# Patient Record
Sex: FEMALE | Race: WHITE | NOT HISPANIC OR LATINO | Employment: OTHER | URBAN - METROPOLITAN AREA
[De-identification: names, ages, dates, MRNs, and addresses within clinical notes are randomized per-mention and may not be internally consistent; named-entity substitution may affect disease eponyms.]

---

## 2017-02-07 ASSESSMENT — ENCOUNTER SYMPTOMS
NECK PAIN: 1
NAIL CHANGES: 0
STIFFNESS: 1
COUGH: 1
TROUBLE SWALLOWING: 0
INSOMNIA: 1
DYSPNEA ON EXERTION: 0
WEIGHT LOSS: 0
RESPIRATORY PAIN: 0
SINUS PAIN: 1
WEIGHT GAIN: 1
TASTE DISTURBANCE: 0
POLYDIPSIA: 0
COUGH DISTURBING SLEEP: 0
HOARSE VOICE: 0
DEPRESSION: 1
HEMOPTYSIS: 0
POSTURAL DYSPNEA: 0
CHILLS: 0
DECREASED APPETITE: 0
MUSCLE WEAKNESS: 1
NIGHT SWEATS: 0
WHEEZING: 0
BACK PAIN: 1
NECK MASS: 0
HALLUCINATIONS: 0
SMELL DISTURBANCE: 0
DECREASED CONCENTRATION: 0
NERVOUS/ANXIOUS: 1
FATIGUE: 1
SNORES LOUDLY: 0
INCREASED ENERGY: 1
SKIN CHANGES: 0
POOR WOUND HEALING: 0
SPUTUM PRODUCTION: 0
SHORTNESS OF BREATH: 0
JOINT SWELLING: 0
PANIC: 0
POLYPHAGIA: 1
MYALGIAS: 1
SINUS CONGESTION: 0
ARTHRALGIAS: 1
FEVER: 0
SORE THROAT: 0
ALTERED TEMPERATURE REGULATION: 0
MUSCLE CRAMPS: 1

## 2017-02-07 ASSESSMENT — ACTIVITIES OF DAILY LIVING (ADL)
DO_MEMBERS_OF_YOUR_HOUSEHOLD_USE_SUNSCREEN?: N
DO_MEMBERS_OF_YOUR_HOUSEHOLD_WEAR_SEAT_BELTS?: Y
ARE_THERE_SMOKE_DETECTORS_IN_YOUR_HOME?: Y
DO_MEMBERS_OF_YOUR_HOUSEHOLD_USE_SAFETY_HELMETS?: Y
ARE_THERE_CARBON_MONOXIDE_DETECTORS_IN_YOUR_HOME?: Y
ARE_THERE_FIREARMS_IN_YOUR_HOME?: N

## 2017-02-08 ENCOUNTER — OFFICE VISIT (OUTPATIENT)
Dept: INTERNAL MEDICINE | Facility: CLINIC | Age: 67
End: 2017-02-08

## 2017-02-08 VITALS
SYSTOLIC BLOOD PRESSURE: 107 MMHG | WEIGHT: 136.8 LBS | BODY MASS INDEX: 22.79 KG/M2 | HEIGHT: 65 IN | HEART RATE: 73 BPM | DIASTOLIC BLOOD PRESSURE: 69 MMHG

## 2017-02-08 DIAGNOSIS — Z12.11 SCREEN FOR COLON CANCER: ICD-10-CM

## 2017-02-08 DIAGNOSIS — M85.80 OSTEOPENIA: Primary | ICD-10-CM

## 2017-02-08 DIAGNOSIS — F33.41 RECURRENT MAJOR DEPRESSIVE DISORDER, IN PARTIAL REMISSION (H): ICD-10-CM

## 2017-02-08 DIAGNOSIS — Z78.0 ASYMPTOMATIC MENOPAUSAL STATE: ICD-10-CM

## 2017-02-08 DIAGNOSIS — Z23 NEED FOR ZOSTAVAX ADMINISTRATION: ICD-10-CM

## 2017-02-08 DIAGNOSIS — Z12.31 VISIT FOR SCREENING MAMMOGRAM: ICD-10-CM

## 2017-02-08 DIAGNOSIS — Z91.89 OTHER SPECIFIED PERSONAL RISK FACTORS, NOT ELSEWHERE CLASSIFIED: ICD-10-CM

## 2017-02-08 DIAGNOSIS — Z11.59 NEED FOR HEPATITIS C SCREENING TEST: ICD-10-CM

## 2017-02-08 DIAGNOSIS — Z13.220 SCREENING FOR HYPERLIPIDEMIA: ICD-10-CM

## 2017-02-08 DIAGNOSIS — M34.9 LIMITED SYSTEMIC SCLEROSIS (H): ICD-10-CM

## 2017-02-08 ASSESSMENT — PAIN SCALES - GENERAL: PAINLEVEL: NO PAIN (0)

## 2017-02-08 NOTE — PROGRESS NOTES
"  SUBJECTIVE:                                                      Stephanie Bhatia is a 66 year old female with a history of systemic sclerosis, osteopenia, anxiety, and lumbar radiculopathy who presents to clinic today for the following health issues:     The patient presents to clinic today for an annual physical. The patient reports that she has generally been well. Her most significant complaint at this time is \"normal aging\", stating that she has diffuse joint pains and aches which she manages with Aleve. She feels that her mood has been low secondary to this, worse than it has been in some time. Her primary symptom is fatigue and low energy. She expresses that she does not wish to resume medication for depression, but does request contact information for talk therapy. She feels that her sleep has been disturbed secondary to this (difficulty with sleep initiation), for which she has been using Advil PM. However, she does not wish to continue this medication as she is sedated in the morning. She also feels loss of motivation, though she is able to function at work and take care of her pet. The patient otherwise denies any recent illnesses, fevers, chills, cough, sore throat, headaches, rashes, nausea, vomiting, constipation, or diarrhea, and states that she has otherwise been healthy. She does not voice any additional concerns at this time.     Problem list and histories reviewed & adjusted, as indicated.    Patient Active Problem List   Diagnosis     Raynaud phenomenon     Anxiety     Osteopenia     Lumbar radiculopathy     Advanced directives, counseling/discussion     CARDIOVASCULAR SCREENING; LDL GOAL LESS THAN 160     Cluster headache syndrome     Dizziness and giddiness     Systemic sclerosis with limited cutaneous involvement (H)     Tinea pedis     AK (actinic keratosis)     Benign neoplasm of other specified sites of skin     Cherry hemangioma     Angular cheilitis     Past Surgical History   Procedure " "Laterality Date     Hysteroscopic placement contraceptive device         Social History   Substance Use Topics     Smoking status: Former Smoker -- 1.00 packs/day for 10 years     Types: Cigarettes     Quit date: 11/26/1986     Smokeless tobacco: Never Used     Alcohol Use: No     Family History   Problem Relation Age of Onset     C.A.D. Father      a efw small heart attacks     Neurologic Disorder Father      dementia     Neurologic Disorder Mother      dementia     Breast Cancer No family hx of      Cancer - colorectal No family hx of      DIABETES No family hx of      CANCER No family hx of      no skin cancer         Current Outpatient Prescriptions   Medication Sig Dispense Refill     Cholecalciferol (VITAMIN D) 1000 UNITS capsule Take 1 capsule by mouth daily       desonide (DESOWEN) 0.05 % ointment Apply topically 2 times daily To rash on the corners of the mouth mixed with nystatin as needed until clear. 60 g 11     nystatin (MYCOSTATIN) ointment Apply topically 2 times daily To rash at the corners of the mouth mixed with desonide until clear. 30 g 11     acyclovir (ZOVIRAX) 400 MG tablet Take 1 tablet (400 mg) by mouth 3 times daily 15 tablet 5     No Known Allergies  BP Readings from Last 3 Encounters:   02/05/16 104/69   12/02/15 92/63   12/18/14 122/80    Wt Readings from Last 3 Encounters:   02/05/16 60.328 kg (133 lb)   12/02/15 62.642 kg (138 lb 1.6 oz)   02/24/15 61.689 kg (136 lb)         Labs reviewed in EPIC.     Problem list, Medication list, Allergies, and Medical/Social/Surgical histories reviewed in [facility] and updated as appropriate.    ROS:  10 point ROS neg other than the symptoms noted above in the HPI.     OBJECTIVE:                                                    /69 mmHg  Pulse 73  Ht 1.657 m (5' 5.25\")  Wt 62.052 kg (136 lb 12.8 oz)  BMI 22.60 kg/m2   Body mass index is 22.6 kg/(m^2).     Physical Examination:   General: Well appearing, sitting upright in chair. No acute " distress. Pleasant and cooperative with examination.   HEENT: PERRL and EOMs intact bilaterally. No scleral icterus or conjunctival injection. Mucous membranes moist without erythema or exudates. Tympanic membranes clear bilaterally.   Neck: No thyromegaly. No lymphadenopathy.   Breast: symmetric breasts, no nipple discharge, no masses palpable, no axillary LN  Pulmonary: CTAB. No wheezes or rales.   Cardiovascular: Normal rate and rhythm. Normal S1 and S2. No murmurs, gallop, or rub. 2+ radial pulses bilaterally. No edema to the bilateral lower extremities.  Abdomen: Soft and non-tender to palpation throughout.   MSK: Fingers are sausage shaped with purple discoloration.   Neurological: Cranial nerves 2-12 intact. 5/5 strength throughout the upper and lower extremities bilaterally. 2+ biceps, patellar, and achilles reflexes. Sensation intact throughout all dermatomal distributions.    Psych: Pleasant affect. Normal mentation.      ASSESSMENT/PLAN:                                                      # Systemic Sclerosis   The patient reports no new or concerning symptoms related to this diagnosis. She has been seen in Rheumatology clinic in the past, and has been encouraged to follow up for her one year appointment. She has expressed that she will do so.   - Follow up in Rheumatology clinic as scheduled    # Anxiety / Depression   The patient reports recent worsening of her depressive symptoms (relatively well controlled throughout the past 20 years). She is not interested in medications at this time, though she would like to see a talk therapist. Melatonin was recommended as a non-sedating sleep aid, and she was encouraged to think about a 10,000 LUX light box to help with her circadian rhythm and seasonal depression.   - Referral for clinical psychology provided   - Melatonin for sleep  - Contact clinic with acutely worsening symptoms     # Healthcare Maintenance   - Contrast enhanced mammogram   - FIT test  provided to patient   - Schedule Dexa scan  - Check insurance coverage of Zostavax  - Referral to Travel Clinic provided for upcoming appointment  - Lipid screening today  - Hepatitis C screening today  - Information provided for advance directives / POLST    Follow Up: Return to primary care clinic in six months.      Priya Delgado, MS4 2/8/17    Scribe Disclosure:   I, Priya Delgado, am serving as a scribe; to document services personally performed by Dr. Cardenas based on data collection and the provider's statements to me.     Provider Disclosure:  I agree with above History, Review of Systems, Physical exam and Plan.  I have reviewed the content of the documentation and have edited it as needed. I have personally performed the services documented here and the documentation accurately represents those services and the decisions I have made.        The medical student acted as scribe and the encounter documented above was completely performed by myself.  Supervising Doctor Lilia Cardenas MD  [facility] PRIMARY [facility]INIC     Answers for HPI/ROS submitted by the patient on 2/7/2017   Annual Exam:  General Symptoms: Yes  Skin Symptoms: Yes  HENT Symptoms: Yes  EYE SYMPTOMS: No  HEART SYMPTOMS: No  LUNG SYMPTOMS: Yes  INTESTINAL SYMPTOMS: No  URINARY SYMPTOMS: No  GYNECOLOGIC SYMPTOMS: No  BREAST SYMPTOMS: No  SKELETAL SYMPTOMS: Yes  BLOOD SYMPTOMS: No  NERVOUS SYSTEM SYMPTOMS: No  MENTAL HEALTH SYMPTOMS: Yes  Fever: No  Loss of appetite: No  Weight loss: No  Weight gain: Yes  Fatigue: Yes  Night sweats: No  Chills: No  Increased stress: Yes  Excessive hunger: Yes  Excessive thirst: No  Feeling hot or cold when others believe the temperature is normal: No  Loss of height: No  Post-operative complications: No  Surgical site pain: No  Hallucinations: No  Change in or Loss of Energy: Yes  Hyperactivity: No  Confusion: No  Changes in hair: No  Changes in moles/birth marks: No  Itching: No  Rashes:  No  Changes in nails: No  Acne: No  Hair in places you don't want it: No  Change in facial hair: No  Warts: No  Non-healing sores: No  Scarring: No  Flaking of skin: No  Color changes of hands/feet in cold : Yes  Sun sensitivity: Yes  Skin thickening: Yes  Ear pain: No  Ear discharge: No  Hearing loss: No  Tinnitus: Yes  Nosebleeds: No  Congestion: No  Sinus pain: Yes  Trouble swallowing: No   Voice hoarseness: No  Mouth sores: No  Sore throat: No  Tooth pain: No  Gum tenderness: No  Bleeding gums: Yes  Change in taste: No  Change in sense of smell: No  Dry mouth: No  Hearing aid used: No  Neck lump: No  Cough: Yes  Sputum or phlegm: No  Coughing up blood: No  Difficulty breating or shortness of breath: No  Snoring: No  Wheezing: No  Difficulty breathing on exertion: No  Respiratory pain: No  Nighttime Cough: No  Difficulty breathing when lying flat: No  Back pain: Yes  Muscle aches: Yes  Neck pain: Yes  Swollen joints: No  Joint pain: Yes  Bone pain: No  Muscle cramps: Yes  Muscle weakness: Yes  Joint stiffness: Yes  Bone fracture: No  Nervous or Anxious: Yes  Depression: Yes  Trouble sleeping: Yes  Trouble thinking or concentrating: No  Mood changes: No  Panic attacks: No  Frequency of exercise:: 6-7 days/week  Duration of exercise:: 30-45 minutes

## 2017-02-08 NOTE — PROGRESS NOTES
The medical student acted as scribe and the encounter documented above was completely performed by myself.  Supervising Doctor Lilia Cardenas    Answers for HPI/ROS submitted by the patient on 2/7/2017   Annual Exam:  General Symptoms: Yes  Skin Symptoms: Yes  HENT Symptoms: Yes  EYE SYMPTOMS: No  HEART SYMPTOMS: No  LUNG SYMPTOMS: Yes  INTESTINAL SYMPTOMS: No  URINARY SYMPTOMS: No  GYNECOLOGIC SYMPTOMS: No  BREAST SYMPTOMS: No  SKELETAL SYMPTOMS: Yes  BLOOD SYMPTOMS: No  NERVOUS SYSTEM SYMPTOMS: No  MENTAL HEALTH SYMPTOMS: Yes  Fever: No  Loss of appetite: No  Weight loss: No  Weight gain: Yes  Fatigue: Yes  Night sweats: No  Chills: No  Increased stress: Yes  Excessive hunger: Yes  Excessive thirst: No  Feeling hot or cold when others believe the temperature is normal: No  Loss of height: No  Post-operative complications: No  Surgical site pain: No  Hallucinations: No  Change in or Loss of Energy: Yes  Hyperactivity: No  Confusion: No  Changes in hair: No  Changes in moles/birth marks: No  Itching: No  Rashes: No  Changes in nails: No  Acne: No  Hair in places you don't want it: No  Change in facial hair: No  Warts: No  Non-healing sores: No  Scarring: No  Flaking of skin: No  Color changes of hands/feet in cold : Yes  Sun sensitivity: Yes  Skin thickening: Yes  Ear pain: No  Ear discharge: No  Hearing loss: No  Tinnitus: Yes  Nosebleeds: No  Congestion: No  Sinus pain: Yes  Trouble swallowing: No   Voice hoarseness: No  Mouth sores: No  Sore throat: No  Tooth pain: No  Gum tenderness: No  Bleeding gums: Yes  Change in taste: No  Change in sense of smell: No  Dry mouth: No  Hearing aid used: No  Neck lump: No  Cough: Yes  Sputum or phlegm: No  Coughing up blood: No  Difficulty breating or shortness of breath: No  Snoring: No  Wheezing: No  Difficulty breathing on exertion: No  Respiratory pain: No  Nighttime Cough: No  Difficulty breathing when lying flat: No  Back pain: Yes  Muscle aches: Yes  Neck pain:  Yes  Swollen joints: No  Joint pain: Yes  Bone pain: No  Muscle cramps: Yes  Muscle weakness: Yes  Joint stiffness: Yes  Bone fracture: No  Nervous or Anxious: Yes  Depression: Yes  Trouble sleeping: Yes  Trouble thinking or concentrating: No  Mood changes: No  Panic attacks: No  Frequency of exercise:: 6-7 days/week  Duration of exercise:: 30-45 minutes

## 2017-02-08 NOTE — NURSING NOTE
Chief Complaint   Patient presents with     Physical     pt here for physical     Yasmin Álvarez CMA at 9:40 AM on 2/8/2017.

## 2017-02-08 NOTE — PATIENT INSTRUCTIONS
Primary Care Center Medication Refill Request Information:  * Please contact your pharmacy regarding ANY request for medication refills.  ** [facility] Prescription Fax = [phone]  * Please allow 3 business days for routine medication refills.  * Please allow 5 business days for controlled substance medication refills.     Primary Care Center Test Result notification information:  *You will be notified with in 7-10 days of your appointment day regarding the results of your test.  If you are on MyChart you will be notified as soon as the provider has reviewed the results and signed off on them.      Primary Care Center [phone] (4th Floor [facility])      Breast Center ([facility]) [phone] (2nd Floor [facility])    Breast Center ([facility]) [phone] ([address])   Rheumatology [phone] (3rd Floor [facility], 3-W)       Health Psychology (Dr. Brandyn Brunson) [phone] (4th Floor [facility])   Health Psychology (Dr. Margarita Oliver) [phone] (4th Floor [facility])  Health Psychology (Dr. Jessica Mari) [phone] (4th Floor [facility])    [facility]  Travel clinic    Mammogram Screening Tool    Mammogram   Does patient have a history of breast cancer? no  Does patient have breast implants? no  Reason for mammogram? Annual    DEXA Screening Tool    Dexa Scan  Is the patient taking any calcium supplements? no, if yes patient must stop calcium supplements 24 hours prior to appointment.

## 2017-02-08 NOTE — MR AVS SNAPSHOT
After Visit Summary   2/8/2017    Stephanie Bhatia    MRN: [id number]           Patient Information     Date Of Birth          1950        Visit Information        Provider Department      2/8/2017 9:55 AM Lilia Cardenas MD [facility]        Today's Diagnoses     Osteopenia    -  1     Screen for colon cancer         Need for hepatitis C screening test         Visit for screening mammogram         Recurrent major depressive disorder, in partial remission (H)         Screening for hyperlipidemia         Limited systemic sclerosis (H)         Need for Zostavax administration         Other specified personal risk factors, not elsewhere classified          Asymptomatic menopausal state            Care Instructions    Primary Care Center Medication Refill Request Information:  * Please contact your pharmacy regarding ANY request for medication refills.  ** [facility] Prescription Fax = [phone]  * Please allow 3 business days for routine medication refills.  * Please allow 5 business days for controlled substance medication refills.     Primary Care Center Test Result notification information:  *You will be notified with in 7-10 days of your appointment day regarding the results of your test.  If you are on MyChart you will be notified as soon as the provider has reviewed the results and signed off on them.      Primary Care Center [phone] (4th Floor [facility])      Breast Center ([facility]) [phone] (2nd Floor [facility])    Breast Center ([facility]) [phone] ([address])   Rheumatology [phone] (3rd Floor [facility], 3-W)       Health Psychology (Dr. Brandyn Brunson) [phone] (4th Floor [facility])   Health Psychology (Dr. Margarita Oliver) [phone] (4th Floor [facility])  Health Psychology (Dr. Jessica Mari) [phone] (4th Floor [facility])    [facility]  [facility] clinic    Mammogram Screening Tool    Mammogram    Does patient have a history of breast cancer? no  Does patient have breast implants? no  Reason for mammogram? Annual    DEXA Screening Tool    Dexa Scan  Is the patient taking any calcium supplements? no, if yes patient must stop calcium supplements 24 hours prior to appointment.          Follow-ups after your visit        Additional Services     [facility] Health Psychology Referral       Your provider has referred you to:  Health Psychology    Please be aware that coverage of these services is subject to the terms and limitations of your health insurance plan.  Call member services at your health plan with any benefit or coverage questions.      Please bring the following to your appointment:    >>   Any x-rays, CTs or MRIs which have been performed.  Contact the facility where they were done to arrange for  prior to your scheduled appointment.   >>   List of current medications   >>   This referral request   >>   Any documents/labs given to you for this referral            RHEUMATOLOGY REFERRAL       Your provider has referred you to: [facility]: Rheumatology Clinic [facility] [phone]   [email or web address]    Please be aware that coverage of these services is subject to the terms and limitations of your health insurance plan.  Call member services at your health plan with any benefit or coverage questions.      Please bring the following with you to your appointment:    (1) Any X-Rays, CTs or MRIs which have been performed.  Contact the facility where they were done to arrange for  prior to your scheduled appointment.    (2) List of current medications   (3) This referral request   (4) Any documents/labs given to you for this referral                  Follow-up notes from your care team     Return in about 6 months (around 8/8/2017).      Future tests that were ordered for you today     Open Future Orders        Priority Expected Expires Ordered    Dexa  "hip/pelvis/spine* Routine  2/8/2018 2/8/2017    Dexa vertebral fracture analysis Routine  2/8/2018 2/8/2017    Hepatitis C Screen Reflex to HCV RNA Quant and Genotype Routine 2/8/2017 2/8/2018 2/8/2017    Lipid panel reflex to direct LDL - -(Today) Routine 2/8/2017 2/8/2018 2/8/2017    Fecal colorectal cancer screen FIT - Future (S+30) Routine 3/1/2017 3/10/2017 2/8/2017    MA Contrast Enhanced Digital Mammogram Routine  2/8/2018 2/8/2017            Who to contact     Please call your clinic at [phone] to:    Ask questions about your health    Make or cancel appointments    Discuss your medicines    Learn about your test results    Speak to your doctor   If you have compliments or concerns about an experience at your clinic, or if you wish to file a complaint, please contact [facility] Patient Relations at [phone] or email us at [email or web address]         Additional Information About Your Visit        [employer] Information     [employer] gives you secure access to your electronic health record. If you see a primary care provider, you can also send messages to your care team and make appointments. If you have questions, please call your primary care clinic.  If you do not have a primary care provider, please call [phone] and they will assist you.      [employer] is an electronic gateway that provides easy, online access to your medical records. With [employer], you can request a clinic appointment, read your test results, renew a prescription or communicate with your care team.     To access your existing account, please contact your [facility] Clinic or call [phone] for assistance.        Care EveryWhere ID     This is your Care EveryWhere ID. This could be used by other organizations to access your [facility] medical records  [id number]        Your Vitals Were     Pulse Height BMI (Body Mass Index)             73 1.657 m (5' 5.25\") 22.60 kg/m2 "          Blood Pressure from Last 3 Encounters:   02/08/17 107/69   02/05/16 104/69   12/02/15 92/63    Weight from Last 3 Encounters:   02/08/17 62.052 kg (136 lb 12.8 oz)   02/05/16 60.328 kg (133 lb)   12/02/15 62.642 kg (138 lb 1.6 oz)              We Performed the Following     [facility] Health Psychology Referral     RHEUMATOLOGY REFERRAL     ZOSTER VACC LIVE SUBQ NJX        Primary Care Provider Office Phone # Fax #    Lilia Cardenas [phone]        [facility]IC[facility] [address]  [facility] [address]        Thank you!     Thank you for choosing [facility] PRIMARY [facility]ARE CLIN[facility]  for your care. Our goal is always to provide you with excellent care. Hearing back from our patients is one way we can continue to improve our services. Please take a few minutes to complete the written survey that you may receive in the mail after your visit with us. Thank you!             Your Updated Medication List - Protect others around you: Learn how to safely use, store and throw away your medicines at www.disposemymeds.org.          This list is accurate as of: 2/8/17 11:00 AM.  Always use your most recent med list.                   Brand Name Dispense Instructions for use    acyclovir 400 MG tablet    ZOVIRAX    15 tablet    Take 1 tablet (400 mg) by mouth 3 times daily       desonide 0.05 % ointment    DESOWEN    60 g    Apply topically 2 times daily To rash on the corners of the mouth mixed with nystatin as needed until clear.       nystatin ointment    MYCOSTATIN    30 g    Apply topically 2 times daily To rash at the corners of the mouth mixed with desonide until clear.       vitamin D 1000 UNITS capsule      Take 1 capsule by mouth daily

## 2017-02-10 DIAGNOSIS — Z12.11 SCREEN FOR COLON CANCER: ICD-10-CM

## 2017-02-10 PROCEDURE — 82274 ASSAY TEST FOR BLOOD FECAL: CPT | Performed by: INTERNAL MEDICINE

## 2017-02-12 LAB — HEMOCCULT STL QL IA: NEGATIVE

## 2017-02-13 DIAGNOSIS — Z11.59 NEED FOR HEPATITIS C SCREENING TEST: ICD-10-CM

## 2017-02-13 DIAGNOSIS — Z13.220 SCREENING FOR HYPERLIPIDEMIA: ICD-10-CM

## 2017-02-13 LAB
CHOLEST SERPL-MCNC: 202 MG/DL
HCV AB SERPL QL IA: NORMAL
HDLC SERPL-MCNC: 51 MG/DL
LDLC SERPL CALC-MCNC: 130 MG/DL
NONHDLC SERPL-MCNC: 151 MG/DL
TRIGL SERPL-MCNC: 105 MG/DL

## 2017-02-13 PROCEDURE — 36415 COLL VENOUS BLD VENIPUNCTURE: CPT | Performed by: FAMILY MEDICINE

## 2017-02-13 PROCEDURE — 80061 LIPID PANEL: CPT | Performed by: FAMILY MEDICINE

## 2017-02-13 PROCEDURE — 86803 HEPATITIS C AB TEST: CPT | Performed by: FAMILY MEDICINE

## 2017-02-23 ENCOUNTER — OFFICE VISIT (OUTPATIENT)
Dept: INTERNAL MEDICINE | Facility: CLINIC | Age: 67
End: 2017-02-23

## 2017-02-23 VITALS
HEART RATE: 68 BPM | OXYGEN SATURATION: 100 % | DIASTOLIC BLOOD PRESSURE: 69 MMHG | RESPIRATION RATE: 16 BRPM | SYSTOLIC BLOOD PRESSURE: 102 MMHG | BODY MASS INDEX: 22.87 KG/M2 | WEIGHT: 138.5 LBS

## 2017-02-23 DIAGNOSIS — Z71.89 OTHER SPECIFIED COUNSELING: Primary | Chronic | ICD-10-CM

## 2017-02-23 ASSESSMENT — PAIN SCALES - GENERAL: PAINLEVEL: MILD PAIN (2)

## 2017-02-23 NOTE — NURSING NOTE
Chief Complaint   Patient presents with     Travel Clinic     Patient is here to discuss what vaccines she needs for when she travels to [facility].     Sary Maria LPN at 3:19 PM on 2/23/2017.

## 2017-02-23 NOTE — PATIENT INSTRUCTIONS
Primary Care Center Medication Refill Request Information:  * Please contact your pharmacy regarding ANY request for medication refills.  ** [facility] Prescription Fax = [phone]  * Please allow 3 business days for routine medication refills.  * Please allow 5 business days for controlled substance medication refills.     Primary Care Center Test Result notification information:  *You will be notified with in 7-10 days of your appointment day regarding the results of your test.  If you are on MyChart you will be notified as soon as the provider has reviewed the results and signed off on them.    Primary Care Center Phone Number [phone]    Infectious Disease [phone] (3rd Floor [facility])        Creating a Medical Travel Kit    Every busy traveler should have a kit containing personal care and medical items that can be easily tossed into a suitcase.  Quantities can be adjusted for the length of travel, for destinations, and availability of replacement at your destination.    Plan to see your Travel medicine Clinic 4-6 weeks prior to departure as some vaccines require time to become effective.     Items to Pack:    -Basic antibiotics  -Mild laxatives: Metamucil or Senokot, available without a prescription.  -Anti-diarrheal medication: Imodium AD or Pepto Bismol, available in tablet form without a prescription.  -Medical thermometer  -Antacid tablets  -Band aides, guaze bandages, ace wrap, adhesive tape, antibiotic ointment  -Topical ointment or creams for rash or bites (over the counter hydrocortisone cream).    -Motion sickness: Dramamine or Antivert (Meclazine)tablets  -Aspirin, acetaminophen(tylenol) ibuprofen  -Cough medicine or drops  -Anti-fungal foot powder  -Nasal spray or decongestants  -Eyeglasses.  Take an extra pair or copy of your prescription.  _Insect repellent with 30% DEET time release, such as Ultrathon or Ramirez products. Available at [employer], [place], sporting goods  stores  -Permethrin clothing insecticide treatment.Available at [employer], [employer]  -Ear Plugs  -Sunscreen  -Sunglasses, Hat  -Safety pins, toilet paper, money belt, padlock, duct tape    Regular Medications should be left in original containers and carried in your carry on bag. Take enough with you for the entire trip. You might need a letter to give to your insurance to obtain a sufficient amount for your trip.      Carry with you the following info: Known allergies, medical conditions, medications, name of your insurance company and policy number, blood type if known, and phone numbers for emergency contact.        Travel Websites:  Wwwnc.cdc.gov/travel    Medical resources  Medical Alert Foundation  1-486.267.1996  Provides Medical Alert Tags    Passport Emergency Services [phone] (9 am TO 5 pm EST)  [phone] (after hours)  Deals with emergencies relating to passport problems.    [facility]  [phone]( Mon-Fri 8:30-10pm EST)  [phone] Emergencies only, 24 hour facilitator)  [phone] ( travel advisories recording) Provides information on current health conditions around the world.

## 2017-02-23 NOTE — MR AVS SNAPSHOT
After Visit Summary   2/23/2017    Stephanie Bhatia    MRN: [id number]           Patient Information     Date Of Birth          1950        Visit Information        Provider Department      2/23/2017 3:40 PM Milla Jiménez, APRN CNP [facility]        Today's Diagnoses     Other specified counseling    -  1      Care Instructions    Primary Care Center Medication Refill Request Information:  * Please contact your pharmacy regarding ANY request for medication refills.  ** [facility] Prescription Fax = [phone]  * Please allow 3 business days for routine medication refills.  * Please allow 5 business days for controlled substance medication refills.     Primary Care Center Test Result notification information:  *You will be notified with in 7-10 days of your appointment day regarding the results of your test.  If you are on MyChart you will be notified as soon as the provider has reviewed the results and signed off on them.    Primary Care Center Phone Number [phone]    Infectious Disease [phone] (3rd Floor [facility])        Creating a Medical Travel Kit    Every busy traveler should have a kit containing personal care and medical items that can be easily tossed into a suitcase.  Quantities can be adjusted for the length of travel, for destinations, and availability of replacement at your destination.    Plan to see your Travel medicine Clinic 4-6 weeks prior to departure as some vaccines require time to become effective.     Items to Pack:    -Basic antibiotics  -Mild laxatives: Metamucil or Senokot, available without a prescription.  -Anti-diarrheal medication: Imodium AD or Pepto Bismol, available in tablet form without a prescription.  -Medical thermometer  -Antacid tablets  -Band aides, guaze bandages, ace wrap, adhesive tape, antibiotic ointment  -Topical ointment or creams for rash or bites (over the counter hydrocortisone cream).    -Motion sickness: Dramamine or  Antivert (Meclazine)tablets  -Aspirin, acetaminophen(tylenol) ibuprofen  -Cough medicine or drops  -Anti-fungal foot powder  -Nasal spray or decongestants  -Eyeglasses.  Take an extra pair or copy of your prescription.  _Insect repellent with 30% DEET time release, such as Ultrathon or Ramirez products. Available at [employer]  -Permethrin clothing insecticide treatment.Available at [employer]  -Ear Plugs  -Sunscreen  -Sunglasses, Hat  -Safety pins, toilet paper, money belt, padlock, duct tape    Regular Medications should be left in original containers and carried in your carry on bag. Take enough with you for the entire trip. You might need a letter to give to your insurance to obtain a sufficient amount for your trip.      Carry with you the following info: Known allergies, medical conditions, medications, name of your insurance company and policy number, blood type if known, and phone numbers for emergency contact.        Travel Websites:  Wwwnc.cdc.gov/travel    Medical resources  Medical Alert Foundation  [phone]  Provides Medical Alert Tags    Passport Emergency Services [phone] (9 am TO 5 pm EST)  [phone] (after hours)  Deals with emergencies relating to passport problems.    [facility]  [phone]( Mon-Fri 8:30-10pm EST)  [phone] Emergencies only, 24 hour facilitator)  [phone] ( travel advisories recording) Provides information on current health conditions around the world.        Follow-ups after your visit        Additional Services     INFECTIOUS DISEASE REFERRAL       Your provider has referred you to: [facility]    Please be aware that coverage of these services is subject to the terms and limitations of your health insurance plan.  Call member services at your health plan with any benefit or coverage questions.      Please  bring the following with you to your appointment:    (1) Any X-Rays, CTs or MRIs which have been performed.  Contact the facility where they were done to arrange for  prior to your scheduled appointment.    (2) List of current medications   (3) This referral request   (4) Any documents/labs given to you for this referral                  Your next 10 appointments already scheduled     Feb 24, 2017 12:00 PM CST   DX HIP/PELVIS/SPINE with [address] [facility] ([facility])    [address] [place] [address] [phone]           Please do not take any of the following 48 hours prior to your exam: vitamins, calcium tablets, antacids.            Feb 24, 2017 12:30 PM CST   DX VERTEBRAL FRACTURE ANALYSIS LAT ONLY with [address] [facility] ([facility])    [address] [place] [address] [phone]           Please do not take any of the following 48 hours prior to your exam: vitamins, calcium tablets, antacids.            Mar 01, 2017 11:30 AM CST   (Arrive by 11:15 AM)   Return Visit with Aristides Case MD   [facility] Rheumatology ([facility])    [address] [place] [address] [phone]              Who to contact     Please call your clinic at [phone] to:    Ask questions about your health    Make or cancel appointments    Discuss your medicines    Learn about your test results    Speak to your doctor   If you have compliments or concerns about an experience at your clinic, or if you wish to file a complaint, please contact [facility] Patient Relations at [phone] or email us at [email or web address]         Additional Information About Your Visit        MyChart Information     [employer] gives you secure access to your electronic health record. If you see a  primary care provider, you can also send messages to your care team and make appointments. If you have questions, please call your primary care clinic.  If you do not have a primary care provider, please call [phone] and they will assist you.      [facility] is an electronic gateway that provides easy, online access to your medical records. With [facility], you can request a clinic appointment, read your test results, renew a prescription or communicate with your care team.     To access your existing account, please contact your [facility] or call [phone] for assistance.        Care EveryWhere ID     This is your Care EveryWhere ID. This could be used by other organizations to access your [facility] medical records  [id number]        Your Vitals Were     Pulse Respirations Pulse Oximetry Breastfeeding? BMI (Body Mass Index)       68 16 100% No 22.87 kg/m2        Blood Pressure from Last 3 Encounters:   02/23/17 102/69   02/08/17 107/69   02/05/16 104/69    Weight from Last 3 Encounters:   02/23/17 62.8 kg (138 lb 8 oz)   02/08/17 62.1 kg (136 lb 12.8 oz)   02/05/16 60.3 kg (133 lb)              We Performed the Following     INFECTIOUS DISEASE REFERRAL        Primary Care Provider Office Phone # Fax #    Lilia Cardenas [phone]        PHYSICIANS [address] [place] [address] 74  [facility] [address]        Thank you!     Thank you for choosing [facility]  for your care. Our goal is always to provide you with excellent care. Hearing back from our patients is one way we can continue to improve our services. Please take a few minutes to complete the written survey that you may receive in the mail after your visit with us. Thank you!             Your Updated Medication List - Protect others around you: Learn how to safely use, store and throw away your medicines at www.disposemymeds.org.          This list is accurate as of: 2/23/17  3:48 PM.  Always  use your most recent med list.                   Brand Name Dispense Instructions for use    acyclovir 400 MG tablet    ZOVIRAX    15 tablet    Take 1 tablet (400 mg) by mouth 3 times daily       desonide 0.05 % ointment    DESOWEN    60 g    Apply topically 2 times daily To rash on the corners of the mouth mixed with nystatin as needed until clear.       nystatin ointment    MYCOSTATIN    30 g    Apply topically 2 times daily To rash at the corners of the mouth mixed with desonide until clear.       vitamin D 1000 UNITS capsule      Take 1 capsule by mouth daily

## 2017-02-23 NOTE — PROGRESS NOTES
HPI: Stephanie Bahtia is a 66 year old female who comes in for consultation regarding travel to [facility] for 2 weeks in June.  She wants to review her current vaccines and be assured she is able to receive the needed vaccines and to ask for a referral to a travel clinic.  She will be visiting a friend, going to the ocean and to a national park.     She is in general good health.     Patient Active Problem List   Diagnosis     Raynaud phenomenon     Anxiety     Osteopenia     Lumbar radiculopathy     Advanced directives, counseling/discussion     CARDIOVASCULAR SCREENING; LDL GOAL LESS THAN 160     Cluster headache syndrome     Dizziness and giddiness     Systemic sclerosis with limited cutaneous involvement (H)     Tinea pedis     AK (actinic keratosis)     Benign neoplasm of other specified sites of skin     Cherry hemangioma     Angular cheilitis       She has a medical hx of  does not have any pertinent problems on file.    Current Outpatient Prescriptions   Medication Sig Dispense Refill     Cholecalciferol (VITAMIN D) 1000 UNITS capsule Take 1 capsule by mouth daily       desonide (DESOWEN) 0.05 % ointment Apply topically 2 times daily To rash on the corners of the mouth mixed with nystatin as needed until clear. 60 g 11     nystatin (MYCOSTATIN) ointment Apply topically 2 times daily To rash at the corners of the mouth mixed with desonide until clear. 30 g 11     acyclovir (ZOVIRAX) 400 MG tablet Take 1 tablet (400 mg) by mouth 3 times daily 15 tablet 5         ALLERGIES: Review of patient's allergies indicates no known allergies.    PAST MEDICAL HX:   Past Medical History   Diagnosis Date     Anxiety      Migraine      Raynaud phenomenon      Sciatica      Systemic sclerosis with limited cutaneous involvement (H) 12/2/2014       PAST SURGICAL HX:   Past Surgical History   Procedure Laterality Date     Hysteroscopic placement contraceptive device         IMMUNIZATION HX:   Immunization History   Administered Date(s)  Administered     Influenza (High Dose) 3 valent vaccine 09/27/2013, 11/26/2014, 10/12/2015, 09/01/2016     Pneumococcal (PCV 13) 11/26/2014     Pneumococcal 23 valent 10/21/2013     TDAP (ADACEL AGES 11-64) 05/01/2012       SOCIAL HX:   Social History     Social History Narrative         OBJECTIVE:  /69  Pulse 68  Resp 16  Wt 62.8 kg (138 lb 8 oz)  SpO2 100%  Breastfeeding? No  BMI 22.87 kg/m2   Wt Readings from Last 1 Encounters:   02/23/17 62.8 kg (138 lb 8 oz)         ASSESSMENT/PLAN:   She will need the following vaccines for [place] travel:  Yellow Fever, typhoid, Hepatitis A, Tdap as well as an rx for Malarone.     Referral given to a travel Medicine  Clinic as we do not have YF vaccine here. Advised to start vaccines within a month to have immunity by the time of her trip.  She is healthy and should be able to have Yellow Fever vaccine w/o much increased risk due to age.     Total time spent 25 minutes.  More than 50% of the time spent with Ms. Bhatia on counseling / reviewing CDC recommendations/ coordinating her care    Milla OWENS, CNP

## 2017-03-01 ENCOUNTER — OFFICE VISIT (OUTPATIENT)
Dept: RHEUMATOLOGY | Facility: CLINIC | Age: 67
End: 2017-03-01
Attending: INTERNAL MEDICINE
Payer: MEDICARE

## 2017-03-01 VITALS
SYSTOLIC BLOOD PRESSURE: 109 MMHG | TEMPERATURE: 98 F | WEIGHT: 136.2 LBS | HEART RATE: 70 BPM | BODY MASS INDEX: 22.69 KG/M2 | HEIGHT: 65 IN | DIASTOLIC BLOOD PRESSURE: 72 MMHG

## 2017-03-01 DIAGNOSIS — M34.9 SYSTEMIC SCLEROSIS WITH LIMITED CUTANEOUS INVOLVEMENT (H): Primary | ICD-10-CM

## 2017-03-01 DIAGNOSIS — R06.02 SOB (SHORTNESS OF BREATH): ICD-10-CM

## 2017-03-01 PROCEDURE — 99212 OFFICE O/P EST SF 10 MIN: CPT | Mod: ZF

## 2017-03-01 ASSESSMENT — PAIN SCALES - GENERAL: PAINLEVEL: NO PAIN (0)

## 2017-03-01 NOTE — MR AVS SNAPSHOT
After Visit Summary   3/1/2017    Stephanie Bhatia    MRN: [id number]           Patient Information     Date Of Birth          1950        Visit Information        Provider Department      3/1/2017 11:30 AM Aristides Case MD [facility] Rheumatology        Today's Diagnoses     Systemic sclerosis with limited cutaneous involvement (H)    -  1    SOB (shortness of breath)          Care Instructions    Check the BP every week and seek medical attention if SBP consistently greater than 15mmHg above baseline or DBP consistently greater than 10mmHG above baseline.         Follow-ups after your visit        Follow-up notes from your care team     Return in about 1 year (around 3/1/2018).      Your next 10 appointments already scheduled     Mar 01, 2017  1:05 PM CST   XR CHEST 2 VIEWS with [id number]   [facility] Orthopaedics XRay ([facility])    [address] [place] [id number] [phone]           Please bring a list of your current medicines to your exam. (Include vitamins, minerals and over-thecounter medicines.) Leave your valuables at home.  Tell your doctor if there is a chance you may be pregnant.  You do not need to do anything special for this exam.            Mar 13, 2017  6:00 AM CDT   FULL PULMONARY FUNCTION with  PFL B   [facility] Pulmonary Function Testing ([facility])    [address] [place] [id number] [phone]            Mar 13, 2017  7:00 AM CDT   Ech Complete with [id number]   [facility] Echo ([facility])    [address] [place] [id number] [phone]           1.  Please bring or wear a comfortable two-piece outfit. 2.  You may eat, drink and take your normal medicines. 3.  For any questions that cannot be answered, please contact the ordering physician            Mar 01, 2018  7:00 AM CST   (Arrive by 6:45 AM)   Return  "Visit with Petros Gunn MD   [facility] Rheumatology ([facility])    [address] [facility]  3rd Floor  [facility] [address] [phone]              Future tests that were ordered for you today     Open Future Orders        Priority Expected Expires Ordered    X-ray Chest 2 views Routine 3/1/2017 9/27/2017 3/1/2017    Echocardiogram Routine  3/1/2018 3/1/2017    General PFT Lab (Please always keep checked) Routine  3/1/2018 3/1/2017    Pulmonary Function Test Routine  3/1/2018 3/1/2017            Who to contact     If you have questions or need follow up information about today's clinic visit or your schedule please contact [facility] R[facility]AT[facility] directly at [phone].  Normal or non-critical lab and imaging results will be communicated to you by MyChart, letter or phone within 4 business days after the clinic has received the results. If you do not hear from us within 7 days, please contact the clinic through [employer] or phone. If you have a critical or abnormal lab result, we will notify you by phone as soon as possible.  Submit refill requests through [employer] or call your pharmacy and they will forward the refill request to us. Please allow 3 business days for your refill to be completed.          Additional Information About Your Visit        [employer] Information     [employer] gives you secure access to your electronic health record. If you see a primary care provider, you can also send messages to your care team and make appointments. If you have questions, please call your primary care clinic.  If you do not have a primary care provider, please call [phone] and they will assist you.        Care EveryWhere ID     This is your Care EveryWhere ID. This could be used by other organizations to access your [facility] medical records  [id number]        Your Vitals Were     Pulse Temperature Height BMI (Body Mass Index)          70 98  F (36.7  C) (Oral) 1.651 m (5' 5\") 22.66 kg/m2 "         Blood Pressure from Last 3 Encounters:   03/01/17 109/72   02/23/17 102/69   02/08/17 107/69    Weight from Last 3 Encounters:   03/01/17 61.8 kg (136 lb 3.2 oz)   02/23/17 62.8 kg (138 lb 8 oz)   02/08/17 62.1 kg (136 lb 12.8 oz)               Primary Care Provider Office Phone # Fax #    Lilia Cardenas [phone]        PHYSICIANS [address] [place] [address] 741  [facility] [address]        Thank you!     Thank you for choosing [facility] RHEUMATOLOG[facility]  for your care. Our goal is always to provide you with excellent care. Hearing back from our patients is one way we can continue to improve our services. Please take a few minutes to complete the written survey that you may receive in the mail after your visit with us. Thank you!             Your Updated Medication List - Protect others around you: Learn how to safely use, store and throw away your medicines at www.disposemymeds.org.          This list is accurate as of: 3/1/17 12:08 PM.  Always use your most recent med list.                   Brand Name Dispense Instructions for use    desonide 0.05 % ointment    DESOWEN    60 g    Apply topically 2 times daily To rash on the corners of the mouth mixed with nystatin as needed until clear.       nystatin ointment    MYCOSTATIN    30 g    Apply topically 2 times daily To rash at the corners of the mouth mixed with desonide until clear.       vitamin D 1000 UNITS capsule      Take 1 capsule by mouth daily

## 2017-03-01 NOTE — NURSING NOTE
"Chief Complaint   Patient presents with     RECHECK     Follow up per pt.       Initial /72  Pulse 70  Temp 98  F (36.7  C) (Oral)  Ht 1.651 m (5' 5\")  Wt 61.8 kg (136 lb 3.2 oz)  BMI 22.66 kg/m2 Estimated body mass index is 22.66 kg/(m^2) as calculated from the following:    Height as of this encounter: 1.651 m (5' 5\").    Weight as of this encounter: 61.8 kg (136 lb 3.2 oz).  Medication Reconciliation: complete  "

## 2017-03-01 NOTE — PROGRESS NOTES
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   No raynaud's or GERD or arthralgias.   Screen for pulmonary hypertension and interstitial lung disease negative. Also there is chronic shortness of breath. Echo, PFT, CXR ordered.    Chronic fatigue. Discussed with the patient regarding plaquenil therapy. But patient wants to wait and watch without plaquenil. I agree with this approach.   Check the BP every week and seek medical attention if SBP consistently greater than 15mmHg above baseline or DBP consistently greater than 10mmHG above baseline.    Since I am moving to [place] practice this spring, patient would follow up with Dr. Gunn in 1 year.      Return in about 1 year (around 3/1/2018).       Orders Placed This Encounter   Procedures     X-ray Chest 2 views     Echocardiogram     General PFT Lab (Please always keep checked)     Pulmonary Function Test       Medications Discontinued During This Encounter   Medication Reason     acyclovir (ZOVIRAX) 400 MG tablet Therapy completed     Current Outpatient Prescriptions   Medication Sig Dispense Refill     Cholecalciferol (VITAMIN D) 1000 UNITS capsule Take 1 capsule by mouth daily       desonide (DESOWEN) 0.05 % ointment Apply topically 2 times daily To rash on the corners of the mouth mixed with nystatin as needed until  clear. 60 g 11     nystatin (MYCOSTATIN) ointment Apply topically 2 times daily To rash at the corners of the mouth mixed with desonide until clear. 30 g 11       Aristides Case M.D.    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  Document not previously scanned.  Validation form completed and sent with document to be scanned.      Confirmed/documented designated decision maker(s). See permanent comments section of demographics in clinical tab. View document(s) and details by clicking on code status.   Added by Elizabeth Hurley on 10/11/2013.             Osteopenia 11/18/2011     Priority: Medium     Lumbar radiculopathy 11/18/2011     Priority: Medium     Raynaud phenomenon      Priority: Medium     Anxiety      Priority: Medium            History of Present Illness:     Chief Complaint   Patient presents with     RECHECK     Follow up per pt.     Original  "presentation:  Ms. Stephanie Bhatia is a very pleasant 63 year old lady who is not on any prescription meds.   She works in [employer] and uses her hands a lot. She says she is here because her PCP asked her to. Ms. Bhatia thinks she can improve her fatigue on diet and exercise.   1995- c/o fatigue, migraine headache, raynauds phenomenon. Saw a rheumatologist in [place]. 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   In 2000 she moved to [facility] and was there until 2009. All those years, her raynauds and migraine went into remission on its own. But had fatigue.  In 2005 saw Dr. Kristen Agee for fatigue in [facility]. They checked her serologies. COLLEEN screen, CCP, C3, C4, neg. dsDNA neg. KRYSTLE panel was negative. PFT was \"borderline\" as per patient. Echo was \"alright\" at that time.   She thinks she has \"sciatica\".  In 2009 moved to [place].   Raynaud's phenomenon:  Onset: 1995  Digits: all fingers except thumb  Digital ulcers: none  Color changes: white and purple in past; past few years purple only  Duration of episode: not >10 mins  Pain during episode: none  Family history of Raynauds: none    GERD: occasionally.  Has had \"thick fingers\" \"always\"  Never been on any DMARDs.    Fatigue - worsening over the past few years  Migraine - 2- 3 episodes in the last one year  No muscle weakness.   Arthralgia: hands, knees, hips, wrists  Rash in forehead this winter on and off  Antiviral on and off for genital herpes (lab tested in past). She does get episodes of perianal lesions which go away in about a week's time. She thinks these lesions are looking different than herpes nowadays.   With exercise, she does feels that \"she cant get enough air\". Chronic cough+  She thinks she might have had couple \"spells\". One episode was 8 months ago and the other was about 4 months ago. Few seconds of blanking out and was having brain fog of about 20 mins. First episode was many years ago " when she was in [place].     CXR neg.   Echo no evidence of Pulmonary HTN  MRA/MRI brain:  1. No evidence of acute infarction or intracranial hemorrhage.  2. Small amount of fluid in the left mastoid air cells .  3. Head MRA demonstrates no definite aneurysm or stenosis of the major  intracranial arteries.  4. Neck MRA demonstrates patent major cervical arteries.    Neurology visit with Olesya Jesus NP: no etiology found for the spell.   6MW test: 1700 feet  PFT within normal limits     No h/o myalgia, weight loss, loss of appetite, fevers, chills, night sweats, swollen glands  Patient denies any, malar rash, photosensitivity, recurrent mouth ulcers, sicca symptoms, recurrent sinusitis/rhinitis,swallowing difficulty, hearing or visual changes recently.   No h/o arterial/venous thrombosis in the past  No h/o persistent cough, chest pain  No h/o persistent nausea, vomiting, constipation, diarrhea, abdominal pain  No h/o hematochezia, hematuria, hemoptysis, hematemesis  No persistent headache, tingling, numbness, weakness. No h/o seizures    PULMONARY STRESS TEST, SIMPLE  MRA and MRI of brain and neck  X-ray Chest 2 views  KRYSTLE antibody panel  DNA double stranded antibodies  CBC with platelets differential  Comprehensive metabolic panel  Antinuclear antibody screen by EIA  Centromere Antibody IgG  Andreia 1 Antibody IgG  Complement C4  Complement C3  UA with Microscopic reflex to Culture  NEUROLOGY ADULT REFERRAL  EKG 12-lead complete w/read - Clinics  Echocardiogram  PFT Lab Testing    COLLEEN 1.4  Anti-centromere and anti-topoisomerase negative.     March 1, 2017  No new changes in the skin.   Raynaud's - about the same.   No acid reflux symptoms.   No muscle weakness on daily basis.  No daily joint pains except chronic on and off neck pain.   Morning stiffness X 30 mins.  Chronic shortness of breath +ve. Not climbing stairs as much nowadays.      Chronic fatigue +ve 6-8/10         Review of Systems:   Complete ROS  "negative except for symptoms mentioned in the HPI          Past Medical History:     Past Medical History   Diagnosis Date     Anxiety      Migraine      Raynaud phenomenon      Sciatica      Systemic sclerosis with limited cutaneous involvement (H) 12/2/2014     Past Surgical History   Procedure Laterality Date     Hysteroscopic placement contraceptive device              Social History:     Social History     Occupational History     Not on file.     Social History Main Topics     Smoking status: Former Smoker     Packs/day: 1.00     Years: 10.00     Types: Cigarettes     Quit date: 11/26/1986     Smokeless tobacco: Never Used     Alcohol use No     Drug use: No     Sexual activity: No            Family History:     Family History   Problem Relation Age of Onset     C.A.D. Father      a efw small heart attacks     Neurologic Disorder Father      dementia     Neurologic Disorder Mother      dementia     Breast Cancer No family hx of      Cancer - colorectal No family hx of      DIABETES No family hx of      CANCER No family hx of      no skin cancer            Allergies:   No Known Allergies         Medications:     Current Outpatient Prescriptions   Medication Sig Dispense Refill     Cholecalciferol (VITAMIN D) 1000 UNITS capsule Take 1 capsule by mouth daily       desonide (DESOWEN) 0.05 % ointment Apply topically 2 times daily To rash on the corners of the mouth mixed with nystatin as needed until clear. 60 g 11     nystatin (MYCOSTATIN) ointment Apply topically 2 times daily To rash at the corners of the mouth mixed with desonide until clear. 30 g 11            Physical Exam:   Blood pressure 109/72, pulse 70, temperature 98  F (36.7  C), temperature source Oral, height 1.651 m (5' 5\"), weight 61.8 kg (136 lb 3.2 oz), not currently breastfeeding.  Wt Readings from Last 4 Encounters:   03/01/17 61.8 kg (136 lb 3.2 oz)   02/23/17 62.8 kg (138 lb 8 oz)   02/08/17 62.1 kg (136 lb 12.8 oz)   02/05/16 60.3 kg (133 " lb)       Constitutional: well-developed, appearing stated age; cooperative  Eyes: nl EOM, PERRLA, vision, conjunctiva, sclera  ENT: nl external ears, nose, hearing, lips, teeth, gums, throat  No mucous membrane lesions, normal saliva pool  Neck: no mass or thyroid enlargement  Resp: lungs clear to auscultation,   CV: RRR, no murmurs, rubs or gallops, no edema  GI: no ABD mass or tenderness, no HSM  : not tested  Lymph: no cervical, supraclavicular or epitrochlear nodes  MS: All shoulder, elbow, wrist, MCP/PIP/DIP, spine, hip, knee, ankle, and foot MTP/IP joints were examined and  found normal. No active synovitis or deformity. Full ROM.  Normal  strength. Fist 100%.  No dactylitis,  tenosynovitis, enthesopathy.  Skin: sclerodactyly +  Psych: nl judgement, orientation, memory, affect.         Data:     Results for orders placed or performed in visit on 02/24/17   Dexa vertebral fracture analysis    Narrative    ATTENTION:  Easy to read DXA/VFA reports (formatted and presented as   tables)   can be found in EPIC under Chart review >  Imaging tab >  DXA    [facility]   [address] [place] [address]  Phone: [phone]   Fax: [phone]    Vertebral Fracture Assessment (VFA) Report:      BEAR PATINO 2DIEM:    Your patient, FUNMI OLSEN ([id number] ), completed a VFA exam ([id number]   ) on a kinkon on   . The following is a summary of   the results.   _________________________________________________________________________    Patient biographical information and history:    Current measured height: 65.0 in.  Current measured weight: 138.0 lbs.    Vertebral Fracture Assessment (VFA) was performed in the lateral decubitus   position on this [age] year [age] White Female, whose history as reported by   the patient is noted for  postmenopausal status      and the following   current treatments:   calcium, vitamin D,    In  "addition, the patient   reported having been previously treated with:   hormone replacement,   corticosteroids,    The patient meets the following indications for a VFA:      (2007 ISCD Position Statements at www.iscd.org)  _________________________________________________________________________    Technical quality:    Satisfactory.  VFA software and contrast adjustment allow for better   visualization of the thoracic vertebral bodies.  VFA scan was   interpretable from T4 - L4.  _________________________________________________________________________    Densitometry results:    Comparison:  None provided.    _________________________________________________________________________    Conclusions:    VFA scan was interpretable from T4 - L4.  Using the semi-quantitative   analysis of Genant (see reference #1), no fractures were identified.  Six   point morphometry confirmed the presence and severity of these   deformities.           _________________________________________________________________________      Feel free to contact DXA services if you have any questions or comments.    Thank you for the opportunity to be of service to you and your patient.      _________________________________________________________________________    References:    1.  NOF Physician's Guideline Website address:  www.nof.org    2.  American College of Rheumatology Recommendations for the Prevention   and Treatment of Glucocorticoid-induced Osteoporosis:  2001 update in   \"Arthritis and Rheumatism\"  July 2001 edition (vol 44, issue 7) pp 1499 -   1504.    3.  ISCD position statements:  www.iscd.org  (includes the report of the   2005 VFA Position Development Conference)    According to the ISCD position statements, lateral spine is not to be used   for diagnosis, but may have a role in monitoring.    According to the ISCD position statements, the diagnosis of osteoporosis   in pre-menopausal women and in men younger than age 50 " should not be made   on the basis of densitometric criteria alone.  In addition Z-scores rather   than T-scores should be used.    4.  Implementation of suggestions is at the discretion of the ordering   provider.  Clinical correlation is recommended.    5.  WHO categories:    normal = T-score of - 1.0 or more positive, low bone density/ osteopenia =   T-score of - 1.0 to - 2.5, osteoporosis = T-score of -2.5 or more negative      Template revised 7.5.2016         Recent Labs   Lab Test  03/21/14   1044  10/21/13   1304  11/18/11   1049   WBC  8.2  6.5  7.1   RBC  4.41  4.15  4.48   HGB  13.5  13.0  13.9   HCT  41.9  39.0  42.0   MCV  95  94  94   RDW  13.1  12.8  12.9   PLT  281  274  283   ALBUMIN  3.9   --   3.9   CRP   --   <5.0   --    BUN  14  18  20      Recent Labs   Lab Test  10/21/13   1304  11/18/11   1049 10/10/08   TSH  1.57  1.10  1.09     Aristides Case M.D.    Division of Rheumatology  [facility]  Phone (Patients line): [phone]  Pager (healthcare professionals only): [phone]  Phone (healthcare professionals only line): [phone]

## 2017-03-01 NOTE — LETTER
3/1/2017       RE: Stephanie Bhatia  [address]  [facility] [address]     Dear Colleague,    Thank you for referring your patient, Stephanie Bhatia, to the [facility] RHEUMATOLOGY at [facility]. Please see a copy of my visit note below.    [facility] - Rheumatology Clinic Visit     Stephanie Bhatia MRN# [id number]   YOB: 1950      Primary care provider: Alysa Gutierrez          Assessment and Plan:   #1 Limited cutaneous systemic sclerosis with secondary raynaud's phenomenon and abnormal capillaries in nailfolds; polyarthralgia;Scl-70 neg; anti-centromere neg  #2 Strongly positive rheumatoid factor  #3 Chronic fatigue; cough; episodes of shortness of breath  #4 Spells of speech arrest X on and off since 2013 - neurology eval negative  #5 Chronic on and off migraines  #6 Episodes of perianal rash     Limited cutaneous systemic sclerosis is stable without any clinical progression since last year.   No raynaud's or GERD or arthralgias.   Screen for pulmonary hypertension and interstitial lung disease negative. Also there is chronic shortness of breath. Echo, PFT, CXR ordered.    Chronic fatigue. Discussed with the patient regarding plaquenil therapy. But patient wants to wait and watch without plaquenil. I agree with this approach.   Check the BP every week and seek medical attention if SBP consistently greater than 15mmHg above baseline or DBP consistently greater than 10mmHG above baseline.    Since I am moving to [place] [facility] this spring, patient would follow up with Dr. Gunn in 1 year.      Return in about 1 year (around 3/1/2018).       Orders Placed This Encounter   Procedures     X-ray Chest 2 views     Echocardiogram     General PFT Lab (Please always keep checked)     Pulmonary Function Test       Medications Discontinued During This Encounter   Medication Reason     acyclovir (ZOVIRAX) 400 MG tablet Therapy completed     Current  Outpatient Prescriptions   Medication Sig Dispense Refill     Cholecalciferol (VITAMIN D) 1000 UNITS capsule Take 1 capsule by mouth daily       desonide (DESOWEN) 0.05 % ointment Apply topically 2 times daily To rash on the corners of the mouth mixed with nystatin as needed until clear. 60 g 11     nystatin (MYCOSTATIN) ointment Apply topically 2 times daily To rash at the corners of the mouth mixed with desonide until clear. 30 g 11       Aristides Case M.D.    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  Document not previously scanned.  Validation form completed and sent with document to be scanned.      Confirmed/documented designated decision maker(s). See permanent comments section of demographics in clinical tab. View document(s) and details by clicking on code status.   Added by Elizabeth Hurley on 10/11/2013.             Osteopenia 11/18/2011      "Priority: Medium     Lumbar radiculopathy 11/18/2011     Priority: Medium     Raynaud phenomenon      Priority: Medium     Anxiety      Priority: Medium            History of Present Illness:     Chief Complaint   Patient presents with     RECHECK     Follow up per pt.     Original presentation:  Ms. Stephanie Bhatia is a very pleasant 63 year old lady who is not on any prescription meds.   She works in [employer] and uses her hands a lot. She says she is here because her PCP asked her to. Ms. Bhatia thinks she can improve her fatigue on diet and exercise.   1995- c/o fatigue, migraine headache, raynauds phenomenon. Saw a rheumatologist in [place]. 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   In 2000 she moved to [facility] and was there until 2009. All those years, her raynauds and migraine went into remission on its own. But had fatigue.  In 2005 saw Dr. Kristen Agee for fatigue in [facility]. They checked her serologies. COLLEEN screen, CCP, C3, C4, neg. dsDNA neg. KRYSTLE panel was negative. PFT was \"borderline\" as per patient. Echo was \"alright\" at that time.   She thinks she has \"sciatica\".  In 2009 moved to [place].   Raynaud's phenomenon:  Onset: 1995  Digits: all fingers except thumb  Digital ulcers: none  Color changes: white and purple in past; past few years purple only  Duration of episode: not >10 mins  Pain during episode: none  Family history of Raynauds: none    GERD: occasionally.  Has had \"thick fingers\" \"always\"  Never been on any DMARDs.    Fatigue - worsening over the past few years  Migraine - 2- 3 episodes in the last one year  No muscle weakness.   Arthralgia: hands, knees, hips, wrists  Rash in forehead this winter on and off  Antiviral on and off for genital herpes (lab tested in past). She does get episodes of perianal lesions which go away in about a week's time. She thinks these lesions are looking different than herpes nowadays.   With exercise, " "she does feels that \"she cant get enough air\". Chronic cough+  She thinks she might have had couple \"spells\". One episode was 8 months ago and the other was about 4 months ago. Few seconds of blanking out and was having brain fog of about 20 mins. First episode was many years ago when she was in [place].     CXR neg.   Echo no evidence of Pulmonary HTN  MRA/MRI brain:  1. No evidence of acute infarction or intracranial hemorrhage.  2. Small amount of fluid in the left mastoid air cells .  3. Head MRA demonstrates no definite aneurysm or stenosis of the major  intracranial arteries.  4. Neck MRA demonstrates patent major cervical arteries.    Neurology visit with Olesya Jesus NP: no etiology found for the spell.   6MW test: 1700 feet  PFT within normal limits     No h/o myalgia, weight loss, loss of appetite, fevers, chills, night sweats, swollen glands  Patient denies any, malar rash, photosensitivity, recurrent mouth ulcers, sicca symptoms, recurrent sinusitis/rhinitis,swallowing difficulty, hearing or visual changes recently.   No h/o arterial/venous thrombosis in the past  No h/o persistent cough, chest pain  No h/o persistent nausea, vomiting, constipation, diarrhea, abdominal pain  No h/o hematochezia, hematuria, hemoptysis, hematemesis  No persistent headache, tingling, numbness, weakness. No h/o seizures    PULMONARY STRESS TEST, SIMPLE  MRA and MRI of brain and neck  X-ray Chest 2 views  KRYSTLE antibody panel  DNA double stranded antibodies  CBC with platelets differential  Comprehensive metabolic panel  Antinuclear antibody screen by EIA  Centromere Antibody IgG  Andreia 1 Antibody IgG  Complement C4  Complement C3  UA with Microscopic reflex to Culture  NEUROLOGY ADULT REFERRAL  EKG 12-lead complete w/read - Clinics  Echocardiogram  PFT Lab Testing    COLLEEN 1.4  Anti-centromere and anti-topoisomerase negative.     March 1, 2017  No new changes in the skin.   Raynaud's - about the same.   No acid reflux " symptoms.   No muscle weakness on daily basis.  No daily joint pains except chronic on and off neck pain.   Morning stiffness X 30 mins.  Chronic shortness of breath +ve. Not climbing stairs as much nowadays.      Chronic fatigue +ve 6-8/10         Review of Systems:   Complete ROS negative except for symptoms mentioned in the HPI          Past Medical History:     Past Medical History   Diagnosis Date     Anxiety      Migraine      Raynaud phenomenon      Sciatica      Systemic sclerosis with limited cutaneous involvement (H) 12/2/2014     Past Surgical History   Procedure Laterality Date     Hysteroscopic placement contraceptive device              Social History:     Social History     Occupational History     Not on file.     Social History Main Topics     Smoking status: Former Smoker     Packs/day: 1.00     Years: 10.00     Types: Cigarettes     Quit date: 11/26/1986     Smokeless tobacco: Never Used     Alcohol use No     Drug use: No     Sexual activity: No            Family History:     Family History   Problem Relation Age of Onset     C.A.D. Father      a efw small heart attacks     Neurologic Disorder Father      dementia     Neurologic Disorder Mother      dementia     Breast Cancer No family hx of      Cancer - colorectal No family hx of      DIABETES No family hx of      CANCER No family hx of      no skin cancer            Allergies:   No Known Allergies         Medications:     Current Outpatient Prescriptions   Medication Sig Dispense Refill     Cholecalciferol (VITAMIN D) 1000 UNITS capsule Take 1 capsule by mouth daily       desonide (DESOWEN) 0.05 % ointment Apply topically 2 times daily To rash on the corners of the mouth mixed with nystatin as needed until clear. 60 g 11     nystatin (MYCOSTATIN) ointment Apply topically 2 times daily To rash at the corners of the mouth mixed with desonide until clear. 30 g 11            Physical Exam:   Blood pressure 109/72, pulse 70, temperature 98  F (36.7  " C), temperature source Oral, height 1.651 m (5' 5\"), weight 61.8 kg (136 lb 3.2 oz), not currently breastfeeding.  Wt Readings from Last 4 Encounters:   03/01/17 61.8 kg (136 lb 3.2 oz)   02/23/17 62.8 kg (138 lb 8 oz)   02/08/17 62.1 kg (136 lb 12.8 oz)   02/05/16 60.3 kg (133 lb)       Constitutional: well-developed, appearing stated age; cooperative  Eyes: nl EOM, PERRLA, vision, conjunctiva, sclera  ENT: nl external ears, nose, hearing, lips, teeth, gums, throat  No mucous membrane lesions, normal saliva pool  Neck: no mass or thyroid enlargement  Resp: lungs clear to auscultation,   CV: RRR, no murmurs, rubs or gallops, no edema  GI: no ABD mass or tenderness, no HSM  : not tested  Lymph: no cervical, supraclavicular or epitrochlear nodes  MS: All shoulder, elbow, wrist, MCP/PIP/DIP, spine, hip, knee, ankle, and foot MTP/IP joints were examined and  found normal. No active synovitis or deformity. Full ROM.  Normal  strength. Fist 100%.  No dactylitis,  tenosynovitis, enthesopathy.  Skin: sclerodactyly +  Psych: nl judgement, orientation, memory, affect.         Data:     Results for orders placed or performed in visit on 02/24/17   Dexa vertebral fracture analysis    Narrative    ATTENTION:  Easy to read DXA/VFA reports (formatted and presented as   tables)   can be found in EPIC under Chart review >  Imaging tab >  DXA    [facility]   [address] [place] [id number]  Phone: [phone]   Fax: [phone]    Vertebral Fracture Assessment (VFA) Report:      BEAR GAY:    Your patient, FUNMI OLSEN ([id number] ), completed a VFA exam ([id number]   ) on a HoverWind on   . The following is a summary of   the results.   _________________________________________________________________________    Patient biographical information and history:    Current measured height: 65.0 in.  Current measured weight: 138.0 " "lbs.    Vertebral Fracture Assessment (VFA) was performed in the lateral decubitus   position on this [age] year [age] White Female, whose history as reported by   the patient is noted for  postmenopausal status      and the following   current treatments:   calcium, vitamin D,    In addition, the patient   reported having been previously treated with:   hormone replacement,   corticosteroids,    The patient meets the following indications for a VFA:      (2007 ISCD Position Statements at www.iscd.org)  _________________________________________________________________________    Technical quality:    Satisfactory.  VFA software and contrast adjustment allow for better   visualization of the thoracic vertebral bodies.  VFA scan was   interpretable from T4 - L4.  _________________________________________________________________________    Densitometry results:    Comparison:  None provided.    _________________________________________________________________________    Conclusions:    VFA scan was interpretable from T4 - L4.  Using the semi-quantitative   analysis of Genant (see reference #1), no fractures were identified.  Six   point morphometry confirmed the presence and severity of these   deformities.           _________________________________________________________________________      Feel free to contact DXA services if you have any questions or comments.    Thank you for the opportunity to be of service to you and your patient.      _________________________________________________________________________    References:    1.  CATHY Physician's Guideline Website address:  www.nof.org    2.  American College of Rheumatology Recommendations for the Prevention   and Treatment of Glucocorticoid-induced Osteoporosis:  2001 update in   \"Arthritis and Rheumatism\"  July 2001 edition (vol 44, issue 7) pp 149   1502.    3.  ISCD position statements:  www.iscd.org  (includes the report of the   2005 VFA Position " Development Conference)    According to the ISCD position statements, lateral spine is not to be used   for diagnosis, but may have a role in monitoring.    According to the ISCD position statements, the diagnosis of osteoporosis   in pre-menopausal women and in men younger than age 50 should not be made   on the basis of densitometric criteria alone.  In addition Z-scores rather   than T-scores should be used.    4.  Implementation of suggestions is at the discretion of the ordering   provider.  Clinical correlation is recommended.    5.  WHO categories:    normal = T-score of - 1.0 or more positive, low bone density/ osteopenia =   T-score of - 1.0 to - 2.5, osteoporosis = T-score of -2.5 or more negative      Template revised 7.5.2016         Recent Labs   Lab Test  03/21/14   1044  10/21/13   1304  11/18/11   1049   WBC  8.2  6.5  7.1   RBC  4.41  4.15  4.48   HGB  13.5  13.0  13.9   HCT  41.9  39.0  42.0   MCV  95  94  94   RDW  13.1  12.8  12.9   PLT  281  274  283   ALBUMIN  3.9   --   3.9   CRP   --   <5.0   --    BUN  14  18  20      Recent Labs   Lab Test  10/21/13   1304  11/18/11   1049 10/10/08   TSH  1.57  1.10  1.09     Aristides Case M.D.    Division of Rheumatology  [facility]  Phone (Patients line): [phone]  Pager (healthcare professionals only): [phone]  Phone (healthcare professionals only line): [phone]

## 2017-03-01 NOTE — PATIENT INSTRUCTIONS
Check the BP every week and seek medical attention if SBP consistently greater than 15mmHg above baseline or DBP consistently greater than 10mmHG above baseline.

## 2017-03-06 DIAGNOSIS — M34.9 SYSTEMIC SCLEROSIS (H): Primary | ICD-10-CM

## 2017-03-06 DIAGNOSIS — J84.10 PULMONARY FIBROSIS (H): ICD-10-CM

## 2017-03-06 DIAGNOSIS — R06.02 SOB (SHORTNESS OF BREATH): ICD-10-CM

## 2017-03-08 ENCOUNTER — OFFICE VISIT (OUTPATIENT)
Dept: INTERNAL MEDICINE | Facility: CLINIC | Age: 67
End: 2017-03-08

## 2017-03-08 VITALS
HEART RATE: 64 BPM | SYSTOLIC BLOOD PRESSURE: 106 MMHG | BODY MASS INDEX: 22.96 KG/M2 | DIASTOLIC BLOOD PRESSURE: 70 MMHG | WEIGHT: 138 LBS

## 2017-03-08 DIAGNOSIS — M81.0 OSTEOPOROSIS: ICD-10-CM

## 2017-03-08 DIAGNOSIS — M81.0 OSTEOPOROSIS: Primary | ICD-10-CM

## 2017-03-08 DIAGNOSIS — Z13.29 SCREENING FOR HYPOTHYROIDISM: ICD-10-CM

## 2017-03-08 LAB
ALBUMIN SERPL-MCNC: 3.5 G/DL (ref 3.4–5)
ALP SERPL-CCNC: 58 U/L (ref 40–150)
ALT SERPL W P-5'-P-CCNC: 17 U/L (ref 0–50)
ANION GAP SERPL CALCULATED.3IONS-SCNC: 7 MMOL/L (ref 3–14)
AST SERPL W P-5'-P-CCNC: 19 U/L (ref 0–45)
BILIRUB SERPL-MCNC: 0.4 MG/DL (ref 0.2–1.3)
BUN SERPL-MCNC: 20 MG/DL (ref 7–30)
CALCIUM SERPL-MCNC: 8.8 MG/DL (ref 8.5–10.1)
CHLORIDE SERPL-SCNC: 106 MMOL/L (ref 94–109)
CO2 SERPL-SCNC: 29 MMOL/L (ref 20–32)
CREAT SERPL-MCNC: 0.61 MG/DL (ref 0.52–1.04)
GFR SERPL CREATININE-BSD FRML MDRD: NORMAL ML/MIN/1.7M2
GLUCOSE SERPL-MCNC: 83 MG/DL (ref 70–99)
POTASSIUM SERPL-SCNC: 3.8 MMOL/L (ref 3.4–5.3)
PROT SERPL-MCNC: 6.9 G/DL (ref 6.8–8.8)
SODIUM SERPL-SCNC: 142 MMOL/L (ref 133–144)
TSH SERPL DL<=0.005 MIU/L-ACNC: 2.43 MU/L (ref 0.4–4)

## 2017-03-08 ASSESSMENT — PAIN SCALES - GENERAL: PAINLEVEL: NO PAIN (0)

## 2017-03-08 NOTE — NURSING NOTE
Chief Complaint   Patient presents with     Results     Pt is here to follow up on a dexa scan.      Fannie Jackson LPN March 8, 2017 2:15 PM

## 2017-03-08 NOTE — PROGRESS NOTES
HPI:   Stephanie Bhatia is a 66 year old year old female presents today for followup.  SUBJECTIVE:  She is here to discuss results of a DEXA scan that we obtained after a recent visit for screening.  She does have osteoporosis based on T-score of -2.5 at the femoral neck.  She has never had a fracture.  She has only been treated with a couple of epidural steroid injections in the remote past for her back pain and has had no other steroid use.  She says her mother broke her femur in her 80s and she is uncertain whether her mother had osteoporosis.  She has been postmenopausal since her early 50s.  She is not sure exactly when she last had a period.      She says that she recently developed hemorrhoids.  She thinks that is what it was.  She was itchy around the anus and then noticed a small lump.  She took a look and saw a small lump that she was able to reduce.  She has noticed increased trouble with constipation prior to this.  She had no bleeding from this and has not had recurrence of the symptoms.  At her last visit, she was describing somewhat depressed mood.  She was not interested in any medications or psychotherapy at that time.  She says it is still somewhat low but she uses self-talk and that is helpful.  Fatigue and low energy, she has related to her cutaneous limited systemic sclerosis does contribute to this somewhat.  She is looking forward to a trip to [facility] in June.  She does describe last week having an episode of a migraine headache.  This began with a visual aura in which vision got some blurry.  Then she developed a headache that has been her typical migraine headache.  This was associated with some trouble speaking that lasted for about 30 minutes and then slowly resolved.  She had similar episodes a couple of years ago and did have an MRI/MRA of the brain and great vessels that was normal.  She previously had similar types of headaches associated with visual aura and so these episodes have been  attributed to migraine.  Other than the one last week, she had had a couple a couple of years ago, and then prior to that, it had been quite a while since previous ones.  Other than that one episode, she has not had other headaches or other neurologic symptoms.         ASSESSMENT/PLAN: ASSESSMENT AND PLAN:   1.  Postmenopausal osteoporosis.  She does not have other obvious risk factors for osteoporosis, but given her age, postmenstrual status and small build as well as a possible family history of osteoporosis, this finding is not unexpected.  Today we will check a vitamin D level, PTH, TSH, comprehensive metabolic panel to rule out secondary causes of osteoporosis.  If these are negative, we would consider treatment with a bisphosphonate.  I discussed with her today the risks and benefits of bisphosphonate treatment as well as other options including denosumab and teriparatide.  At this point, she would not be a candidate for these other options but I would mainly focus on the possibility of treatment with bisphosphonate.  She currently is not having any difficulty with dysphagia or swallowing problems, but with her diagnosis of cutaneous systemic sclerosis, I will check with her rheumatologist whether this would raise any concerns about using an oral bisphosphonate.  If it would, I would then consider IV zoledronic acid.  Ms. Bhatia wishes to read about these medications prior to making a final decision about what to use.  I widely discussed the risk of osteonecrosis of the jaw and atypical femur fractures but explained that these things are very rare events.   2.  Likely recent hemorrhoid, now resolved.   3.  Likely migraine headache, previously evaluated with normal MRI/MRA.  Given how infrequent these are, no prophylactic treatment at this time but we will observe.   4.  She will be getting shots prior to going on her trip to [facility] in June.   5.  I will call her with test results and will make a plan regarding  treatment of her osteoporosis once those are available.         Patient Active Problem List   Diagnosis     Raynaud phenomenon     Anxiety     Osteopenia     Lumbar radiculopathy     Advanced directives, counseling/discussion     CARDIOVASCULAR SCREENING; LDL GOAL LESS THAN 160     Cluster headache syndrome     Dizziness and giddiness     Systemic sclerosis with limited cutaneous involvement (H)     Tinea pedis     AK (actinic keratosis)     Benign neoplasm of other specified sites of skin     Cherry hemangioma     Angular cheilitis       Current Outpatient Prescriptions   Medication Sig Dispense Refill     Cholecalciferol (VITAMIN D) 1000 UNITS capsule Take 1 capsule by mouth daily       desonide (DESOWEN) 0.05 % ointment Apply topically 2 times daily To rash on the corners of the mouth mixed with nystatin as needed until clear. 60 g 11     nystatin (MYCOSTATIN) ointment Apply topically 2 times daily To rash at the corners of the mouth mixed with desonide until clear. 30 g 11         ROS:    Constitutional: no fevers, chill   Cardiovascular: no chest pain, palpitations  Respiratory: no dyspnea, cough, shortness of breath or wheezing   GI: no nausea, vomiting, diarrhea, no abdominal pain   Musculoskeletal: no edema       PHYSICAL EXAM:   /70  Pulse 64  Wt 62.6 kg (138 lb)  BMI 22.96 kg/m2   Wt Readings from Last 1 Encounters:   03/08/17 62.6 kg (138 lb)       Constitutional: no distress, comfortable, pleasant    Cardiovascular: regular rate and rhythm, normal S1 and S2, no murmurs, rubs or gallops  Respiratory: clear to auscultation, no wheezes or crackles, normal breath sounds   Musculoskeletal:  no edema       Lilia Cardenas

## 2017-03-08 NOTE — MR AVS SNAPSHOT
After Visit Summary   3/8/2017    Stephanie Bhatia    MRN: [id number]           Patient Information     Date Of Birth          1950        Visit Information        Provider Department      3/8/2017 2:30 PM Lilia Cardenas MD [facility]        Today's Diagnoses     Osteoporosis    -  1    Screening for hypothyroidism          Care Instructions    Primary Care Center Medication Refill Request Information:  * Please contact your pharmacy regarding ANY request for medication refills.  ** [facility] Prescription Fax = [phone]  * Please allow 3 business days for routine medication refills.  * Please allow 5 business days for controlled substance medication refills.     Primary Care Center Test Result notification information:  *You will be notified with in 7-10 days of your appointment day regarding the results of your test.  If you are on MyChart you will be notified as soon as the provider has reviewed the results and signed off on them.          Follow-ups after your visit        Your next 10 appointments already scheduled     Mar 08, 2017  3:15 PM CST   LAB with  LAB   [facility] ([facility])    [address] [place] [id number] [phone]           Patient must bring picture ID.  Patient should be prepared to give a urine specimen  Please do not eat 10-12 hours before your appointment if you are coming in fasting for labs on lipids, cholesterol, or glucose (sugar).  Pregnant women should follow their Care Team instructions. Water with medications is okay. Do not drink coffee or other fluids.   If you have concerns about taking  your medications, please ask at office or if scheduling via [employer], send a message by clicking on Secure Messaging, Message Your Care Team.            Mar 13, 2017  6:00 AM CDT   FULL PULMONARY FUNCTION with  PFL B   [facility] Pulmonary Function Testing ([facility])    [address] [id number] [phone]            Mar 13, 2017  7:00 AM CDT   Ech Complete with UCECHCR2   [facility] Echo ([facility])    [address] [address] [phone]           1.  Please bring or wear a comfortable two-piece outfit. 2.  You may eat, drink and take your normal medicines. 3.  For any questions that cannot be answered, please contact the ordering physician            Mar 01, 2018  7:00 AM CST   (Arrive by 6:45 AM)   Return Visit with Petrso Gunn MD   [facility] Rheumatology ([facility])    [address] [place] [address] [phone]              Future tests that were ordered for you today     Open Future Orders        Priority Expected Expires Ordered    Vitamin D Deficiency Routine 3/8/2017 3/8/2018 3/8/2017    Parathyroid Hormone Intact Routine 3/8/2017 3/8/2018 3/8/2017    TSH with free T4 reflex Routine 3/8/2017 3/22/2017 3/8/2017            Who to contact     Please call your clinic at [phone] to:    Ask questions about your health    Make or cancel appointments    Discuss your medicines    Learn about your test results    Speak to your doctor   If you have compliments or concerns about an experience at your clinic, or if you wish to file a complaint, please contact [facility] Patient Relations at [phone] or email us at [email or web address]         Additional Information About Your Visit        [employer] Information     [employer] gives you secure access to your electronic health record. If you see a primary care provider, you can also send messages to your care team and make appointments. If you have questions, please call your primary care clinic.  If you do not have a primary care provider, please call [phone] and they will assist you.      [employer] is an electronic gateway that provides  easy, online access to your medical records. With [employer], you can request a clinic appointment, read your test results, renew a prescription or communicate with your care team.     To access your existing account, please contact your [facility] or call [phone] for assistance.        Care EveryWhere ID     This is your Care EveryWhere ID. This could be used by other organizations to access your [facility] medical records  [id number]        Your Vitals Were     Pulse BMI (Body Mass Index)                64 22.96 kg/m2           Blood Pressure from Last 3 Encounters:   03/08/17 106/70   03/01/17 109/72   02/23/17 102/69    Weight from Last 3 Encounters:   03/08/17 62.6 kg (138 lb)   03/01/17 61.8 kg (136 lb 3.2 oz)   02/23/17 62.8 kg (138 lb 8 oz)              We Performed the Following     Comprehensive metabolic panel        Primary Care Provider Office Phone # Fax #    Lilia Cardenas [phone]        [facility] [address] [place] [address] [address]41  [facility] [address]        Thank you!     Thank you for choosing [facility]  for your care. Our goal is always to provide you with excellent care. Hearing back from our patients is one way we can continue to improve our services. Please take a few minutes to complete the written survey that you may receive in the mail after your visit with us. Thank you!             Your Updated Medication List - Protect others around you: Learn how to safely use, store and throw away your medicines at www.disposemymeds.org.          This list is accurate as of: 3/8/17  3:13 PM.  Always use your most recent med list.                   Brand Name Dispense Instructions for use    desonide 0.05 % ointment    DESOWEN    60 g    Apply topically 2 times daily To rash on the corners of the mouth mixed with nystatin as needed until clear.       nystatin ointment    MYCOSTATIN    30 g    Apply topically 2 times daily To rash at  the corners of the mouth mixed with desonide until clear.       vitamin D 1000 UNITS capsule      Take 1 capsule by mouth daily

## 2017-03-09 LAB
DEPRECATED CALCIDIOL+CALCIFEROL SERPL-MC: 26 UG/L (ref 20–75)
PTH-INTACT SERPL-MCNC: 70 PG/ML (ref 12–72)

## 2017-03-13 ENCOUNTER — RADIANT APPOINTMENT (OUTPATIENT)
Dept: CARDIOLOGY | Facility: CLINIC | Age: 67
End: 2017-03-13
Attending: INTERNAL MEDICINE

## 2017-03-13 DIAGNOSIS — R06.02 SOB (SHORTNESS OF BREATH): Primary | ICD-10-CM

## 2017-03-13 DIAGNOSIS — R06.02 SOB (SHORTNESS OF BREATH): ICD-10-CM

## 2017-03-13 DIAGNOSIS — M34.9 SYSTEMIC SCLEROSIS WITH LIMITED CUTANEOUS INVOLVEMENT (H): ICD-10-CM

## 2017-03-20 NOTE — PROGRESS NOTES
Results released to [facility]:  Normal and stable echo when compared to 2014    Sincerely    Aristides Case MD

## 2017-03-29 DIAGNOSIS — M34.9 SYSTEMIC SCLEROSIS (H): ICD-10-CM

## 2017-03-29 DIAGNOSIS — J84.9 ILD (INTERSTITIAL LUNG DISEASE) (H): Primary | ICD-10-CM

## 2017-03-29 LAB
DLCOUNC-%PRED-PRE: 95 %
DLCOUNC-PRE: 20.53 ML/MIN/MMHG
DLCOUNC-PRED: 21.49 ML/MIN/MMHG
ERV-%PRED-PRE: 163 %
ERV-PRE: 1.47 L
ERV-PRED: 0.9 L
EXPTIME-PRE: 7.12 SEC
FEF2575-%PRED-PRE: 126 %
FEF2575-PRE: 2.42 L/SEC
FEF2575-PRED: 1.91 L/SEC
FEFMAX-%PRED-PRE: 120 %
FEFMAX-PRE: 7.31 L/SEC
FEFMAX-PRED: 6.07 L/SEC
FEV1-%PRED-PRE: 120 %
FEV1-PRE: 2.71 L
FEV1FEV6-PRE: 79 %
FEV1FEV6-PRED: 79 %
FEV1FVC-PRE: 80 %
FEV1FVC-PRED: 79 %
FEV1SVC-PRE: 83 %
FEV1SVC-PRED: 67 %
FIFMAX-PRE: 4.56 L/SEC
FRCPLETH-%PRED-PRE: 103 %
FRCPLETH-PRE: 2.91 L
FRCPLETH-PRED: 2.82 L
FVC-%PRED-PRE: 118 %
FVC-PRE: 3.4 L
FVC-PRED: 2.86 L
IC-%PRED-PRE: 73 %
IC-PRE: 1.81 L
IC-PRED: 2.45 L
RVPLETH-%PRED-PRE: 67 %
RVPLETH-PRE: 1.44 L
RVPLETH-PRED: 2.13 L
TLCPLETH-%PRED-PRE: 89 %
TLCPLETH-PRE: 4.72 L
TLCPLETH-PRED: 5.27 L
VA-%PRED-PRE: 75 %
VA-PRE: 4.05 L
VC-%PRED-PRE: 98 %
VC-PRE: 3.28 L
VC-PRED: 3.35 L

## 2017-04-04 DIAGNOSIS — J84.9 ILD (INTERSTITIAL LUNG DISEASE) (H): Primary | ICD-10-CM

## 2017-05-11 ASSESSMENT — ENCOUNTER SYMPTOMS
NECK PAIN: 1
MUSCLE WEAKNESS: 1
ARTHRALGIAS: 1
MUSCLE CRAMPS: 0
BACK PAIN: 0
MYALGIAS: 0
JOINT SWELLING: 0
STIFFNESS: 0

## 2017-05-19 ENCOUNTER — PRE VISIT (OUTPATIENT)
Dept: PULMONOLOGY | Facility: CLINIC | Age: 67
End: 2017-05-19

## 2017-05-19 NOTE — TELEPHONE ENCOUNTER
1.  Date/reason for appt:5/25/17, scleroderma  2.  Referring provider: ANDRES BAUTISTA  3.  Call to patient (Yes / No - short description): No, referred  4.  Previous care at / records requested from:   [facility]- office notes and imaging are in epic.

## 2017-05-25 ENCOUNTER — OFFICE VISIT (OUTPATIENT)
Dept: PULMONOLOGY | Facility: CLINIC | Age: 67
End: 2017-05-25
Attending: INTERNAL MEDICINE
Payer: MEDICARE

## 2017-05-25 VITALS
HEART RATE: 77 BPM | WEIGHT: 128 LBS | BODY MASS INDEX: 21.3 KG/M2 | RESPIRATION RATE: 16 BRPM | OXYGEN SATURATION: 100 % | SYSTOLIC BLOOD PRESSURE: 96 MMHG | DIASTOLIC BLOOD PRESSURE: 62 MMHG

## 2017-05-25 DIAGNOSIS — J84.9 ILD (INTERSTITIAL LUNG DISEASE) (H): ICD-10-CM

## 2017-05-25 DIAGNOSIS — M34.9 SYSTEMIC SCLEROSIS (H): ICD-10-CM

## 2017-05-25 LAB
6 MIN WALK (FT): 1650 FT
6 MIN WALK (M): 503 M

## 2017-05-25 PROCEDURE — 99212 OFFICE O/P EST SF 10 MIN: CPT | Mod: ZF

## 2017-05-25 ASSESSMENT — PAIN SCALES - GENERAL: PAINLEVEL: NO PAIN (0)

## 2017-05-25 NOTE — LETTER
5/25/2017       RE: Stephanie Bhatia  [address]  [facility] [address]     Dear Colleague,    Thank you for referring your patient, Stephanie Bhatia, to the [facility] at [facility]. Please see a copy of my visit note below.    [facility] Interstitial [facility] Disease [facility]    Reason for Visit  Stephanie Bhatia is a 66 year old year old female who is being seen for Interstitial Lung Disease (ILD) (Patient is bieng seen for consultation of ILD/scleroderma)    HPI    Ms. Bhatia is a 66-year old whom I am seeing as a new patient referred by Dr. Case for evaluation of interstitial lung disease.  Ms. Bhatia has been followed by Dr. Case since 2014, at which time she was diagnosed with limited scleroderma.  She had normal PFTs in 2014.  She had followup with Dr. Case in March, at which time she reported some shortness of breath.  He ordered pulmonary function tests and a chest CT, and referred her to ILD Clinic for further evaluation.  Ms. Bhatia reports some dyspnea on exertion; for example, when she walks up hills.  She has noticed this for the past 3-4 years, and it has been increasing slowly.  She does walk her dog every day for about 30-45 minutes, and also takes her dog to the dog park in the evenings.  She also occasionally takes a tap class.  She denies paroxysmal nocturnal dyspnea, fevers, chest pain, syncope, dizziness, peripheral edema, heartburn or new skin rashes.  She does endorse a morning cough with occasional sputum production.  She has never needed specific treatment for her lung disease, and has not been told that she has scleroderma lung disease up to now.  Dr. Case also ordered an echocardiogram in March that showed normal LV and RV function.      Ms. Bhatia denies any exposure to birds or hot tubs.  There is no mold in her house, as far she knows.  She has never had chemotherapy or radiation  therapy.  She does think that she was exposed to asbestos during childhood from basement pipes that were coated in asbestos.  She also lived in [place] from [address], and it was very nathalie from nearby construction.  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           Current Outpatient Prescriptions   Medication     Cholecalciferol (VITAMIN D) 1000 UNITS capsule     desonide (DESOWEN) 0.05 % ointment     nystatin (MYCOSTATIN) ointment     No current facility-administered medications for this visit.      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     Social History Main Topics     Smoking status: Former Smoker     Packs/day: 1.00     Years: 10.00     Types: Cigarettes     Quit date: 11/26/1986     Smokeless tobacco: Never Used     Alcohol use No     Drug use: No     Sexual activity: No     Other Topics Concern     Parent/Sibling W/ Cabg, Mi Or Angioplasty Before 65f 55m? No     Social History Narrative    .  No children.  Part time at [employer].  Possible asbestos exposure from basement pipes in childhood home.  Lived in [place] [address].  Denies exposure to pet birds, hot tubs.  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       Family History   Problem Relation Age of Onset     C.A.D. Father      a efw small heart attacks     Neurologic Disorder Father      dementia     Neurologic  Disorder Mother      dementia     Rheumatoid Arthritis Sister      Breast Cancer No family hx of      Cancer - colorectal No family hx of      DIABETES No family hx of      CANCER No family hx of      no skin cancer     Interstitial Lung Disease No family hx of              ROS Pulmonary  A complete ROS was otherwise negative except as noted in the HPI.    Vitals: BP 96/62 (BP Location: Right arm, Patient Position: Chair, Cuff Size: Adult Regular)  Pulse 77  Resp 16  Wt 58.1 kg (128 lb)  SpO2 100%  BMI 21.3 kg/m2    Exam:   GENERAL APPEARANCE: Well developed, well nourished, alert, and in no apparent distress.  LYMPHATICS: No significant cervical, or supraclavicular nodes.  RESP: good air flow throughout.  Bibasilar inspiratory crackles. No rhonchi. No wheezes.  CV: Normal S1, S2, regular rhythm, normal rate. No murmur.  No LE edema.   ABD: BS+, soft, nontender.  MS: extremities normal. No clubbing. No cyanosis.  Sausage fingers.  SKIN: no rash on limited exam.  No telangiectasias.  NEURO: Mentation intact, speech normal, normal gait and stance.  PSYCH: mentation appears normal. and affect normal/bright.    Results:  Recent Results (from the past 168 hour(s))   6 minute walk test    Collection Time: 05/25/17 12:00 AM   Result Value Ref Range    6 min walk (FT) 1650 ft    6 Min Walk (M) 503 m   General PFT Lab (Please always keep checked)    Collection Time: 05/25/17  6:14 AM   Result Value Ref Range    FVC-Pred 2.75 L    FVC-Pre 3.30 L    FVC-%Pred-Pre 120 %    FEV1-Pre 2.75 L    FEV1-%Pred-Pre 127 %    FEV1FVC-Pred 79 %    FEV1FVC-Pre 83 %    FEFMax-Pred 5.90 L/sec    FEFMax-Pre 6.90 L/sec    FEFMax-%Pred-Pre 116 %    FEF2575-Pred 1.86 L/sec    FEF2575-Pre 2.89 L/sec    WGG7822-%Pred-Pre 155 %    ExpTime-Pre 8.16 sec    FIFMax-Pre 4.59 L/sec    VC-Pred 3.21 L    VC-Pre 3.10 L    VC-%Pred-Pre 96 %    IC-Pred 2.28 L    IC-Pre 1.68 L    IC-%Pred-Pre 73 %    ERV-Pred 0.93 L    ERV-Pre 1.42 L    ERV-%Pred-Pre 152 %     FEV1FEV6-Pred 79 %    FEV1FEV6-Pre 83 %    FRCPleth-Pred 2.77 L    FRCPleth-Pre 3.20 L    FRCPleth-%Pred-Pre 115 %    RVPleth-Pred 2.09 L    RVPleth-Pre 1.78 L    RVPleth-%Pred-Pre 85 %    TLCPleth-Pred 5.11 L    TLCPleth-Pre 4.88 L    TLCPleth-%Pred-Pre 95 %    DLCOunc-Pred 21.07 ml/min/mmHg    DLCOunc-Pre 22.66 ml/min/mmHg    DLCOunc-%Pred-Pre 107 %    VA-Pre 4.53 L    VA-%Pred-Pre 86 %    FEV1SVC-Pred 67 %    FEV1SVC-Pre 89 %       I reviewed the pulmonary function tests and 6-minute walk test that were performed today.  The 6-minute walk test shows greater than normal walk distance, and no significant desaturation or hypoxia on room air.  Pulmonary function test today shows normal spirometry, total lung capacity and diffusion.  However, diffusion has decreased slightly compared to 3 years ago.  I also reviewed a chest CT scan that was performed in March.  This shows peripheral interstitial fibrosis with possible honeycombing that is predominant in the bases.  There are no significant ground-glass opacities.  I do not see any significant air trapping by expiratory images.  By my review, this looks like a possible or definite UIP pattern.  I also reviewed the echocardiogram report from March per the HPI above.     I reviewed results with the patient.      Assessment and plan:   Ms. Bhatia is a 66-year-old with interstitial lung disease who is a new patient.   1.  Scleroderma interstitial lung disease.  Her ILD is related to her scleroderma, and there is no need for lung biopsy to confirm the diagnosis.  Chest CT scan is consistent with possible to definite UIP pattern by my review.  She has nothing else in her history to suggest environmental cause.  She is on no medications that would cause it.  I will review the chest CT scan with Dr. Schmid in our ILD conference to confirm and also to review the 4 mm lung nodules and see if she needs followup for that.  I would not treat her with mycophenolate at this time  given that her pulmonary function test is completely normal.  However, there has been a slight decline over the past 3 years, and she is in the high risk period for progression of her scleroderma ILD.  I will see her back in 3 months with repeat PFT.  If her scleroderma lung disease worsens, then I would recommend mycophenolate for treatment.  I would avoid cyclophosphamide unless absolutely necessary.  She does not qualify for the SENSCIS scleroderma lung disease study because her lung function is too high.  I have encouraged her to continue daily walking as she is doing.  I will see her back in 3 months with full PFT.   2.  Lung nodules.  Chest CT report indicates two 4 mm lung nodules, one in the left lower lobe and one in the right lower lobe.  I will review with Dr. Schmid in ILD confident and determine whether she needs a followup chest CT or not.  She is low risk for lung cancer.     Again, thank you for allowing me to participate in the care of your patient.      Sincerely,    Tiffanie Gomez MD

## 2017-05-25 NOTE — PROGRESS NOTES
[facility] Interstitial Lung Disease Clinic    Reason for Visit  Stephanie Bhatia is a 66 year old year old female who is being seen for Interstitial Lung Disease (ILD) (Patient is bieng seen for consultation of ILD/scleroderma)    HPI    Ms. Bhatia is a 66-year old whom I am seeing as a new patient referred by Dr. Case for evaluation of interstitial lung disease.  Ms. Bahtia has been followed by Dr. Case since 2014, at which time she was diagnosed with limited scleroderma.  She had normal PFTs in 2014.  She had followup with Dr. Case in March, at which time she reported some shortness of breath.  He ordered pulmonary function tests and a chest CT, and referred her to ILD Clinic for further evaluation.  Ms. Bhatia reports some dyspnea on exertion; for example, when she walks up hills.  She has noticed this for the past 3-4 years, and it has been increasing slowly.  She does walk her dog every day for about 30-45 minutes, and also takes her dog to the dog park in the evenings.  She also occasionally takes a tap class.  She denies paroxysmal nocturnal dyspnea, fevers, chest pain, syncope, dizziness, peripheral edema, heartburn or new skin rashes.  She does endorse a morning cough with occasional sputum production.  She has never needed specific treatment for her lung disease, and has not been told that she has scleroderma lung disease up to now.  Dr. Case also ordered an echocardiogram in March that showed normal LV and RV function.      Ms. Bhatia denies any exposure to birds or hot tubs.  There is no mold in her house, as far she knows.  She has never had chemotherapy or radiation therapy.  She does think that she was exposed to asbestos during childhood from basement pipes that were coated in asbestos.  She also lived in [place] from [address], and it was very nathalie from nearby construction.  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           Current Outpatient Prescriptions   Medication     Cholecalciferol (VITAMIN D) 1000 UNITS capsule     desonide (DESOWEN) 0.05 % ointment     nystatin (MYCOSTATIN) ointment     No current facility-administered medications for this visit.      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     Social History Main Topics     Smoking status: Former Smoker     Packs/day: 1.00     Years: 10.00     Types: Cigarettes     Quit date: 11/26/1986     Smokeless tobacco: Never Used     Alcohol use No     Drug use: No     Sexual activity: No     Other Topics Concern     Parent/Sibling W/ Cabg, Mi Or Angioplasty Before 65f 55m? No     Social History Narrative    .  No children.  Part time at [employer].  Possible asbestos exposure from basement pipes in childhood home.  Lived in [place] [address].  Denies exposure to pet birds, hot tubs.  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       Family History   Problem Relation Age of Onset     C.A.D. Father      a efw small heart attacks     Neurologic Disorder Father      dementia     Neurologic Disorder Mother      dementia     Rheumatoid Arthritis Sister      Breast Cancer No family hx of      Cancer - colorectal No family hx of      DIABETES No family hx of      CANCER No family hx of      no skin cancer     Interstitial Lung Disease No family hx of              ROS Pulmonary  A complete  ROS was otherwise negative except as noted in the HPI.    Vitals: BP 96/62 (BP Location: Right arm, Patient Position: Chair, Cuff Size: Adult Regular)  Pulse 77  Resp 16  Wt 58.1 kg (128 lb)  SpO2 100%  BMI 21.3 kg/m2    Exam:   GENERAL APPEARANCE: Well developed, well nourished, alert, and in no apparent distress.  LYMPHATICS: No significant cervical, or supraclavicular nodes.  RESP: good air flow throughout.  Bibasilar inspiratory crackles. No rhonchi. No wheezes.  CV: Normal S1, S2, regular rhythm, normal rate. No murmur.  No LE edema.   ABD: BS+, soft, nontender.  MS: extremities normal. No clubbing. No cyanosis.  Sausage fingers.  SKIN: no rash on limited exam.  No telangiectasias.  NEURO: Mentation intact, speech normal, normal gait and stance.  PSYCH: mentation appears normal. and affect normal/bright.    Results:  Recent Results (from the past 168 hour(s))   6 minute walk test    Collection Time: 05/25/17 12:00 AM   Result Value Ref Range    6 min walk (FT) 1650 ft    6 Min Walk (M) 503 m   General PFT Lab (Please always keep checked)    Collection Time: 05/25/17  6:14 AM   Result Value Ref Range    FVC-Pred 2.75 L    FVC-Pre 3.30 L    FVC-%Pred-Pre 120 %    FEV1-Pre 2.75 L    FEV1-%Pred-Pre 127 %    FEV1FVC-Pred 79 %    FEV1FVC-Pre 83 %    FEFMax-Pred 5.90 L/sec    FEFMax-Pre 6.90 L/sec    FEFMax-%Pred-Pre 116 %    FEF2575-Pred 1.86 L/sec    FEF2575-Pre 2.89 L/sec    SIG3303-%Pred-Pre 155 %    ExpTime-Pre 8.16 sec    FIFMax-Pre 4.59 L/sec    VC-Pred 3.21 L    VC-Pre 3.10 L    VC-%Pred-Pre 96 %    IC-Pred 2.28 L    IC-Pre 1.68 L    IC-%Pred-Pre 73 %    ERV-Pred 0.93 L    ERV-Pre 1.42 L    ERV-%Pred-Pre 152 %    FEV1FEV6-Pred 79 %    FEV1FEV6-Pre 83 %    FRCPleth-Pred 2.77 L    FRCPleth-Pre 3.20 L    FRCPleth-%Pred-Pre 115 %    RVPleth-Pred 2.09 L    RVPleth-Pre 1.78 L    RVPleth-%Pred-Pre 85 %    TLCPleth-Pred 5.11 L    TLCPleth-Pre 4.88 L    TLCPleth-%Pred-Pre 95 %    DLCOunc-Pred 21.07 ml/min/mmHg     DLCOunc-Pre 22.66 ml/min/mmHg    DLCOunc-%Pred-Pre 107 %    VA-Pre 4.53 L    VA-%Pred-Pre 86 %    FEV1SVC-Pred 67 %    FEV1SVC-Pre 89 %       I reviewed the pulmonary function tests and 6-minute walk test that were performed today.  The 6-minute walk test shows greater than normal walk distance, and no significant desaturation or hypoxia on room air.  Pulmonary function test today shows normal spirometry, total lung capacity and diffusion.  However, diffusion has decreased slightly compared to 3 years ago.  I also reviewed a chest CT scan that was performed in March.  This shows peripheral interstitial fibrosis with possible honeycombing that is predominant in the bases.  There are no significant ground-glass opacities.  I do not see any significant air trapping by expiratory images.  By my review, this looks like a possible or definite UIP pattern.  I also reviewed the echocardiogram report from March per the HPI above.     I reviewed results with the patient.      Assessment and plan:   Ms. Bhatia is a 66-year-old with interstitial lung disease who is a new patient.   1.  Scleroderma interstitial lung disease.  Her ILD is related to her scleroderma, and there is no need for lung biopsy to confirm the diagnosis.  Chest CT scan is consistent with possible to definite UIP pattern by my review.  She has nothing else in her history to suggest environmental cause.  She is on no medications that would cause it.  I will review the chest CT scan with Dr. Schmid in our ILD conference to confirm and also to review the 4 mm lung nodules and see if she needs followup for that.  I would not treat her with mycophenolate at this time given that her pulmonary function test is completely normal.  However, there has been a slight decline over the past 3 years, and she is in the high risk period for progression of her scleroderma ILD.  I will see her back in 3 months with repeat PFT.  If her scleroderma lung disease worsens, then I  would recommend mycophenolate for treatment.  I would avoid cyclophosphamide unless absolutely necessary.  She does not qualify for the SENSCIS scleroderma lung disease study because her lung function is too high.  I have encouraged her to continue daily walking as she is doing.  I will see her back in 3 months with full PFT.   2.  Lung nodules.  Chest CT report indicates two 4 mm lung nodules, one in the left lower lobe and one in the right lower lobe.  I will review with Dr. Schmid in ILD confident and determine whether she needs a followup chest CT or not.  She is low risk for lung cancer.

## 2017-05-25 NOTE — MR AVS SNAPSHOT
After Visit Summary   5/25/2017    Stephanie Bhatia    MRN: [id number]           Patient Information     Date Of Birth          1950        Visit Information        Provider Department      5/25/2017 8:00 AM Tiffanie Gomez MD [facility]        Today's Diagnoses     ILD (interstitial lung disease) (H)        Systemic sclerosis (H)           Follow-ups after your visit        Follow-up notes from your care team     Return in about 3 months (around 8/25/2017).      Your next 10 appointments already scheduled     Jun 30, 2017  8:30 AM CDT   (Arrive by 8:15 AM)   Return Visit with Doris Robertson MD   [facility] Dermatology ([facility])    [address] [place] [address]   [phone]            Aug 24, 2017  7:00 AM CDT   FULL PULMONARY FUNCTION with  PFL [facility] Pulmonary Function Testing ([facility])    [address] [place] [address]   [phone]            Aug 24, 2017  8:00 AM CDT   (Arrive by 7:45 AM)   RETURN SCLERODERMA with Tiffanie Gomez MD   [facility] ([facility])    [address] [place] [address]   [phone]            Mar 01, 2018  7:00 AM CST   (Arrive by 6:45 AM)   Return Visit with Petros Gunn MD   [facility] Rheumatology ([facility])    [address] [place] [address]   [phone]              Future tests that were ordered for you today     Open Future Orders        Priority Expected Expires Ordered    General PFT Lab (Please always keep checked) Routine 8/25/2017 5/25/2018 5/25/2017    Pulmonary Function Test Routine 8/25/2017 5/25/2018 5/25/2017            Who to contact     If you have questions or need follow up information about today's clinic visit or your schedule please contact [facility] directly at [phone].  Normal or non-critical lab and imaging results will be communicated to you by MyChart, letter or phone within 4 business days after the clinic has received the results. If you do not hear from us within 7 days, please contact the clinic through [email or web address] or phone. If you have a critical or abnormal lab result, we will notify you by phone as soon as possible.  Submit refill requests through [employer] or call your pharmacy and they will forward the refill request to us. Please allow 3 business days for your refill to be completed.          Additional Information About Your Visit        [email or web address] Information     [employer] gives you secure access to your electronic health record. If you see a primary care provider, you can also send messages to your care team and make appointments. If you have questions, please call your primary care clinic.  If you do not have a primary care provider, please call [phone] and they will assist you.        Care EveryWhere ID     This is your Care EveryWhere ID. This could be used by other organizations to access your [place] medical records  [id number]        Your Vitals Were     Pulse Respirations Pulse Oximetry BMI (Body Mass Index)          77 16 100% 21.3 kg/m2         Blood Pressure from Last 3 Encounters:   05/25/17 96/62   03/08/17 106/70   03/01/17 109/72    Weight from Last 3 Encounters:   05/25/17 58.1 kg (128 lb)   03/08/17 62.6 kg (138 lb)   03/01/17 61.8 kg (136 lb 3.2 oz)               Primary Care Provider Office Phone # Fax #    Lilia Cardenas [phone]        PHYSICIANS [address] [facility] [address]  [facility] [address]        Thank you!     Thank you for choosing [facility]  for your care. Our goal is always to provide you with excellent care. Hearing back from our patients is one way we can continue to improve our services. Please take a few minutes to  complete the written survey that you may receive in the mail after your visit with us. Thank you!             Your Updated Medication List - Protect others around you: Learn how to safely use, store and throw away your medicines at www.disposemymeds.org.          This list is accurate as of: 5/25/17  8:49 AM.  Always use your most recent med list.                   Brand Name Dispense Instructions for use    desonide 0.05 % ointment    DESOWEN    60 g    Apply topically 2 times daily To rash on the corners of the mouth mixed with nystatin as needed until clear.       nystatin ointment    MYCOSTATIN    30 g    Apply topically 2 times daily To rash at the corners of the mouth mixed with desonide until clear.       vitamin D 1000 UNITS capsule      Take 1 capsule by mouth daily

## 2017-05-25 NOTE — NURSING NOTE
Chief Complaint   Patient presents with     Interstitial Lung Disease (ILD)     Patient is bieng seen for consultation of ILD/scleroderma      Carli Duran CMA at 7:50 AM on 5/25/2017

## 2017-06-26 LAB
DLCOUNC-%PRED-PRE: 107 %
DLCOUNC-PRE: 22.66 ML/MIN/MMHG
DLCOUNC-PRED: 21.07 ML/MIN/MMHG
ERV-%PRED-PRE: 152 %
ERV-PRE: 1.42 L
ERV-PRED: 0.93 L
EXPTIME-PRE: 8.16 SEC
FEF2575-%PRED-PRE: 155 %
FEF2575-PRE: 2.89 L/SEC
FEF2575-PRED: 1.86 L/SEC
FEFMAX-%PRED-PRE: 116 %
FEFMAX-PRE: 6.9 L/SEC
FEFMAX-PRED: 5.9 L/SEC
FEV1-%PRED-PRE: 127 %
FEV1-PRE: 2.75 L
FEV1FEV6-PRE: 83 %
FEV1FEV6-PRED: 79 %
FEV1FVC-PRE: 83 %
FEV1FVC-PRED: 79 %
FEV1SVC-PRE: 89 %
FEV1SVC-PRED: 67 %
FIFMAX-PRE: 4.59 L/SEC
FRCPLETH-%PRED-PRE: 115 %
FRCPLETH-PRE: 3.2 L
FRCPLETH-PRED: 2.77 L
FVC-%PRED-PRE: 120 %
FVC-PRE: 3.3 L
FVC-PRED: 2.75 L
IC-%PRED-PRE: 73 %
IC-PRE: 1.68 L
IC-PRED: 2.28 L
RVPLETH-%PRED-PRE: 85 %
RVPLETH-PRE: 1.78 L
RVPLETH-PRED: 2.09 L
TLCPLETH-%PRED-PRE: 95 %
TLCPLETH-PRE: 4.88 L
TLCPLETH-PRED: 5.11 L
VA-%PRED-PRE: 86 %
VA-PRE: 4.53 L
VC-%PRED-PRE: 96 %
VC-PRE: 3.1 L
VC-PRED: 3.21 L

## 2017-06-30 ENCOUNTER — OFFICE VISIT (OUTPATIENT)
Dept: DERMATOLOGY | Facility: CLINIC | Age: 67
End: 2017-06-30

## 2017-06-30 DIAGNOSIS — B35.3 TINEA PEDIS, UNSPECIFIED LATERALITY: Primary | ICD-10-CM

## 2017-06-30 DIAGNOSIS — M34.9 SYSTEMIC SCLEROSIS WITH LIMITED CUTANEOUS INVOLVEMENT (H): ICD-10-CM

## 2017-06-30 ASSESSMENT — PAIN SCALES - GENERAL: PAINLEVEL: NO PAIN (0)

## 2017-06-30 NOTE — LETTER
6/30/2017       RE: Stephanie Bhatia  [address]  [facility] [address]     Dear Colleague,    Thank you for referring your patient, Stephanie Bhatia, to the [facility] DERMATOLOGY at [facility]. Please see a copy of my visit note below.    [facility] Dermatology Note      Dermatology Problem List:  1. Tinea pedis, previously treated with ketoconazole cream  2. AKs   3. Angular cheilitis  4. Benign nevi   5. Cherry hemangiomas  6. CREST, not on any systemic medications     Encounter Date: Jun 30, 2017    CC:   Chief Complaint   Patient presents with     Derm Problem     Stephanie is here today for a skin check.  States she has no areas of concern at this time.          History of Present Illness:  Ms. Stephanie Bhatia is a 66 year old female with a history of limited cutaneous systemic sclerosis with secondary raynaud's phenomenon  who presents as a follow-up for initial concern for lesions on her face which have since resolved. The patient was last seen 5/19/17 for skin check and did not have any concerning lesions. She states that she had something on her face at one point that seems to have self-resolved. She has recently been to [facility] and had a number of bug bites. She currently is doing well with her CREST but has some mild pulmonary fibrosis and occasionally dose have some difficulty swallowing large pills.    She denies any lesions that have been growing, bleeding crusty or otherwise symptomatic. No personal hx of skin cancer. Reports diligence for sunscreen. She does report small amount of weight loss after cutting out sugars out of her diet.     Past Medical History:   Patient Active Problem List   Diagnosis     Raynaud phenomenon     Anxiety     Osteopenia     Lumbar radiculopathy     Advanced directives, counseling/discussion     CARDIOVASCULAR SCREENING; LDL GOAL LESS THAN 160     Cluster headache syndrome     Dizziness and giddiness     Systemic  sclerosis with limited cutaneous involvement (H)     Tinea pedis     AK (actinic keratosis)     Benign neoplasm of other specified sites of skin     Cherry hemangioma     Angular cheilitis     Osteoporosis     ILD (interstitial lung disease) (H)     Past Medical History:   Diagnosis Date     Anxiety      Interstitial lung disease (H)     scleroderma ILD, dx by chest CT 5/2017     Migraine      Raynaud phenomenon      Sciatica      Systemic sclerosis with limited cutaneous involvement (H) 12/02/2014    Scl-70 and anti-centromere antibody neg, +RF     Past Surgical History:   Procedure Laterality Date     HYSTEROSCOPIC PLACEMENT CONTRACEPTIVE DEVICE         Social History:  The patient denies use of tanning beds. She used to live in [place] and had a lot of sun exposure. Had outdoor job in recreation     Family History:  There is no family history of skin cancer.    Medications:  Current Outpatient Prescriptions   Medication Sig Dispense Refill     Cholecalciferol (VITAMIN D) 1000 UNITS capsule Take 1 capsule by mouth daily       desonide (DESOWEN) 0.05 % ointment Apply topically 2 times daily To rash on the corners of the mouth mixed with nystatin as needed until clear. 60 g 11     nystatin (MYCOSTATIN) ointment Apply topically 2 times daily To rash at the corners of the mouth mixed with desonide until clear. 30 g 11        No Known Allergies      Review of Systems:  -Skin Establ Pt: The patient denies any new rash, pruritus, or lesions that are symptomatic, changing or bleeding, except as per HPI.  -Constitutional: The patient denies fatigue, fevers, chills, unintended weight loss, and night sweats.  -HEENT: Patient denies nonhealing oral sores.  -Skin: As above in HPI. No additional skin concerns.    Physical exam:  Vitals: There were no vitals taken for this visit.  GEN: This is a well developed, well-nourished female in no acute distress, in a pleasant mood.    SKIN: Full skin, which includes the head/face, both  arms, chest, back, abdomen,both legs, genitalia and/or groin buttocks, digits and/or nails, was examined.  - Few regular brown pigmented macules and papules are identified on the right arm.   - pink papule on right inguinal fold   - skin thickening and mild sclerodactyly over bilateral hands and feet with purplish discoloration   - no gritty papules on sun exposed areas   - multiple pink papules on upper back and legs   - no significant scaling over the feet or in between the toes     Impression/Plan:  1. Hx of Actinic keratosis and solar elastosis. She had a lesion by her eyebrow which has since resolved since making her appointment     Sun precaution was advised including the use of sun screens of SPF 30 or higher, sun protective clothing, and avoidance of tanning beds.    Yearly skin check should be adequate     2. CREST with associated Raynauds, previously saw Dr. Case and is to see Dr. Gunn. Reports some difficulty swallowing larger pills which is not really necessary new. Raynauds is well controlled       Monitor and to see Dr. Gunn sooner rather than later    3. Angular cheilitis, well controlled     Continue nystatin ointment and deonside ointment    4. Hx of tinea pedis     Continue lamisil cream prn     5.  Multiple bug bites, appear to be resolving     CC Dr. Gunn on close of this encounter.  Follow-up in 1 years, earlier for new or changing lesions.       Dr. Robertson staffed the patient.    Staff Involved:  Resident(Brooklyn Zamora MD)/Staff(as above)    I have personally examined this patient and agree with 's documentation and plan of care. I have reviewed and amended the resident's note above. The documentation accurately reflects my clinical observations, diagnoses, treatment and follow-up plans.     Doris Robertson MD  Dermatology Staff

## 2017-06-30 NOTE — PROGRESS NOTES
[facility] Dermatology Note      Dermatology Problem List:  1. Tinea pedis, previously treated with ketoconazole cream  2. AKs   3. Angular cheilitis  4. Benign nevi   5. Cherry hemangiomas  6. CREST, not on any systemic medications     Encounter Date: Jun 30, 2017    CC:   Chief Complaint   Patient presents with     Derm Problem     Stephanie is here today for a skin check.  States she has no areas of concern at this time.          History of Present Illness:  Ms. Stephanie Bhatia is a 66 year old female with a history of limited cutaneous systemic sclerosis with secondary raynaud's phenomenon  who presents as a follow-up for initial concern for lesions on her face which have since resolved. The patient was last seen 5/19/17 for skin check and did not have any concerning lesions. She states that she had something on her face at one point that seems to have self-resolved. She has recently been to [facility] and had a number of bug bites. She currently is doing well with her CREST but has some mild pulmonary fibrosis and occasionally dose have some difficulty swallowing large pills.    She denies any lesions that have been growing, bleeding crusty or otherwise symptomatic. No personal hx of skin cancer. Reports diligence for sunscreen. She does report small amount of weight loss after cutting out sugars out of her diet.     Past Medical History:   Patient Active Problem List   Diagnosis     Raynaud phenomenon     Anxiety     Osteopenia     Lumbar radiculopathy     Advanced directives, counseling/discussion     CARDIOVASCULAR SCREENING; LDL GOAL LESS THAN 160     Cluster headache syndrome     Dizziness and giddiness     Systemic sclerosis with limited cutaneous involvement (H)     Tinea pedis     AK (actinic keratosis)     Benign neoplasm of other specified sites of skin     Cherry hemangioma     Angular cheilitis     Osteoporosis     ILD (interstitial lung disease) (H)     Past Medical History:   Diagnosis  Date     Anxiety      Interstitial lung disease (H)     scleroderma ILD, dx by chest CT 5/2017     Migraine      Raynaud phenomenon      Sciatica      Systemic sclerosis with limited cutaneous involvement (H) 12/02/2014    Scl-70 and anti-centromere antibody neg, +RF     Past Surgical History:   Procedure Laterality Date     HYSTEROSCOPIC PLACEMENT CONTRACEPTIVE DEVICE         Social History:  The patient denies use of tanning beds. She used to live in [place] and had a lot of sun exposure. Had outdoor job in recreation     Family History:  There is no family history of skin cancer.    Medications:  Current Outpatient Prescriptions   Medication Sig Dispense Refill     Cholecalciferol (VITAMIN D) 1000 UNITS capsule Take 1 capsule by mouth daily       desonide (DESOWEN) 0.05 % ointment Apply topically 2 times daily To rash on the corners of the mouth mixed with nystatin as needed until clear. 60 g 11     nystatin (MYCOSTATIN) ointment Apply topically 2 times daily To rash at the corners of the mouth mixed with desonide until clear. 30 g 11        No Known Allergies      Review of Systems:  -Skin Establ Pt: The patient denies any new rash, pruritus, or lesions that are symptomatic, changing or bleeding, except as per HPI.  -Constitutional: The patient denies fatigue, fevers, chills, unintended weight loss, and night sweats.  -HEENT: Patient denies nonhealing oral sores.  -Skin: As above in HPI. No additional skin concerns.    Physical exam:  Vitals: There were no vitals taken for this visit.  GEN: This is a well developed, well-nourished female in no acute distress, in a pleasant mood.    SKIN: Full skin, which includes the head/face, both arms, chest, back, abdomen,both legs, genitalia and/or groin buttocks, digits and/or nails, was examined.  - Few regular brown pigmented macules and papules are identified on the right arm.   - pink papule on right inguinal fold   - skin thickening and mild sclerodactyly over  bilateral hands and feet with purplish discoloration   - no gritty papules on sun exposed areas   - multiple pink papules on upper back and legs   - no significant scaling over the feet or in between the toes     Impression/Plan:  1. Hx of Actinic keratosis and solar elastosis. She had a lesion by her eyebrow which has since resolved since making her appointment     Sun precaution was advised including the use of sun screens of SPF 30 or higher, sun protective clothing, and avoidance of tanning beds.    Yearly skin check should be adequate     2. CREST with associated Raynauds, previously saw Dr. Case and is to see Dr. Gunn. Reports some difficulty swallowing larger pills which is not really necessary new. Raynauds is well controlled       Monitor and to see Dr. Gunn sooner rather than later    3. Angular cheilitis, well controlled     Continue nystatin ointment and deonside ointment    4. Hx of tinea pedis     Continue lamisil cream prn     5.  Multiple bug bites, appear to be resolving     CC Dr. Gunn on close of this encounter.  Follow-up in 1 years, earlier for new or changing lesions.       Dr. Robertson staffed the patient.    Staff Involved:  Resident(Brooklyn Zamora MD)/Staff(as above)    I have personally examined this patient and agree with 's documentation and plan of care. I have reviewed and amended the resident's note above. The documentation accurately reflects my clinical observations, diagnoses, treatment and follow-up plans.     Doris Robertson MD  Dermatology Staff

## 2017-06-30 NOTE — NURSING NOTE
Dermatology Rooming Note    Stephanie Sriram's goals for this visit include:   Chief Complaint   Patient presents with     Derm Problem     Stephanie is here today for a skin check.  States she has no areas of concern at this time.        Clarita Kelley LPN

## 2017-06-30 NOTE — MR AVS SNAPSHOT
After Visit Summary   6/30/2017    Stephanie Bhatia    MRN: [id number]           Patient Information     Date Of Birth          1950        Visit Information        Provider Department      6/30/2017 8:30 AM Doris Robertson MD [facility] Dermatology        Today's Diagnoses     Tinea pedis, unspecified laterality    -  1    Systemic sclerosis with limited cutaneous involvement (H)           Follow-ups after your visit        Follow-up notes from your care team     Return in about 1 year (around 6/30/2018).      Your next 10 appointments already scheduled     Aug 24, 2017  7:00 AM CDT   FULL PULMONARY FUNCTION with  PFL C   [facility] Pulmonary Function Testing ([facility])    [address] [place] [address] [phone]            Aug 24, 2017  8:00 AM CDT   (Arrive by 7:45 AM)   RETURN SCLERODERMA with Tiffanie Gomez MD   [facility] for [facility] Science and [facility] ([facility])    [address] [place] [address] [phone]            Mar 01, 2018  7:00 AM CST   (Arrive by 6:45 AM)   Return Visit with Petros Gunn MD   [facility] Rheumatology ([facility])    [address] [place] [address] [phone]              Who to contact     Please call your clinic at [phone] to:    Ask questions about your health    Make or cancel appointments    Discuss your medicines    Learn about your test results    Speak to your doctor   If you have compliments or concerns about an experience at your clinic, or if you wish to file a complaint, please contact [facility] Patient Relations at [phone] or email us at [email or web address]         Additional Information About Your Visit        MyChart Information     [employer] gives you secure access to your electronic health record. If you see a primary care  provider, you can also send messages to your care team and make appointments. If you have questions, please call your primary care clinic.  If you do not have a primary care provider, please call [phone] and they will assist you.      [employer] is an electronic gateway that provides easy, online access to your medical records. With [employer], you can request a clinic appointment, read your test results, renew a prescription or communicate with your care team.     To access your existing account, please contact your [facility] or call [phone] for assistance.        Care EveryWhere ID     This is your Care EveryWhere ID. This could be used by other organizations to access your [place] medical records  [id number]         Blood Pressure from Last 3 Encounters:   05/25/17 96/62   03/08/17 106/70   03/01/17 109/72    Weight from Last 3 Encounters:   05/25/17 58.1 kg (128 lb)   03/08/17 62.6 kg (138 lb)   03/01/17 61.8 kg (136 lb 3.2 oz)              Today, you had the following     No orders found for display       Primary Care Provider Office Phone # Fax #    Lilia Cardenas [phone]        PHYSICIANS [address] [facility] [address]  [facility] [address]        Equal Access to Services     TROY SILVA : Hadii karime ku hadasho Soomaali, waaxda luqadaha, qaybta kaalmada adeegyada, rich lopez . So [facility] [phone].    ATENCIÓN: Si habla español, tiene a chacon disposición servicios gratuitos de asistencia lingüística. ame al [phone].    We comply with applicable federal civil rights laws and [place] laws. We do not discriminate on the basis of race, color, national origin, age, disability sex, sexual orientation or gender identity.            Thank you!     Thank you for choosing [facility]  for your care. Our goal is always to provide you with excellent care. Hearing back from our patients is one way we can continue to  improve our services. Please take a few minutes to complete the written survey that you may receive in the mail after your visit with us. Thank you!             Your Updated Medication List - Protect others around you: Learn how to safely use, store and throw away your medicines at www.disposemymeds.org.          This list is accurate as of: 6/30/17 11:59 PM.  Always use your most recent med list.                   Brand Name Dispense Instructions for use Diagnosis    desonide 0.05 % ointment    DESOWEN    60 g    Apply topically 2 times daily To rash on the corners of the mouth mixed with nystatin as needed until clear.    Angular cheilitis       nystatin ointment    MYCOSTATIN    30 g    Apply topically 2 times daily To rash at the corners of the mouth mixed with desonide until clear.    Angular cheilitis       vitamin D 1000 UNITS capsule      Take 1 capsule by mouth daily

## 2017-08-21 ASSESSMENT — ENCOUNTER SYMPTOMS
STIFFNESS: 0
BACK PAIN: 0
RECTAL BLEEDING: 0
HEARTBURN: 0
ABDOMINAL PAIN: 0
VOMITING: 0
BLOOD IN STOOL: 0
PANIC: 0
MUSCLE WEAKNESS: 0
RECTAL PAIN: 0
NAUSEA: 0
MUSCLE CRAMPS: 0
DEPRESSION: 1
MYALGIAS: 0
JOINT SWELLING: 0
ARTHRALGIAS: 1
NERVOUS/ANXIOUS: 0
NECK PAIN: 1
CONSTIPATION: 0
DECREASED CONCENTRATION: 0
BOWEL INCONTINENCE: 0
DIARRHEA: 0
JAUNDICE: 0
INSOMNIA: 1
BLOATING: 0

## 2017-08-24 ENCOUNTER — OFFICE VISIT (OUTPATIENT)
Dept: PULMONOLOGY | Facility: CLINIC | Age: 67
End: 2017-08-24
Attending: INTERNAL MEDICINE
Payer: MEDICARE

## 2017-08-24 VITALS
HEIGHT: 65 IN | OXYGEN SATURATION: 99 % | SYSTOLIC BLOOD PRESSURE: 96 MMHG | WEIGHT: 127.6 LBS | HEART RATE: 77 BPM | BODY MASS INDEX: 21.26 KG/M2 | DIASTOLIC BLOOD PRESSURE: 62 MMHG

## 2017-08-24 DIAGNOSIS — J84.9 ILD (INTERSTITIAL LUNG DISEASE) (H): ICD-10-CM

## 2017-08-24 DIAGNOSIS — J84.9 ILD (INTERSTITIAL LUNG DISEASE) (H): Primary | ICD-10-CM

## 2017-08-24 DIAGNOSIS — M34.9 SYSTEMIC SCLEROSIS (H): ICD-10-CM

## 2017-08-24 ASSESSMENT — PAIN SCALES - GENERAL: PAINLEVEL: NO PAIN (0)

## 2017-08-24 NOTE — PROGRESS NOTES
[facility] Interstitial [facility] Disease [facility]    Reason for Visit  Stephanie Bhatia is a 66 year old year old female who is being seen for Follow Up For    HPI    Ms. Bhatia is a 66-year old whom I am seeing for follow up of scleroderma ILD.  She was diagnosed with scleroderma ILD earlier this year and has not required treatment to date.  She continues to walk 45 min - 1 hour and bike.  She endorses dyspnea when she walks uphill and has noticed no worsening since last visit.  She denies fevers, chest pain, syncope, or cough.  She went to [facility] for 3 weeks and had no problems with her lungs.      Current Outpatient Prescriptions   Medication     Cholecalciferol (VITAMIN D) 1000 UNITS capsule     desonide (DESOWEN) 0.05 % ointment     nystatin (MYCOSTATIN) ointment     No current facility-administered medications for this visit.      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     Social History Main Topics     Smoking status: Former Smoker     Packs/day: 1.00     Years: 10.00     Types: Cigarettes     Quit date: 11/26/1986     Smokeless tobacco: Never Used     Alcohol use No     Drug use: No     Sexual activity: No     Other Topics Concern     Parent/Sibling W/ Cabg, Mi Or Angioplasty Before 65f 55m? No     Social History Narrative    .  No children.  Part time at [employer].  Possible asbestos exposure from basement pipes in  "childhood home.  Lived in [place] [address].  Denies exposure to pet birds, hot tubs.  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       Family History   Problem Relation Age of Onset     C.A.D. Father      a efw small heart attacks     Neurologic Disorder Father      dementia     Neurologic Disorder Mother      dementia     Rheumatoid Arthritis Sister      Breast Cancer No family hx of      Cancer - colorectal No family hx of      DIABETES No family hx of      CANCER No family hx of      no skin cancer     Interstitial Lung Disease No family hx of      Melanoma No family hx of      Skin Cancer No family hx of              ROS Pulmonary  A complete ROS was otherwise negative except as noted in the HPI.    Vitals: BP 96/62 (BP Location: Right arm, Patient Position: Sitting, Cuff Size: Adult Regular)  Pulse 77  Ht 1.651 m (5' 5\")  Wt 57.9 kg (127 lb 9.6 oz)  SpO2 99%  BMI 21.23 kg/m2    Exam:   GENERAL APPEARANCE: Well developed, well nourished, alert, and in no apparent distress.  RESP: good air flow throughout.  Bibasilar inspiratory crackles. No rhonchi. No wheezes.  CV: Normal S1, S2, regular rhythm, normal rate. No murmur.  No LE edema.   MS: extremities normal. No clubbing. No cyanosis.  SKIN: no rash on limited exam.  NEURO: Mentation intact, speech normal, normal gait and stance.  PSYCH: mentation appears normal. and affect normal/bright.    Results:  Recent Results (from the past 168 hour(s))   General PFT Lab (Please always keep checked)    Collection Time: 08/24/17  6:47 AM   Result Value Ref Range    FVC-Pred 2.75 L    FVC-Pre 3.25 L    FVC-%Pred-Pre 118 %    FEV1-Pre 2.67 L    FEV1-%Pred-Pre 123 %    FEV1FVC-Pred 79 %    FEV1FVC-Pre 82 %    FEFMax-Pred 5.88 L/sec    FEFMax-Pre 7.17 L/sec    FEFMax-%Pred-Pre 121 %    FEF2575-Pred 1.85 L/sec    FEF2575-Pre 2.73 L/sec    ONH6526-%Pred-Pre 147 %    ExpTime-Pre 6.83 sec    FIFMax-Pre 4.34 L/sec    VC-Pred 3.21 L    VC-Pre 3.14 L    " VC-%Pred-Pre 97 %    IC-Pred 2.28 L    IC-Pre 1.83 L    IC-%Pred-Pre 80 %    ERV-Pred 0.93 L    ERV-Pre 1.32 L    ERV-%Pred-Pre 141 %    FEV1FEV6-Pred 79 %    FEV1FEV6-Pre 82 %    FRCPleth-Pred 2.77 L    FRCPleth-Pre 3.07 L    FRCPleth-%Pred-Pre 110 %    RVPleth-Pred 2.09 L    RVPleth-Pre 1.75 L    RVPleth-%Pred-Pre 83 %    TLCPleth-Pred 5.11 L    TLCPleth-Pre 4.90 L    TLCPleth-%Pred-Pre 95 %    DLCOunc-Pred 21.04 ml/min/mmHg    DLCOunc-Pre 20.60 ml/min/mmHg    DLCOunc-%Pred-Pre 97 %    VA-Pre 4.53 L    VA-%Pred-Pre 86 %    FEV1SVC-Pred 67 %    FEV1SVC-Pre 85 %       I reviewed the pulmonary function test that was performed today.  It shows normal spirometry, total lung capacity and diffusion; stable from 3 mo ago.     I reviewed results with the patient.      Assessment and plan:   Ms. Bhatia is a 66-year-old who is here for follow up of scleroderma interstitial lung disease.   1.  Scleroderma interstitial lung disease.  She has mild RILEY that is stable and normal PFT.  I would not treat her with mycophenolate at this time given that her pulmonary function test is completely normal.  However, she is in the high risk period for progression of her scleroderma ILD and requires regular monitoring.  I will see her back in 6 months with repeat PFT.  If her scleroderma lung disease worsens, then I would recommend mycophenolate for treatment.  I would avoid cyclophosphamide unless absolutely necessary.  She does not qualify for the SENSCIS scleroderma lung disease study because her lung function is too high.  I have encouraged her to continue regular physical activity as she is doing.  2.  Lung nodules.  Chest CT report indicates two 4 mm lung nodules, one in the left lower lobe and one in the right lower lobe.  No follow up is recommended as she is low risk for lung cancer, [facility]

## 2017-08-24 NOTE — MR AVS SNAPSHOT
After Visit Summary   8/24/2017    Stephanie Bhatia    MRN: [id number]           Patient Information     Date Of Birth          1950        Visit Information        Provider Department      8/24/2017 8:00 AM Tiffanie Gomez MD [facility]        Today's Diagnoses     ILD (interstitial lung disease) (H)    -  1       Follow-ups after your visit        Follow-up notes from your care team     Return in about 6 months (around 2/24/2018).      Your next 10 appointments already scheduled     Mar 01, 2018  7:00 AM CST   (Arrive by 6:45 AM)   Return Visit with Petros Gunn MD   [facility] Rheumatology ([facility])    [address] [place] [address] [id number] [phone]            Mar 01, 2018  8:00 AM CST   FULL PULMONARY FUNCTION with  PFL [facility] Pulmonary Function Testing ([facility])    [address] [id number] [phone]            Mar 01, 2018  9:00 AM CST   (Arrive by 8:45 AM)   RETURN SCLERODERMA with Tiffanie Gomez MD   [facility] ([facility])    [address] [place] [address] [id number] [phone]              Future tests that were ordered for you today     Open Future Orders        Priority Expected Expires Ordered    General PFT Lab (Please always keep checked) Routine 2/24/2018 8/24/2018 8/24/2017    Pulmonary Function Test Routine 2/24/2018 8/24/2018 8/24/2017            Who to contact     If you have questions or need follow up information about today's clinic visit or your schedule please contact [facility] directly at [phone].  Normal or non-critical lab and imaging results will be communicated to you by MyChart, letter or phone within 4 business days after the clinic has received the results. If you do not hear from us within 7  "days, please contact the clinic through Nanalysis or phone. If you have a critical or abnormal lab result, we will notify you by phone as soon as possible.  Submit refill requests through Nanalysis or call your pharmacy and they will forward the refill request to us. Please allow 3 business days for your refill to be completed.          Additional Information About Your Visit        E96hart Information     Nanalysis gives you secure access to your electronic health record. If you see a primary care provider, you can also send messages to your care team and make appointments. If you have questions, please call your primary care clinic.  If you do not have a primary care provider, please call [phone] and they will assist you.        Care EveryWhere ID     This is your Care EveryWhere ID. This could be used by other organizations to access your [place] medical records  [id number]        Your Vitals Were     Pulse Height Pulse Oximetry BMI (Body Mass Index)          77 1.651 m (5' 5\") 99% 21.23 kg/m2         Blood Pressure from Last 3 Encounters:   08/24/17 96/62   05/25/17 96/62   03/08/17 106/70    Weight from Last 3 Encounters:   08/24/17 57.9 kg (127 lb 9.6 oz)   05/25/17 58.1 kg (128 lb)   03/08/17 62.6 kg (138 lb)               Primary Care Provider Office Phone # Fax #    Lilia Cardenas [phone]       [address] [place] [address] [phone]  [facility] [address]        Equal Access to Services     TROY SILVA AH: Hadii aad ku hadasho Soomaali, waaxda luqadaha, qaybta kaalmada adeegyada, waxay herberthin haychetann albino montilla la'aracelis . So [facility] [phone].    ATENCIÓN: Si habla español, tiene a chacon disposición servicios gratuitos de asistencia lingüística. Llame al [phone].    We comply with applicable federal civil rights laws and [place] laws. We do not discriminate on the basis of race, color, national origin, age, disability sex, sexual orientation or gender identity.            Thank you!     Thank " you for choosing [facility]  for your care. Our goal is always to provide you with excellent care. Hearing back from our patients is one way we can continue to improve our services. Please take a few minutes to complete the written survey that you may receive in the mail after your visit with us. Thank you!             Your Updated Medication List - Protect others around you: Learn how to safely use, store and throw away your medicines at www.disposemymeds.org.          This list is accurate as of: 8/24/17  8:17 AM.  Always use your most recent med list.                   Brand Name Dispense Instructions for use Diagnosis    desonide 0.05 % ointment    DESOWEN    60 g    Apply topically 2 times daily To rash on the corners of the mouth mixed with nystatin as needed until clear.    Angular cheilitis       nystatin ointment    MYCOSTATIN    30 g    Apply topically 2 times daily To rash at the corners of the mouth mixed with desonide until clear.    Angular cheilitis       vitamin D 1000 UNITS capsule      Take 1 capsule by mouth daily

## 2017-08-24 NOTE — LETTER
8/24/2017      RE: Stephanie Bhatia  [address]  [place] [id number]       [facility] Lung Disease [facility]    Reason for Visit  Stephanie Bhatia is a 66 year old year old female who is being seen for Follow Up For    HPI    Ms. Bhatia is a 66-year old whom I am seeing for follow up of scleroderma ILD.  She was diagnosed with scleroderma ILD earlier this year and has not required treatment to date.  She continues to walk 45 min - 1 hour and bike.  She endorses dyspnea when she walks uphill and has noticed no worsening since last visit.  She denies fevers, chest pain, syncope, or cough.  She went to [facility] for 3 weeks and had no problems with her lungs.      Current Outpatient Prescriptions   Medication     Cholecalciferol (VITAMIN D) 1000 UNITS capsule     desonide (DESOWEN) 0.05 % ointment     nystatin (MYCOSTATIN) ointment     No current facility-administered medications for this visit.      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     Social History Main Topics     Smoking status: Former Smoker     Packs/day: 1.00     Years: 10.00     Types: Cigarettes     Quit date: 11/26/1986     Smokeless tobacco: Never Used     Alcohol use No     Drug use: No     Sexual activity: No     Other Topics Concern     Parent/Sibling W/ Cabg, Mi Or Angioplasty Before 65f 55m? No     Social History Narrative    .  No children.   "Part time at [employer].  Possible asbestos exposure from basement pipes in childhood home.  Lived in [place] [address].  Denies exposure to pet birds, hot tubs.  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       Family History   Problem Relation Age of Onset     C.A.D. Father      a efw small heart attacks     Neurologic Disorder Father      dementia     Neurologic Disorder Mother      dementia     Rheumatoid Arthritis Sister      Breast Cancer No family hx of      Cancer - colorectal No family hx of      DIABETES No family hx of      CANCER No family hx of      no skin cancer     Interstitial Lung Disease No family hx of      Melanoma No family hx of      Skin Cancer No family hx of              ROS Pulmonary  A complete ROS was otherwise negative except as noted in the HPI.    Vitals: BP 96/62 (BP Location: Right arm, Patient Position: Sitting, Cuff Size: Adult Regular)  Pulse 77  Ht 1.651 m (5' 5\")  Wt 57.9 kg (127 lb 9.6 oz)  SpO2 99%  BMI 21.23 kg/m2    Exam:   GENERAL APPEARANCE: Well developed, well nourished, alert, and in no apparent distress.  RESP: good air flow throughout.  Bibasilar inspiratory crackles. No rhonchi. No wheezes.  CV: Normal S1, S2, regular rhythm, normal rate. No murmur.  No LE edema.   MS: extremities normal. No clubbing. No cyanosis.  SKIN: no rash on limited exam.  NEURO: Mentation intact, speech normal, normal gait and stance.  PSYCH: mentation appears normal. and affect normal/bright.    Results:  Recent Results (from the past 168 hour(s))   General PFT Lab (Please always keep checked)    Collection Time: 08/24/17  6:47 AM   Result Value Ref Range    FVC-Pred 2.75 L    FVC-Pre 3.25 L    FVC-%Pred-Pre 118 %    FEV1-Pre 2.67 L    FEV1-%Pred-Pre 123 %    FEV1FVC-Pred 79 %    FEV1FVC-Pre 82 %    FEFMax-Pred 5.88 L/sec    FEFMax-Pre 7.17 L/sec    FEFMax-%Pred-Pre 121 %    FEF2575-Pred 1.85 L/sec    FEF2575-Pre 2.73 L/sec    PSK1778-%Pred-Pre 147 %    " ExpTime-Pre 6.83 sec    FIFMax-Pre 4.34 L/sec    VC-Pred 3.21 L    VC-Pre 3.14 L    VC-%Pred-Pre 97 %    IC-Pred 2.28 L    IC-Pre 1.83 L    IC-%Pred-Pre 80 %    ERV-Pred 0.93 L    ERV-Pre 1.32 L    ERV-%Pred-Pre 141 %    FEV1FEV6-Pred 79 %    FEV1FEV6-Pre 82 %    FRCPleth-Pred 2.77 L    FRCPleth-Pre 3.07 L    FRCPleth-%Pred-Pre 110 %    RVPleth-Pred 2.09 L    RVPleth-Pre 1.75 L    RVPleth-%Pred-Pre 83 %    TLCPleth-Pred 5.11 L    TLCPleth-Pre 4.90 L    TLCPleth-%Pred-Pre 95 %    DLCOunc-Pred 21.04 ml/min/mmHg    DLCOunc-Pre 20.60 ml/min/mmHg    DLCOunc-%Pred-Pre 97 %    VA-Pre 4.53 L    VA-%Pred-Pre 86 %    FEV1SVC-Pred 67 %    FEV1SVC-Pre 85 %       I reviewed the pulmonary function test that was performed today.   It shows normal spirometry, total lung capacity and diffusion; stable from 3 mo ago.     I reviewed results with the patient.      Assessment and plan:   Ms. Bhatia is a 66-year-old who is here for follow up of scleroderma interstitial lung disease.   1.  Scleroderma interstitial lung disease.  She has mild RILEY that is stable and normal PFT.  I would not treat her with mycophenolate at this time given that her pulmonary function test is completely normal.  However, she is in the high risk period for progression of her scleroderma ILD and requires regular monitoring.  I will see her back in 6 months with repeat PFT.  If her scleroderma lung disease worsens, then I would recommend mycophenolate for treatment.  I would avoid cyclophosphamide unless absolutely necessary.  She does not qualify for the SENSCIS scleroderma lung disease study because her lung function is too high.  I have encouraged her to continue regular physical activity as she is doing.  2.  Lung nodules.  Chest CT report indicates two 4 mm lung nodules, one in the left lower lobe and one in the right lower lobe.   No follow up is recommended as she is low risk for lung cancer, per 2017 [facility] recs.      Tiffanie Gomez MD

## 2017-08-30 LAB
DLCOUNC-%PRED-PRE: 97 %
DLCOUNC-PRE: 20.6 ML/MIN/MMHG
DLCOUNC-PRED: 21.04 ML/MIN/MMHG
ERV-%PRED-PRE: 141 %
ERV-PRE: 1.32 L
ERV-PRED: 0.93 L
EXPTIME-PRE: 6.83 SEC
FEF2575-%PRED-PRE: 147 %
FEF2575-PRE: 2.73 L/SEC
FEF2575-PRED: 1.85 L/SEC
FEFMAX-%PRED-PRE: 121 %
FEFMAX-PRE: 7.17 L/SEC
FEFMAX-PRED: 5.88 L/SEC
FEV1-%PRED-PRE: 123 %
FEV1-PRE: 2.67 L
FEV1FEV6-PRE: 82 %
FEV1FEV6-PRED: 79 %
FEV1FVC-PRE: 82 %
FEV1FVC-PRED: 79 %
FEV1SVC-PRE: 85 %
FEV1SVC-PRED: 67 %
FIFMAX-PRE: 4.34 L/SEC
FRCPLETH-%PRED-PRE: 110 %
FRCPLETH-PRE: 3.07 L
FRCPLETH-PRED: 2.77 L
FVC-%PRED-PRE: 118 %
FVC-PRE: 3.25 L
FVC-PRED: 2.75 L
IC-%PRED-PRE: 80 %
IC-PRE: 1.83 L
IC-PRED: 2.28 L
RVPLETH-%PRED-PRE: 83 %
RVPLETH-PRE: 1.75 L
RVPLETH-PRED: 2.09 L
TLCPLETH-%PRED-PRE: 95 %
TLCPLETH-PRE: 4.9 L
TLCPLETH-PRED: 5.11 L
VA-%PRED-PRE: 86 %
VA-PRE: 4.53 L
VC-%PRED-PRE: 97 %
VC-PRE: 3.14 L
VC-PRED: 3.21 L

## 2017-11-14 ENCOUNTER — MYC MEDICAL ADVICE (OUTPATIENT)
Dept: PULMONOLOGY | Facility: CLINIC | Age: 67
End: 2017-11-14

## 2017-12-05 ENCOUNTER — MYC MEDICAL ADVICE (OUTPATIENT)
Dept: FAMILY MEDICINE | Facility: CLINIC | Age: 67
End: 2017-12-05

## 2017-12-06 ASSESSMENT — ENCOUNTER SYMPTOMS
NECK PAIN: 0
MUSCLE CRAMPS: 0
SINUS CONGESTION: 1
HOARSE VOICE: 0
MUSCLE WEAKNESS: 0
SMELL DISTURBANCE: 0
TROUBLE SWALLOWING: 1
NECK MASS: 0
SORE THROAT: 0
MYALGIAS: 0
TASTE DISTURBANCE: 0
SINUS PAIN: 1
ARTHRALGIAS: 1
JOINT SWELLING: 1
STIFFNESS: 1
BACK PAIN: 1

## 2017-12-06 ASSESSMENT — ACTIVITIES OF DAILY LIVING (ADL)
IN_THE_PAST_7_DAYS,_DID_YOU_NEED_HELP_FROM_OTHERS_TO_PERFORM_EVERYDAY_ACTIVITIES_SUCH_AS_EATING,_GETTING_DRESSED,_GROOMING,_BATHING,_WALKING,_OR_USING_THE_TOILET: N
IN_THE_PAST_7_DAYS,_DID_YOU_NEED_HELP_FROM_OTHERS_TO_TAKE_CARE_OF_THINGS_SUCH_AS_LAUNDRY_AND_HOUSEKEEPING,_BANKING,_SHOPPING,_USING_THE_TELEPHONE,_FOOD_PREPARATION,_TRANSPORTATION,_OR_TAKING_YOUR_OWN_MEDICATIONS?: N

## 2017-12-07 ENCOUNTER — OFFICE VISIT (OUTPATIENT)
Dept: INTERNAL MEDICINE | Facility: CLINIC | Age: 67
End: 2017-12-07

## 2017-12-07 VITALS
BODY MASS INDEX: 21.08 KG/M2 | WEIGHT: 126.7 LBS | HEART RATE: 72 BPM | SYSTOLIC BLOOD PRESSURE: 99 MMHG | DIASTOLIC BLOOD PRESSURE: 65 MMHG

## 2017-12-07 DIAGNOSIS — Z82.49 FAMILY HX OF AORTIC ANEURYSM: ICD-10-CM

## 2017-12-07 DIAGNOSIS — I71.40 ABDOMINAL AORTIC ANEURYSM WITHOUT RUPTURE (H): ICD-10-CM

## 2017-12-07 DIAGNOSIS — R10.9 RADIATING ABDOMINAL PAIN: Primary | ICD-10-CM

## 2017-12-07 LAB
ALBUMIN UR-MCNC: NEGATIVE MG/DL
APPEARANCE UR: CLEAR
BILIRUB UR QL STRIP: NEGATIVE
COLOR UR AUTO: YELLOW
GLUCOSE UR STRIP-MCNC: NEGATIVE MG/DL
HGB UR QL STRIP: NEGATIVE
KETONES UR STRIP-MCNC: NEGATIVE MG/DL
LEUKOCYTE ESTERASE UR QL STRIP: NEGATIVE
MUCOUS THREADS #/AREA URNS LPF: PRESENT /LPF
NITRATE UR QL: NEGATIVE
PH UR STRIP: 5 PH (ref 5–7)
RBC #/AREA URNS AUTO: 2 /HPF (ref 0–2)
SOURCE: ABNORMAL
SP GR UR STRIP: 1.02 (ref 1–1.03)
SQUAMOUS #/AREA URNS AUTO: <1 /HPF (ref 0–1)
UROBILINOGEN UR STRIP-MCNC: 0 MG/DL (ref 0–2)
WBC #/AREA URNS AUTO: 2 /HPF (ref 0–2)

## 2017-12-07 ASSESSMENT — PAIN SCALES - GENERAL: PAINLEVEL: MODERATE PAIN (4)

## 2017-12-07 NOTE — NURSING NOTE
Chief Complaint   Patient presents with     Abdominal Pain     Patient here for possible hiatal hernia.       Ángel Hair CMA at 5:43 PM on 12/7/2017.

## 2017-12-07 NOTE — MR AVS SNAPSHOT
After Visit Summary   12/7/2017    Stephanie Bhatia    MRN: [id number]           Patient Information     Date Of Birth          1950        Visit Information        Provider Department      12/7/2017 5:40 PM Milla Jiménez, GRACIELA [facility]        Today's Diagnoses     Radiating abdominal pain    -  1    Family hx of aortic aneurysm        Abdominal aortic aneurysm without rupture (H)            Follow-ups after your visit        Your next 10 appointments already scheduled     Dec 11, 2017  8:00 AM CST   (Arrive by 7:45 AM)   US ABDOMINAL AORTIC IMAGING with UCUSV2   [facility] US ([facility])    [address] [place] [address] [phone] [phone]           Please bring a list of your medicines (including vitamins, minerals and over-the-counter drugs). Also, tell your doctor about any allergies you may have. Wear comfortable clothes and leave your valuables at home.  Adults: No eating or drinking for 8 hours before the exam. You may take medicine with a small sip of water.  Children: - Children 6+ years: No food or drink for 6 hours before exam. - Children 1-5 years: No food or drink for 4 hours before exam. - Infants, breast-fed: may have breast milk up to 2 hours before exam. - Infants, formula: may have bottle until 4 hours before exam.  Please call the Imaging Department at your exam site with any questions.            Dec 11, 2017  9:00 AM CST   (Arrive by 8:45 AM)   XR CHEST 2 VIEWS with UCXR1   [facility] Xray ([facility])    [address] [place] [address] [id number] [phone]           Please bring a list of your current medicines to your exam. (Include vitamins, minerals and over-thecounter medicines.) Leave your valuables at home.  Tell your doctor if there is a chance you may be pregnant.  You do not need to do anything special for this exam.             Feb 14, 2018  1:00 PM CST   (Arrive by 12:45 PM)   Alex Edwards with Feliciyt Schmid MD PhD   [facility] ([facility])    [address] [place] [address]  4th [facility] [id number] [phone]            Mar 01, 2018  8:00 AM CST   FULL PULMONARY FUNCTION with  PFL C   [facility] Pulmonary Function Testing ([facility])    [address] [place] [address]  3rd [facility] [id number] [phone]            Mar 01, 2018  9:00 AM CST   (Arrive by 8:45 AM)   RETURN SCLERODERMA with Tiffanie Gomez MD   [facility] ([facility])    [address] [place] [id number]   [phone]            Mar 08, 2018  7:30 AM CST   (Arrive by 7:15 AM)   Return Visit with Petros Gunn MD   [facility] Rheumatology ([facility])    [address] [place] [phone] [phone]              Future tests that were ordered for you today     Open Future Orders        Priority Expected Expires Ordered    XR Chest 2 Views Routine 12/7/2017 12/7/2018 12/7/2017            Who to contact     Please call your clinic at [phone] to:    Ask questions about your health    Make or cancel appointments    Discuss your medicines    Learn about your test results    Speak to your doctor   If you have compliments or concerns about an experience at your clinic, or if you wish to file a complaint, please contact [facility] Patient Relations at [phone] or email us at [email or web address]         Additional Information About Your Visit        MyChart Information     MyChart gives you secure access to your electronic health record. If you see a primary care provider, you can also send messages to your care team and make appointments. If you have questions, please call your primary care clinic.  If you do not  have a primary care provider, please call [phone] and they will assist you.      SocialSmack is an electronic gateway that provides easy, online access to your medical records. With SocialSmack, you can request a clinic appointment, read your test results, renew a prescription or communicate with your care team.     To access your existing account, please contact your [facility] or call [phone] for assistance.        Care EveryWhere ID     This is your Care EveryWhere ID. This could be used by other organizations to access your [facility] medical records  [id number]        Your Vitals Were     Pulse Breastfeeding? BMI (Body Mass Index)             72 No 21.08 kg/m2          Blood Pressure from Last 3 Encounters:   12/07/17 99/65   08/24/17 96/62   05/25/17 96/62    Weight from Last 3 Encounters:   12/07/17 57.5 kg (126 lb 11.2 oz)   08/24/17 57.9 kg (127 lb 9.6 oz)   05/25/17 58.1 kg (128 lb)              We Performed the Following     UA with Microscopic reflex to Culture     US Abdominal Aorta Imaging        Primary Care Provider    Lilia Cardenas MD       No address on file        Equal Access to Services     [facility]: Hadii aad ku kristen Norris, waaxda lutressaadaha, qaybta kaalmada tenzin, rich lopez . So [facility] [phone].    ATENCIÓN: Si habla español, tiene a chacon disposición servicios gratuitos de asistencia lingüística. Llame al [phone].    We comply with applicable federal civil rights laws and [place] laws. We do not discriminate on the basis of race, color, national origin, age, disability, sex, sexual orientation, or gender identity.            Thank you!     Thank you for choosing [facility]  for your care. Our goal is always to provide you with excellent care. Hearing back from our patients is one way we can continue to improve our services. Please take a few minutes to complete the written survey that you  may receive in the mail after your visit with us. Thank you!             Your Updated Medication List - Protect others around you: Learn how to safely use, store and throw away your medicines at www.disposemymeds.org.          This list is accurate as of: 12/7/17  6:51 PM.  Always use your most recent med list.                   Brand Name Dispense Instructions for use Diagnosis    desonide 0.05 % ointment    DESOWEN    60 g    Apply topically 2 times daily To rash on the corners of the mouth mixed with nystatin as needed until clear.    Angular cheilitis       nystatin ointment    MYCOSTATIN    30 g    Apply topically 2 times daily To rash at the corners of the mouth mixed with desonide until clear.    Angular cheilitis       vitamin D 1000 UNITS capsule      Take 1 capsule by mouth daily

## 2017-12-08 NOTE — PROGRESS NOTES
HPI: Stephanie Bhatia is a 67 year old female who comes in for abdominal pain for the past several days. She states that she has a boring acute pain which radiates to her back and is aggravated by lying down. She was belching more than usual. She was wondering if this is related to a hiatal hernia. She went to a chiropractor who told her that she had a hiatal hernia and did some deep massage on her which somewhat alleviated her symptoms. She points to her left flank area and states the pain is deep inside, but  is not tender to palpation. She denies any nausea, diarrhea, constipation, blood in her stool. She has no previous history of renal calculi. She thinks her father had a history of an aortic aneurysm but she is not sure of the details about this.      Patient Active Problem List   Diagnosis     Raynaud phenomenon     Anxiety     Osteopenia     Lumbar radiculopathy     Advanced directives, counseling/discussion     CARDIOVASCULAR SCREENING; LDL GOAL LESS THAN 160     Cluster headache syndrome     Dizziness and giddiness     Systemic sclerosis with limited cutaneous involvement (H)     Tinea pedis     AK (actinic keratosis)     Benign neoplasm of other specified sites of skin     Cherry hemangioma     Angular cheilitis     Osteoporosis     ILD (interstitial lung disease) (H)         Current Outpatient Prescriptions   Medication Sig Dispense Refill     Cholecalciferol (VITAMIN D) 1000 UNITS capsule Take 1 capsule by mouth daily       desonide (DESOWEN) 0.05 % ointment Apply topically 2 times daily To rash on the corners of the mouth mixed with nystatin as needed until clear. 60 g 11     nystatin (MYCOSTATIN) ointment Apply topically 2 times daily To rash at the corners of the mouth mixed with desonide until clear. 30 g 11         ALLERGIES: Review of patient's allergies indicates no known allergies.    PAST MEDICAL HX:   Past Medical History:   Diagnosis Date     Anxiety      Interstitial lung disease (H)      scleroderma ILD, dx by chest CT 5/2017     Migraine      Raynaud phenomenon      Sciatica      Systemic sclerosis with limited cutaneous involvement (H) 12/02/2014    Scl-70 and anti-centromere antibody neg, +RF       PAST SURGICAL HX:   Past Surgical History:   Procedure Laterality Date     HYSTEROSCOPIC PLACEMENT CONTRACEPTIVE DEVICE         IMMUNIZATION HX:   Immunization History   Administered Date(s) Administered     Influenza (High Dose) 3 valent vaccine 09/27/2013, 11/26/2014, 10/12/2015, 09/01/2016     Influenza (IIV3) PF 09/28/2017     Pneumococcal (PCV 13) 11/26/2014     Pneumococcal 23 valent 10/21/2013     TDAP Vaccine (Adacel) 05/01/2012       SOCIAL HX:   Social History     Social History Narrative    .  No children.  Part time at [employer].  Possible asbestos exposure from basement pipes in childhood home.  Lived in [place] [address].  Denies exposure to pet birds, hot tubs.  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       ROS: 8 system ROS reviewed w/o changes except for above      OBJECTIVE:  BP 99/65  Pulse 72  Wt 57.5 kg (126 lb 11.2 oz)  Breastfeeding? No  BMI 21.08 kg/m2   Wt Readings from Last 1 Encounters:   12/07/17 57.5 kg (126 lb 11.2 oz)     Constitutional: no distress, comfortable, pleasant   Eyes: anicteric.   Cardiovascular: regular rate and rhythm, normal S1 and S2, no murmurs, rubs or gallops, peripheral pulses full and symmetric   Respiratory: clear to auscultation, no wheezes or crackles, normal breath sounds. Previous CXRs do not mention HH.   Gastrointestinal: positive bowel sounds, nontender, no hepatosplenomegaly, no masses.   Musculoskeletal: Upon lying down on the exam table she exclaimed pain in her back in the left flank area. It resolved momentarily after lying down. She had no pain with percussion of her flank area or deep palpation of her abdomen.  Skin: no concerning lesions, no jaundice, temp normal   Neurological:  normal  gait,normal speech, no tremor. A and O x 3,  good historian.  Psychological: appropriate mood, sleep normal, good eye contact, normal affect       ASSESSMENT/PLAN:  Stephanie was seen today for abdominal pain.    Diagnoses and all orders for this visit:    Radiating abdominal pain  -     UA with Microscopic reflex to Culture  -     XR Chest 2 Views; Future to check for hiatal hernia.        -     Will consider a Barium Swallow test.    Family hx of aortic aneurysm  -     US Abdominal Aorta Imaging    If her UA is positive for sediment or blood will f/u with a renal US.   Total time spent 25 minutes.  More than 50% of the time spent with Ms. Bhatia on counseling / coordinating her care    Milla OWENS, CNP

## 2018-02-07 ENCOUNTER — OFFICE VISIT (OUTPATIENT)
Dept: PSYCHIATRY | Facility: CLINIC | Age: 68
End: 2018-02-07
Attending: PSYCHOLOGIST
Payer: MEDICARE

## 2018-02-07 DIAGNOSIS — F41.0 PANIC DISORDER WITHOUT AGORAPHOBIA: Primary | ICD-10-CM

## 2018-02-07 NOTE — PROGRESS NOTES
Note Type:   PSYCHIATRIC EVALUATION FORM    Service Provided by:  Ruth Gutierrez, PhD, LP      Encounter Duration: 1 hour  Start time: 1:03    End time: 2:00 pm    IDENTIFICATION  ALL is a 67 year old woman who stated that she was having significant anxiety and problems around working with her current boss. ALL is feeling significant anxiety about going to her work, and she feels like it takes a lot of effort to work with her current supervisor.     REASON FOR REFERRAL/CHIEF COMPLAINT   Frequent panic attacks.  Consumer s Expectations  Assessment and treatment for frequent panic attacks.    Family s Expectations  [x] Family not present  Evaluation and diagnostic impressions.     HISTORY OF PRESENT ILLNESS (Including bereavement/loss issues)  ALL reported that she has suffered from anxiety for 10-11 years, shortness of breath, feeling a sense of impending doom and fear of death. At some point she felt like she was having a heart attack. This happened approximately in 2009. ALL had a mild panic attack yesterday during which she experienced shortness of breath, he throat is constricted, she feels like she going to belittle her again, increased heart rate. ALL also feels like she is so flustered that she cannot communicate clearly and she starts stuttering, Within the last year she has had more than 10 or 12 panic attacks. The attacks last between 5-10 minutes. ALL also is afraid that the panic attacks will repeat and she is afraid particularly when she anticipates working with her current supervisor.    She also worries about things in general, for example, ALL worries about not been able to support herself, she wonders how long she is going to be able to stay in her house which she bought. She has options to live with family and friends but she would like to be on her own for as long as she can.    Specifically, she endorsed the following symptoms: frequent panic attacks.    PAST PSYCHIATRIC HISTORY             []NO  [x]YES  (If  YES, provide details  below)   Past history of panic attacks about 10-11 years ago. 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 She went to her mother home in [place], under the care of her sister and her mother. The psychotic symptoms started to elapsed once she was in [place]. people were concerned about her and she ended up going back to [place].    CURRENT MEDICATIONS  Not currently on medication.    PERSONAL, DEVELOPMENTAL, AND SOCIAL HISTORY   Childhood History  ALL reports growing up with a house full of women, her grandmother, her sister and herself and a great aunt. There were also other family members. Her parents  before she was conceived. Her father was emotionally supportive but could not live in the house with the rest of the family. ALL reports a good relationship with her mother and a good relationship with her father.    Family Circumstances (Include custody/guardianship status, structure, quality of relationships, impact of consumer s illness and dynamics to consider in discharge planning)    ALL lives by herself.    Relationship History (number, stability and quality of relationships; include sexual orientation/identity when relevant and sexually risky behaviors)  No current romantic partner. Was  at age 18 to a solorzano partner for 3 1/2 years, shortly after she had the psychotic break in [place].  She  a second time and was  for 16 years, this person left the relationship. They formally  in 2003.    Environment and Home (safety, neighborhood demographics, criminal activities within the home or neighborhood, etc)  Lives in a safe neighborhood.    Social Supports (including usual social/peer group and environmental setting)  Friends and her sister.    Ethnic/Cultural Considerations    Holiness and Spirituality (include role that spirituality could/does play in the consumer s treatment)  ALL does not go to Orthodoxy, has a strong sense of  spirituality but does not practice.    Advanced Directive for Behavioral Health Care or Medical Care (ages 18 and over only):  [x] NO  [] YES (If YES, location: ?????)    Does Consumer wish to make/modify/revoke an advanced directive:  [x] NO  [] YES   ?????    Leisure and Recreation (include type of activity consumer uses to relax/exercise/socialize)  ALL used to walk her dog a lot and have dinner with other dog owners, she also sees her group of highschool girlfriends. ALL socializes with another person she works with who is her age and very active.    Educational History (include educational status, last school attended, performance, plans for future education as appropriate)   Masters in dance.    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    Financial Issues [x]NO  []YES  (If YES, explain below)  ?????  Legal History  [x]NO  []YES  (If YES, explain below; include type of charges and convictions)  ?????    Urgency of legal problems (e.g.: pending court dates)  None.     Commitment Status (if applicable)     Service History [x]NO  []YES  (If Yes , explain below)  ?????    History of Abuse/Exploitation (either as the alleged victim or alleged perpetrator) [x]NO  [] YES  (if Yes, check all that apply below)  Past      Current     Age Related (Elder, Child)                  []      []    Sexual      []      []   Domestic    []      []    Physical     []      []    Psychological Trauma (include bullying) []      []    Other     []      []     Provide further details concerning the categories checked above:    MEDICAL HISTORY   Active Medical Problems [x]NO  []YES  (If YES, explain below)  ?????  Allergies, Adverse Drug Reactions and Side Effects [x] NO [] YES (If YES, explain below)  ?????    Pregnancy/Breast Feeding Status  (applicable to women between the  "ages of 10-58 years old)  Pregnant  [x]NO  []YES   (If YES, Number of Weeks ?????)  Breast Feeding  [x]NO  []YES      Past Medical History including surgery     [x]NO  [] YES (If YES, explain below)    Has a Current Primary Care Provider(s) [x] NO  [] YES (If YES, list providers below)    SIGNIFICANT FAMILY MEDICAL AND PSYCHIATRIC HISTORY  []Absent  [x]Present  (If \"present\" explain below,  including  current or past use of drugs and alcohol and other addictive behaviors   Mother had a stroke and had to be placed on valium.       MENTAL STATUS EXAMINATION   Appearance and Behavior: ALL was dressed informally and appeared to be her stated age.      Mood and Affect: ALL did not evidenced depressed mood and affect but acknowledged current anxiety.    Rate and Pattern of Speech: ALL spoke in a coherent, logical, and generally goal-directed manner.      Thought Form (logical, organized, tangential, circumstantial, etc): L thoughts were logical and organized.      Thought Content (basic themes, delusional, fixated, etc): ALL thought content was normal.       Perception (hallucination, depersonalization, etc): ALL denied any A/V Hallucinations.  However, she experienced some delusions during a past psychotic breakdown, she though that she would go an join [employer] and become a fighter. This took place approximately in 1974.    Orientation: Normal, oriented in time, place and person.    Attention and Concentration: Preserved.      Recent and Remote Memory:  Preserved.     Insight and Judgment: Insight was appropriate.     Other Findings (if any):    None.     LETHALITY  SCREENING    SUICIDALITY (ideation and/or behavior)   Past:      [] NO [x] YES Explain: (include: plan, intent, means)   Current: [x] NO [] YES   Explain: (include plan, intent, means)    Fleeting past suicide ideation. Once she took a knife but then put it back in a drawer. In [place] at times she would cross the street without looking hoping to get hit by a " "car.    HOMICIDALITY AND/OR AGGRESSION (ideation and/or behavior)   Past:     [x] NO [] YES Explain: (include: plan, intent, means)?????     Current: [x] NO [] YES   Explain: (include: plan, intent, means)?????     L stated that during tiffany-menopause she \"wanted to disappear\" and used to walk in the street saying \"beem me up abe\" however she stated that if she were to commit suicide she would hurt a lot of people that care about her. She thinks also that she was afraid of dying.    PROTECTIVE FACTORS AND PREFERRED COPING SKILLS   (Strengths that may assist in protecting consumer from harming themselves or others)    Protective Factors   [x] Hope/Optimism     []Positive therapeutic relationship     [x]Capacity for reality testing    []Spirituality                [x]Capacity for frustration tolerance   []Moral or Episcopalian prohibition     []Children in the home     [x]Successful past response to stress/positive coping                                                                                                                        [x]Sense of responsibility to family/Social     []Pets in the home                       Support            [x]Other (specify) Trying to connect with others.            Comments: (include details on above factors; if applicable)  ?????  Coping Skills:  [x]Call Support Person     [x]Community Activity/Support    [x]Spend Time with Support Person  [x] Engage in a Preferred Activity    []Contact Service/Crisis provider            [] Journaling/Writing     []Relaxation Techniques   [] Reading       []Meditation              [] Listening to Music     []Prayer     [] Other Diversionary Activities   []Dialectical Behavioral Therapy  Skills           [x] Other (specify) Swimming but not as often as she would like.  [x]Physical Activity                 Comments: (include details on above skill; if applicable)?????      HISTORY OF ADDICTIVE BEHAVIORS      Past      Current  Drugs  []NO [x]YES " (If YES, explain below)  [x]NO []YES (If YES, explain below)  Alcohol []NO [x]YES (If YES, explain below)  [x]NO []YES (If YES, explain below)  Gambling [x]NO []YES (If YES, explain below)  [x]NO []YES (If YES, explain below)  Internet [x]NO []YES (If YES, explain below)  [x]NO []YES (If YES, explain below)  Sexual  [x]NO []YES (If YES, explain below)  [x]NO []YES (If YES, explain below)  Other   [x]NO []YES (If YES, explain below)  [x]NO []YES (If YES, explain below)     Recent and Historic Use  (include age of onset, type, amount, frequency, duration, pattern of use, date and amount of last use)     L used to drink quite a bit, particularly on weekends, she started to drink at age 11, she would drink with friends but large amounts of alcohol. L also used marijuana and cocaine.  ALL stated that the last time she drank was in 2000 when she started drinking all day every day, she quit alcohol in 2001, on her own, she was in therapy for depression at the time and taking care of her father who had dementia.     Effects/Consequences of Addictive Behaviors  (emotional, behavioral, legal, social and physical health effects)    Never had any ill consequences from her drinking.    Treatment History    (including provider, type of treatment, dates and outcome and/or relapse history):    Never received treatment.    RECOVERY FACTORS    Recovery Readiness     Motivated for Change/Articulates Goal(s)  []NO  [x]YES (explain below)      Accepting of Treatment    []NO  [x]YES  (explain below)    Recovery Environment (strengths/resources or obstacles to recovery)  Family      [x]YES  [] NO   Supportive            [x]YES  [] NO  Openly communicates about  issues      [x]YES  [] NO  Engaged in consumer s treatment      [x]YES  [] NO  Participates in activities in support of consumer (e.g. support groups, advocacy)  []YES  [x] NO       Lacks acceptance of consumer s illness       []YES  [x] NO  Talks with or visits with on a regular  basis       [x]YES  [] NO  Accepting of consumer s illness     [x]YES  [] NO  Sets appropriate limits    Comments: (include details on above factors; if applicable)    Social  [x]YES  [] NO    Supportive community       []YES  [x] NO     Not affected by stigma      [x]YES  [] NO     Utilizes peer support       []YES  [x] NO     Participation in self-advocacy    [x]YES  [] NO     Interested in engaging in social activities    [x]YES  [] NO     Engaged in meaningful employment    [x]YES  [] NO  Stable Housing   [x]YES  [] NO  Able to access needed resources    Comments: (include details on above factors; if applicable)    Ethnic/Cultural    []YES  [x] NO  Engages in cultural or Quaker rituals  []YES  [x] NO    Utilizes Quaker/spiritual support   []YES  [] NO     Engages in cross cultural peer support  [x]YES  [] NO      Tolerant of individual differences   []YES  [x] NO     Connected to culture/ethnicity   [x]YES  [] NO  Able to access ethnic/cultural activities    Comments: (include details on above factors; if applicable)?????    Health  [x]YES  [] NO  Accesses healthcare when needed   [x]YES  [] NO    Desires to lead a healthy lifestyle    [x]YES  [] NO     Positive engagement with health care providers  [x]YES  [] NO      Does not engage in high risk health behaviors   [x]YES  [] NO     Able to articulate healthy lifestyle goals    Comments: (include details on above factors; if applicable)?????    Emotional  [x]YES  [] NO  Expresses hope  [x]YES  [] NO  Able to articulate goals   [x]YES  [] NO    Working towards meeting life goals (job, relationships)  [x]YES  [] NO     Learns from mistakes   [x]YES  [] NO     Utilizes healthy coping skills    [x]YES  [] NO     Utilizes natural supports     Comments: (include details on above factors; if applicable)?????  Community Resources Being Utilized [x]NO  []YES  (If YES, explain below)  ?????    LEARNING/TREATMENT NEEDS OR CONSIDERATION AND BARRIERS TO  TREATMENT  Individual       Family  Cultural/Orthodox   [x]NO  []YES     [x]NO  []YES (If YES, specify):    Emotional Barriers   [x]NO  []YES     [x]NO  []YES (If YES, specify):  ?????  Desire/Motivation   [x]NO  []YES     [x]NO  []YES (If YES, specify):  ?????  Physical    [x]NO  []YES     [x]NO  []YES (If YES, specify):  ?????  Cognitive    [x]NO  []YES     [x]NO  []YES (If YES, specify):  ?????  Communication/Language Barriers [x]NO  []YES     [x]NO  []YES (If YES, specify):  ?????    Indicate Consumer/ Family, Ability and Readiness for Treatment and Learning [x] Family not present    Learning Style (check preferred methods of learning)  Demonstration    []  Reading       []   Video       []    Group    []  Individual   [x]   Verbal    [x]       ASSESSMENT SUMMARY AND FINDINGS    Specifically, she endorsed the following symptoms:      Patient Self-Assessment Form Summary Scores:  Pain score:  0  Referral Indicated?  []NO  []YES (If YES, specify):  ?????   Nutrition:  No change.  Referral Indicated?  []NO  []YES (If YES, specify):  ?????  Function score:   0  Referral Indicated?  []NO  []YES (If YES, specify):  ?????    Initial Treatment Plan and Recommendations for Further Evaluation(s)  Need(s)  Coping skills to reduce anxiety  Goals   To decrease symptoms of anxiety and frequency of panic disorders.    Interventions/Treatment Recommendations( including additional history and diagnostic assessments as appropriate)      It was recommended that ALL engage in individual outpatient therapy at the [facility].  She would be comfortable to receive treatment from a female student supervised by Dr. Gutierrez.     Referrals/Consults (Check all referrals made)  Legal   []    ?????   Education Assessment []    ?????  Vocational Assessment []    ?????  Occupations Therapy []    ?????  Physical Therapy []    ?????   Child Welfare  []    ?????   Physical Health Care []    ?????   Other   []    Specify:?????      Initial  treatment plan/ Recommendations reviewed with (check all that apply):  Consumer []NO  [x]  YES               Family  [x]NO  []  YES     Comments: (include details; if applicable)    The following individuals participated in and agreed with recommendations and initial treatment plan (check all that apply)    Consumer []NO  [x]  YES               Family  [x]NO  []  YES     Diagnosis.   Panic disorder without agoraphobia F41.0

## 2018-02-07 NOTE — MR AVS SNAPSHOT
After Visit Summary   2/7/2018    Stephanie Bhatia    MRN: [id number]           Patient Information     Date Of Birth          1950        Visit Information        Provider Department      2/7/2018 1:00 PM Ruth Benz, PhD Psychiatry Clinic        Today's Diagnoses     Panic disorder without agoraphobia    -  1       Follow-ups after your visit        Your next 10 appointments already scheduled     Feb 14, 2018  1:00 PM CST   (Arrive by 12:45 PM)   Alex Edwards with Felicity Schmid MD PhD   [facility] ([facility])    [address]  [address] [facility] [address]   [phone]            Feb 21, 2018  1:00 PM CST   Adult Psychotherapy with Ruth Gutierrez, PhD   Psychiatry Clinic ([facility])    [address]  [address] [facility] [id number] [phone]            Mar 01, 2018  8:00 AM CST   FULL PULMONARY FUNCTION with  PFL C   [facility] Pulmonary Function Testing ([facility])    [address]  [address]rd [facility] [id number] [phone]            Mar 01, 2018  9:00 AM CST   (Arrive by 8:45 AM)   RETURN SCLERODERMA with Tiffanie Gomez MD   [facility] ([facility])    [address]  [address]  [facility] [id number] [phone]            Mar 08, 2018  7:30 AM CST   (Arrive by 7:15 AM)   Return Visit with Petros Gunn MD   [facility] Rheumatology ([facility])    [address] [place] [address]  [address]  [facility] [id number] [phone]              Who to contact     Please call your clinic at [phone] to:    Ask questions about your health    Make or cancel appointments    Discuss your medicines    Learn about your test results    Speak to your doctor   If you have compliments or concerns about an experience at your clinic, or if you  wish to file a complaint, please contact [facility] Patient Relations at [phone] or email us at [email or web address]         Additional Information About Your Visit        zealot networkharcarlos Information     Sentropi gives you secure access to your electronic health record. If you see a primary care provider, you can also send messages to your care team and make appointments. If you have questions, please call your primary care clinic.  If you do not have a primary care provider, please call [phone] and they will assist you.      Sentropi is an electronic gateway that provides easy, online access to your medical records. With Sentropi, you can request a clinic appointment, read your test results, renew a prescription or communicate with your care team.     To access your existing account, please contact your [facility] or call [phone] for assistance.        Care EveryWhere ID     This is your Care EveryWhere ID. This could be used by other organizations to access your [place] medical records  [id number]         Blood Pressure from Last 3 Encounters:   12/07/17 99/65   08/24/17 96/62   05/25/17 96/62    Weight from Last 3 Encounters:   12/07/17 57.5 kg (126 lb 11.2 oz)   08/24/17 57.9 kg (127 lb 9.6 oz)   05/25/17 58.1 kg (128 lb)              Today, you had the following     No orders found for display       Primary Care Provider Office Phone # Fax #    Milla CROCKER Tino, APRN -[phone]       [address] [place] [address] [address] [place] [address]        Equal Access to Services     [facility]: Hadii aad ku hadasho Soomaali, waaxda luqadaha, qaybta kaalmada adeegyada, rich lopez . So [facility] [phone].    ATENCIÓN: Si habla español, tiene a chacon disposición servicios gratuitos de asistencia lingüística. Llame al [phone].    We comply with applicable federal civil rights laws and [place] laws. We do  not discriminate on the basis of race, color, national origin, age, disability, sex, sexual orientation, or gender identity.            Thank you!     Thank you for choosing PSYCHIATRY CLINIC  for your care. Our goal is always to provide you with excellent care. Hearing back from our patients is one way we can continue to improve our services. Please take a few minutes to complete the written survey that you may receive in the mail after your visit with us. Thank you!             Your Updated Medication List - Protect others around you: Learn how to safely use, store and throw away your medicines at www.disposemymeds.org.          This list is accurate as of 2/7/18  1:56 PM.  Always use your most recent med list.                   Brand Name Dispense Instructions for use Diagnosis    desonide 0.05 % ointment    DESOWEN    60 g    Apply topically 2 times daily To rash on the corners of the mouth mixed with nystatin as needed until clear.    Angular cheilitis       nystatin ointment    MYCOSTATIN    30 g    Apply topically 2 times daily To rash at the corners of the mouth mixed with desonide until clear.    Angular cheilitis       vitamin D 1000 UNITS capsule      Take 1 capsule by mouth daily

## 2018-02-11 ASSESSMENT — ENCOUNTER SYMPTOMS
NECK PAIN: 1
HALLUCINATIONS: 0
DEPRESSION: 1
BACK PAIN: 1
FATIGUE: 1
INCREASED ENERGY: 0
ARTHRALGIAS: 1
CHILLS: 0
MUSCLE CRAMPS: 1
FEVER: 0
NIGHT SWEATS: 0
ALTERED TEMPERATURE REGULATION: 0
NERVOUS/ANXIOUS: 1
DECREASED APPETITE: 0
INSOMNIA: 1
POLYPHAGIA: 1
WEIGHT GAIN: 0
WEIGHT LOSS: 0
PANIC: 1
DECREASED CONCENTRATION: 0
JOINT SWELLING: 0
STIFFNESS: 1
POLYDIPSIA: 0
MUSCLE WEAKNESS: 1
MYALGIAS: 0

## 2018-02-12 ENCOUNTER — OFFICE VISIT (OUTPATIENT)
Dept: PSYCHIATRY | Facility: CLINIC | Age: 68
End: 2018-02-12
Attending: PSYCHOLOGIST
Payer: MEDICARE

## 2018-02-12 VITALS
HEART RATE: 75 BPM | BODY MASS INDEX: 22.2 KG/M2 | DIASTOLIC BLOOD PRESSURE: 72 MMHG | SYSTOLIC BLOOD PRESSURE: 113 MMHG | WEIGHT: 133.4 LBS

## 2018-02-12 DIAGNOSIS — F41.9 ANXIETY: Primary | ICD-10-CM

## 2018-02-12 PROCEDURE — G0463 HOSPITAL OUTPT CLINIC VISIT: HCPCS | Mod: ZF

## 2018-02-12 RX ORDER — SACCHAROMYCES BOULARDII 250 MG
250 CAPSULE ORAL 2 TIMES DAILY
COMMUNITY
End: 2018-08-06

## 2018-02-12 ASSESSMENT — PAIN SCALES - GENERAL: PAINLEVEL: NO PAIN (0)

## 2018-02-12 NOTE — PROGRESS NOTES
"   Outpatient Psychiatry Diagnostic Assessment      Provider:  GRACIELA Martin CNP 2/12/2018 7:27 AM  Patient Identification: Stephanie Bhatia   YOB: 1950   MRN: [id number]      Stephanie Bhatia is a 67 year old female with alcohol use disorder in remote remission who presents for a 60 minute psychiatric evaluation.     The patient s chief complaint is  anxiety, it's mild. I've been dealing with it since 2007 but didn't know it until 2011\".     Patient was referred by GRACIELA Suresh CNP  [address] [facility] [address]  [place] [address]     History of Present Illness      Stephanie Bhatia presents alone and on time.      She is not taking any psychotropics.     She describes her mood as anxious.     Psychiatric Review of Systems:  Adult onset of anxiety in 2007 when she put her [place] home on the market. Anxiety worsened further when she discovered that many people around her needed an end of life health advocate. She worried through her adult years that she could never support herself. She reports alcohol use between ages [age] helped her numb out. She didn't realize what she suffered was anxiety until 2011. Currently, she is anxious and panicked with her female boss.      She endorses depression symptoms including some helpless, sadness, hopeless. Low energy.      Anxiety symptoms include racing heart, sense of doom, shortness of breath. She finds it difficult to control her worry about finances and limited intermediate savings. Stephanie describes chronic anxiety. She reports feeling as though she was having a heart attack last in 2009. Panic attacks that have been more mild in nature increased over the last year when she anticipates working with her boss.    She enjoys having a dog to walk, seeing her sister and friends; she used to enjoy sewing, reading, tap dancing when she had energy.      She is manages housework and hygiene as is normal for her.    She denies wilner and psychosis. She describes a " "history of compulsive alcohol and food use to help her avoid dystonic emotions like anxiety.     She denies current suicidal ideation, plan, intention or previous suicide attempt. When she's had SI in the past this has been passive in nature and during perimenopause. She denies self harm behavior. She denies family history of parasuicidal behaviors or completed suicide. She denies homicidal ideation or history of violence.    Appetite: ranges between over eating and eating very little. Perceives minor weight gain and attributes this to low energy and not walking her dog. She denies having a disordered eating diagnosis but was told she was losing too much weight in a [place] research trial for perimenopausal women.    Sleeping: binging on streaming TV before bed. She falls asleep in 30 min, trying to adhere to a routine for sleeping and waking. Sleeps 930p-5a; she'd like to extend sleep until 6a; she wakes many nights at 1a but can't go back to sleep easily 2-3x week. Denies naps.    Past Psychiatric History      She previously saw psychiatrists in [place] in the 1980s and in [place] and [place].    Previous psychotropic trials include Zoloft (\"when I really needed it, it was great. Later it didn't do anything\").     She denies ECT, detox, rehab or previous inpatient admission. 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 She stayed with friends and later family in her recovery, this did not involve a psychiatric hospitalization.    She had a \"bad experience\" with a counseling student when she was young.       Chemical Use History      CAGE: not assessed  Alcohol: she started binge drinking in 8th grade. Drinking escalated in 2000 to \"drinking all day long\" for one year. Preferred vodka, wine and daniel. Quit drinking abruptly in 2002 at age 53 after becoming scared that she couldn't keep her car on the road. Found good support in AA. "     Cannabis: advised by her therapist to smoke cannabis in 2001, experimented cannabis, describes vaguely  Other illicits: experimented with cocaine and cannabis  Prescription pills: denies  Caffeine: unknown  Nicotine: quit cold turkey in 1986 after trying to quit unsuccessfully several times using distraction techniques.     Past Medical/Surgical History      The patient s primary care provider is Milla Jiménez.     Pulse Readings from Last 3 Encounters:   02/12/18 75   12/07/17 72   08/24/17 77     Wt Readings from Last 3 Encounters:   02/12/18 60.5 kg (133 lb 6.4 oz)   12/07/17 57.5 kg (126 lb 11.2 oz)   08/24/17 57.9 kg (127 lb 9.6 oz)     BP Readings from Last 3 Encounters:   02/12/18 113/72   12/07/17 99/65   08/24/17 96/62     No Known Allergies      Current Outpatient Prescriptions:      Cholecalciferol (VITAMIN D) 1000 UNITS capsule, Take 1 capsule by mouth daily, Disp: , Rfl:      desonide (DESOWEN) 0.05 % ointment, Apply topically 2 times daily To rash on the corners of the mouth mixed with nystatin as needed until clear., Disp: 60 g, Rfl: 11     nystatin (MYCOSTATIN) ointment, Apply topically 2 times daily To rash at the corners of the mouth mixed with desonide until clear., Disp: 30 g, Rfl: 11    Lab Results   Component Value Date    WBC 7.4 02/05/2016    HGB 13.3 02/05/2016    HCT 40.5 02/05/2016     02/05/2016    CHOL 202 (H) 02/13/2017    TRIG 105 02/13/2017    HDL 51 02/13/2017    ALT 17 03/08/2017    AST 19 03/08/2017     03/08/2017    BUN 20 03/08/2017    CO2 29 03/08/2017    TSH 2.43 03/08/2017     Past Medical History:   Diagnosis Date     Anxiety      Interstitial lung disease (H)     scleroderma ILD, dx by chest CT 5/2017     Migraine      Raynaud phenomenon      Sciatica      Systemic sclerosis with limited cutaneous involvement (H) 12/02/2014    Scl-70 and anti-centromere antibody neg, +RF     Past Surgical History:   Procedure Laterality Date     HYSTEROSCOPIC PLACEMENT  CONTRACEPTIVE DEVICE       Reviewed medical and surgical history listed above. She adds have a BTL and that perimenopause worsened mood symptoms in early 2000s. She denies current physical symptoms. She denies head injury, loss of consciousness, seizures, or other neurological concerns.      Family History     First degree family mental health history includes Mom- treated with Valium for anxiety.     Family History     Problem (# of Occurrences) Relation (Name,Age of Onset)    C.A.D. (1) Father: a efw small heart attacks    Neurologic Disorder (2) Father: dementia, Mother: dementia    Rheumatoid Arthritis (1) Sister       Negative family history of: Breast Cancer, Cancer - colorectal, DIABETES, CANCER, Interstitial Lung Disease, Melanoma, Skin Cancer         Social History     She grew up in a household of women; her Dad was emotionally supportive but unable to financially support them. He left them for [place] when Stephanie was between [age]yo. Her parents  after she was conceived but they never . She grew up in [place] for [place] first 20 years. She moved to [place], [place] and other places across the [facility] for 40 years. She chose to move back to [place] to to be near family after caring for her Dad for many years. She ultimately placed her Dad with dementia in [facility]. She has one 71yo sister.     She grew up in [place] and [facility] but denies any abuse history. She was  twice: 1st- 3.5 years before her  came out as solorzano, 2nd- 16 years until they . She has no children. Social supports include her sister, AA sponsor, two good friends, one local and one in [place]; she has two groups of friends (one from high school and a group of dog walkers). She lives alone. She graduated [employer]. She attended [employer] for physical education and [employer] for a graduate degree in dance.    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 She enjoys  her peers and her customers. She perceives her boss is in her position due to nepotism. She feels she is limited from making a job change due to her age, limited education and limited physical abilities.    She denies legal or  history. She identifies as someone who doesn't work at her spirituality enough. She is financially supported by her income, [employer] and that her house is paid for; she describes her finances as adequate and basic needs are met.    Review of Systems      Pertinent items are noted in HPI.  All other systems are negative.     Results      /72  Pulse 75  Wt 60.5 kg (133 lb 6.4 oz)  BMI 22.2 kg/m2     Mental Status Exam      Appearance:  Casually dressed, Well groomed, Dressed appropriately for weather and Appears stated age  Behavior/relationship to examiner/demeanor:  Cooperative, Engaged and Pleasant  Motor activity/EPS:  Normal  Gait:  Normal  Speech rate:  Normal  Speech volume:  Normal  Speech articulation:  Normal  Speech coherence:  Normal  Speech spontaneity:  Normal  Mood (subjective report):  anxious  Affect (objective appearance):  Appropriate/mood-congruent and Subdued  Thought Process (Associations):  Circumstantial and over inclusive  Thought process (Rate):  Normal  Thought content:  no evidence of suicidal or homicidal ideation, no overt psychosis, denies suicidal ideation, intent or thoughts, patient denies auditory and visual hallucinations, patient does not appear to be responding to internal stimuli and denies suicidal intent or plan  Abnormal Perception:  None  Sensorium:  Alert, Oriented to person, Oriented to place, Oriented to date/time and Oriented to situation  Attention/Concentration:  Normal  Memory:  Immediate recall intact, Short-term memory intact and Long-term memory intact  Language:  Intact  Fund of Knowledge/Intelligence:  Average  Abstraction:  Normal  Insight:  Limited  Judgment:  Good     Assessment & Plan      Assessment:  Stephanie is a 67 year old  "female who presents for psychiatric evaluation.  - motivated to start therapy, declines seeing a resident or intern  - motivated to start walking again  - consistent with intake notes, patient chooses to start therapy vs retrial meds  - discussed her question \"do I really want to keep my job if I have to take medication?\"    Diagnosis  Axis 1: NATALI, alcohol abuse in remote remission  Axis 2: deferred     Plan:   Medication: she declines any medication and chooses instead to schedule with therapist  OTC Recommendations: none  Lab Orders: none, reviewed unremarkable March 2017 labs  Referrals: she requests a referral to [facility]  Release of Information: none  Future Treatment Considerations: has only trialed Zoloft; discussed reasonable expectation, risks and benefits of buspar, gabapentin, Valium  Return for Follow Up: 4 weeks, sooner as needed     Stephanie voiced understanding of the treatment plan including discussion of options, risks/ benefits. Stephanie has clinic contact information and will seek emergency services if urgent or life threatening symptoms present. She understands risks associated with street drugs or alcohol.    PHQ-9 score: 12  GAD7: No flowsheet data found.  CAGE: not assessed  : 02/2018- no file  Marlen Sweet, APRN CNP 2/12/2018    "

## 2018-02-12 NOTE — MR AVS SNAPSHOT
After Visit Summary   2/12/2018    Stephanie Bhatia    MRN: [id number]           Patient Information     Date Of Birth          1950        Visit Information        Provider Department      2/12/2018 7:30 AM Marlen Sweet APRN [facility]        Today's Diagnoses     Anxiety    -  1       Follow-ups after your visit        Follow-up notes from your care team     Return in about 4 weeks (around 3/12/2018), or if symptoms worsen or fail to improve, for Routine Visit.      Your next 10 appointments already scheduled     Mar 01, 2018  8:00 AM CST   FULL PULMONARY FUNCTION with  PFL C   [facility] Pulmonary Function Testing ([facility])    [address]  3rd Floor  [facility] [address]   [phone]            Mar 01, 2018  9:00 AM CST   (Arrive by 8:45 AM)   RETURN SCLERODERMA with Tiffanie Gomez MD   [facility] ([facility])    [address]  [address]  [facility] [address]   [phone]            Mar 08, 2018  7:30 AM CST   (Arrive by 7:15 AM)   Return Visit with Petros Gunn MD   [facility] Rheumatology ([facility])    [address]  [address]  [facility] [address]   [phone]            Apr 04, 2018  7:45 AM CDT   (Arrive by 7:30 AM)   MA SCREENING DIGITAL BILATERAL with [id number]   [facility] ([facility])    [address]  2nd Floor  [facility] [address] [phone]           Do not use any powder, lotion or deodorant under your arms or on your breast. If you do, we will ask you to remove it before your exam.  Wear comfortable, two-piece clothing.  If you have any allergies, tell your care team.  Bring any previous mammograms from other facilities or have them mailed to the breast center. Three-dimensional (3D) mammograms are available at [place] locations in [facility],  "[place], [place], [facility], [place], [place], and [place]. [facility] locations include [place] and [facility] in [place]. Benefits of 3D mammograms include: - Improved rate of cancer detection - Decreases your chance of having to go back for more tests, which means fewer: - \"False-positive\" results (This means that there is an abnormal area but it isn't cancer.) - Invasive testing procedures, such as a biopsy or surgery - Can provide clearer images of the breast if you have dense breast tissue. 3D mammography is an optional exam that anyone can have with a 2D mammogram. It doesn't replace or take the place of a 2D mammogram. 2D mammograms remain an effective screening test for all women.  Not all insurance companies cover the cost of a 3D mammogram. Check with your insurance.              Who to contact     Please call your clinic at [phone] to:    Ask questions about your health    Make or cancel appointments    Discuss your medicines    Learn about your test results    Speak to your doctor            Additional Information About Your Visit        Snapdeal Information     Snapdeal gives you secure access to your electronic health record. If you see a primary care provider, you can also send messages to your care team and make appointments. If you have questions, please call your primary care clinic.  If you do not have a primary care provider, please call [phone] and they will assist you.      Snapdeal is an electronic gateway that provides easy, online access to your medical records. With Snapdeal, you can request a clinic appointment, read your test results, renew a prescription or communicate with your care team.     To access your existing account, please contact your [facility] or call [phone] for assistance.        Care EveryWhere ID     This is your Care EveryWhere ID. This could be used by other organizations to access your [place] " medical records  [id number]        Your Vitals Were     Pulse BMI (Body Mass Index)                75 22.2 kg/m2           Blood Pressure from Last 3 Encounters:   02/20/18 104/66   02/14/18 108/71   02/12/18 113/72    Weight from Last 3 Encounters:   02/20/18 59.3 kg (130 lb 12.8 oz)   02/14/18 58 kg (127 lb 12.8 oz)   02/12/18 60.5 kg (133 lb 6.4 oz)              Today, you had the following     No orders found for display       Primary Care Provider Office Phone # Fax #    Milla Jiménez, GRACIELA [phone]       [address] [address]        Equal Access to Services     [facility] : Ellen lomaxo Sodaisyali, waaxda luqadaha, qaybta kaalmada adeegyada, rich lopez . So [facility] [phone].    ATENCIÓN: Si habla español, tiene a chacon disposición servicios gratuitos de asistencia lingüística. Llame al [phone].    We comply with applicable federal civil rights laws and [place] laws. We do not discriminate on the basis of race, color, national origin, age, disability, sex, sexual orientation, or gender identity.            Thank you!     Thank you for choosing PSYCHIATRY CLINIC  for your care. Our goal is always to provide you with excellent care. Hearing back from our patients is one way we can continue to improve our services. Please take a few minutes to complete the written survey that you may receive in the mail after your visit with us. Thank you!             Your Updated Medication List - Protect others around you: Learn how to safely use, store and throw away your medicines at www.disposemymeds.org.          This list is accurate as of 2/12/18 11:59 PM.  Always use your most recent med list.                   Brand Name Dispense Instructions for use Diagnosis    desonide 0.05 % ointment    DESOWEN    60 g    Apply topically 2 times daily To rash on the corners of the mouth mixed with nystatin as needed until clear.    Angular  cheilitis       nystatin ointment    MYCOSTATIN    30 g    Apply topically 2 times daily To rash at the corners of the mouth mixed with desonide until clear.    Angular cheilitis       saccharomyces boulardii 250 MG capsule    FLORASTOR     Take 250 mg by mouth 2 times daily        vitamin D 1000 UNITS capsule      Take 1 capsule by mouth daily

## 2018-02-12 NOTE — NURSING NOTE
Chief Complaint   Patient presents with     Eval/Assessment     anxiety, panic disorder     Reviewed allergies, medications, pharmacy and smoking status.  Administered abuse screening questions   Administered fall assessment  Obtained weight, pain level, blood pressure and heart rate

## 2018-02-13 ASSESSMENT — PATIENT HEALTH QUESTIONNAIRE - PHQ9: SUM OF ALL RESPONSES TO PHQ QUESTIONS 1-9: 12

## 2018-02-14 ENCOUNTER — OFFICE VISIT (OUTPATIENT)
Dept: FAMILY MEDICINE | Facility: CLINIC | Age: 68
End: 2018-02-14
Payer: COMMERCIAL

## 2018-02-14 VITALS
BODY MASS INDEX: 21.29 KG/M2 | RESPIRATION RATE: 16 BRPM | HEART RATE: 64 BPM | TEMPERATURE: 97.4 F | HEIGHT: 65 IN | SYSTOLIC BLOOD PRESSURE: 108 MMHG | OXYGEN SATURATION: 99 % | WEIGHT: 127.8 LBS | DIASTOLIC BLOOD PRESSURE: 71 MMHG

## 2018-02-14 DIAGNOSIS — F41.1 GAD (GENERALIZED ANXIETY DISORDER): ICD-10-CM

## 2018-02-14 DIAGNOSIS — Z12.31 ENCOUNTER FOR SCREENING MAMMOGRAM FOR BREAST CANCER: Primary | ICD-10-CM

## 2018-02-14 DIAGNOSIS — M81.0 AGE RELATED OSTEOPOROSIS, UNSPECIFIED PATHOLOGICAL FRACTURE PRESENCE: ICD-10-CM

## 2018-02-14 DIAGNOSIS — Z00.00 ROUTINE GENERAL MEDICAL EXAMINATION AT A HEALTH CARE FACILITY: ICD-10-CM

## 2018-02-14 LAB
ANION GAP SERPL CALCULATED.3IONS-SCNC: 7 MMOL/L (ref 3–14)
BUN SERPL-MCNC: 19 MG/DL (ref 7–30)
CALCIUM SERPL-MCNC: 9.5 MG/DL (ref 8.5–10.1)
CHLORIDE SERPL-SCNC: 105 MMOL/L (ref 94–109)
CO2 SERPL-SCNC: 29 MMOL/L (ref 20–32)
CREAT SERPL-MCNC: 0.9 MG/DL (ref 0.52–1.04)
GFR SERPL CREATININE-BSD FRML MDRD: 62 ML/MIN/1.7M2
GLUCOSE SERPL-MCNC: 94 MG/DL (ref 70–99)
MAGNESIUM SERPL-MCNC: 2.2 MG/DL (ref 1.6–2.3)
PHOSPHATE SERPL-MCNC: 4.4 MG/DL (ref 2.5–4.5)
POTASSIUM SERPL-SCNC: 4.9 MMOL/L (ref 3.4–5.3)
SODIUM SERPL-SCNC: 141 MMOL/L (ref 133–144)

## 2018-02-14 ASSESSMENT — PAIN SCALES - GENERAL: PAINLEVEL: NO PAIN (0)

## 2018-02-14 NOTE — NURSING NOTE
Chief Complaint   Patient presents with     Establish Care     Patient is here to establish care for new PCP     Kenton Naranjo CMA ([facility]) at 1:19 PM on 2/14/2018

## 2018-02-14 NOTE — MR AVS SNAPSHOT
After Visit Summary   2/14/2018    Stephanie Bhatia    MRN: [id number]           Patient Information     Date Of Birth          1950        Visit Information        Provider Department      2/14/2018 1:00 PM Felicity Schmid MD PhD [facility]        Today's Diagnoses     Routine general medical examination at a health care facility    -  1    Encounter for screening mammogram for breast cancer        Age related osteoporosis, unspecified pathological fracture presence          Care Instructions    Primary Care Center Medication Refill Request Information:  * Please contact your pharmacy regarding ANY request for medication refills.  ** [facility] Prescription Fax = [phone]  * Please allow 3 business days for routine medication refills.  * Please allow 5 business days for controlled substance medication refills.     Primary Care Center Test Result notification information:  *You will be notified with in 7-10 days of your appointment day regarding the results of your test.  If you are on MyChart you will be notified as soon as the provider has reviewed the results and signed off on them.    Primary Care Center [phone]     Make an osteoporosis appt with me at [place]   Patient should take 1200mg of calcium/day in divided doses and vitamin D3 4000IU/day.  Stay active  Mammogram  FIT kit             Follow-ups after your visit        Follow-up notes from your care team     Return in about 6 months (around 8/14/2018).      Your next 10 appointments already scheduled     Feb 21, 2018  1:00 PM CST   Adult Psychotherapy with Ruth Gutierrez, PhD   Psychiatry Clinic ([facility])    [address]  [address] [facility] [address]   [phone]            Mar 01, 2018  8:00 AM CST   FULL PULMONARY FUNCTION with  PFL C    Health Pulmonary Function Testing ([facility])    [address]  "Floor  [facility] [address]   [phone]            Mar 01, 2018  9:00 AM CST   (Arrive by 8:45 AM)   RETURN SCLERODERMA with Tiffanie Gomez MD   [facility] ([facility])    [address]  [address]  [facility] [address]   [phone]            Mar 08, 2018  7:30 AM CST   (Arrive by 7:15 AM)   Return Visit with Petros Gunn MD   [facility] Rheumatology ([facility])    [address]  [address]  [facility] [address]   [phone]            Apr 04, 2018  7:45 AM CDT   (Arrive by 7:30 AM)   MA SCREENING DIGITAL BILATERAL with UCBCMA1   [facility] Breast Center Imaging ([facility])    [address]  2nd Floor  [facility] [address]   [phone]           Do not use any powder, lotion or deodorant under your arms or on your breast. If you do, we will ask you to remove it before your exam.  Wear comfortable, two-piece clothing.  If you have any allergies, tell your care team.  Bring any previous mammograms from other facilities or have them mailed to the breast center. Three-dimensional (3D) mammograms are available at [place] locations in [facility], [facility], [facility], and [place]. [facility] locations include [place] and [facility] Center in [place]. Benefits of 3D mammograms include: - Improved rate of cancer detection - Decreases your chance of having to go back for more tests, which means fewer: - \"False-positive\" results (This means that there is an abnormal area but it isn't cancer.) - Invasive testing procedures, such as a biopsy or surgery - Can provide clearer images of the breast if you have dense breast tissue. 3D mammography is an optional exam that anyone can have with a 2D mammogram. It doesn't replace or take the place of a 2D mammogram. 2D mammograms remain an effective screening test for all women. " " Not all insurance companies cover the cost of a 3D mammogram. Check with your insurance.              Future tests that were ordered for you today     Open Future Orders        Priority Expected Expires Ordered    Mammogram, routine screening Routine  2/14/2019 2/14/2018    Fecal cancer screen FIT Routine 2/14/2018 5/15/2018 2/14/2018    Magnesium Routine 2/14/2018 2/28/2018 2/14/2018    Phosphorus Routine 2/14/2018 2/28/2018 2/14/2018            Who to contact     Please call your clinic at [phone] to:    Ask questions about your health    Make or cancel appointments    Discuss your medicines    Learn about your test results    Speak to your doctor            Additional Information About Your Visit        [employer] Ins[employer] Information     [employer] gives you secure access to your electronic health record. If you see a primary care provider, you can also send messages to your care team and make appointments. If you have questions, please call your primary care clinic.  If you do not have a primary care provider, please call [phone] and they will assist you.      [employer] is an electronic gateway that provides easy, online access to your medical records. With [employer], you can request a clinic appointment, read your test results, renew a prescription or communicate with your care team.     To access your existing account, please contact your [facility] or call [phone] for assistance.        Care EveryWhere ID     This is your Care EveryWhere ID. This could be used by other organizations to access your [place] medical records  [id number]        Your Vitals Were     Pulse Temperature Respirations Height Pulse Oximetry Breastfeeding?    64 97.4  F (36.3  C) (Oral) 16 1.657 m (5' 5.25\") 99% No    BMI (Body Mass Index)                   21.1 kg/m2            Blood Pressure from Last 3 Encounters:   02/14/18 108/71   12/07/17 99/65   08/24/17 96/62    Weight from Last 3 Encounters: "   02/14/18 58 kg (127 lb 12.8 oz)   12/07/17 57.5 kg (126 lb 11.2 oz)   08/24/17 57.9 kg (127 lb 9.6 oz)              We Performed the Following     Basic metabolic panel        Primary Care Provider Office Phone # Fax #    GRACIELA Suresh [phone]       [address] [place] [address] [address]  [facility] [address]        Equal Access to Services     TROY SILVA : Hadii aad ku hadasho Soomaali, waaxda luqadaha, qaybta kaalmada adeegyada, waxay idiin hayaan adeeg kharash la'aan . So [facility] [phone].    ATENCIÓN: Si habla español, tiene a chacon disposición servicios gratuitos de asistencia lingüística. [facility] [phone].    We comply with applicable federal civil rights laws and [place] laws. We do not discriminate on the basis of race, color, national origin, age, disability, sex, sexual orientation, or gender identity.            Thank you!     Thank you for choosing [facility] PRIMARY CARE CLINIC  for your care. Our goal is always to provide you with excellent care. Hearing back from our patients is one way we can continue to improve our services. Please take a few minutes to complete the written survey that you may receive in the mail after your visit with us. Thank you!             Your Updated Medication List - Protect others around you: Learn how to safely use, store and throw away your medicines at www.disposemymeds.org.          This list is accurate as of 2/14/18  2:04 PM.  Always use your most recent med list.                   Brand Name Dispense Instructions for use Diagnosis    desonide 0.05 % ointment    DESOWEN    60 g    Apply topically 2 times daily To rash on the corners of the mouth mixed with nystatin as needed until clear.    Angular cheilitis       nystatin ointment    MYCOSTATIN    30 g    Apply topically 2 times daily To rash at the corners of the mouth mixed with desonide until clear.    Angular cheilitis       saccharomyces boulardii 250 MG capsule    FLORASTOR     Take 250  mg by mouth 2 times daily        vitamin D 1000 UNITS capsule      Take 1 capsule by mouth daily

## 2018-02-14 NOTE — PATIENT INSTRUCTIONS
[facility] Medication Refill Request Information:  * Please contact your pharmacy regarding ANY request for medication refills.  ** [facility] Prescription Fax = [phone]  * Please allow 3 business days for routine medication refills.  * Please allow 5 business days for controlled substance medication refills.     [facility] Test Result notification information:  *You will be notified with in 7-10 days of your appointment day regarding the results of your test.  If you are on MyChart you will be notified as soon as the provider has reviewed the results and signed off on them.    [facility] [phone]     Make an osteoporosis appt with me at [place]   Patient should take 1200mg of calcium/day in divided doses and vitamin D3 4000IU/day.  Stay active  Mammogram  FIT kit

## 2018-02-14 NOTE — PROGRESS NOTES
[facility]  Primary Care Center   Felicity Schmid MD PhD  2018      Chief Complaint:   Establish care      History of Present Illness:   Stephanie Bhatia is a 67 year old (, post menopausal) female who presents to establish care The patient denies vaginal bleeding, burning, or discharge. She is not sexually active - Paps are not indicated   Her last mammogram was (3/28/2017)  The patient elected for a fecal screen in place of a colonoscopy.   Her last DEXA was (2017). L neck -2.5, R neck  -2.4The patient reports that she gets calcium in her diet in the form of some milk, yogurt, green vegetables.  The patient reports that her exercise has dropped considerably since last , when her dog .    Patient was seen in early December  for some abdominal pain.  This was thought to be related to a small hiatal hernia.  The pain had migrated from the abdomen to her sides around to her back. She tried probiotics and   the pain is totally resolved.  She has  been taking probiotics.  She does complain  of belching.    She does report anxiety.  She is not wanting to take any medications.  She has connected with a counselor and is going to be working through this with the counselor.    She had a DEXA in 2017.  Her T score of the left neck was -2.5 compatible with osteoporosis.  She has been resistant to being on any medication.  She does get calcium in her diet in the form of milk yogurt and green vegetables.  She does report that her exercise has fallen off.        Answers for HPI/ROS submitted by the patient on 2018   General Symptoms: Yes  Skin Symptoms: No  HENT Symptoms: No  EYE SYMPTOMS: No  HEART SYMPTOMS: No  LUNG SYMPTOMS: No  INTESTINAL SYMPTOMS: No  URINARY SYMPTOMS: No  GYNECOLOGIC SYMPTOMS: No  BREAST SYMPTOMS: No  SKELETAL SYMPTOMS: Yes  BLOOD SYMPTOMS: No  NERVOUS SYSTEM SYMPTOMS: No  MENTAL HEALTH SYMPTOMS: Yes  Fever: No  Loss of appetite: No  Weight loss: No  Weight gain:  No  Fatigue: Yes  Night sweats: No  Chills: No  Increased stress: Yes  Excessive hunger: Yes  Excessive thirst: No  Feeling hot or cold when others believe the temperature is normal: No  Loss of height: No  Post-operative complications: No  Surgical site pain: No  Hallucinations: No  Change in or Loss of Energy: No  Hyperactivity: No  Confusion: No  Back pain: Yes  Muscle aches: No  Neck pain: Yes  Swollen joints: No  Joint pain: Yes  Bone pain: No  Muscle cramps: Yes  Muscle weakness: Yes  Joint stiffness: Yes  Bone fracture: No  Nervous or Anxious: Yes  Depression: Yes  Trouble sleeping: Yes  Trouble thinking or concentrating: No  Mood changes: Yes  Panic attacks: Yes      Active Medications:   Current Outpatient Prescriptions:      saccharomyces boulardii (FLORASTOR) 250 MG capsule, Take 250 mg by mouth 2 times daily, Disp: , Rfl:      Cholecalciferol (VITAMIN D) 1000 UNITS capsule, Take 1 capsule by mouth daily, Disp: , Rfl:      desonide (DESOWEN) 0.05 % ointment, Apply topically 2 times daily To rash on the corners of the mouth mixed with nystatin as needed until clear., Disp: 60 g, Rfl: 11     nystatin (MYCOSTATIN) ointment, Apply topically 2 times daily To rash at the corners of the mouth mixed with desonide until clear., Disp: 30 g, Rfl: 11      Allergies:   Review of patient's allergies indicates no known allergies.      Past Medical History:  Anxiety   Interstitial lung disease, scleroderma ILD, dx by chest CT 5/2017  Complex migraine, loss of speech and comprehension, has had w/u and is complex migraine, last one about 2/2017   Raynaud phenomenon   Systemic sclerosis with limited cutaneous involvement, Scl-70 and anti-centromere antibody neg, +RF  Osteopenia  Lumbar radiculopathy  Advanced directives, counseling/discussion  CARDIOVASCULAR SCREENING; LDL GOAL LESS THAN 160  Cluster headache syndrome  Dizziness and giddiness  Tinea pedis  AK (actinic keratosis)  Benign neoplasm of other specified sites of  "skin  Cherry hemangioma  Angular cheilitis  Osteoporosis  ILD (interstitial lung disease)    Past Surgical History:  Hysteroscopy placement contraceptive device     Family History:   Dementia: Mother  Dementia, CAD (a few small heart attacks): Father  rheumatoid arthritis, hypertension: Sister  No family history of breast cancer, colon cancer, diabetes, cancer, interstitial lung disease, melanoma, or skin cancer     Social History:   Former smoker, 1 pack/day for 10 years, quit (11/26/1986)  No alcohol  Sexually active with males; has a part time job.     Physical Exam:   /71 (BP Location: Right arm, Patient Position: Chair, Cuff Size: Adult Regular)  Pulse 64  Temp 97.4  F (36.3  C) (Oral)  Resp 16  Ht 1.657 m (5' 5.25\")  Wt 58 kg (127 lb 12.8 oz)  SpO2 99%  Breastfeeding? No  BMI 21.1 kg/m2      Constitutional: Healthy, alert, no distress and cooperative  Head: Normocephalic. No masses, lesions, tenderness or abnormalities  Eyes: PERRL, no conjunctival injection  ENT: Bilateral TM normal without fluid or infection, throat normal without erythema or exudate, no cervical lymphadenopathy  Breast.  Small, symmetrical, no masses and no tenderness.  No axillary nodes.  Cardiovascular: JVP normal. RRR, S1,S2 no murmurs, clicks, rubs, or gallops. No pretibial edema.  No carotid bruits.  Respiratory: Clear to auscultation and percussion bilaterally.   Gastrointestinal: BS NA. Soft, nontender, no hepatosplenomegaly or mass. Liver is 10 centimeters at the midclavicular line.  There is no CVA or flank tenderness bilaterally.  Extremities: bluish fingers, slightly swollen,  Joints are normal. No gross deformities noted, gait normal and normal muscle tone.    Pelvic: deferred  Rectal deferred   Skin: No suspicious lesions or rashes  Neurologic: Gait normal. Reflexes normal and symmetric. Strength is intact bilaterally in upper and lower extremities. Sensation grossly WNL.  Cranial nerves II through XII are intact. "     Psychiatric: Mentation appears normal and affect normal  Hematologic/Lymphatic/Immunologic: No cervical, anterior, posterior, submandibular, submental, and supraclavicular  lymph nodes       Assessment.  A postmenopausal female comes in today with history of interstitial lung disease, anxiety, and osteoporosis who wants an annual exam and to establish care.    A/P  Routine general medical examination at a health care facility   Healthy PM female. Immunizations UTD.  Pap not indicated. Refusing colonoscopy.  - Mammogram, routine screening  - Fecal cancer screen FIT  The patient's last mammogram was March 2017. She opted for a fecal cancer screen instead of a colonoscopy.      Age related osteoporosis, unspecified pathological fracture presence  Patient has T-scores to indicate osteoporosis.  Currently not on any medication.  Reviewed calcium and vitamin D, encouraged exercise.  Asked her to make an osteoporosis appointment so we could further talk about her DEXA.   increase her vitamin D to 4000IU/day  - last one was 26 3/2017.   I have recommended increasing her calcium intake.    NATALI  She has been having more anxiety recently.  She is just connected with a counselor whom she does like and will continue with her.  She is not interested in medication.    Follow-up: 3 months         Scribe Disclosure:   I, Parish Mohamud, am serving as a scribe to document services personally performed by Felicity Schmid MD PhD at this visit, based upon the provider's statements to me. All documentation has been reviewed by the aforementioned provider prior to being entered into the official medical record.     Portions of this medical record were completed by a scribe. UPON MY REVIEW AND AUTHENTICATION BY ELECTRONIC SIGNATURE, this confirms (a) I performed the applicable clinical services, and (b) the record is accurate.

## 2018-02-20 ENCOUNTER — OFFICE VISIT (OUTPATIENT)
Dept: PSYCHIATRY | Facility: CLINIC | Age: 68
End: 2018-02-20
Attending: NURSE PRACTITIONER
Payer: MEDICARE

## 2018-02-20 VITALS
BODY MASS INDEX: 21.6 KG/M2 | HEART RATE: 88 BPM | DIASTOLIC BLOOD PRESSURE: 66 MMHG | SYSTOLIC BLOOD PRESSURE: 104 MMHG | WEIGHT: 130.8 LBS

## 2018-02-20 DIAGNOSIS — F41.9 ANXIETY: Primary | ICD-10-CM

## 2018-02-20 PROCEDURE — G0463 HOSPITAL OUTPT CLINIC VISIT: HCPCS | Mod: ZF

## 2018-02-20 RX ORDER — CALCIUM CARBONATE 500(1250)
1 TABLET ORAL 2 TIMES DAILY
COMMUNITY
End: 2018-08-06

## 2018-02-20 ASSESSMENT — PAIN SCALES - GENERAL: PAINLEVEL: NO PAIN (0)

## 2018-02-20 NOTE — MR AVS SNAPSHOT
After Visit Summary   2/20/2018    Stephanie Bhatia    MRN: [id number]           Patient Information     Date Of Birth          1950        Visit Information        Provider Department      2/20/2018 8:30 AM Marlen Sweet APRN [facility] Psychiatry Clinic        Today's Diagnoses     Anxiety    -  1       Follow-ups after your visit        Follow-up notes from your care team     Return if symptoms worsen or fail to improve, for Routine Visit.      Your next 10 appointments already scheduled     Mar 01, 2018  8:00 AM CST   FULL PULMONARY FUNCTION with  PFL C   [facility] Pulmonary Function Testing ([facility])    [address]  3rd Floor  [facility] [address]   [phone]            Mar 01, 2018  9:00 AM CST   (Arrive by 8:45 AM)   RETURN SCLERODERMA with Tiffanie Gomez MD   [facility] ([facility])    [address]  [address]  [facility] [address]   [phone]            Mar 08, 2018  7:30 AM CST   (Arrive by 7:15 AM)   Return Visit with Petros Gunn MD   [facility] Rheumatology ([facility])    [address]  [address]  [facility] [address]   [phone]            Apr 04, 2018  7:45 AM CDT   (Arrive by 7:30 AM)   MA SCREENING DIGITAL BILATERAL with UCBCMA1   [facility] Breast Center Imaging ([facility])    [address]  2nd Floor  [facility] [address]   [phone]           Do not use any powder, lotion or deodorant under your arms or on your breast. If you do, we will ask you to remove it before your exam.  Wear comfortable, two-piece clothing.  If you have any allergies, tell your care team.  Bring any previous mammograms from other facilities or have them mailed to the breast center. Three-dimensional (3D) mammograms are available at [place] locations in [facility],  "[place], [place], and [place]. [facility] locations include [place] and [facility] in [place]. Benefits of 3D mammograms include: - Improved rate of cancer detection - Decreases your chance of having to go back for more tests, which means fewer: - \"False-positive\" results (This means that there is an abnormal area but it isn't cancer.) - Invasive testing procedures, such as a biopsy or surgery - Can provide clearer images of the breast if you have dense breast tissue. 3D mammography is an optional exam that anyone can have with a 2D mammogram. It doesn't replace or take the place of a 2D mammogram. 2D mammograms remain an effective screening test for all women.  Not all insurance companies cover the cost of a 3D mammogram. Check with your insurance.              Who to contact     Please call your clinic at [phone] to:    Ask questions about your health    Make or cancel appointments    Discuss your medicines    Learn about your test results    Speak to your doctor            Additional Information About Your Visit        [email or web address] Information     [email or web address] gives you secure access to your electronic health record. If you see a primary care provider, you can also send messages to your care team and make appointments. If you have questions, please call your primary care clinic.  If you do not have a primary care provider, please call [phone] and they will assist you.      [email or web address] is an electronic gateway that provides easy, online access to your medical records. With [email or web address], you can request a clinic appointment, read your test results, renew a prescription or communicate with your care team.     To access your existing account, please contact your [facility] or call [phone] for assistance.        Care EveryWhere ID     This is your Care EveryWhere ID. This could be used by other organizations to access your [place] medical records  [id number]      "   Your Vitals Were     Pulse BMI (Body Mass Index)                88 21.6 kg/m2           Blood Pressure from Last 3 Encounters:   02/20/18 104/66   02/14/18 108/71   02/12/18 113/72    Weight from Last 3 Encounters:   02/20/18 59.3 kg (130 lb 12.8 oz)   02/14/18 58 kg (127 lb 12.8 oz)   02/12/18 60.5 kg (133 lb 6.4 oz)              Today, you had the following     No orders found for display       Primary Care Provider Office Phone # Fax #    Felicity Schmid MD PhD [phone]       [address] [address]        Equal Access to Services     TROY SILVA : Ellen Norris, marylou almonte, genevieve kaalmatrent dave, rich abarca. So [facility] [phone].    ATENCIÓN: Si habla español, tiene a chacon disposición servicios gratuitos de asistencia lingüística. Llame al [phone].    We comply with applicable federal civil rights laws and [place] laws. We do not discriminate on the basis of race, color, national origin, age, disability, sex, sexual orientation, or gender identity.            Thank you!     Thank you for choosing PSYCHIATRY CLINIC  for your care. Our goal is always to provide you with excellent care. Hearing back from our patients is one way we can continue to improve our services. Please take a few minutes to complete the written survey that you may receive in the mail after your visit with us. Thank you!             Your Updated Medication List - Protect others around you: Learn how to safely use, store and throw away your medicines at www.disposemymeds.org.          This list is accurate as of 2/20/18 11:59 PM.  Always use your most recent med list.                   Brand Name Dispense Instructions for use Diagnosis    calcium carbonate 1250 MG tablet    OS-DONTE 500 mg [place]. [place]     Take 1 tablet by mouth 2 times daily        desonide 0.05 % ointment    DESOWEN    60 g    Apply topically 2 times daily To rash on the  corners of the mouth mixed with nystatin as needed until clear.    Angular cheilitis       nystatin ointment    MYCOSTATIN    30 g    Apply topically 2 times daily To rash at the corners of the mouth mixed with desonide until clear.    Angular cheilitis       saccharomyces boulardii 250 MG capsule    FLORASTOR     Take 250 mg by mouth 2 times daily        UNABLE TO FIND      1,200 Units MEDICATION NAME: Calcium        vitamin D 1000 UNITS capsule      Take 1 capsule by mouth daily

## 2018-02-20 NOTE — NURSING NOTE
Chief Complaint   Patient presents with     Recheck Medication     anxiety, panic disorder     Reviewed allergies, medications, pharmacy and smoking status.  Administered abuse screening questions   Administered fall assessment  Obtained weight, pain level, blood pressure and heart rate

## 2018-02-20 NOTE — PROGRESS NOTES
Outpatient Psychiatry Progress Note     Provider: GRACIELA Martin CNP  Date: 2018  Service:  Medication management.   Patient Identification: Stephanie Bhatia  : 1950   MRN: [id number]    Stephanie Bhatia is a 67 year old female who presents for ongoing psychiatric care.  Stephanie was last seen in clinic on 18. At that time, she chose to leave, preferring to schedule with a therapist rather than trial medication.    2018  She presents today stating that talking last week brought her symtoms and her history to mind leaving her feeling more depressed.   - On  she stayed in [place] all day binge watching [employer].  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  - She is not missing work despite her anxiety.  - She asks questions about risks and benefits of psychiatric medications. Wonders if she subconsciously sabotaging herself?   - She is able to voice that her anxiety at work might relate to her past.   - She grieves that her Mom's mental illness was untreated. She thinks about her Mom more and more as she is aging.   - good support from her  sponsor and her sister   - she does not regard herself as a strong person but has a goal to support herself so that she can continue to live independently.   - Swimming laps with her sponsor from .   Discussed [facility] as an option, or another in network provider to process.     Psychiatric Review of Systems:  Depression: endorses feeling tired, no energy, inactive with low motivation to walk   Anxiety: worsens acutely at work when boss is around    Lethality: none    Review of Medical Systems:  Appetite: OK, weight stable  Sleep: sleeping 10p-6a  Self Care: swimming with sponsor, going to AA meetings   Housework and hygiene: managing as is normal for her  Energy:  low to adequate for work  Concentration: intact    Current Substance Use:    Social History     Social History     Marital status: Single     Spouse name:      Number of children: N/A     Years of education: N/A     Occupational History           [employer] - part time     Social History Main Topics     Smoking status: Former Smoker     Packs/day: 1.00     Years: 10.00     Types: Cigarettes     Quit date: 11/26/1986     Smokeless tobacco: Never Used     Alcohol use No     Drug use: No     Sexual activity: No     Other Topics Concern     Parent/Sibling W/ Cabg, Mi Or Angioplasty Before 65f 55m? No     Social History Narrative    .  No children.  Part time at [employer].  Possible asbestos exposure from basement pipes in childhood home.  Lived in [place] [address].  Denies exposure to pet birds, hot tubs.  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     Sober from alcohol since 2002. Stopped cannabis in 2001.     Past Medical History:   Past Medical History:   Diagnosis Date     Anxiety      Interstitial lung disease (H)     scleroderma ILD, dx by chest CT 5/2017     Migraine     loss of speech and comprehension, has had w/u and is complex migraine, last one about 2/2017     Raynaud phenomenon      Systemic sclerosis with limited cutaneous involvement (H) 12/02/2014    Scl-70 and anti-centromere antibody neg, +RF     Medical health update: none today    Allergies: No Known Allergies       Current Medications     Current Outpatient Prescriptions Ordered in [facility]   Medication Sig Dispense Refill     calcium carbonate (OS-DONTE 500 MG [place]. CA) 1250 MG tablet Take 1 tablet by mouth 2 times daily       UNABLE TO FIND 1,200 Units MEDICATION NAME: Calcium       saccharomyces boulardii (FLORASTOR) 250 MG capsule Take 250 mg by mouth 2 times daily       Cholecalciferol (VITAMIN D) 1000 UNITS capsule Take 1 capsule by mouth daily       desonide (DESOWEN) 0.05 % ointment  "Apply topically 2 times daily To rash on the corners of the mouth mixed with nystatin as needed until clear. 60 g 11     nystatin (MYCOSTATIN) ointment Apply topically 2 times daily To rash at the corners of the mouth mixed with desonide until clear. 30 g 11     No current Epic-ordered facility-administered medications on file.       Mental Status Exam     Appearance:  No apparent distress, Well groomed and Appears stated age  Behavior/relationship to examiner/demeanor:  Cooperative, Engaged and Pleasant  Orientation: Oriented to person, place, time and situation  Psychomotor: Normal  Speech Rate:  Normal  Speech Spontaneity:  Normal  Mood:  \"fine\"  Affect:  Appropriate/mood-congruent  Thought Process (Associations):  Circumstantial  Thought Content:  no evidence of suicidal or homicidal ideation  Abnormal Perception:  None  Attention/Concentration:  Normal  Language:  Intact  Insight:  fair  Judgment:  Good      Results     Vital signs: /66  Pulse 88  Wt 59.3 kg (130 lb 12.8 oz)  BMI 21.6 kg/m2    Laboratory Data:   Lab Results   Component Value Date    WBC 7.4 02/05/2016    HGB 13.3 02/05/2016    HCT 40.5 02/05/2016     02/05/2016    CHOL 202 (H) 02/13/2017    TRIG 105 02/13/2017    HDL 51 02/13/2017    ALT 17 03/08/2017    AST 19 03/08/2017     02/14/2018    BUN 19 02/14/2018    CO2 29 02/14/2018    TSH 2.43 03/08/2017     Assessment & Plan     Stephanie is seen today for follow up.    Diagnosis  Axis 1: NATALI, r/o panic disorder, alcohol abuse in remote remission  Axis 2: deferred     Plan:  Medication: she declines med trial today  OTC Recommendations: none  Referrals: provided a list for therapy with some listed specialities, she will research and may call her insurer to verify coverage.    Release of Information: none  Future Treatment Considerations: has only trialed Zoloft; discussed reasonable expectation, risks and benefits of buspar, gabapentin, Valium  Return for Follow Up: as " needed    She voices understanding of the treatment plan including discussion of options. She has clinic contact information and will seek emergency services if urgent or life threatening symptoms present. Stephanie understands risks associated with drug and alcohol use.     Visit was spent counseling the patient and/or coordinating care regarding review of social and occupational functioning.  In addition patient was counseled on health and wellness practices to augment medication treatment of symptoms. See note for details.    PHQ-9 score:  7  PHQ-9 SCORE 2/12/2018   Total Score 12     GAD7: No flowsheet data found.  : 02/2018  Marlen Sweet, GRACIELA CNP 2/20/2018

## 2018-02-21 PROCEDURE — 82274 ASSAY TEST FOR BLOOD FECAL: CPT | Performed by: FAMILY MEDICINE

## 2018-02-22 ASSESSMENT — PATIENT HEALTH QUESTIONNAIRE - PHQ9: SUM OF ALL RESPONSES TO PHQ QUESTIONS 1-9: 7

## 2018-02-23 LAB — HEMOCCULT STL QL IA: NEGATIVE

## 2018-03-01 ENCOUNTER — OFFICE VISIT (OUTPATIENT)
Dept: PULMONOLOGY | Facility: CLINIC | Age: 68
End: 2018-03-01
Attending: INTERNAL MEDICINE
Payer: MEDICARE

## 2018-03-01 VITALS
OXYGEN SATURATION: 100 % | DIASTOLIC BLOOD PRESSURE: 68 MMHG | RESPIRATION RATE: 16 BRPM | WEIGHT: 135.8 LBS | BODY MASS INDEX: 22.63 KG/M2 | HEART RATE: 59 BPM | SYSTOLIC BLOOD PRESSURE: 106 MMHG | HEIGHT: 65 IN

## 2018-03-01 DIAGNOSIS — J84.9 ILD (INTERSTITIAL LUNG DISEASE) (H): Primary | ICD-10-CM

## 2018-03-01 DIAGNOSIS — J84.9 ILD (INTERSTITIAL LUNG DISEASE) (H): ICD-10-CM

## 2018-03-01 PROCEDURE — G0463 HOSPITAL OUTPT CLINIC VISIT: HCPCS | Mod: ZF

## 2018-03-01 ASSESSMENT — PAIN SCALES - GENERAL: PAINLEVEL: NO PAIN (0)

## 2018-03-01 NOTE — MR AVS SNAPSHOT
After Visit Summary   3/1/2018    Stephanie Bhatia    MRN: [id number]           Patient Information     Date Of Birth          1950        Visit Information        Provider Department      3/1/2018 9:00 AM Tiffanie Gomez MD [facility]        Today's Diagnoses     ILD (interstitial lung disease) (H)    -  1       Follow-ups after your visit        Follow-up notes from your care team     Return in about 6 months (around 9/1/2018).      Your next 10 appointments already scheduled     Mar 01, 2018  9:00 AM CST   (Arrive by 8:45 AM)   RETURN SCLERODERMA with Tiffanie Gomez MD   [facility] ([facility])    [address]  [address]  [facility] [address] [phone]            Mar 08, 2018  7:30 AM CST   (Arrive by 7:15 AM)   Return Visit with Petros Gunn MD   [facility] Rheumatology ([facility])    [address]  [address]  [facility] [address]   [phone]            Apr 04, 2018  7:45 AM CDT   (Arrive by 7:30 AM)   MA SCREENING DIGITAL BILATERAL with [email or web address]   [facility] Breast [facility] Imaging ([facility])    [address]  2nd Floor  [facility] [address] [phone]           Do not use any powder, lotion or deodorant under your arms or on your breast. If you do, we will ask you to remove it before your exam.  Wear comfortable, two-piece clothing.  If you have any allergies, tell your care team.  Bring any previous mammograms from other facilities or have them mailed to the breast center. Three-dimensional (3D) mammograms are available at [place] locations in [place], [place], [place], [place], [facility], [place], [place], and [place]. [facility] locations include [place] [facility] in [place]. Benefits of 3D mammograms include: - Improved rate of cancer detection - Decreases your chance of  "having to go back for more tests, which means fewer: - \"False-positive\" results (This means that there is an abnormal area but it isn't cancer.) - Invasive testing procedures, such as a biopsy or surgery - Can provide clearer images of the breast if you have dense breast tissue. 3D mammography is an optional exam that anyone can have with a 2D mammogram. It doesn't replace or take the place of a 2D mammogram. 2D mammograms remain an effective screening test for all women.  Not all insurance companies cover the cost of a 3D mammogram. Check with your insurance.            Sep 06, 2018  7:00 AM CDT   FULL PULMONARY FUNCTION with UC PFL B   [facility] Pulmonary Function Testing ([facility])    [address]  3rd Floor  [facility] [address] [phone]            Sep 06, 2018  8:00 AM CDT   (Arrive by 7:45 AM)   RETURN SCLERODERMA with Tiffanie Gomez MD   [facility] for [facility] Science and Health ([facility])    [address]  [address] [place] [address] [phone]              Future tests that were ordered for you today     Open Future Orders        Priority Expected Expires Ordered    General PFT Lab (Please always keep checked) Routine 9/1/2018 3/1/2019 3/1/2018    Pulmonary Function Test Routine 9/1/2018 3/1/2019 3/1/2018            Who to contact     If you have questions or need follow up information about today's clinic visit or your schedule please contact [facility] directly at [phone].  Normal or non-critical lab and imaging results will be communicated to you by MyChart, letter or phone within 4 business days after the clinic has received the results. If you do not hear from us within 7 days, please contact the clinic through MyChart or phone. If you have a critical or abnormal lab result, we will notify you by phone as soon as possible.  Submit refill requests through [employer] or call " "your pharmacy and they will forward the refill request to us. Please allow 3 business days for your refill to be completed.          Additional Information About Your Visit        MyChart Information     [employer] gives you secure access to your electronic health record. If you see a primary care provider, you can also send messages to your care team and make appointments. If you have questions, please call your primary care clinic.  If you do not have a primary care provider, please call [phone] and they will assist you.        Care EveryWhere ID     This is your Care EveryWhere ID. This could be used by other organizations to access your [place] medical records  [id number]        Your Vitals Were     Pulse Respirations Height Pulse Oximetry BMI (Body Mass Index)       59 16 1.651 m (5' 5\") 100% 22.6 kg/m2        Blood Pressure from Last 3 Encounters:   03/01/18 106/68   02/14/18 108/71   12/07/17 99/65    Weight from Last 3 Encounters:   03/01/18 61.6 kg (135 lb 12.8 oz)   02/14/18 58 kg (127 lb 12.8 oz)   12/07/17 57.5 kg (126 lb 11.2 oz)               Primary Care Provider Office Phone # Fax #    Felicity Schmid MD PhD [phone]       [address] [place] [id number]        Equal Access to Services     TROY SILVA : Hadii karime ku hadasho Soomaali, waaxda luqadaha, qaybta kaalmada adecelestine, rich abarca. So [facility] [phone].    ATENCIÓN: Si habla español, tiene a chacon disposición servicios gratuitos de asistencia lingüística. Andrea al [phone].    We comply with applicable federal civil rights laws and [place] laws. We do not discriminate on the basis of race, color, national origin, age, disability, sex, sexual orientation, or gender identity.            Thank you!     Thank you for choosing [facility]  for your care. Our goal is always to provide you with excellent care. Hearing back from our patients is " one way we can continue to improve our services. Please take a few minutes to complete the written survey that you may receive in the mail after your visit with us. Thank you!             Your Updated Medication List - Protect others around you: Learn how to safely use, store and throw away your medicines at www.disposemymeds.org.          This list is accurate as of 3/1/18  8:57 AM.  Always use your most recent med list.                   Brand Name Dispense Instructions for use Diagnosis    calcium carbonate 1250 MG tablet    OS-DONTE 500 mg [place]. Ca     Take 1 tablet by mouth 2 times daily        desonide 0.05 % ointment    DESOWEN    60 g    Apply topically 2 times daily To rash on the corners of the mouth mixed with nystatin as needed until clear.    Angular cheilitis       nystatin ointment    MYCOSTATIN    30 g    Apply topically 2 times daily To rash at the corners of the mouth mixed with desonide until clear.    Angular cheilitis       saccharomyces boulardii 250 MG capsule    FLORASTOR     Take 250 mg by mouth 2 times daily        UNABLE TO FIND      1,200 Units MEDICATION NAME: Calcium        vitamin D 1000 UNITS capsule      Take 1 capsule by mouth daily

## 2018-03-07 LAB
DLCOUNC-%PRED-PRE: 93 %
DLCOUNC-PRE: 19.52 ML/MIN/MMHG
DLCOUNC-PRED: 20.98 ML/MIN/MMHG
ERV-%PRED-PRE: 152 %
ERV-PRE: 1.31 L
ERV-PRED: 0.86 L
EXPTIME-PRE: 9.21 SEC
FEF2575-%PRED-PRE: 132 %
FEF2575-PRE: 2.42 L/SEC
FEF2575-PRED: 1.83 L/SEC
FEFMAX-%PRED-PRE: 127 %
FEFMAX-PRE: 7.47 L/SEC
FEFMAX-PRED: 5.84 L/SEC
FEV1-%PRED-PRE: 126 %
FEV1-PRE: 2.7 L
FEV1FEV6-PRE: 79 %
FEV1FEV6-PRED: 79 %
FEV1FVC-PRE: 81 %
FEV1FVC-PRED: 76 %
FEV1SVC-PRE: 82 %
FEV1SVC-PRED: 67 %
FIFMAX-PRE: 4.04 L/SEC
FRCPLETH-%PRED-PRE: 103 %
FRCPLETH-PRE: 2.86 L
FRCPLETH-PRED: 2.77 L
FVC-%PRED-PRE: 122 %
FVC-PRE: 3.35 L
FVC-PRED: 2.73 L
IC-%PRED-PRE: 85 %
IC-PRE: 2 L
IC-PRED: 2.34 L
RVPLETH-%PRED-PRE: 74 %
RVPLETH-PRE: 1.56 L
RVPLETH-PRED: 2.1 L
TLCPLETH-%PRED-PRE: 95 %
TLCPLETH-PRE: 4.87 L
TLCPLETH-PRED: 5.11 L
VA-%PRED-PRE: 83 %
VA-PRE: 4.39 L
VC-%PRED-PRE: 103 %
VC-PRE: 3.31 L
VC-PRED: 3.2 L

## 2018-03-08 ENCOUNTER — OFFICE VISIT (OUTPATIENT)
Dept: RHEUMATOLOGY | Facility: CLINIC | Age: 68
End: 2018-03-08
Attending: INTERNAL MEDICINE
Payer: MEDICARE

## 2018-03-08 VITALS
DIASTOLIC BLOOD PRESSURE: 62 MMHG | HEIGHT: 65 IN | WEIGHT: 130.1 LBS | BODY MASS INDEX: 21.67 KG/M2 | TEMPERATURE: 97.5 F | SYSTOLIC BLOOD PRESSURE: 97 MMHG | HEART RATE: 67 BPM

## 2018-03-08 DIAGNOSIS — J84.9 ILD (INTERSTITIAL LUNG DISEASE) (H): ICD-10-CM

## 2018-03-08 DIAGNOSIS — M81.0 AGE RELATED OSTEOPOROSIS, UNSPECIFIED PATHOLOGICAL FRACTURE PRESENCE: ICD-10-CM

## 2018-03-08 DIAGNOSIS — M34.9 SYSTEMIC SCLEROSIS WITH LIMITED CUTANEOUS INVOLVEMENT (H): Primary | ICD-10-CM

## 2018-03-08 DIAGNOSIS — I73.00 RAYNAUD'S PHENOMENON WITHOUT GANGRENE: ICD-10-CM

## 2018-03-08 PROCEDURE — G0463 HOSPITAL OUTPT CLINIC VISIT: HCPCS | Mod: ZF

## 2018-03-08 ASSESSMENT — PAIN SCALES - GENERAL: PAINLEVEL: NO PAIN (0)

## 2018-03-08 NOTE — NURSING NOTE
"Chief Complaint   Patient presents with     Consult     establish care with sammy CASTREJON cma       Initial BP 97/62  Pulse 67  Temp 97.5  F (36.4  C) (Oral)  Ht 1.651 m (5' 5\")  Wt 59 kg (130 lb 1.6 oz)  BMI 21.65 kg/m2 Estimated body mass index is 21.65 kg/(m^2) as calculated from the following:    Height as of this encounter: 1.651 m (5' 5\").    Weight as of this encounter: 59 kg (130 lb 1.6 oz).  Medication Reconciliation: complete    "

## 2018-03-08 NOTE — PROGRESS NOTES
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     PLAN/RECOMMENDATIONS:  In reviewing with her, she seems to really have very little in the way of symptoms that would suggest any kind of progression of this condition.      I did reinforce that she should continue to be seen in Pulmonary at least annually and Dr. Gomez is wanting her seen every 6 months for monitoring of her pulmonary function tests due to long-term risk of the development of pulmonary hypertension in addition to the baseline shortness of breath and low-grade interstitial lung disease.      She, however, can follow up with me on more of a p.r.n. basis.  I did suggest she be seen at least every 2-3 years, but I am happy to see her sooner for any problems.        This visit was 25 minutes in duration, over 50% in counseling.         Data Unavailable       No orders of the defined types were placed in this encounter.      Medications Discontinued During This Encounter   Medication Reason     UNABLE TO FIND      Current Outpatient Prescriptions   Medication Sig Dispense Refill     calcium carbonate (OS-DONTE 500 MG [place]. CA) 1250 MG tablet Take 1 tablet by mouth 2 times daily       saccharomyces boulardii (FLORASTOR) 250 MG capsule Take 250 mg by mouth 2 times daily       Cholecalciferol (VITAMIN D) 1000 UNITS  capsule Take 1 capsule by mouth daily       desonide (DESOWEN) 0.05 % ointment Apply topically 2 times daily To rash on the corners of the mouth mixed with nystatin as needed until clear. 60 g 11     nystatin (MYCOSTATIN) ointment Apply topically 2 times daily To rash at the corners of the mouth mixed with desonide until clear. 30 g 11       Aristides Case M.D.    Division of Rheumatology  [facility]  Phone (Patients line): [phone]  Pager (healthcare professionals only): [phone]  Phone (healthcare professionals only line): [phone]            Active Problem List:     Patient Active Problem List    Diagnosis Date Noted     ILD (interstitial lung disease) (H) 05/25/2017     Priority: Medium     Osteoporosis 03/08/2017     Priority: Medium     AK (actinic keratosis) 05/19/2015     Priority: Medium     Systemic sclerosis with limited cutaneous involvement (H) 12/02/2014     Priority: Medium     Cluster headache syndrome 10/21/2013     Priority: Medium     CARDIOVASCULAR SCREENING; LDL GOAL LESS THAN 160 10/02/2013     Priority: Medium     Advanced directives, counseling/discussion 09/27/2013     Priority: Medium     Advance Care Planning:   Receipt of ACP document:  Received: Health Care Directive which was witnessed or notarized on 09/15/2011.  Document not previously scanned.  Validation form completed and sent with document to be scanned.      Confirmed/documented designated decision maker(s). See permanent comments section of demographics in clinical tab. View document(s) and details by clicking on code status.   Added by Elizabeth Hurley on 10/11/2013.             Lumbar radiculopathy 11/18/2011     Priority: Medium     Raynaud phenomenon      Priority: Medium     Anxiety      Priority: Medium            History of Present Illness:     Chief Complaint   Patient presents with     Consult     establish care with RA, sammy pena     Original presentation:  Ms. Brown  "Sriram is a very pleasant 63 year old lady who is not on any prescription meds.   She works in [employer] and uses her hands a lot. She says she is here because her PCP asked her to. Ms. Bhatia thinks she can improve her fatigue on diet and exercise.   1995- c/o fatigue, migraine headache, raynauds phenomenon. Saw a rheumatologist in [place]. 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   In 2000 she moved to [facility] and was there until 2009. All those years, her raynauds and migraine went into remission on its own. But had fatigue.  In 2005 saw Dr. Kristen Agee for fatigue in [facility]. They checked her serologies. COLLEEN screen, CCP, C3, C4, neg. dsDNA neg. KRYSTLE panel was negative. PFT was \"borderline\" as per patient. Echo was \"alright\" at that time.   She thinks she has \"sciatica\".  In 2009 moved to [place].   Raynaud's phenomenon:  Onset: 1995  Digits: all fingers except thumb  Digital ulcers: none  Color changes: white and purple in past; past few years purple only  Duration of episode: not >10 mins  Pain during episode: none  Family history of Raynauds: none    GERD: occasionally.  Has had \"thick fingers\" \"always\"  Never been on any DMARDs.    Fatigue - worsening over the past few years  Migraine - 2- 3 episodes in the last one year  No muscle weakness.   Arthralgia: hands, knees, hips, wrists  Rash in forehead this winter on and off  Antiviral on and off for genital herpes (lab tested in past). She does get episodes of perianal lesions which go away in about a week's time. She thinks these lesions are looking different than herpes nowadays.   With exercise, she does feels that \"she cant get enough air\". Chronic cough+  She thinks she might have had couple \"spells\". One episode was 8 months ago and the other was about 4 months ago. Few seconds of blanking out and was having brain fog of about 20 mins. First episode was many years ago when she was in [place]. "     CXR neg.   Echo no evidence of Pulmonary HTN  MRA/MRI brain:  1. No evidence of acute infarction or intracranial hemorrhage.  2. Small amount of fluid in the left mastoid air cells .  3. Head MRA demonstrates no definite aneurysm or stenosis of the major  intracranial arteries.  4. Neck MRA demonstrates patent major cervical arteries.    Neurology visit with Olesya Jesus NP: no etiology found for the spell.   6MW test: 1700 feet  PFT within normal limits     No h/o myalgia, weight loss, loss of appetite, fevers, chills, night sweats, swollen glands  Patient denies any, malar rash, photosensitivity, recurrent mouth ulcers, sicca symptoms, recurrent sinusitis/rhinitis,swallowing difficulty, hearing or visual changes recently.   No h/o arterial/venous thrombosis in the past  No h/o persistent cough, chest pain  No h/o persistent nausea, vomiting, constipation, diarrhea, abdominal pain  No h/o hematochezia, hematuria, hemoptysis, hematemesis  No persistent headache, tingling, numbness, weakness. No h/o seizures    PULMONARY STRESS TEST, SIMPLE  MRA and MRI of brain and neck  X-ray Chest 2 views  KRYSTLE antibody panel  DNA double stranded antibodies  CBC with platelets differential  Comprehensive metabolic panel  Antinuclear antibody screen by EIA  Centromere Antibody IgG  Andreia 1 Antibody IgG  Complement C4  Complement C3  UA with Microscopic reflex to Culture  NEUROLOGY ADULT REFERRAL  EKG 12-lead complete w/read - Clinics  Echocardiogram  PFT Lab Testing    COLLEEN 1.4  Anti-centromere and anti-topoisomerase negative.     March 1, 2017  No new changes in the skin.   Raynaud's - about the same.   No acid reflux symptoms.   No muscle weakness on daily basis.  No daily joint pains except chronic on and off neck pain.   Morning stiffness X 30 mins.  Chronic shortness of breath +ve. Not climbing stairs as much nowadays.      Chronic fatigue +ve 6-8/10  \  March 8, 2018, INTERVAL HISTORY:  I am seeing the patient today 1 year  roughly after she was seen by Dr. Case.  Over that timeframe, she has really been quite well and she believes completely stable.      She continues to get Raynaud's phenomena episodes.  She can almost always break these within 15 minutes.  Indeed, they are not so bad that she cannot swim, which she really likes to do, although occasionally she gets attacks while in the pool and she fixes that simply by getting out and getting in the hot tub.  She has not developed any open digital ulcers, although she does note a little bit slower wound healing over the dorsum of her hands, but has not really had any problems in the fingertips.      From a gastrointestinal standpoint, she denies any significant GERD, any significant dysphagia, and has no features suggestive of bacterial small bowel overgrowth.      There has been some discussion with her PCP about using an oral bisphosphonate and she is a little bit concerned about that.  She has never had a barium esophagram.  I have reviewed her most recent chest x-ray and CT scans and they really do show features of a patulous esophagus, so it is unlikely she would have problems, but we did discuss the possible use of a barium esophagram to further reassure before using an oral bisphosphonate.      She continues to see Dr. Gomez for her pulmonary status and is doing every 6 month visits with pulmonary function tests which have been stable.  She believes her exercise tolerance is likewise stable.      She believes her skin is stable and she has not had significant joint pain with morning stiffness lasting more than 10 minutes.  This includes MCP joints which do have some slight swelling, but these just do not seem to ever hurt her.      She denies any keratoconjunctivitis sicca features.      Finally, she denies any heart palpitations and really has no other new features on a complete 12-point review of systems.      Her only other symptom is just a sense of fatigue.  She is  frustrated that she cannot get outside to exercise more and does not have the compelling need to do so that she had a year ago because her dog  and she decided because of an intermittently heavy work schedule not to get another dog that would have to be at home all alone often.              Review of Systems:   Complete ROS negative except for symptoms mentioned in the HPI          Past Medical History:     Past Medical History:   Diagnosis Date     Anxiety      Interstitial lung disease (H)     scleroderma ILD, dx by chest CT 2017     Migraine     loss of speech and comprehension, has had w/u and is complex migraine, last one about 2017     Raynaud phenomenon      Systemic sclerosis with limited cutaneous involvement (H) 2014    Scl-70 and anti-centromere antibody neg, +RF     Past Surgical History:   Procedure Laterality Date     HYSTEROSCOPIC PLACEMENT CONTRACEPTIVE DEVICE              Social History:     Social History     Occupational History     [employer] - part time     Social History Main Topics     Smoking status: Former Smoker     Packs/day: 1.00     Years: 10.00     Types: Cigarettes     Quit date: 1986     Smokeless tobacco: Never Used     Alcohol use No     Drug use: No     Sexual activity: No            Family History:     Family History   Problem Relation Age of Onset     C.A.D. Father      a efw small heart attacks     Neurologic Disorder Father      dementia     Neurologic Disorder Mother      dementia     Rheumatoid Arthritis Sister      Hypertension Sister      Breast Cancer No family hx of      Cancer - colorectal No family hx of      DIABETES No family hx of      CANCER No family hx of      no skin cancer     Interstitial Lung Disease No family hx of      Melanoma No family hx of      Skin Cancer No family hx of             Allergies:   No Known Allergies         Medications:     Current Outpatient Prescriptions   Medication Sig Dispense Refill      "calcium carbonate (OS-DONTE 500 MG [place]. CA) 1250 MG tablet Take 1 tablet by mouth 2 times daily       saccharomyces boulardii (FLORASTOR) 250 MG capsule Take 250 mg by mouth 2 times daily       Cholecalciferol (VITAMIN D) 1000 UNITS capsule Take 1 capsule by mouth daily       desonide (DESOWEN) 0.05 % ointment Apply topically 2 times daily To rash on the corners of the mouth mixed with nystatin as needed until clear. 60 g 11     nystatin (MYCOSTATIN) ointment Apply topically 2 times daily To rash at the corners of the mouth mixed with desonide until clear. 30 g 11            Physical Exam:   Blood pressure 97/62, pulse 67, temperature 97.5  F (36.4  C), temperature source Oral, height 1.651 m (5' 5\"), weight 59 kg (130 lb 1.6 oz), not currently breastfeeding.  Wt Readings from Last 4 Encounters:   03/08/18 59 kg (130 lb 1.6 oz)   03/01/18 61.6 kg (135 lb 12.8 oz)   02/14/18 58 kg (127 lb 12.8 oz)   12/07/17 57.5 kg (126 lb 11.2 oz)       Constitutional: well-developed, appearing stated age; cooperative  Eyes: nl EOM, PERRLA, vision, conjunctiva, sclera  ENT: nl external ears, nose, hearing, lips, teeth, gums, throat  No mucous membrane lesions, normal saliva pool  Neck: no mass or thyroid enlargement  Resp: lungs clear to auscultation,   CV: RRR, no murmurs, rubs or gallops, no edema  GI: no ABD mass or tenderness, no HSM  : not tested  Lymph: no cervical, supraclavicular or epitrochlear nodes  MS: All shoulder, elbow, wrist, MCP/PIP/DIP, spine, hip, knee, ankle, and foot MTP/IP joints were examined and  found normal. No active synovitis or deformity. Full ROM.  Normal  strength. Fist 100%.  No dactylitis,  tenosynovitis, enthesopathy.  Skin: sclerodactyly minor, wih low-gr 2,3 MCP swelling, NT +  Psych: nl judgement, orientation, memory, affect.         Data:     Results for orders placed or performed in visit on 03/01/18   General PFT Lab (Please always keep checked)   Result Value Ref Range    FVC-Pred 2.73 " L    FVC-Pre 3.35 L    FVC-%Pred-Pre 122 %    FEV1-Pre 2.70 L    FEV1-%Pred-Pre 126 %    FEV1FVC-Pred 76 %    FEV1FVC-Pre 81 %    FEFMax-Pred 5.84 L/sec    FEFMax-Pre 7.47 L/sec    FEFMax-%Pred-Pre 127 %    FEF2575-Pred 1.83 L/sec    FEF2575-Pre 2.42 L/sec    WIK2356-%Pred-Pre 132 %    ExpTime-Pre 9.21 sec    FIFMax-Pre 4.04 L/sec    VC-Pred 3.20 L    VC-Pre 3.31 L    VC-%Pred-Pre 103 %    IC-Pred 2.34 L    IC-Pre 2.00 L    IC-%Pred-Pre 85 %    ERV-Pred 0.86 L    ERV-Pre 1.31 L    ERV-%Pred-Pre 152 %    FEV1FEV6-Pred 79 %    FEV1FEV6-Pre 79 %    FRCPleth-Pred 2.77 L    FRCPleth-Pre 2.86 L    FRCPleth-%Pred-Pre 103 %    RVPleth-Pred 2.10 L    RVPleth-Pre 1.56 L    RVPleth-%Pred-Pre 74 %    TLCPleth-Pred 5.11 L    TLCPleth-Pre 4.87 L    TLCPleth-%Pred-Pre 95 %    DLCOunc-Pred 20.98 ml/min/mmHg    DLCOunc-Pre 19.52 ml/min/mmHg    DLCOunc-%Pred-Pre 93 %    VA-Pre 4.39 L    VA-%Pred-Pre 83 %    FEV1SVC-Pred 67 %    FEV1SVC-Pre 82 %    Narrative    The FVC, FEV1, and FEV1/FVC ratio are within normal limits.  The inspiratory flow rates are within normal limits.  Lung volumes are within normal limits.  The diffusing capacity is normal.  However, the diffusing capacity was not corrected for the   patient's hemoglobin.  IMPRESSION:  Normal spirometry, diffusion, and lung volumes.   ____________________________________________Tiffanie Dumont       Recent Labs   Lab Test  03/21/14   1044  10/21/13   1304  11/18/11   1049   WBC  8.2  6.5  7.1   RBC  4.41  4.15  4.48   HGB  13.5  13.0  13.9   HCT  41.9  39.0  42.0   MCV  95  94  94   RDW  13.1  12.8  12.9   PLT  281  274  283   ALBUMIN  3.9   --   3.9   CRP   --   <5.0   --    BUN  14  18  20      Recent Labs   Lab Test  10/21/13   1304  11/18/11   1049 10/10/08   TSH  1.57  1.10  1.09     Division of Rheumatology  [facility]  Phone (Patients line): [phone]  Pager (healthcare professionals only): [phone]  Phone (healthcare professionals only line):  [phone]

## 2018-03-08 NOTE — MR AVS SNAPSHOT
"              After Visit Summary   3/8/2018    Stephanie Bhatia    MRN: [id number]           Patient Information     Date Of Birth          1950        Visit Information        Provider Department      3/8/2018 7:30 AM Petros Gunn MD [facility] Rheumatology        Today's Diagnoses     Systemic sclerosis with limited cutaneous involvement (H)    -  1    Raynaud's phenomenon without gangrene        Age related osteoporosis, unspecified pathological fracture presence        ILD (interstitial lung disease) (H)           Follow-ups after your visit        Your next 10 appointments already scheduled     Apr 04, 2018  7:45 AM CDT   (Arrive by 7:30 AM)   MA SCREENING DIGITAL BILATERAL with [id number]   [facility] ([facility])    [address]  2nd Floor  [place] [address] [phone]           Do not use any powder, lotion or deodorant under your arms or on your breast. If you do, we will ask you to remove it before your exam.  Wear comfortable, two-piece clothing.  If you have any allergies, tell your care team.  Bring any previous mammograms from other facilities or have them mailed to the breast center. Three-dimensional (3D) mammograms are available at [facility] locations in [place], [place], [place], [place], [facility], [place], [place], and [place]. [facility] locations include [place] and [facility] in [place]. Benefits of 3D mammograms include: - Improved rate of cancer detection - Decreases your chance of having to go back for more tests, which means fewer: - \"False-positive\" results (This means that there is an abnormal area but it isn't cancer.) - Invasive testing procedures, such as a biopsy or surgery - Can provide clearer images of the breast if you have dense breast tissue. 3D mammography is an optional exam that anyone can have with a 2D mammogram. It doesn't replace or take the place of a 2D mammogram. 2D " mammograms remain an effective screening test for all women.  Not all insurance companies cover the cost of a 3D mammogram. Check with your insurance.            Sep 06, 2018  7:00 AM CDT   FULL PULMONARY FUNCTION with UC PFL B   [facility] Pulmonary Function Testing ([facility])    [address]  3rd Floor  [facility] [id number] [phone]            Sep 06, 2018  8:00 AM CDT   (Arrive by 7:45 AM)   RETURN SCLERODERMA with Tiffanie Gomez MD   [facility] ([facility])    [address]  [address]  [place] [address] [phone]              Who to contact     If you have questions or need follow up information about today's clinic visit or your schedule please contact [facility] RHEUMATOLOGY directly at [phone].  Normal or non-critical lab and imaging results will be communicated to you by MyChart, letter or phone within 4 business days after the clinic has received the results. If you do not hear from us within 7 days, please contact the clinic through [employer] or phone. If you have a critical or abnormal lab result, we will notify you by phone as soon as possible.  Submit refill requests through [employer] or call your pharmacy and they will forward the refill request to us. Please allow 3 business days for your refill to be completed.          Additional Information About Your Visit        MyChart Information     [employer] gives you secure access to your electronic health record. If you see a primary care provider, you can also send messages to your care team and make appointments. If you have questions, please call your primary care clinic.  If you do not have a primary care provider, please call [phone] and they will assist you.        Care EveryWhere ID     This is your Care EveryWhere ID. This could be used by other organizations to access your [facility] medical records  [id number]        Your  "Vitals Were     Pulse Temperature Height BMI (Body Mass Index)          67 97.5  F (36.4  C) (Oral) 1.651 m (5' 5\") 21.65 kg/m2         Blood Pressure from Last 3 Encounters:   03/08/18 97/62   03/01/18 106/68   02/14/18 108/71    Weight from Last 3 Encounters:   03/08/18 59 kg (130 lb 1.6 oz)   03/01/18 61.6 kg (135 lb 12.8 oz)   02/14/18 58 kg (127 lb 12.8 oz)              Today, you had the following     No orders found for display         Today's Medication Changes          These changes are accurate as of 3/8/18 11:59 PM.  If you have any questions, ask your nurse or doctor.               Stop taking these medicines if you haven't already. Please contact your care team if you have questions.     UNABLE TO FIND   Stopped by:  Petros Gunn MD                    Primary Care Provider Office Phone # Fax #    Felicity Schmid MD PhD [phone]       [address] [place] [address]        Equal Access to Services     [facility]: Hadii karime gunn hadzonia Soenriqueta, waaxda lupam, qaybta kaalnica dave, rich lopez . So [facility] [phone].    ATENCIÓN: Si habla español, tiene a chacon disposición servicios gratuitos de asistencia lingüística. [facility] [phone].    We comply with applicable federal civil rights laws and [place] laws. We do not discriminate on the basis of race, color, national origin, age, disability, sex, sexual orientation, or gender identity.            Thank you!     Thank you for choosing [facility] RHEUMATOLOGY  for your care. Our goal is always to provide you with excellent care. Hearing back from our patients is one way we can continue to improve our services. Please take a few minutes to complete the written survey that you may receive in the mail after your visit with us. Thank you!             Your Updated Medication List - Protect others around you: Learn how to safely use, store and throw away your medicines at " www.disposemymeds.org.          This list is accurate as of 3/8/18 11:59 PM.  Always use your most recent med list.                   Brand Name Dispense Instructions for use Diagnosis    calcium carbonate 1250 MG tablet    OS-DONTE 500 mg [place]. Ca     Take 1 tablet by mouth 2 times daily        desonide 0.05 % ointment    DESOWEN    60 g    Apply topically 2 times daily To rash on the corners of the mouth mixed with nystatin as needed until clear.    Angular cheilitis       nystatin ointment    MYCOSTATIN    30 g    Apply topically 2 times daily To rash at the corners of the mouth mixed with desonide until clear.    Angular cheilitis       saccharomyces boulardii 250 MG capsule    FLORASTOR     Take 250 mg by mouth 2 times daily        vitamin D 1000 UNITS capsule      Take 1 capsule by mouth daily

## 2018-03-08 NOTE — LETTER
3/8/2018       RE: Stephanie Bhatia  [address]  [facility] [address]     Dear Colleague,    Thank you for referring your patient, Stephanie Bhatia, to the [facility] RHEUMATOLOGY at [facility]. Please see a copy of my visit note below.    [facility] - Rheumatology Clinic Visit     Stephanie Bhatia MRN# [id number]   YOB: 1950      Primary care provider: Alysa Gutierrez          Assessment and Plan:   #1 Limited cutaneous systemic sclerosis with secondary raynaud's phenomenon and abnormal capillaries in nailfolds; polyarthralgia;Scl-70 neg; anti-centromere neg  #2 Strongly positive rheumatoid factor  #3 Chronic fatigue; cough; episodes of shortness of breath  #4 Spells of speech arrest X on and off since 2013 - neurology eval negative  #5 Chronic on and off migraines  #6 Episodes of perianal rash     Limited cutaneous systemic sclerosis is stable without any clinical progression since last year.     PLAN/RECOMMENDATIONS:  In reviewing with her, she seems to really have very little in the way of symptoms that would suggest any kind of progression of this condition.      I did reinforce that she should continue to be seen in Pulmonary at least annually and Dr. Gomez is wanting her seen every 6 months for monitoring of her pulmonary function tests due to long-term risk of the development of pulmonary hypertension in addition to the baseline shortness of breath and low-grade interstitial lung disease.      She, however, can follow up with me on more of a p.r.n. basis.  I did suggest she be seen at least every 2-3 years, but I am happy to see her sooner for any problems.        This visit was 25 minutes in duration, over 50% in counseling.         Data Unavailable       No orders of the defined types were placed in this encounter.      Medications Discontinued During This Encounter   Medication Reason     UNABLE TO FIND      Current Outpatient  Prescriptions   Medication Sig Dispense Refill     calcium carbonate (OS-DONTE 500 MG [place]. CA) 1250 MG tablet Take 1 tablet by mouth 2 times daily       saccharomyces boulardii (FLORASTOR) 250 MG capsule Take 250 mg by mouth 2 times daily       Cholecalciferol (VITAMIN D) 1000 UNITS capsule Take 1 capsule by mouth daily       desonide (DESOWEN) 0.05 % ointment Apply topically 2 times daily To rash on the corners of the mouth mixed with nystatin as needed until clear. 60 g 11     nystatin (MYCOSTATIN) ointment Apply topically 2 times daily To rash at the corners of the mouth mixed with desonide until clear. 30 g 11       Aristides Case M.D.    Division of Rheumatology  [facility]  Phone (Patients line): [phone]  Pager (healthcare professionals only): [phone]  Phone (healthcare professionals only line): [phone]            Active Problem List:     Patient Active Problem List    Diagnosis Date Noted     ILD (interstitial lung disease) (H) 05/25/2017     Priority: Medium     Osteoporosis 03/08/2017     Priority: Medium     AK (actinic keratosis) 05/19/2015     Priority: Medium     Systemic sclerosis with limited cutaneous involvement (H) 12/02/2014     Priority: Medium     Cluster headache syndrome 10/21/2013     Priority: Medium     CARDIOVASCULAR SCREENING; LDL GOAL LESS THAN 160 10/02/2013     Priority: Medium     Advanced directives, counseling/discussion 09/27/2013     Priority: Medium     Advance Care Planning:   Receipt of ACP document:  Received: Health Care Directive which was witnessed or notarized on 09/15/2011.  Document not previously scanned.  Validation form completed and sent with document to be scanned.      Confirmed/documented designated decision maker(s). See permanent comments section of demographics in clinical tab. View document(s) and details by clicking on code status.   Added by Elizabeth Hurley on 10/11/2013.             Lumbar radiculopathy  "11/18/2011     Priority: Medium     Raynaud phenomenon      Priority: Medium     Anxiety      Priority: Medium            History of Present Illness:     Chief Complaint   Patient presents with     Consult     establish care with sammy CASTREJON cma     Original presentation:  Ms. Stephanie Bhatia is a very pleasant 63 year old lady who is not on any prescription meds.   She works in [employer] and uses her hands a lot. She says she is here because her PCP asked her to. Ms. Bhatia thinks she can improve her fatigue on diet and exercise.   1995- c/o fatigue, migraine headache, raynauds phenomenon. Saw a rheumatologist in [place]. 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   In 2000 she moved to [facility] and was there until 2009. All those years, her raynauds and migraine went into remission on its own. But had fatigue.  In 2005 saw Dr. Kristen Agee for fatigue in [facility]. They checked her serologies. COLLEEN screen, CCP, C3, C4, neg. dsDNA neg. KRYSTLE panel was negative. PFT was \"borderline\" as per patient. Echo was \"alright\" at that time.   She thinks she has \"sciatica\".  In 2009 moved to [place].   Raynaud's phenomenon:  Onset: 1995  Digits: all fingers except thumb  Digital ulcers: none  Color changes: white and purple in past; past few years purple only  Duration of episode: not >10 mins  Pain during episode: none  Family history of Raynauds: none    GERD: occasionally.  Has had \"thick fingers\" \"always\"  Never been on any DMARDs.    Fatigue - worsening over the past few years  Migraine - 2- 3 episodes in the last one year  No muscle weakness.   Arthralgia: hands, knees, hips, wrists  Rash in forehead this winter on and off  Antiviral on and off for genital herpes (lab tested in past). She does get episodes of perianal lesions which go away in about a week's time. She thinks these lesions are looking different than herpes nowadays.   With exercise, she does feels that \"she " "cant get enough air\". Chronic cough+  She thinks she might have had couple \"spells\". One episode was 8 months ago and the other was about 4 months ago. Few seconds of blanking out and was having brain fog of about 20 mins. First episode was many years ago when she was in [place].     CXR neg.   Echo no evidence of Pulmonary HTN  MRA/MRI brain:  1. No evidence of acute infarction or intracranial hemorrhage.  2. Small amount of fluid in the left mastoid air cells .  3. Head MRA demonstrates no definite aneurysm or stenosis of the major  intracranial arteries.  4. Neck MRA demonstrates patent major cervical arteries.    Neurology visit with Olesya Jesus NP: no etiology found for the spell.   6MW test: 1700 feet  PFT within normal limits     No h/o myalgia, weight loss, loss of appetite, fevers, chills, night sweats, swollen glands  Patient denies any, malar rash, photosensitivity, recurrent mouth ulcers, sicca symptoms, recurrent sinusitis/rhinitis,swallowing difficulty, hearing or visual changes recently.   No h/o arterial/venous thrombosis in the past  No h/o persistent cough, chest pain  No h/o persistent nausea, vomiting, constipation, diarrhea, abdominal pain  No h/o hematochezia, hematuria, hemoptysis, hematemesis  No persistent headache, tingling, numbness, weakness. No h/o seizures    PULMONARY STRESS TEST, SIMPLE  MRA and MRI of brain and neck  X-ray Chest 2 views  KRYSTLE antibody panel  DNA double stranded antibodies  CBC with platelets differential  Comprehensive metabolic panel  Antinuclear antibody screen by EIA  Centromere Antibody IgG  Andreia 1 Antibody IgG  Complement C4  Complement C3  UA with Microscopic reflex to Culture  NEUROLOGY ADULT REFERRAL  EKG 12-lead complete w/read - Clinics  Echocardiogram  PFT Lab Testing    COLLEEN 1.4  Anti-centromere and anti-topoisomerase negative.     March 1, 2017  No new changes in the skin.   Raynaud's - about the same.   No acid reflux symptoms.   No muscle weakness on " daily basis.  No daily joint pains except chronic on and off neck pain.   Morning stiffness X 30 mins.  Chronic shortness of breath +ve. Not climbing stairs as much nowadays.      Chronic fatigue +ve 6-8/10  \  March 8, 2018, INTERVAL HISTORY:  I am seeing the patient today 1 year roughly after she was seen by Dr. Case.  Over that timeframe, she has really been quite well and she believes completely stable.      She continues to get Raynaud's phenomena episodes.  She can almost always break these within 15 minutes.  Indeed, they are not so bad that she cannot swim, which she really likes to do, although occasionally she gets attacks while in the pool and she fixes that simply by getting out and getting in the hot tub.  She has not developed any open digital ulcers, although she does note a little bit slower wound healing over the dorsum of her hands, but has not really had any problems in the fingertips.      From a gastrointestinal standpoint, she denies any significant GERD, any significant dysphagia, and has no features suggestive of bacterial small bowel overgrowth.      There has been some discussion with her PCP about using an oral bisphosphonate and she is a little bit concerned about that.  She has never had a barium esophagram.  I have reviewed her most recent chest x-ray and CT scans and they really do show features of a patulous esophagus, so it is unlikely she would have problems, but we did discuss the possible use of a barium esophagram to further reassure before using an oral bisphosphonate.      She continues to see Dr. Gomez for her pulmonary status and is doing every 6 month visits with pulmonary function tests which have been stable.  She believes her exercise tolerance is likewise stable.      She believes her skin is stable and she has not had significant joint pain with morning stiffness lasting more than 10 minutes.  This includes MCP joints which do have some slight swelling, but these  just do not seem to ever hurt her.      She denies any keratoconjunctivitis sicca features.      Finally, she denies any heart palpitations and really has no other new features on a complete 12-point review of systems.      Her only other symptom is just a sense of fatigue.  She is frustrated that she cannot get outside to exercise more and does not have the compelling need to do so that she had a year ago because her dog  and she decided because of an intermittently heavy work schedule not to get another dog that would have to be at home all alone often.              Review of Systems:   Complete ROS negative except for symptoms mentioned in the HPI          Past Medical History:     Past Medical History:   Diagnosis Date     Anxiety      Interstitial lung disease (H)     scleroderma ILD, dx by chest CT 2017     Migraine     loss of speech and comprehension, has had w/u and is complex migraine, last one about 2017     Raynaud phenomenon      Systemic sclerosis with limited cutaneous involvement (H) 2014    Scl-70 and anti-centromere antibody neg, +RF     Past Surgical History:   Procedure Laterality Date     HYSTEROSCOPIC PLACEMENT CONTRACEPTIVE DEVICE              Social History:     Social History     Occupational History           [employer] - part time     Social History Main Topics     Smoking status: Former Smoker     Packs/day: 1.00     Years: 10.00     Types: Cigarettes     Quit date: 1986     Smokeless tobacco: Never Used     Alcohol use No     Drug use: No     Sexual activity: No            Family History:     Family History   Problem Relation Age of Onset     C.A.D. Father      a efw small heart attacks     Neurologic Disorder Father      dementia     Neurologic Disorder Mother      dementia     Rheumatoid Arthritis Sister      Hypertension Sister      Breast Cancer No family hx of      Cancer - colorectal No family hx of      DIABETES No family hx of      CANCER No  "family hx of      no skin cancer     Interstitial Lung Disease No family hx of      Melanoma No family hx of      Skin Cancer No family hx of             Allergies:   No Known Allergies         Medications:     Current Outpatient Prescriptions   Medication Sig Dispense Refill     calcium carbonate (OS-DONTE 500 MG [place]. CA) 1250 MG tablet Take 1 tablet by mouth 2 times daily       saccharomyces boulardii (FLORASTOR) 250 MG capsule Take 250 mg by mouth 2 times daily       Cholecalciferol (VITAMIN D) 1000 UNITS capsule Take 1 capsule by mouth daily       desonide (DESOWEN) 0.05 % ointment Apply topically 2 times daily To rash on the corners of the mouth mixed with nystatin as needed until clear. 60 g 11     nystatin (MYCOSTATIN) ointment Apply topically 2 times daily To rash at the corners of the mouth mixed with desonide until clear. 30 g 11            Physical Exam:   Blood pressure 97/62, pulse 67, temperature 97.5  F (36.4  C), temperature source Oral, height 1.651 m (5' 5\"), weight 59 kg (130 lb 1.6 oz), not currently breastfeeding.  Wt Readings from Last 4 Encounters:   03/08/18 59 kg (130 lb 1.6 oz)   03/01/18 61.6 kg (135 lb 12.8 oz)   02/14/18 58 kg (127 lb 12.8 oz)   12/07/17 57.5 kg (126 lb 11.2 oz)       Constitutional: well-developed, appearing stated age; cooperative  Eyes: nl EOM, PERRLA, vision, conjunctiva, sclera  ENT: nl external ears, nose, hearing, lips, teeth, gums, throat  No mucous membrane lesions, normal saliva pool  Neck: no mass or thyroid enlargement  Resp: lungs clear to auscultation,   CV: RRR, no murmurs, rubs or gallops, no edema  GI: no ABD mass or tenderness, no HSM  : not tested  Lymph: no cervical, supraclavicular or epitrochlear nodes  MS: All shoulder, elbow, wrist, MCP/PIP/DIP, spine, hip, knee, ankle, and foot MTP/IP joints were examined and  found normal. No active synovitis or deformity. Full ROM.  Normal  strength. Fist 100%.  No dactylitis,  tenosynovitis, " enthesopathy.  Skin: sclerodactyly minor, wih low-gr 2,3 MCP swelling, NT +  Psych: nl judgement, orientation, memory, affect.         Data:     Results for orders placed or performed in visit on 03/01/18   General PFT Lab (Please always keep checked)   Result Value Ref Range    FVC-Pred 2.73 L    FVC-Pre 3.35 L    FVC-%Pred-Pre 122 %    FEV1-Pre 2.70 L    FEV1-%Pred-Pre 126 %    FEV1FVC-Pred 76 %    FEV1FVC-Pre 81 %    FEFMax-Pred 5.84 L/sec    FEFMax-Pre 7.47 L/sec    FEFMax-%Pred-Pre 127 %    FEF2575-Pred 1.83 L/sec    FEF2575-Pre 2.42 L/sec    QUQ8909-%Pred-Pre 132 %    ExpTime-Pre 9.21 sec    FIFMax-Pre 4.04 L/sec    VC-Pred 3.20 L    VC-Pre 3.31 L    VC-%Pred-Pre 103 %    IC-Pred 2.34 L    IC-Pre 2.00 L    IC-%Pred-Pre 85 %    ERV-Pred 0.86 L    ERV-Pre 1.31 L    ERV-%Pred-Pre 152 %    FEV1FEV6-Pred 79 %    FEV1FEV6-Pre 79 %    FRCPleth-Pred 2.77 L    FRCPleth-Pre 2.86 L    FRCPleth-%Pred-Pre 103 %    RVPleth-Pred 2.10 L    RVPleth-Pre 1.56 L    RVPleth-%Pred-Pre 74 %    TLCPleth-Pred 5.11 L    TLCPleth-Pre 4.87 L    TLCPleth-%Pred-Pre 95 %    DLCOunc-Pred 20.98 ml/min/mmHg    DLCOunc-Pre 19.52 ml/min/mmHg    DLCOunc-%Pred-Pre 93 %    VA-Pre 4.39 L    VA-%Pred-Pre 83 %    FEV1SVC-Pred 67 %    FEV1SVC-Pre 82 %    Narrative    The FVC, FEV1, and FEV1/FVC ratio are within normal limits.  The inspiratory flow rates are within normal limits.  Lung volumes are within normal limits.  The diffusing capacity is normal.  However, the diffusing capacity was not corrected for the   patient's hemoglobin.  IMPRESSION:  Normal spirometry, diffusion, and lung volumes.   ____________________________________________M.DTiffanie Vital       Recent Labs   Lab Test  03/21/14   1044  10/21/13   1304  11/18/11   1049   WBC  8.2  6.5  7.1   RBC  4.41  4.15  4.48   HGB  13.5  13.0  13.9   HCT  41.9  39.0  42.0   MCV  95  94  94   RDW  13.1  12.8  12.9   PLT  281  274  283   ALBUMIN  3.9   --   3.9   CRP   --   <5.0   --    BUN  14  18   20      Recent Labs   Lab Test  10/21/13   1304  11/18/11   1049 10/10/08   TSH  1.57  1.10  1.09     Again, thank you for allowing me to participate in the care of your patient.      Sincerely,        Petros Gunn MD  Division of Rheumatology  [facility]  Phone (Patients line): [phone]  Pager (healthcare professionals only): [phone]  Phone (healthcare professionals only line): [phone]

## 2018-07-23 ASSESSMENT — ANXIETY QUESTIONNAIRES
7. FEELING AFRAID AS IF SOMETHING AWFUL MIGHT HAPPEN: SEVERAL DAYS
7. FEELING AFRAID AS IF SOMETHING AWFUL MIGHT HAPPEN: SEVERAL DAYS
1. FEELING NERVOUS, ANXIOUS, OR ON EDGE: SEVERAL DAYS
2. NOT BEING ABLE TO STOP OR CONTROL WORRYING: SEVERAL DAYS
6. BECOMING EASILY ANNOYED OR IRRITABLE: SEVERAL DAYS
5. BEING SO RESTLESS THAT IT IS HARD TO SIT STILL: NOT AT ALL
GAD7 TOTAL SCORE: 6
GAD7 TOTAL SCORE: 6
3. WORRYING TOO MUCH ABOUT DIFFERENT THINGS: SEVERAL DAYS
4. TROUBLE RELAXING: SEVERAL DAYS

## 2018-07-23 ASSESSMENT — ENCOUNTER SYMPTOMS
PANIC: 0
NAIL CHANGES: 1
HEADACHES: 0
SEIZURES: 0
NERVOUS/ANXIOUS: 1
DEPRESSION: 1
ARTHRALGIAS: 1
NECK PAIN: 0
DIZZINESS: 1
PARALYSIS: 0
MUSCLE WEAKNESS: 1
TREMORS: 0
JOINT SWELLING: 0
LOSS OF CONSCIOUSNESS: 0
WEAKNESS: 1
DECREASED CONCENTRATION: 0
MUSCLE CRAMPS: 0
MEMORY LOSS: 1
TINGLING: 0
NUMBNESS: 0
MYALGIAS: 0
DISTURBANCES IN COORDINATION: 1
POOR WOUND HEALING: 0
SKIN CHANGES: 0
STIFFNESS: 0
BACK PAIN: 0
INSOMNIA: 1
SPEECH CHANGE: 0

## 2018-07-24 ASSESSMENT — ANXIETY QUESTIONNAIRES: GAD7 TOTAL SCORE: 6

## 2018-07-31 ENCOUNTER — MYC MEDICAL ADVICE (OUTPATIENT)
Dept: PSYCHIATRY | Facility: CLINIC | Age: 68
End: 2018-07-31

## 2018-08-02 NOTE — TELEPHONE ENCOUNTER
Marlen Sweet, Jorge Meyers CNP RN        Caller: Unspecified (2 days ago,  8:37 AM)                     Thanks, Jorge. Let me know how she responds.

## 2018-08-06 ENCOUNTER — OFFICE VISIT (OUTPATIENT)
Dept: FAMILY MEDICINE | Facility: CLINIC | Age: 68
End: 2018-08-06
Attending: FAMILY MEDICINE
Payer: MEDICARE

## 2018-08-06 VITALS
SYSTOLIC BLOOD PRESSURE: 114 MMHG | BODY MASS INDEX: 21.49 KG/M2 | WEIGHT: 129 LBS | HEART RATE: 73 BPM | HEIGHT: 65 IN | DIASTOLIC BLOOD PRESSURE: 66 MMHG

## 2018-08-06 DIAGNOSIS — M81.0 SENILE OSTEOPOROSIS: Primary | ICD-10-CM

## 2018-08-06 DIAGNOSIS — R13.19 ESOPHAGEAL DYSPHAGIA: ICD-10-CM

## 2018-08-06 LAB
DEPRECATED CALCIDIOL+CALCIFEROL SERPL-MC: 38 UG/L (ref 20–75)
PTH-INTACT SERPL-MCNC: 34 PG/ML (ref 18–80)
TSH SERPL DL<=0.005 MIU/L-ACNC: 1.71 MU/L (ref 0.4–4)

## 2018-08-06 PROCEDURE — 84443 ASSAY THYROID STIM HORMONE: CPT | Performed by: FAMILY MEDICINE

## 2018-08-06 PROCEDURE — G0463 HOSPITAL OUTPT CLINIC VISIT: HCPCS | Mod: ZF

## 2018-08-06 PROCEDURE — 84080 ASSAY ALKALINE PHOSPHATASES: CPT | Performed by: FAMILY MEDICINE

## 2018-08-06 PROCEDURE — 82306 VITAMIN D 25 HYDROXY: CPT | Performed by: FAMILY MEDICINE

## 2018-08-06 PROCEDURE — 83970 ASSAY OF PARATHORMONE: CPT | Performed by: FAMILY MEDICINE

## 2018-08-06 PROCEDURE — 36415 COLL VENOUS BLD VENIPUNCTURE: CPT | Performed by: FAMILY MEDICINE

## 2018-08-06 ASSESSMENT — PAIN SCALES - GENERAL: PAINLEVEL: NO PAIN (0)

## 2018-08-06 NOTE — TELEPHONE ENCOUNTER
Writer telephoned pt to explore her needs, due to her having read but not responded to latest Ramen message.  Left voice mail message identifying purpose of call and requesting a call to the clinic if any needs arise.

## 2018-08-06 NOTE — PATIENT INSTRUCTIONS
Blood work today.  You should also be taking a total of 7083-0626 mg ( 100-120 %) of calcium per day, best from your diet or from calcium supplements (or a combination of the two). For reference, one glass of milk is about 300 mg of calcium or 30% RA. If you use supplements, those containing calcium citrate or calcium carbonate are best absorbed. In addition, if you don't drink at least 2 glasses of milk daily, you should also take supplemental Vitamin D, 400-800 IU (100-200 %) daily. Many calcium and vitamin preparations include Vitamin D or a multiple vitamin may contain Vitamin D. You may safely take up to 1000 IU daily    Make an appointment with Rosalee Catherine    Return to clinic 6 months after starting medication    We will obtain a dexa scan one year after starting a medication.

## 2018-08-06 NOTE — LETTER
"8/6/2018       RE: Stephanie Bhatia  [address]  [facility] [address]     Dear Colleague,    Thank you for referring your patient, Stephanie Bhatia, to the [facility] at [facility]. Please see a copy of my visit note below.    Stephanie is a  67 year old female post menopausal  Gr2 P0 that presents today for osteoporosis consult.   Referring Physician: Referred Self    HPI     Have you ever had a bone density test? Yes  Where = [facility]  When = 2/2017  Spine Tscore = -1.1  Left neck Tscore = -2.5  Total left hip Tscore = -1.4  Right neck Tscore = -2.4  Total Right hip Tscore = -1.6    11/2013 [facility]  Lumbar Spine L1-L4: T-score -0.8   Left Femoral Neck: T-score -2.2   Right Femoral Neck: T-score -2.0   Have you received any x-ray dye or contrast in the last ten days? No  How many servings of dairy products do you consume per day? 1-3 Type: skim milk, cheese , yogurt, kale/spinach  Do you take a multi-vitamin daily? No  Do you take a vitamin D supplement? Yes, 1000IU  Do you take a calcium supplement daily? Calcium citrate, 1200 U, splits the pills up  Do you take a supplement containing strontium? No  Are you exposed to natural sunlight at least 20 minutes three times a week? Yes  Have you broken any bones during your adult life? No  How tall were you at age 23? 5' 5.5\"  Have you gone through menopause? Yes , 47  Did your menopause occur before age 43? No  Have you taken hormone therapy? Yes. Details: HRT 2-3 years    Social History   reports that she quit smoking about 31 years ago. Her smoking use included Cigarettes. She has a 20.00 pack-year smoking history. She has never used smokeless tobacco. She reports that she drinks alcohol. She reports that she does not use illicit drugs.  Do you smoke cigarettes? No  Do you exercise? Yes. Details: walk 4-5x/week, 30min-1hr. Swimming once per week. Yoga once per week. No weights.   Do " you drink alcohol? No    Medication History  Have you taken any of the following medications?   Blood thinner (Coumadin or Heparin): No   Chemotherapy (ex: Lupron, Arimidex): No   Depo Provera: No   Anti-Seizure (ex: Dilantin, Depakote): No   Steroids (ex: Prednisone, Cortisone): No   Thyroid (ex: Synthroid): No  Have you used any of the following medications?   Actonel (Risedronate): No   Aredia (Pamidronate): No   Boniva (Ibindronate): No   Didronil (Etidronate): No   Evista (Raloxifene): No   Fosamax (Alendronate): No   Forteo (Parathyroid hormone) injections: No   HCTZ (Thiazide): No   Calcitonin nasal spray: No   Reclast or Zometa (Zolendronate): No   Prolia (Denosumab): No   HT in the past   Current Outpatient Prescriptions   Medication Sig Dispense Refill     calcium carbonate (OS-DONTE 500 MG [place]. CA) 1250 MG tablet Take 1 tablet by mouth 2 times daily       Cholecalciferol (VITAMIN D) 1000 UNITS capsule Take 1 capsule by mouth daily       desonide (DESOWEN) 0.05 % ointment Apply topically 2 times daily To rash on the corners of the mouth mixed with nystatin as needed until clear. 60 g 11     nystatin (MYCOSTATIN) ointment Apply topically 2 times daily To rash at the corners of the mouth mixed with desonide until clear. 30 g 11        No Known Allergies    Past Medical History  Do you have or have you had any of the following?   Celiac sprue (wheat intolerance):No   Chronic low back problems (ex: scohosis, arthritis): No   Diabetes mellitus: No   High blood calcium level: No   Hip or spine injury: No   History of an eating disorder: No   History of gastric bypass: No   Hyperparathyroidism: No   Inflammatory bowel disease (ex: Crohn's, ulcerative colitis): No   Kidney disease: No   Kidney stones: No   Liver disease: No   Lupus: No   Overactive thyroid gland: No   Rhuematoid arthritis: Yes, Dx in 90's. No history of taking corticosteroids or methotrexate.     Have you had any of the following?   Hysterectomy:  "No   Ovaries removed: No   Breast cancer: No   Family history of breast cancer: No    Family History   Problem Relation Age of Onset     C.A.D. Father      a efw small heart attacks     Neurologic Disorder Father      dementia     Coronary Artery Disease Father      Neurologic Disorder Mother      dementia     Depression Mother      Cerebrovascular Disease Maternal Grandmother      ,     Other Cancer Paternal Grandmother      Rheumatoid Arthritis Sister      Hypertension Sister      Hypertension Sister      Depression Sister      Breast Cancer No family hx of      Cancer - colorectal No family hx of      Diabetes No family hx of      Cancer No family hx of      no skin cancer     Interstitial Lung Disease No family hx of      Melanoma No family hx of      Skin Cancer No family hx of      Is there a family history of osteoporosis? Yes. Details: Mother  Did either parent have a hip fracture? Questionable femur fracture from fall    ROS:  General: none  Head/Eyes: none, sometimes trouble swallowing  Ears/Nose/Throat: trouble swallowing  Cardiovascular: none  Respiratory: none  Gastrointestinal: none  Breast: none  Genitourinary: none  Sexual Function: none  Musculoskeletal: stiffness and numbness/tingling  Skin: none  Neurological: numbness/tingling   Mental Health: anxiety and depression , seeing a therapist  Endocrine: none      Clinic Measurements  Vitals: /66  Pulse 73  Ht 1.651 m (5' 5\")  Wt 58.5 kg (129 lb)  BMI 21.47 kg/m2  BMI= Body mass index is 21.47 kg/(m^2).    Physical exam  Constitutional: Well appearing woman in no acute distress.   Psychological: appropriate mood.  Neck: No thyroidmegaly.  no carotid bruits.  Cardiovascular: regular rate and rhythm, normal S1 and S2, no murmurs, rubs or gallops, peripheral pulses full and symmetric   Respiratory: clear to auscultation, no wheezes or crackles, normal breath sounds.  Gastrointestinal: positive bowel sounds, nontender, no hepatosplenomegaly, no " masses. No guarding or rebound.  Musculoskeletal: full range of motion  Swelling of bilateral 1st MCP joints on hands. Non-tender  Spine: Full ROM of spine with flexion, extension, rotation and lateral movement, no tenderness, spine is straight   Skin: no concerning lesions, no jaundice.  Neurological: normal gait, no tremor.     LAB  Vertebra; Fracture Assessment: 2/2017 no fx identified  Dexa Scan: 2/2017      FRAX Assessment Tool:  28% for 10 risk Major Osteoporotic, 8.1% for 10 risk of Hip Fracture  Risk Factors: Rheumatoid arthritis, PM, T scores, +FHx, parent fx hip     ASSESSMENT:  Stephanie Bhatia is a 67 year old PM  woman with a history of rheumatoid arthritis with scleroderma and osteoporosis. Most recent Dexa 2/2017 with T-score of -2.5 of left hip confirms osteoporosis. She has several risk factors and FRAX shows that her 10 yr risk of fracture is high.  Currently she is not on bone medication.  Discussed calcium, vit D and exercise and recommending that she consider medication.     PLAN:  Discussed bone health today including diagnosis of osteoporosis. Reviewed Dexa scan from 2/2017. Due to patient's history of rheumatoid arthritis, T-scores of the spine are likely falsely elevated. Discussed management including Calcium, Vitamin D, exercise, and pharmacologic management. Patient's history of scleroderma and occasional difficulty swallowing is concerning and would avoid bisphosphonate. Recommend a visit with Naomi Catherine. Recommend IV Reclast or Prolia. Will order labs today and schedule visit with Dorene. Will see patient again 6 months after starting a medication and will obtain repeat Dexa 1 year after starting medication.   - Labs: Vit D, TSH, PTH, ALP- bone specific  - Visit with Pharm D  - RTC 6 months after starting medication  - Dexa 1 year after starting medication    Iwona Suarez MD  PGY-1  Ob/Gyn    I saw the patient with the resident and agree with the findings and the plan of care as  documented in the resident's note.  I personally reviewed history and exam findings.  Key findings  Osteoporosis on femoral neck, FRAX was significant for fracture, has scleroderma - have concern for taking oral alendronate.   Felicity Schmid MD, PhD

## 2018-08-06 NOTE — MR AVS SNAPSHOT
After Visit Summary   8/6/2018    Stephanie Bhatia    MRN: [id number]           Patient Information     Date Of Birth          1950        Visit Information        Provider Department      8/6/2018 8:30 AM Felicity Schmid MD PhD [facility]        Today's Diagnoses     Senile osteoporosis    -  1    Esophageal dysphagia           Care Instructions    Blood work today.  You should also be taking a total of 6027-9647 mg ( 100-120 %) of calcium per day, best from your diet or from calcium supplements (or a combination of the two). For reference, one glass of milk is about 300 mg of calcium or 30% RA. If you use supplements, those containing calcium citrate or calcium carbonate are best absorbed. In addition, if you don't drink at least 2 glasses of milk daily, you should also take supplemental Vitamin D, 400-800 IU (100-200 %) daily. Many calcium and vitamin preparations include Vitamin D or a multiple vitamin may contain Vitamin D. You may safely take up to 1000 IU daily    Make an appointment with Rosalee Catherine    Return to clinic 6 months after starting medication    We will obtain a dexa scan one year after starting a medication.           Follow-ups after your visit        Your next 10 appointments already scheduled     Sep 06, 2018  7:00 AM CDT   FULL PULMONARY FUNCTION with  PFL B   [facility] Pulmonary Function Testing ([facility])    [address] [place] [address]  3rd Floor  [facility] [address]   [phone]            Sep 06, 2018  8:00 AM CDT   (Arrive by 7:45 AM)   RETURN SCLERODERMA with Tiffanie Gomez MD   [facility] ([facility])    [address] [place] [address]  [address] [place] [id number]   [phone]            Sep 06, 2018  9:30 AM CDT   Screening Mammogram with [id number]   [facility] ([facility])    [address] [place] [address], [facility]  "[facility] [address] [phone]           Do NOT use body powder, lotions, perfume or deodorant the day of the exam.  If your last mammogram was not done at [facility], please bring your mammogram films. We will need the name of your provider to send a copy of your report.  A mammogram may be covered on an annual or biannual basis, please check with your insurance company.              Who to contact     Please call your clinic at [phone] to:    Ask questions about your health    Make or cancel appointments    Discuss your medicines    Learn about your test results    Speak to your doctor            Additional Information About Your Visit        [employer] Information     [employer] gives you secure access to your electronic health record. If you see a primary care provider, you can also send messages to your care team and make appointments. If you have questions, please call your primary care clinic.  If you do not have a primary care provider, please call [phone] and they will assist you.      [employer] is an electronic gateway that provides easy, online access to your medical records. With [employer], you can request a clinic appointment, read your test results, renew a prescription or communicate with your care team.     To access your existing account, please contact your [facility] or call [phone] for assistance.        Care EveryWhere ID     This is your Care EveryWhere ID. This could be used by other organizations to access your [facility] medical records  [id number]        Your Vitals Were     Pulse Height BMI (Body Mass Index)             73 1.651 m (5' 5\") 21.47 kg/m2          Blood Pressure from Last 3 Encounters:   08/06/18 114/66   03/08/18 97/62   03/01/18 106/68    Weight from Last 3 Encounters:   08/06/18 58.5 kg (129 lb)   03/08/18 59 kg (130 lb 1.6 oz)   03/01/18 61.6 kg (135 lb 12.8 oz)              We Performed the Following     Bone specific alk " phosphatase     Parathyroid Hormone Intact     TSH with free T4 reflex     Vitamin D deficiency screening        Primary Care Provider Office Phone # Fax #    Felicity Brian Schmid MD PhD [phone]       [address] [place] [phone]        Equal Access to Services     TROY SILVA : Ellen gunn kristen Sodaisyali, waaxda luqadaha, qaybta kaalmada adecelestine, rich herberthin hayaaliza farahchristopher larsenjosé antonio abarca. [facility] [phone].    ATENCIÓN: Si habla español, tiene a chacon disposición servicios gratuitos de asistencia lingüística. [facility] [phone].    We comply with applicable federal civil rights laws and [place] laws. We do not discriminate on the basis of race, color, national origin, age, disability, sex, sexual orientation, or gender identity.            Thank you!     Thank you for choosing [facility]  for your care. Our goal is always to provide you with excellent care. Hearing back from our patients is one way we can continue to improve our services. Please take a few minutes to complete the written survey that you may receive in the mail after your visit with us. Thank you!             Your Updated Medication List - Protect others around you: Learn how to safely use, store and throw away your medicines at www.disposemymeds.org.          This list is accurate as of 8/6/18  9:19 AM.  Always use your most recent med list.                   Brand Name Dispense Instructions for use Diagnosis    calcium citrate-vitamin D 315-200 MG-UNIT Tabs per tablet    CITRACAL     Take 1 tablet by mouth daily        desonide 0.05 % ointment    DESOWEN    60 g    Apply topically 2 times daily To rash on the corners of the mouth mixed with nystatin as needed until clear.    Angular cheilitis       nystatin ointment    MYCOSTATIN    30 g    Apply topically 2 times daily To rash at the corners of the mouth mixed with desonide until clear.    Angular cheilitis       vitamin D 1000  units capsule      Take 1 capsule by mouth daily

## 2018-08-06 NOTE — PROGRESS NOTES
"Stephanie is a  67 year old female post menopausal  Gr2 P0 that presents today for osteoporosis consult.   Referring Physician: Referred Self    HPI     Have you ever had a bone density test? Yes  Where = [facility]  When = 2/2017  Spine Tscore = -1.1  Left neck Tscore = -2.5  Total left hip Tscore = -1.4  Right neck Tscore = -2.4  Total Right hip Tscore = -1.6    11/2013 [facility]  Lumbar Spine L1-L4: T-score -0.8   Left Femoral Neck: T-score -2.2   Right Femoral Neck: T-score -2.0   Have you received any x-ray dye or contrast in the last ten days? No  How many servings of dairy products do you consume per day? 1-3 Type: skim milk, cheese , yogurt, kale/spinach  Do you take a multi-vitamin daily? No  Do you take a vitamin D supplement? Yes, 1000IU  Do you take a calcium supplement daily? Calcium citrate, 1200 U, splits the pills up  Do you take a supplement containing strontium? No  Are you exposed to natural sunlight at least 20 minutes three times a week? Yes  Have you broken any bones during your adult life? No  How tall were you at age 23? 5' 5.5\"  Have you gone through menopause? Yes , 47  Did your menopause occur before age 43? No  Have you taken hormone therapy? Yes. Details: HRT 2-3 years    Social History   reports that she quit smoking about 31 years ago. Her smoking use included Cigarettes. She has a 20.00 pack-year smoking history. She has never used smokeless tobacco. She reports that she drinks alcohol. She reports that she does not use illicit drugs.  Do you smoke cigarettes? No  Do you exercise? Yes. Details: walk 4-5x/week, 30min-1hr. Swimming once per week. Yoga once per week. No weights.   Do you drink alcohol? No    Medication History  Have you taken any of the following medications?   Blood thinner (Coumadin or Heparin): No   Chemotherapy (ex: Lupron, Arimidex): No   Depo Provera: No   Anti-Seizure (ex: Dilantin, Depakote): No   Steroids (ex: Prednisone, Cortisone): " No   Thyroid (ex: Synthroid): No  Have you used any of the following medications?   Actonel (Risedronate): No   Aredia (Pamidronate): No   Boniva (Ibindronate): No   Didronil (Etidronate): No   Evista (Raloxifene): No   Fosamax (Alendronate): No   Forteo (Parathyroid hormone) injections: No   HCTZ (Thiazide): No   Calcitonin nasal spray: No   Reclast or Zometa (Zolendronate): No   Prolia (Denosumab): No   HT in the past   Current Outpatient Prescriptions   Medication Sig Dispense Refill     calcium carbonate (OS-DONTE 500 MG [place]. [place]) 1250 MG tablet Take 1 tablet by mouth 2 times daily       Cholecalciferol (VITAMIN D) 1000 UNITS capsule Take 1 capsule by mouth daily       desonide (DESOWEN) 0.05 % ointment Apply topically 2 times daily To rash on the corners of the mouth mixed with nystatin as needed until clear. 60 g 11     nystatin (MYCOSTATIN) ointment Apply topically 2 times daily To rash at the corners of the mouth mixed with desonide until clear. 30 g 11        No Known Allergies    Past Medical History  Do you have or have you had any of the following?   Celiac sprue (wheat intolerance):No   Chronic low back problems (ex: scohosis, arthritis): No   Diabetes mellitus: No   High blood calcium level: No   Hip or spine injury: No   History of an eating disorder: No   History of gastric bypass: No   Hyperparathyroidism: No   Inflammatory bowel disease (ex: Crohn's, ulcerative colitis): No   Kidney disease: No   Kidney stones: No   Liver disease: No   Lupus: No   Overactive thyroid gland: No   Rhuematoid arthritis: Yes, Dx in 90's. No history of taking corticosteroids or methotrexate.     Have you had any of the following?   Hysterectomy: No   Ovaries removed: No   Breast cancer: No   Family history of breast cancer: No    Family History   Problem Relation Age of Onset     C.A.D. Father      a efw small heart attacks     Neurologic Disorder Father      dementia     Coronary Artery Disease Father      Neurologic  "Disorder Mother      dementia     Depression Mother      Cerebrovascular Disease Maternal Grandmother      ,     Other Cancer Paternal Grandmother      Rheumatoid Arthritis Sister      Hypertension Sister      Hypertension Sister      Depression Sister      Breast Cancer No family hx of      Cancer - colorectal No family hx of      Diabetes No family hx of      Cancer No family hx of      no skin cancer     Interstitial Lung Disease No family hx of      Melanoma No family hx of      Skin Cancer No family hx of      Is there a family history of osteoporosis? Yes. Details: Mother  Did either parent have a hip fracture? Questionable femur fracture from fall    ROS:  General: none  Head/Eyes: none, sometimes trouble swallowing  Ears/Nose/Throat: trouble swallowing  Cardiovascular: none  Respiratory: none  Gastrointestinal: none  Breast: none  Genitourinary: none  Sexual Function: none  Musculoskeletal: stiffness and numbness/tingling  Skin: none  Neurological: numbness/tingling   Mental Health: anxiety and depression , seeing a therapist  Endocrine: none      Clinic Measurements  Vitals: /66  Pulse 73  Ht 1.651 m (5' 5\")  Wt 58.5 kg (129 lb)  BMI 21.47 kg/m2  BMI= Body mass index is 21.47 kg/(m^2).    Physical exam  Constitutional: Well appearing woman in no acute distress.   Psychological: appropriate mood.  Neck: No thyroidmegaly.  no carotid bruits.  Cardiovascular: regular rate and rhythm, normal S1 and S2, no murmurs, rubs or gallops, peripheral pulses full and symmetric   Respiratory: clear to auscultation, no wheezes or crackles, normal breath sounds.  Gastrointestinal: positive bowel sounds, nontender, no hepatosplenomegaly, no masses. No guarding or rebound.  Musculoskeletal: full range of motion  Swelling of bilateral 1st MCP joints on hands. Non-tender  Spine: Full ROM of spine with flexion, extension, rotation and lateral movement, no tenderness, spine is straight   Skin: no concerning lesions, no " jaundice.  Neurological: normal gait, no tremor.     LAB  Vertebra; Fracture Assessment: 2/2017 no fx identified  Dexa Scan: 2/2017      FRAX Assessment Tool:  28% for 10 risk Major Osteoporotic, 8.1% for 10 risk of Hip Fracture  Risk Factors: Rheumatoid arthritis, PM, T scores, +FHx, parent fx hip     ASSESSMENT:  Stephanie Bhatia is a 67 year old PM  woman with a history of rheumatoid arthritis with scleroderma and osteoporosis. Most recent Dexa 2/2017 with T-score of -2.5 of left hip confirms osteoporosis. She has several risk factors and FRAX shows that her 10 yr risk of fracture is high.  Currently she is not on bone medication.  Discussed calcium, vit D and exercise and recommending that she consider medication.     PLAN:  Discussed bone health today including diagnosis of osteoporosis. Reviewed Dexa scan from 2/2017. Due to patient's history of rheumatoid arthritis, T-scores of the spine are likely falsely elevated. Discussed management including Calcium, Vitamin D, exercise, and pharmacologic management. Patient's history of scleroderma and occasional difficulty swallowing is concerning and would avoid bisphosphonate. Recommend a visit with Naomi Catherine. Recommend IV Reclast or Prolia. Will order labs today and schedule visit with Dorene. Will see patient again 6 months after starting a medication and will obtain repeat Dexa 1 year after starting medication.   - Labs: Vit D, TSH, PTH, ALP- bone specific  - Visit with Pharm D  - RTC 6 months after starting medication  - Dexa 1 year after starting medication    Iwona Suarez MD  PGY-1  Ob/Gyn    I saw the patient with the resident and agree with the findings and the plan of care as documented in the resident's note.  I personally reviewed history and exam findings.  Key findings  Osteoporosis on femoral neck, FRAX was significant for fracture, has scleroderma - have concern for taking oral alendronate.   Felicity Schmid MD, PhD

## 2018-08-07 LAB — ALP BONE SERPL-MCNC: 7.7 UG/L

## 2018-10-05 ENCOUNTER — OFFICE VISIT (OUTPATIENT)
Dept: PULMONOLOGY | Facility: CLINIC | Age: 68
End: 2018-10-05
Attending: INTERNAL MEDICINE
Payer: MEDICARE

## 2018-10-05 VITALS
SYSTOLIC BLOOD PRESSURE: 105 MMHG | HEART RATE: 67 BPM | RESPIRATION RATE: 18 BRPM | WEIGHT: 131.2 LBS | DIASTOLIC BLOOD PRESSURE: 70 MMHG | OXYGEN SATURATION: 100 % | HEIGHT: 65 IN | BODY MASS INDEX: 21.86 KG/M2

## 2018-10-05 DIAGNOSIS — J84.9 ILD (INTERSTITIAL LUNG DISEASE) (H): Primary | ICD-10-CM

## 2018-10-05 PROCEDURE — G0463 HOSPITAL OUTPT CLINIC VISIT: HCPCS | Mod: ZF

## 2018-10-05 ASSESSMENT — PAIN SCALES - GENERAL: PAINLEVEL: NO PAIN (0)

## 2018-10-05 NOTE — LETTER
10/5/2018      RE: Stephanie Bhatia  [address]  [facility] [address]       [facility] Disease [facility]    Reason for Visit  Stephanie Bhatia is a 68 year old year old female who is being seen for Interstitial Lung Disease (ILD) (Follow up on Stephanie and her Scleroderma)    HPI    Ms. Bhatia is a 68-year-old who is here for follow-up of scleroderma interstitial lung disease.  She was diagnosed with scleroderma ILD in 2017 by chest CT scan.  She also had 2 4 mm nodules that did not require follow-up in 2017 as she is low risk.  To date, she has not required treatment of her scleroderma ILD.  Today, she reports that she is doing well.  She walks 3-4 times per week for 45 minutes at a time.  She feels no shortness of breath when she walks on level ground.  There are no stairs in her house.  She also swims periodically.  She denies paroxysmal nocturnal dyspnea, cough, fevers, or syncope.  She has no digital fingertip ulcers.  She does endorse palpitations sometimes and shortness of breath when she feels anxious.  She has learned how to manage her anxiety, and this resolves her palpitations and shortness of breath.  She did receive the flu vaccine.  She continues to work part-time in sales.  She is planning a one-month trip to [place] next March.        Current Outpatient Prescriptions   Medication     calcium citrate-vitamin D (CITRACAL) 315-200 MG-UNIT TABS per tablet     Cholecalciferol (VITAMIN D) 1000 UNITS capsule     desonide (DESOWEN) 0.05 % ointment     nystatin (MYCOSTATIN) ointment     No current facility-administered medications for this visit.      No Known Allergies  Past Medical History:   Diagnosis Date     Anxiety      Herpes simplex without mention of complication      Interstitial lung disease (H)     scleroderma ILD, dx by chest CT 5/2017     Lung nodules     4 mm LLL and RLL 2017     Migraine     loss of speech and comprehension, has had w/u and is complex migraine,  last one about 2/2017     Raynaud phenomenon      Systemic sclerosis with limited cutaneous involvement (H) 12/02/2014    Scl-70 and anti-centromere antibody neg, +RF       Past Surgical History:   Procedure Laterality Date     HYSTEROSCOPIC PLACEMENT CONTRACEPTIVE DEVICE         Social History     Social History     Marital status: Single     Spouse name:      Number of children: N/A     Years of education: N/A     Occupational History           [employer] - part time     Social History Main Topics     Smoking status: Former Smoker     Packs/day: 1.00     Years: 20.00     Types: Cigarettes     Quit date: 11/26/1986     Smokeless tobacco: Never Used     Alcohol use Yes      Comment: none since 2001     Drug use: No     Sexual activity: No     Other Topics Concern     Parent/Sibling W/ Cabg, Mi Or Angioplasty Before 65f 55m? No     Social History Narrative    .  No children.  Part time at [employer].  Possible asbestos exposure from basement pipes in childhood home.  Lived in [place] [address].  Denies exposure to pet birds, hot tubs.  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       Family History   Problem Relation Age of Onset     C.A.D. Father      a efw small heart attacks     Neurologic Disorder Father      dementia     Coronary Artery Disease Father      Neurologic Disorder Mother      dementia     Depression Mother      Cerebrovascular Disease Maternal Grandmother      ,     Other Cancer Paternal Grandmother      Rheumatoid Arthritis Sister      Hypertension Sister      Hypertension Sister      Depression Sister      Breast Cancer No family hx of      Cancer - colorectal No family hx of      Diabetes No family hx of      Cancer No family hx of      no skin cancer     Interstitial Lung Disease No family hx of      Melanoma No family hx of      Skin Cancer No family hx of              ROS Pulmonary  A complete ROS was otherwise negative except as noted  "in the HPI.    Vitals: /70  Pulse 67  Resp 18  Ht 1.651 m (5' 5\")  Wt 59.5 kg (131 lb 3.2 oz)  SpO2 100%  BMI 21.83 kg/m2    Exam:   GENERAL APPEARANCE: Well developed, well nourished, alert, and in no apparent distress.  RESP: good air flow throughout.  Bibasilar inspiratory crackles. No rhonchi. No wheezes.  CV: Normal S1, S2, regular rhythm, normal rate. No murmur.  No LE edema.   MS: extremities normal. No clubbing. No cyanosis.  SKIN: no rash on limited exam.  NEURO: Mentation intact, speech normal, normal gait and stance.  PSYCH: mentation appears normal. and affect normal/bright.    Results:  Recent Results (from the past 168 hour(s))   General PFT Lab (Please always keep checked)    Collection Time: 10/05/18  6:44 AM   Result Value Ref Range    FVC-Pred 2.71 L    FVC-Pre 3.28 L    FVC-%Pred-Pre 120 %    FEV1-Pre 2.73 L    FEV1-%Pred-Pre 128 %    FEV1FVC-Pred 76 %    FEV1FVC-Pre 83 %    FEFMax-Pred 5.79 L/sec    FEFMax-Pre 7.12 L/sec    FEFMax-%Pred-Pre 122 %    FEF2575-Pred 1.81 L/sec    FEF2575-Pre 3.07 L/sec    AME4233-%Pred-Pre 169 %    ExpTime-Pre 7.54 sec    FIFMax-Pre 4.41 L/sec    VC-Pred 3.19 L    VC-Pre 3.18 L    VC-%Pred-Pre 99 %    IC-Pred 2.30 L    IC-Pre 1.78 L    IC-%Pred-Pre 77 %    ERV-Pred 0.89 L    ERV-Pre 1.40 L    ERV-%Pred-Pre 157 %    FEV1FEV6-Pred 79 %    FEV1FEV6-Pre 83 %    FRCPleth-Pred 2.77 L    FRCPleth-Pre 2.82 L    FRCPleth-%Pred-Pre 101 %    RVPleth-Pred 2.11 L    RVPleth-Pre 1.41 L    RVPleth-%Pred-Pre 66 %    TLCPleth-Pred 5.11 L    TLCPleth-Pre 4.60 L    TLCPleth-%Pred-Pre 89 %    DLCOunc-Pred 20.91 ml/min/mmHg    DLCOunc-Pre 18.69 ml/min/mmHg    DLCOunc-%Pred-Pre 89 %    VA-Pre 4.34 L    VA-%Pred-Pre 82 %    FEV1SVC-Pred 66 %    FEV1SVC-Pre 86 %       I reviewed pulmonary function test that was performed today.  This shows shows normal spirometry, lung volumes, and diffusion.    I reviewed results with the patient.      Assessment and plan:   Ms. Bhatia is a " 68-year-old with scleroderma interstitial lung disease who is here for follow-up.  1.  Scleroderma ILD.  She is doing well.  Lung function remains normal.  I have encouraged her to stay physically active as she is doing.  There is no need to start mycophenolate at this time, although that would be an option in the future if her ILD worsens.  I have encouraged her to get her annual flu vaccine as she is doing.  She will return in 6 months with repeat PFT.  If stable, then we can get annual PFTs.  2.  Lung nodules.  She has a history of 2 small lung nodules one in the left lower lobe and one in the right lower lobe that were 4 mm in 2017.  Per Fleischner Society guidelines, no follow-up follow-up is recommended because she is low risk for lung cancer.      Tiffanie Gomez MD

## 2018-10-05 NOTE — MR AVS SNAPSHOT
After Visit Summary   10/5/2018    Stephanie Bhatia    MRN: [id number]           Patient Information     Date Of Birth          1950        Visit Information        Provider Department      10/5/2018 8:00 AM Tiffanie Gomez MD [facility]        Today's Diagnoses     ILD (interstitial lung disease) (H)    -  1       Follow-ups after your visit        Follow-up notes from your care team     Return in about 6 months (around 4/5/2019).      Your next 10 appointments already scheduled     Apr 12, 2019  7:00 AM CDT   FULL PULMONARY FUNCTION with  PFL A   [facility] Pulmonary Function Testing ([facility])    [address]  3rd Floor  [facility] [id number] [phone]            Apr 12, 2019  8:00 AM CDT   (Arrive by 7:45 AM)   RETURN SCLERODERMA with Tiffanie Gomez MD   [facility] ([facility])    [address]  [address]  [facility] [id number] [phone]              Future tests that were ordered for you today     Open Future Orders        Priority Expected Expires Ordered    General PFT Lab (Please always keep checked) Routine 4/5/2019 10/5/2019 10/5/2018    Pulmonary Function Test Routine 4/5/2019 10/5/2019 10/5/2018            Who to contact     If you have questions or need follow up information about today's clinic visit or your schedule please contact [facility] directly at [phone].  Normal or non-critical lab and imaging results will be communicated to you by MyChart, letter or phone within 4 business days after the clinic has received the results. If you do not hear from us within 7 days, please contact the clinic through MyChart or phone. If you have a critical or abnormal lab result, we will notify you by phone as soon as possible.  Submit refill requests through [employer] or call your pharmacy and they will forward the  "refill request to us. Please allow 3 business days for your refill to be completed.          Additional Information About Your Visit        [employer] Information     [employer] gives you secure access to your electronic health record. If you see a primary care provider, you can also send messages to your care team and make appointments. If you have questions, please call your primary care clinic.  If you do not have a primary care provider, please call [phone] and they will assist you.        Care EveryWhere ID     This is your Care EveryWhere ID. This could be used by other organizations to access your [place] medical records  [id number]        Your Vitals Were     Pulse Respirations Height Pulse Oximetry BMI (Body Mass Index)       67 18 1.651 m (5' 5\") 100% 21.83 kg/m2        Blood Pressure from Last 3 Encounters:   10/05/18 105/70   08/06/18 114/66   03/08/18 97/62    Weight from Last 3 Encounters:   10/05/18 59.5 kg (131 lb 3.2 oz)   08/06/18 58.5 kg (129 lb)   03/08/18 59 kg (130 lb 1.6 oz)               Primary Care Provider Office Phone # Fax #    Felicity Brian Schmid MD PhD [phone]       [address] [place] [address]        Equal Access to Services     TROY SILVA AH: Hadii karime ku hadasho Soomaali, waaxda luqadaha, qaybta kaalmada adeegyada, rich kevinin haychetann albino abarca. So [facility] [phone].    ATENCIÓN: Si habla español, tiene a chacon disposición servicios gratuitos de asistencia lingüística. [facility] [phone].    We comply with applicable federal civil rights laws and [place] laws. We do not discriminate on the basis of race, color, national origin, age, disability, sex, sexual orientation, or gender identity.            Thank you!     Thank you for choosing [facility]  for your care. Our goal is always to provide you with excellent care. Hearing back from our patients is one way we can continue to improve our services. " Please take a few minutes to complete the written survey that you may receive in the mail after your visit with us. Thank you!             Your Updated Medication List - Protect others around you: Learn how to safely use, store and throw away your medicines at www.disposemymeds.org.          This list is accurate as of 10/5/18  8:18 AM.  Always use your most recent med list.                   Brand Name Dispense Instructions for use Diagnosis    calcium citrate-vitamin D 315-200 MG-UNIT Tabs per tablet    CITRACAL     Take 1 tablet by mouth daily        desonide 0.05 % ointment    DESOWEN    60 g    Apply topically 2 times daily To rash on the corners of the mouth mixed with nystatin as needed until clear.    Angular cheilitis       nystatin ointment    MYCOSTATIN    30 g    Apply topically 2 times daily To rash at the corners of the mouth mixed with desonide until clear.    Angular cheilitis       vitamin D 1000 units capsule      Take 1 capsule by mouth daily

## 2018-10-05 NOTE — NURSING NOTE
Chief Complaint   Patient presents with     Interstitial Lung Disease (ILD)     Follow up on Stephanie and her Scleroderma     Brandon Villagran CMA at 7:49 AM on 10/5/2018

## 2018-10-05 NOTE — PROGRESS NOTES
[facility] Interstitial Lung Disease Clinic    Reason for Visit  Stephanie Bhatia is a 68 year old year old female who is being seen for Interstitial Lung Disease (ILD) (Follow up on Stephanie and her Scleroderma)    HPI    Ms. Bhatia is a 68-year-old who is here for follow-up of scleroderma interstitial lung disease.  She was diagnosed with scleroderma ILD in 2017 by chest CT scan.  She also had 2 4 mm nodules that did not require follow-up in 2017 as she is low risk.  To date, she has not required treatment of her scleroderma ILD.  Today, she reports that she is doing well.  She walks 3-4 times per week for 45 minutes at a time.  She feels no shortness of breath when she walks on level ground.  There are no stairs in her house.  She also swims periodically.  She denies paroxysmal nocturnal dyspnea, cough, fevers, or syncope.  She has no digital fingertip ulcers.  She does endorse palpitations sometimes and shortness of breath when she feels anxious.  She has learned how to manage her anxiety, and this resolves her palpitations and shortness of breath.  She did receive the flu vaccine.  She continues to work part-time in sales.  She is planning a one-month trip to [place] next March.        Current Outpatient Prescriptions   Medication     calcium citrate-vitamin D (CITRACAL) 315-200 MG-UNIT TABS per tablet     Cholecalciferol (VITAMIN D) 1000 UNITS capsule     desonide (DESOWEN) 0.05 % ointment     nystatin (MYCOSTATIN) ointment     No current facility-administered medications for this visit.      No Known Allergies  Past Medical History:   Diagnosis Date     Anxiety      Herpes simplex without mention of complication      Interstitial lung disease (H)     scleroderma ILD, dx by chest CT 5/2017     Lung nodules     4 mm LLL and RLL 2017     Migraine     loss of speech and comprehension, has had w/u and is complex migraine, last one about 2/2017     Raynaud phenomenon      Systemic sclerosis with limited  "cutaneous involvement (H) 12/02/2014    Scl-70 and anti-centromere antibody neg, +RF       Past Surgical History:   Procedure Laterality Date     HYSTEROSCOPIC PLACEMENT CONTRACEPTIVE DEVICE         Social History     Social History     Marital status: Single     Spouse name:      Number of children: N/A     Years of education: N/A     Occupational History           [employer] - part time     Social History Main Topics     Smoking status: Former Smoker     Packs/day: 1.00     Years: 20.00     Types: Cigarettes     Quit date: 11/26/1986     Smokeless tobacco: Never Used     Alcohol use Yes      Comment: none since 2001     Drug use: No     Sexual activity: No     Other Topics Concern     Parent/Sibling W/ Cabg, Mi Or Angioplasty Before 65f 55m? No     Social History Narrative    .  No children.  Part time at [employer].  Possible asbestos exposure from basement pipes in childhood home.  Lived in [place] [address].  Denies exposure to pet birds, hot tubs.  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       Family History   Problem Relation Age of Onset     C.A.D. Father      a efw small heart attacks     Neurologic Disorder Father      dementia     Coronary Artery Disease Father      Neurologic Disorder Mother      dementia     Depression Mother      Cerebrovascular Disease Maternal Grandmother      ,     Other Cancer Paternal Grandmother      Rheumatoid Arthritis Sister      Hypertension Sister      Hypertension Sister      Depression Sister      Breast Cancer No family hx of      Cancer - colorectal No family hx of      Diabetes No family hx of      Cancer No family hx of      no skin cancer     Interstitial Lung Disease No family hx of      Melanoma No family hx of      Skin Cancer No family hx of              ROS Pulmonary  A complete ROS was otherwise negative except as noted in the HPI.    Vitals: /70  Pulse 67  Resp 18  Ht 1.651 m (5' 5\")  Wt " 59.5 kg (131 lb 3.2 oz)  SpO2 100%  BMI 21.83 kg/m2    Exam:   GENERAL APPEARANCE: Well developed, well nourished, alert, and in no apparent distress.  RESP: good air flow throughout.  Bibasilar inspiratory crackles. No rhonchi. No wheezes.  CV: Normal S1, S2, regular rhythm, normal rate. No murmur.  No LE edema.   MS: extremities normal. No clubbing. No cyanosis.  SKIN: no rash on limited exam.  NEURO: Mentation intact, speech normal, normal gait and stance.  PSYCH: mentation appears normal. and affect normal/bright.    Results:  Recent Results (from the past 168 hour(s))   General PFT Lab (Please always keep checked)    Collection Time: 10/05/18  6:44 AM   Result Value Ref Range    FVC-Pred 2.71 L    FVC-Pre 3.28 L    FVC-%Pred-Pre 120 %    FEV1-Pre 2.73 L    FEV1-%Pred-Pre 128 %    FEV1FVC-Pred 76 %    FEV1FVC-Pre 83 %    FEFMax-Pred 5.79 L/sec    FEFMax-Pre 7.12 L/sec    FEFMax-%Pred-Pre 122 %    FEF2575-Pred 1.81 L/sec    FEF2575-Pre 3.07 L/sec    PVW7900-%Pred-Pre 169 %    ExpTime-Pre 7.54 sec    FIFMax-Pre 4.41 L/sec    VC-Pred 3.19 L    VC-Pre 3.18 L    VC-%Pred-Pre 99 %    IC-Pred 2.30 L    IC-Pre 1.78 L    IC-%Pred-Pre 77 %    ERV-Pred 0.89 L    ERV-Pre 1.40 L    ERV-%Pred-Pre 157 %    FEV1FEV6-Pred 79 %    FEV1FEV6-Pre 83 %    FRCPleth-Pred 2.77 L    FRCPleth-Pre 2.82 L    FRCPleth-%Pred-Pre 101 %    RVPleth-Pred 2.11 L    RVPleth-Pre 1.41 L    RVPleth-%Pred-Pre 66 %    TLCPleth-Pred 5.11 L    TLCPleth-Pre 4.60 L    TLCPleth-%Pred-Pre 89 %    DLCOunc-Pred 20.91 ml/min/mmHg    DLCOunc-Pre 18.69 ml/min/mmHg    DLCOunc-%Pred-Pre 89 %    VA-Pre 4.34 L    VA-%Pred-Pre 82 %    FEV1SVC-Pred 66 %    FEV1SVC-Pre 86 %       I reviewed pulmonary function test that was performed today.  This shows shows normal spirometry, lung volumes, and diffusion.    I reviewed results with the patient.      Assessment and plan:   Ms. Bhatia is a 68-year-old with scleroderma interstitial lung disease who is here for  follow-up.  1.  Scleroderma ILD.  She is doing well.  Lung function remains normal.  I have encouraged her to stay physically active as she is doing.  There is no need to start mycophenolate at this time, although that would be an option in the future if her ILD worsens.  I have encouraged her to get her annual flu vaccine as she is doing.  She will return in 6 months with repeat PFT.  If stable, then we can get annual PFTs.  2.  Lung nodules.  She has a history of 2 small lung nodules one in the left lower lobe and one in the right lower lobe that were 4 mm in 2017.  Per Fleischner Society guidelines, no follow-up follow-up is recommended because she is low risk for lung cancer.

## 2018-10-16 LAB
DLCOUNC-%PRED-PRE: 89 %
DLCOUNC-PRE: 18.69 ML/MIN/MMHG
DLCOUNC-PRED: 20.91 ML/MIN/MMHG
ERV-%PRED-PRE: 157 %
ERV-PRE: 1.4 L
ERV-PRED: 0.89 L
EXPTIME-PRE: 7.54 SEC
FEF2575-%PRED-PRE: 169 %
FEF2575-PRE: 3.07 L/SEC
FEF2575-PRED: 1.81 L/SEC
FEFMAX-%PRED-PRE: 122 %
FEFMAX-PRE: 7.12 L/SEC
FEFMAX-PRED: 5.79 L/SEC
FEV1-%PRED-PRE: 128 %
FEV1-PRE: 2.73 L
FEV1FEV6-PRE: 83 %
FEV1FEV6-PRED: 79 %
FEV1FVC-PRE: 83 %
FEV1FVC-PRED: 76 %
FEV1SVC-PRE: 86 %
FEV1SVC-PRED: 66 %
FIFMAX-PRE: 4.41 L/SEC
FRCPLETH-%PRED-PRE: 101 %
FRCPLETH-PRE: 2.82 L
FRCPLETH-PRED: 2.77 L
FVC-%PRED-PRE: 120 %
FVC-PRE: 3.28 L
FVC-PRED: 2.71 L
IC-%PRED-PRE: 77 %
IC-PRE: 1.78 L
IC-PRED: 2.3 L
RVPLETH-%PRED-PRE: 66 %
RVPLETH-PRE: 1.41 L
RVPLETH-PRED: 2.11 L
TLCPLETH-%PRED-PRE: 89 %
TLCPLETH-PRE: 4.6 L
TLCPLETH-PRED: 5.11 L
VA-%PRED-PRE: 82 %
VA-PRE: 4.34 L
VC-%PRED-PRE: 99 %
VC-PRE: 3.18 L
VC-PRED: 3.19 L

## 2018-11-15 ENCOUNTER — OFFICE VISIT (OUTPATIENT)
Dept: INTERNAL MEDICINE | Facility: CLINIC | Age: 68
End: 2018-11-15
Payer: COMMERCIAL

## 2018-11-15 VITALS
DIASTOLIC BLOOD PRESSURE: 58 MMHG | WEIGHT: 128.2 LBS | SYSTOLIC BLOOD PRESSURE: 91 MMHG | BODY MASS INDEX: 21.33 KG/M2 | HEART RATE: 93 BPM

## 2018-11-15 DIAGNOSIS — Z71.84 COUNSELING FOR TRAVEL: Primary | ICD-10-CM

## 2018-11-15 RX ORDER — ATOVAQUONE AND PROGUANIL HYDROCHLORIDE 250; 100 MG/1; MG/1
1 TABLET, FILM COATED ORAL DAILY
Qty: 43 TABLET | Refills: 0 | Status: SHIPPED | OUTPATIENT
Start: 2018-11-15 | End: 2019-06-10

## 2018-11-15 RX ORDER — CIPROFLOXACIN 500 MG/1
500 TABLET, FILM COATED ORAL 2 TIMES DAILY
Qty: 10 TABLET | Refills: 0 | Status: SHIPPED | OUTPATIENT
Start: 2018-11-15 | End: 2018-11-15

## 2018-11-15 RX ORDER — CIPROFLOXACIN 500 MG/1
500 TABLET, FILM COATED ORAL 2 TIMES DAILY
Qty: 10 TABLET | Refills: 0 | Status: SHIPPED | OUTPATIENT
Start: 2018-11-15 | End: 2019-06-10

## 2018-11-15 ASSESSMENT — PAIN SCALES - GENERAL: PAINLEVEL: NO PAIN (0)

## 2018-11-15 NOTE — PROGRESS NOTES
[facility]  Primary Care Center   Milla Jiménez, APRN CNP  11/15/2018      Chief Complaint:   Travel Counseling     Travel:  Itinerary: (List all countries)  [place], outside [facility] (has been once before, in 2017)  Departure Date: 2/26/2019, Return Date: 3/29/2018  Reason for Travel (i.e. business, pleasure): Pleasure  Visiting an urban or rural area? Urban and rural  Accommodations (i.e. hotel, hostel, friends, family etc.): Friend  Women - First day of your last period: Postmenopausal    Other concerns discussed:  Stephanie is not sure if her pneumonia immunization is up to date.   Stephanie's flu and tetanus immunizations are up to date.     IMMUNIZATION HISTORY  Immunization History   Administered Date(s) Administered     Influenza (High Dose) 3 valent vaccine 09/27/2013, 11/26/2014, 10/12/2015, 09/01/2016, 08/27/2018     Influenza (IIV3) PF 09/28/2017     Pneumo Conj 13-V (2010&after) 11/26/2014     Pneumococcal 23 valent 10/21/2013     TDAP Vaccine (Adacel) 05/01/2012     Typhoid Oral 03/13/2017     Yellow Fever, unspecified 03/13/2017        Review of Systems:   Pertinent items are noted in HPI, remainder of complete ROS is negative.      Active Medications:   Current Outpatient Prescriptions:      atovaquone-proguanil (MALARONE) 250-100 MG per tablet, Take 1 tablet by mouth daily Start 2 days before travel and continue 7 days after return., Disp: 43 tablet, Rfl: 0     calcium citrate-vitamin D (CITRACAL) 315-200 MG-UNIT TABS per tablet, Take 1 tablet by mouth daily, Disp: , Rfl:      Cholecalciferol (VITAMIN D) 1000 UNITS capsule, Take 1 capsule by mouth daily, Disp: , Rfl:      ciprofloxacin (CIPRO) 500 MG tablet, Take 1 tablet (500 mg) by mouth 2 times daily Until diarrhea stops, Disp: 10 tablet, Rfl: 0     desonide (DESOWEN) 0.05 % ointment, Apply topically 2 times daily To rash on the corners of the mouth mixed with nystatin as needed until clear., Disp: 60 g, Rfl: 11     nystatin (MYCOSTATIN) ointment, Apply  "topically 2 times daily To rash at the corners of the mouth mixed with desonide until clear., Disp: 30 g, Rfl: 11     ARE YOU TAKING:  Steroids, prednisone, or anti-cancer drugs?  No  Antibiotics or sulfonamides?  No  Oral contraceptives?  No  Aspirin therapy? (children & adolescents)  No     Allergies:   Review of patient's allergies indicates no known allergies.   ARE YOU ALLERGIC TO:  Any medications? No  Any foods or other?  No     Past Medical History:  Anxiety  Migraine  Cluster headache syndrome  Systemic sclerosis with limited cutaneous involvement  Interstitial lung disease  Lung nodules  Herpes simplex without mention of complication  Osteoporosis  Lumbar radiculopathy  Raynaud phenomenon  Actinic keratosis    Past Surgical History:  The patient does not have any past pertinent surgical history.    Family History:   Mother: Dementia, depression  Father: Coronary artery disease (history of MI), dementia  Sister: Rheumatoid arthritis, hypertension, depression  Maternal grandmother: Cerebrovascular disease  Paternal grandmother: Other cancer      Social History:   Marital Status: Single  Presents to the clinic alone  Tobacco Use: Former cigarette smoker (1 pack/day for 20 years; quit 11/26/1986), never used smokeless tobacco  Alcohol Use: Yes, \"none since 2001\"  PCP: Felicity Schmid     Physical Exam:   BP 91/58  Pulse 93  Wt 58.2 kg (128 lb 3.2 oz)  BMI 21.33 kg/m2   Wt Readings from Last 1 Encounters:   11/15/18 58.2 kg (128 lb 3.2 oz)   General:  Appears in NAD.  Well nourished.     Assessment and Plan:  Counseling for travel  I have reviewed general recommendations for safe travel including: food/water precautions, insect avoidance, safe sex practices given high prevalence of HIV and other STDs, roadway safety. Educational materials and links to the CDC Traveler's health website have been provided.    Her immunizations are UTD.   Malaraia prophylaxis recommended: Malarone  Symptomatic treatment for " traveler's diarrhea: ciprofloxacin    - atovaquone-proguanil (MALARONE) 250-100 MG per tablet  Dispense: 43 tablet; Refill: 0  - ciprofloxacin (CIPRO) 500 MG tablet  Dispense: 10 tablet; Refill: 0     She plans to obtain SHingrix vaccine in the future.    Total time 25 minutes, greater than 50 percent in counseling and coordination of care.    Scribe Disclosure:   I, Kristal Chauhan, am serving as a scribe to document services personally performed by GRACIELA Hernández CNP at this visit, based upon the provider's statements to me. All documentation has been reviewed by the aforementioned provider prior to being entered into the official medical record.     Portions of this medical record were completed by a scribe. UPON MY REVIEW AND AUTHENTICATION BY ELECTRONIC SIGNATURE, this confirms (a) I performed the applicable clinical services, and (b) the record is accurate.   Milla OWENS, CNP

## 2018-11-15 NOTE — NURSING NOTE
Chief Complaint   Patient presents with     Travel Clinic     pt traveling [place] from 02/26/19 to 03/26/19 for a month       Yasmin Álvarez CMA at 3:59 PM on 11/15/2018.

## 2018-11-15 NOTE — PATIENT INSTRUCTIONS
[facility] Medication Refill Request Information:  * Please contact your pharmacy regarding ANY request for medication refills.  ** [facility] Prescription Fax = [phone]  * Please allow 3 business days for routine medication refills.  * Please allow 5 business days for controlled substance medication refills.     [facility] Test Result notification information:  *You will be notified with in 7-10 days of your appointment day regarding the results of your test.  If you are on MyChart you will be notified as soon as the provider has reviewed the results and signed off on them.    [facility]: [phone]

## 2018-11-15 NOTE — MR AVS SNAPSHOT
After Visit Summary   11/15/2018    Stephanie Bhatia    MRN: [id number]           Patient Information     Date Of Birth          1950        Visit Information        Provider Department      11/15/2018 4:00 PM Milla Jiménez APRN [facility]        Today's Diagnoses     Counseling for travel    -  1      Care Instructions    Primary Care Center Medication Refill Request Information:  * Please contact your pharmacy regarding ANY request for medication refills.  ** [facility] Prescription Fax = [phone]  * Please allow 3 business days for routine medication refills.  * Please allow 5 business days for controlled substance medication refills.     [facility] Test Result notification information:  *You will be notified with in 7-10 days of your appointment day regarding the results of your test.  If you are on MyChart you will be notified as soon as the provider has reviewed the results and signed off on them.    [facility]: [phone]             Follow-ups after your visit        Your next 10 appointments already scheduled     Apr 12, 2019  7:00 AM CDT   FULL PULMONARY FUNCTION with  PFL A   [facility] Pulmonary Function Testing ([facility])    [address] [place] [id number] [phone]            Apr 12, 2019  8:00 AM CDT   (Arrive by 7:45 AM)   RETURN SCLERODERMA with Tiffanie Gomez MD   [facility] ([facility])    [address] [place] [id number] [phone]              Who to contact     Please call your clinic at [phone] to:    Ask questions about your health    Make or cancel appointments    Discuss your medicines    Learn about your test results    Speak to your doctor            Additional Information About Your Visit        MyChart Information     MyChart gives you secure access to your electronic health  record. If you see a primary care provider, you can also send messages to your care team and make appointments. If you have questions, please call your primary care clinic.  If you do not have a primary care provider, please call [phone] and they will assist you.      [employer] is an electronic gateway that provides easy, online access to your medical records. With [employer], you can request a clinic appointment, read your test results, renew a prescription or communicate with your care team.     To access your existing account, please contact your [facility] or call [phone] for assistance.        Care EveryWhere ID     This is your Care EveryWhere ID. This could be used by other organizations to access your [place] medical records  [id number]        Your Vitals Were     Pulse BMI (Body Mass Index)                93 21.33 kg/m2           Blood Pressure from Last 3 Encounters:   11/15/18 91/58   10/05/18 105/70   08/06/18 114/66    Weight from Last 3 Encounters:   11/15/18 58.2 kg (128 lb 3.2 oz)   10/05/18 59.5 kg (131 lb 3.2 oz)   08/06/18 58.5 kg (129 lb)              Today, you had the following     No orders found for display         Today's Medication Changes          These changes are accurate as of 11/15/18  8:16 PM.  If you have any questions, ask your nurse or doctor.               Start taking these medicines.        Dose/Directions    atovaquone-proguanil 250-100 MG per tablet   Commonly known as:  MALARONE   Used for:  Counseling for travel   Started by:  Milla Jiménez APRN CNP        Dose:  1 tablet   Take 1 tablet by mouth daily Start 2 days before travel and continue 7 days after return.   Quantity:  43 tablet   Refills:  0       ciprofloxacin 500 MG tablet   Commonly known as:  CIPRO   Used for:  Counseling for travel   Started by:  Milla Jiménez APRN CNP        Dose:  500 mg   Take 1 tablet (500 mg) by mouth 2 times daily Until diarrhea stops    Quantity:  10 tablet   Refills:  0            Where to get your medicines      Some of these will need a paper prescription and others can be bought over the counter.  Ask your nurse if you have questions.     Bring a paper prescription for each of these medications     atovaquone-proguanil 250-100 MG per tablet    ciprofloxacin 500 MG tablet                Primary Care Provider Office Phone # Fax #    Felicity Schmid MD PhD [phone]       [address] [place] [address]  [facility] [address]        Equal Access to Services     LISA SILVA : Hadii aad ku hadasho Soomaali, waaxda luqadaha, qaybta kaalmada adeegyada, waxay idiin hayaan adeeg roldan lopez . So [facility] [phone].    ATENCIÓN: Si habla español, tiene a chacon disposición servicios gratuitos de asistencia lingüística. [facility] [phone].    We comply with applicable federal civil rights laws and [place] laws. We do not discriminate on the basis of race, color, national origin, age, disability, sex, sexual orientation, or gender identity.            Thank you!     Thank you for choosing [facility]  for your care. Our goal is always to provide you with excellent care. Hearing back from our patients is one way we can continue to improve our services. Please take a few minutes to complete the written survey that you may receive in the mail after your visit with us. Thank you!             Your Updated Medication List - Protect others around you: Learn how to safely use, store and throw away your medicines at www.disposemymeds.org.          This list is accurate as of 11/15/18  8:16 PM.  Always use your most recent med list.                   Brand Name Dispense Instructions for use Diagnosis    atovaquone-proguanil 250-100 MG per tablet    MALARONE    43 tablet    Take 1 tablet by mouth daily Start 2 days before travel and continue 7 days after return.    Counseling for travel       calcium citrate-vitamin D 315-200  MG-UNIT Tabs per tablet    CITRACAL     Take 1 tablet by mouth daily        ciprofloxacin 500 MG tablet    CIPRO    10 tablet    Take 1 tablet (500 mg) by mouth 2 times daily Until diarrhea stops    Counseling for travel       desonide 0.05 % ointment    DESOWEN    60 g    Apply topically 2 times daily To rash on the corners of the mouth mixed with nystatin as needed until clear.    Angular cheilitis       nystatin ointment    MYCOSTATIN    30 g    Apply topically 2 times daily To rash at the corners of the mouth mixed with desonide until clear.    Angular cheilitis       vitamin D 1000 units capsule      Take 1 capsule by mouth daily

## 2018-11-15 NOTE — PROGRESS NOTES
Pt traveling to [facility] from 02/26/19 to 03/26/19 total of 30 days Yasmin Álvarez CMA at 3:57 PM on 11/15/2018.

## 2019-03-11 ENCOUNTER — DOCUMENTATION ONLY (OUTPATIENT)
Dept: CARE COORDINATION | Facility: CLINIC | Age: 69
End: 2019-03-11

## 2019-04-12 ENCOUNTER — OFFICE VISIT (OUTPATIENT)
Dept: PULMONOLOGY | Facility: CLINIC | Age: 69
End: 2019-04-12
Attending: INTERNAL MEDICINE
Payer: MEDICARE

## 2019-04-12 VITALS
DIASTOLIC BLOOD PRESSURE: 61 MMHG | WEIGHT: 130.2 LBS | OXYGEN SATURATION: 97 % | HEART RATE: 75 BPM | HEIGHT: 65 IN | SYSTOLIC BLOOD PRESSURE: 96 MMHG | RESPIRATION RATE: 17 BRPM | BODY MASS INDEX: 21.69 KG/M2

## 2019-04-12 DIAGNOSIS — J84.9 ILD (INTERSTITIAL LUNG DISEASE) (H): ICD-10-CM

## 2019-04-12 DIAGNOSIS — J84.9 ILD (INTERSTITIAL LUNG DISEASE) (H): Primary | ICD-10-CM

## 2019-04-12 PROCEDURE — G0463 HOSPITAL OUTPT CLINIC VISIT: HCPCS | Mod: ZF

## 2019-04-12 ASSESSMENT — MIFFLIN-ST. JEOR: SCORE: 1111.46

## 2019-04-12 ASSESSMENT — PAIN SCALES - GENERAL: PAINLEVEL: NO PAIN (0)

## 2019-04-12 NOTE — PROGRESS NOTES
[facility] Inter[facility] Disease [facility]    Reason for Visit  Stephanie Bhatia is a 68 year old year old female who is being seen for RECHECK (Scleroderma)    HPI    Ms. Bhatia is a 68-year-old with scleroderma interstitial lung disease who is here for follow-up.  She was initially diagnosed with scleroderma in about 2009 or 2010.  Scleroderma ILD was first diagnosed in chest CT scan in 2/2017.  She also had a few nodules that were small, 4 mm and did not require follow-up as she was low risk.  She has not required treatment for scleroderma ILD to date.  Today, she reports she is doing well.  She went on a trip to [facility] for 1 month in March and had no problems.  She has not been exercising regularly but will restart when the weather improves.  She denies cough, fevers, new skin rashes, syncope, palpitations, or chest pain.  She has noticed no problems with her breathing.        Current Outpatient Medications   Medication     calcium citrate-vitamin D (CITRACAL) 315-200 MG-UNIT TABS per tablet     Cholecalciferol (VITAMIN D) 1000 UNITS capsule     desonide (DESOWEN) 0.05 % ointment     nystatin (MYCOSTATIN) ointment     atovaquone-proguanil (MALARONE) 250-100 MG per tablet     ciprofloxacin (CIPRO) 500 MG tablet     No current facility-administered medications for this visit.      No Known Allergies  Past Medical History:   Diagnosis Date     Anxiety      Herpes simplex without mention of complication      Interstitial lung disease (H)     scleroderma ILD, dx by chest CT 5/2017     Lung nodules     4 mm LLL and RLL 2017     Migraine     loss of speech and comprehension, has had w/u and is complex migraine, last one about 2/2017     Raynaud phenomenon      Systemic sclerosis with limited cutaneous involvement (H) 12/02/2014    Dx ~2010, Scl-70 and anti-centromere antibody neg, +RF       Past Surgical History:   Procedure Laterality Date     HYSTEROSCOPIC PLACEMENT CONTRACEPTIVE DEVICE         Social  History     Socioeconomic History     Marital status: Single     Spouse name:      Number of children: Not on file     Years of education: Not on file     Highest education level: Not on file   Occupational History     Occupation:      Comment: [employer] - part time   Social Needs     Financial resource strain: Not on file     Food insecurity:     Worry: Not on file     Inability: Not on file     Transportation needs:     Medical: Not on file     Non-medical: Not on file   Tobacco Use     Smoking status: Former Smoker     Packs/day: 1.00     Years: 20.00     Pack years: 20.00     Types: Cigarettes     Last attempt to quit: 1986     Years since quittin.3     Smokeless tobacco: Never Used   Substance and Sexual Activity     Alcohol use: Yes     Comment: none since      Drug use: No     Sexual activity: Never     Partners: Male   Lifestyle     Physical activity:     Days per week: Not on file     Minutes per session: Not on file     Stress: Not on file   Relationships     Social connections:     Talks on phone: Not on file     Gets together: Not on file     Attends Orthodoxy service: Not on file     Active member of club or organization: Not on file     Attends meetings of clubs or organizations: Not on file     Relationship status: Not on file     Intimate partner violence:     Fear of current or ex partner: Not on file     Emotionally abused: Not on file     Physically abused: Not on file     Forced sexual activity: Not on file   Other Topics Concern     Parent/sibling w/ CABG, MI or angioplasty before 65F 55M? No   Social History Narrative    .  No children.  Part time at [employer].  Possible asbestos exposure from basement pipes in childhood home.  Lived in [place] [address].  Denies exposure to pet birds, hot tubs.  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       Family History   Problem Relation Age of Onset     C.A.D. Father         a  "efw small heart attacks     Neurologic Disorder Father         dementia     Coronary Artery Disease Father      Neurologic Disorder Mother         dementia     Depression Mother      Cerebrovascular Disease Maternal Grandmother         ,     Other Cancer Paternal Grandmother      Rheumatoid Arthritis Sister      Hypertension Sister      Hypertension Sister      Depression Sister      Breast Cancer No family hx of      Cancer - colorectal No family hx of      Diabetes No family hx of      Cancer No family hx of         no skin cancer     Interstitial Lung Disease No family hx of      Melanoma No family hx of      Skin Cancer No family hx of              ROS Pulmonary  A complete ROS was otherwise negative except as noted in the HPI.    Vitals: BP 96/61   Pulse 75   Resp 17   Ht 1.651 m (5' 5\")   Wt 59.1 kg (130 lb 3.2 oz)   SpO2 97%   BMI 21.67 kg/m      Exam:   GENERAL APPEARANCE: Well developed, well nourished, alert, and in no apparent distress.  RESP: good air flow throughout.  Bibasilar inspiratory crackles. No rhonchi. No wheezes.  CV: Normal S1, S2, regular rhythm, normal rate. No murmur.  No LE edema.   MS: extremities normal. No clubbing. No cyanosis.  SKIN: no rash on limited exam.  NEURO: Mentation intact, speech normal, normal gait and stance.  PSYCH: mentation appears normal. and affect normal/bright.    Results:  Recent Results (from the past 168 hour(s))   General PFT Lab (Please always keep checked)    Collection Time: 04/12/19  6:58 AM   Result Value Ref Range    FVC-Pred 2.72 L    FVC-Pre 3.25 L    FVC-%Pred-Pre 119 %    FEV1-Pre 2.75 L    FEV1-%Pred-Pre 129 %    FEV1FVC-Pred 79 %    FEV1FVC-Pre 85 %    FEFMax-Pred 5.80 L/sec    FEFMax-Pre 7.56 L/sec    FEFMax-%Pred-Pre 130 %    FEF2575-Pred 1.81 L/sec    FEF2575-Pre 3.04 L/sec    ZYR4884-%Pred-Pre 167 %    ExpTime-Pre 5.98 sec    FIFMax-Pre 4.68 L/sec    VC-Pred 3.19 L    VC-Pre 3.10 L    VC-%Pred-Pre 97 %    IC-Pred 2.29 L    IC-Pre 1.89 L "    IC-%Pred-Pre 82 %    ERV-Pred 0.90 L    ERV-Pre 1.21 L    ERV-%Pred-Pre 135 %    FEV1FEV6-Pred 79 %    FEV1FEV6-Pre 85 %    FRCPleth-Pred 2.77 L    FRCPleth-Pre 3.04 L    FRCPleth-%Pred-Pre 109 %    RVPleth-Pred 2.11 L    RVPleth-Pre 1.83 L    RVPleth-%Pred-Pre 86 %    TLCPleth-Pred 5.11 L    TLCPleth-Pre 4.93 L    TLCPleth-%Pred-Pre 96 %    DLCOunc-Pred 20.13 ml/min/mmHg    DLCOunc-Pre 19.60 ml/min/mmHg    DLCOunc-%Pred-Pre 97 %    VA-Pre 4.31 L    VA-%Pred-Pre 88 %    FEV1SVC-Pred 67 %    FEV1SVC-Pre 89 %       I reviewed pulmonary function test that was performed today.  This shows normal spirometry, lung volumes, and diffusion.  Lung function has been stable for 2 years.    I reviewed results with the patient.      Assessment and plan:     Ms. Bhatia is a 68-year-old with scleroderma interstitial lung disease who is here for follow-up.    1.  Scleroderma ILD.  Lung function is normal and stable.  She has no pulmonary symptoms.  I have encouraged her to restart regular exercise and to get annual flu vaccines.  Since she is doing well, I will see her back in 1 year with repeat full PFT.  If she develops problems before then, then she should call to be seen earlier.  There is no need to start mycophenolate at this time, although that will be kept as an option in the future if her scleroderma ILD worsens.    2.  Lung nodules.  She has a history of 2 small nodules in the left lower lobe and one in the right lower lobe that were 4 mm in 2017.  No follow-up was required as she is low risk for lung cancer per Fleischner Johnny Society guidelines.

## 2019-04-12 NOTE — LETTER
4/12/2019       RE: Stephanie Bhatia  [address]  [facility] [address]     Dear Colleague,    Thank you for referring your patient, Stephanie Bhatia, to the [facility] at [facility]. Please see a copy of my visit note below.    [facility]    Reason for Visit  Stephanie Bhatia is a 68 year old year old female who is being seen for RECHECK (Scleroderma)    HPI    Ms. Bhatia is a 68-year-old with scleroderma interstitial lung disease who is here for follow-up.  She was initially diagnosed with scleroderma in about 2009 or 2010.  Scleroderma ILD was first diagnosed in chest CT scan in 2/2017.  She also had a few nodules that were small, 4 mm and did not require follow-up as she was low risk.  She has not required treatment for scleroderma ILD to date.  Today, she reports she is doing well.  She went on a trip to [facility] for 1 month in March and had no problems.  She has not been exercising regularly but will restart when the weather improves.  She denies cough, fevers, new skin rashes, syncope, palpitations, or chest pain.  She has noticed no problems with her breathing.        Current Outpatient Medications   Medication     calcium citrate-vitamin D (CITRACAL) 315-200 MG-UNIT TABS per tablet     Cholecalciferol (VITAMIN D) 1000 UNITS capsule     desonide (DESOWEN) 0.05 % ointment     nystatin (MYCOSTATIN) ointment     atovaquone-proguanil (MALARONE) 250-100 MG per tablet     ciprofloxacin (CIPRO) 500 MG tablet     No current facility-administered medications for this visit.      No Known Allergies  Past Medical History:   Diagnosis Date     Anxiety      Herpes simplex without mention of complication      Interstitial lung disease (H)     scleroderma ILD, dx by chest CT 5/2017     Lung nodules     4 mm LLL and RLL 2017     Migraine     loss of speech and comprehension, has had w/u and is complex migraine,  last one about 2017     Raynaud phenomenon      Systemic sclerosis with limited cutaneous involvement (H) 2014    Dx ~2010, Scl-70 and anti-centromere antibody neg, +RF       Past Surgical History:   Procedure Laterality Date     HYSTEROSCOPIC PLACEMENT CONTRACEPTIVE DEVICE         Social History     Socioeconomic History     Marital status: Single     Spouse name:      Number of children: Not on file     Years of education: Not on file     Highest education level: Not on file   Occupational History     Occupation:      Comment: [employer] - part time   Social Needs     Financial resource strain: Not on file     Food insecurity:     Worry: Not on file     Inability: Not on file     Transportation needs:     Medical: Not on file     Non-medical: Not on file   Tobacco Use     Smoking status: Former Smoker     Packs/day: 1.00     Years: 20.00     Pack years: 20.00     Types: Cigarettes     Last attempt to quit: 1986     Years since quittin.3     Smokeless tobacco: Never Used   Substance and Sexual Activity     Alcohol use: Yes     Comment: none since      Drug use: No     Sexual activity: Never     Partners: Male   Lifestyle     Physical activity:     Days per week: Not on file     Minutes per session: Not on file     Stress: Not on file   Relationships     Social connections:     Talks on phone: Not on file     Gets together: Not on file     Attends Latter day service: Not on file     Active member of club or organization: Not on file     Attends meetings of clubs or organizations: Not on file     Relationship status: Not on file     Intimate partner violence:     Fear of current or ex partner: Not on file     Emotionally abused: Not on file     Physically abused: Not on file     Forced sexual activity: Not on file   Other Topics Concern     Parent/sibling w/ CABG, MI or angioplasty before 65F 55M? No   Social History Narrative    .  No children.  Part time at  "[employer].  Possible asbestos exposure from basement pipes in childhood home.  Lived in [place] [address].  Denies exposure to pet birds, hot tubs.  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       Family History   Problem Relation Age of Onset     C.A.D. Father         a efw small heart attacks     Neurologic Disorder Father         dementia     Coronary Artery Disease Father      Neurologic Disorder Mother         dementia     Depression Mother      Cerebrovascular Disease Maternal Grandmother         ,     Other Cancer Paternal Grandmother      Rheumatoid Arthritis Sister      Hypertension Sister      Hypertension Sister      Depression Sister      Breast Cancer No family hx of      Cancer - colorectal No family hx of      Diabetes No family hx of      Cancer No family hx of         no skin cancer     Interstitial Lung Disease No family hx of      Melanoma No family hx of      Skin Cancer No family hx of              ROS Pulmonary  A complete ROS was otherwise negative except as noted in the HPI.    Vitals: BP 96/61   Pulse 75   Resp 17   Ht 1.651 m (5' 5\")   Wt 59.1 kg (130 lb 3.2 oz)   SpO2 97%   BMI 21.67 kg/m       Exam:   GENERAL APPEARANCE: Well developed, well nourished, alert, and in no apparent distress.  RESP: good air flow throughout.  Bibasilar inspiratory crackles. No rhonchi. No wheezes.  CV: Normal S1, S2, regular rhythm, normal rate. No murmur.  No LE edema.   MS: extremities normal. No clubbing. No cyanosis.  SKIN: no rash on limited exam.  NEURO: Mentation intact, speech normal, normal gait and stance.  PSYCH: mentation appears normal. and affect normal/bright.    Results:  Recent Results (from the past 168 hour(s))   General PFT Lab (Please always keep checked)    Collection Time: 04/12/19  6:58 AM   Result Value Ref Range    FVC-Pred 2.72 L    FVC-Pre 3.25 L    FVC-%Pred-Pre 119 %    FEV1-Pre 2.75 L    FEV1-%Pred-Pre 129 %    FEV1FVC-Pred 79 %    FEV1FVC-Pre 85 % "    FEFMax-Pred 5.80 L/sec    FEFMax-Pre 7.56 L/sec    FEFMax-%Pred-Pre 130 %    FEF2575-Pred 1.81 L/sec    FEF2575-Pre 3.04 L/sec    BLX4770-%Pred-Pre 167 %    ExpTime-Pre 5.98 sec    FIFMax-Pre 4.68 L/sec    VC-Pred 3.19 L    VC-Pre 3.10 L    VC-%Pred-Pre 97 %    IC-Pred 2.29 L    IC-Pre 1.89 L    IC-%Pred-Pre 82 %    ERV-Pred 0.90 L    ERV-Pre 1.21 L    ERV-%Pred-Pre 135 %    FEV1FEV6-Pred 79 %    FEV1FEV6-Pre 85 %    FRCPleth-Pred 2.77 L    FRCPleth-Pre 3.04 L    FRCPleth-%Pred-Pre 109 %    RVPleth-Pred 2.11 L    RVPleth-Pre 1.83 L    RVPleth-%Pred-Pre 86 %    TLCPleth-Pred 5.11 L    TLCPleth-Pre 4.93 L    TLCPleth-%Pred-Pre 96 %    DLCOunc-Pred 20.13 ml/min/mmHg    DLCOunc-Pre 19.60 ml/min/mmHg    DLCOunc-%Pred-Pre 97 %    VA-Pre 4.31 L    VA-%Pred-Pre 88 %    FEV1SVC-Pred 67 %    FEV1SVC-Pre 89 %       I reviewed pulmonary function test that was performed today.  This shows normal spirometry, lung volumes, and diffusion.  Lung function has been stable for 2 years.    I reviewed results with the patient.      Assessment and plan:     Ms. Bhatia is a 68-year-old with scleroderma interstitial lung disease who is here for follow-up.    1.  Scleroderma ILD.  Lung function is normal and stable.  She has no pulmonary symptoms.  I have encouraged her to restart regular exercise and to get annual flu vaccines.  Since she is doing well, I will see her back in 1 year with repeat full PFT.  If she develops problems before then, then she should call to be seen earlier.  There is no need to start mycophenolate at this time, although that will be kept as an option in the future if her scleroderma ILD worsens.    2.  Lung nodules.  She has a history of 2 small nodules in the left lower lobe and one in the right lower lobe that were 4 mm in 2017.  No follow-up was required as she is low risk for lung cancer per Fleischner Johnny Society guidelines.            Again, thank you for allowing me to participate in the care of your  patient.      Sincerely,    Tiffanie Gomez MD

## 2019-04-13 LAB
DLCOUNC-%PRED-PRE: 97 %
DLCOUNC-PRE: 19.6 ML/MIN/MMHG
DLCOUNC-PRED: 20.13 ML/MIN/MMHG
ERV-%PRED-PRE: 135 %
ERV-PRE: 1.21 L
ERV-PRED: 0.9 L
EXPTIME-PRE: 5.98 SEC
FEF2575-%PRED-PRE: 167 %
FEF2575-PRE: 3.04 L/SEC
FEF2575-PRED: 1.81 L/SEC
FEFMAX-%PRED-PRE: 130 %
FEFMAX-PRE: 7.56 L/SEC
FEFMAX-PRED: 5.8 L/SEC
FEV1-%PRED-PRE: 129 %
FEV1-PRE: 2.75 L
FEV1FEV6-PRE: 85 %
FEV1FEV6-PRED: 79 %
FEV1FVC-PRE: 85 %
FEV1FVC-PRED: 79 %
FEV1SVC-PRE: 89 %
FEV1SVC-PRED: 67 %
FIFMAX-PRE: 4.68 L/SEC
FRCPLETH-%PRED-PRE: 109 %
FRCPLETH-PRE: 3.04 L
FRCPLETH-PRED: 2.77 L
FVC-%PRED-PRE: 119 %
FVC-PRE: 3.25 L
FVC-PRED: 2.72 L
IC-%PRED-PRE: 82 %
IC-PRE: 1.89 L
IC-PRED: 2.29 L
RVPLETH-%PRED-PRE: 86 %
RVPLETH-PRE: 1.83 L
RVPLETH-PRED: 2.11 L
TLCPLETH-%PRED-PRE: 96 %
TLCPLETH-PRE: 4.93 L
TLCPLETH-PRED: 5.11 L
VA-%PRED-PRE: 88 %
VA-PRE: 4.31 L
VC-%PRED-PRE: 97 %
VC-PRE: 3.1 L
VC-PRED: 3.19 L

## 2019-06-05 ASSESSMENT — ENCOUNTER SYMPTOMS
NECK MASS: 0
SORE THROAT: 0
NIGHT SWEATS: 0
NECK PAIN: 0
SEIZURES: 0
NUMBNESS: 0
POLYDIPSIA: 0
DECREASED APPETITE: 0
TROUBLE SWALLOWING: 0
DIZZINESS: 1
MUSCLE WEAKNESS: 1
HALLUCINATIONS: 0
TINGLING: 0
DEPRESSION: 1
MEMORY LOSS: 0
JOINT SWELLING: 0
LOSS OF CONSCIOUSNESS: 0
CHILLS: 0
PARALYSIS: 0
ALTERED TEMPERATURE REGULATION: 0
TASTE DISTURBANCE: 0
HOARSE VOICE: 0
SPEECH CHANGE: 0
NERVOUS/ANXIOUS: 1
STIFFNESS: 1
BACK PAIN: 1
PANIC: 0
FATIGUE: 1
SINUS CONGESTION: 1
FEVER: 0
POLYPHAGIA: 1
DISTURBANCES IN COORDINATION: 1
WEAKNESS: 1
ARTHRALGIAS: 1
HEADACHES: 1
WEIGHT GAIN: 1
SINUS PAIN: 1
MYALGIAS: 1
INCREASED ENERGY: 0
WEIGHT LOSS: 0
TREMORS: 0
SMELL DISTURBANCE: 1
INSOMNIA: 1
MUSCLE CRAMPS: 0
DECREASED CONCENTRATION: 1

## 2019-06-05 ASSESSMENT — ACTIVITIES OF DAILY LIVING (ADL)
IN_THE_PAST_7_DAYS,_DID_YOU_NEED_HELP_FROM_OTHERS_TO_TAKE_CARE_OF_THINGS_SUCH_AS_LAUNDRY_AND_HOUSEKEEPING,_BANKING,_SHOPPING,_USING_THE_TELEPHONE,_FOOD_PREPARATION,_TRANSPORTATION,_OR_TAKING_YOUR_OWN_MEDICATIONS?: N
IN_THE_PAST_7_DAYS,_DID_YOU_NEED_HELP_FROM_OTHERS_TO_PERFORM_EVERYDAY_ACTIVITIES_SUCH_AS_EATING,_GETTING_DRESSED,_GROOMING,_BATHING,_WALKING,_OR_USING_THE_TOILET: N

## 2019-06-10 ENCOUNTER — OFFICE VISIT (OUTPATIENT)
Dept: FAMILY MEDICINE | Facility: CLINIC | Age: 69
End: 2019-06-10
Payer: MEDICARE

## 2019-06-10 VITALS
HEART RATE: 64 BPM | HEIGHT: 66 IN | TEMPERATURE: 98.5 F | DIASTOLIC BLOOD PRESSURE: 66 MMHG | WEIGHT: 133.1 LBS | BODY MASS INDEX: 21.39 KG/M2 | SYSTOLIC BLOOD PRESSURE: 103 MMHG | OXYGEN SATURATION: 100 %

## 2019-06-10 DIAGNOSIS — M81.0 SENILE OSTEOPOROSIS: ICD-10-CM

## 2019-06-10 DIAGNOSIS — K13.0 ANGULAR CHEILITIS: ICD-10-CM

## 2019-06-10 DIAGNOSIS — M05.742 RHEUMATOID ARTHRITIS INVOLVING BOTH HANDS WITH POSITIVE RHEUMATOID FACTOR (H): ICD-10-CM

## 2019-06-10 DIAGNOSIS — M05.741 RHEUMATOID ARTHRITIS INVOLVING BOTH HANDS WITH POSITIVE RHEUMATOID FACTOR (H): ICD-10-CM

## 2019-06-10 DIAGNOSIS — J84.9 ILD (INTERSTITIAL LUNG DISEASE) (H): ICD-10-CM

## 2019-06-10 DIAGNOSIS — Z12.11 SPECIAL SCREENING FOR MALIGNANT NEOPLASMS, COLON: ICD-10-CM

## 2019-06-10 DIAGNOSIS — Z76.89 ENCOUNTER TO ESTABLISH CARE WITH NEW DOCTOR: Primary | ICD-10-CM

## 2019-06-10 RX ORDER — NYSTATIN 100000 U/G
OINTMENT TOPICAL 2 TIMES DAILY
Qty: 30 G | Refills: 11 | Status: SHIPPED | OUTPATIENT
Start: 2019-06-10 | End: 2021-11-22

## 2019-06-10 RX ORDER — DESONIDE 0.5 MG/G
OINTMENT TOPICAL 2 TIMES DAILY
Qty: 15 G | Refills: 1 | Status: SHIPPED | OUTPATIENT
Start: 2019-06-10 | End: 2019-06-24

## 2019-06-10 ASSESSMENT — MIFFLIN-ST. JEOR: SCORE: 1136.52

## 2019-06-10 ASSESSMENT — PAIN SCALES - GENERAL: PAINLEVEL: NO PAIN (0)

## 2019-06-10 NOTE — PROGRESS NOTES
[facility]  Primary Care Center   Rangel Garcia MD  06/10/2019      Chief Complaint:   1. Dementia/ memory concerns verses anxiety  2. Diet   3. Exercise   4. Nasal odor      History of Present Illness:   Stephanie Bhatia is a 68 year old female with a history of systematic sclerosis, Artur's phenomenon, lung nodules, rheumatoid arthritis, osteoporosis and lumbar radiculopathy who presents for an establish care visit.    Dementia  She has a paternal family history of dementia. Her father first experienced dementia symptoms in his 70's. Her paternal uncle first experienced symptoms in his 60's. Her paternal cousins all had mild symptoms of dementia. She started to experience occasional episodes of difficulty concentrating while at work and previously associated with complex migraines which have resolved. She is wondering if this is anxiety or depression and wioth her family history worries it may be early dementia. She notes one of the triggers is interacting with her supervisor at work which causes an anxiety feeling. She has been less active which may be a trigger. See below.    Diet and Exercise  She has not been as active since her dog passed away 2 years ago. She misses her dog indicating it is difficult to walk without her dog. She stopped going on walks 2 times a day. She stopped tap dancing and swimming two years ago. She recently started walking again because she wanted to be in better shape for this visit. In regard to her diet, she is unsure what she should be eating for a women her age. She eats greens. She occasionally eats red meats. States she is tired frequently but this ay be related to less physical activity.    Osteoporosis  Last DEXA bone scan was on 2/24/2017 that was consistent with osteoporosis. Denies any current calcium supplementation.   She was told to start medication therapy but has concerns about SE as a friend had complications in her jaw related to therapy.  Odor  Reports a  "consistent \"yeasty\" odor over the past several years through nose breathing. She is unsure what is causing her to smell this odor. The odor worsened while on her trip in [place].     Other concerns discussed:    Scleroderma has been well controlled.     Rheumatoid arthritis is primary located in bilateral hands.      Review of Systems:   Pertinent items are noted in HPI, remainder of complete ROS is negative.      Answers for HPI/ROS submitted by the patient on 6/5/2019   General Symptoms: Yes  Skin Symptoms: No  HENT Symptoms: Yes  EYE SYMPTOMS: No  HEART SYMPTOMS: No  LUNG SYMPTOMS: No  INTESTINAL SYMPTOMS: No  URINARY SYMPTOMS: No  GYNECOLOGIC SYMPTOMS: No  BREAST SYMPTOMS: No  SKELETAL SYMPTOMS: Yes  BLOOD SYMPTOMS: No  NERVOUS SYSTEM SYMPTOMS: Yes  MENTAL HEALTH SYMPTOMS: Yes  Fever: No  Loss of appetite: No  Weight loss: No  Weight gain: Yes  Fatigue: Yes  Night sweats: No  Chills: No  Increased stress: No  Excessive hunger: Yes  Excessive thirst: No  Feeling hot or cold when others believe the temperature is normal: No  Loss of height: No  Post-operative complications: No  Surgical site pain: No  Hallucinations: No  Change in or Loss of Energy: No  Hyperactivity: No  Confusion: Yes  Ear pain: No  Ear discharge: No  Hearing loss: No  Tinnitus: No  Nosebleeds: No  Congestion: Yes  Sinus pain: Yes  Trouble swallowing: No   Voice hoarseness: No  Mouth sores: No  Sore throat: No  Tooth pain: No  Gum tenderness: No  Bleeding gums: No  Change in taste: No  Change in sense of smell: Yes  Dry mouth: No  Hearing aid used: No  Neck lump: No  Back pain: Yes  Muscle aches: Yes  Neck pain: No  Swollen joints: No  Joint pain: Yes  Bone pain: No  Muscle cramps: No  Muscle weakness: Yes  Joint stiffness: Yes  Bone fracture: No  Trouble with coordination: Yes  Dizziness or trouble with balance: Yes  Fainting or black-out spells: No  Memory loss: No  Headache: Yes  Seizures: No  Speech problems: No  Tingling: No  Tremor: " "No  Weakness: Yes  Difficulty walking: Yes  Paralysis: No  Numbness: No  Nervous or Anxious: Yes  Depression: Yes  Trouble sleeping: Yes  Trouble thinking or concentrating: Yes  Mood changes: Yes  Panic attacks: No      Active Medications:   Current Outpatient Medications:      Cholecalciferol (VITAMIN D) 1000 UNITS capsule, Take 1 capsule by mouth daily, Disp: , Rfl:      nystatin (MYCOSTATIN) ointment, Apply topically 2 times daily To rash at the corners of the mouth mixed with desonide until clear., Disp: 30 g, Rfl: 11      Allergies:   Review of patient's allergies indicates no known allergies.   ARE YOU ALLERGIC TO:  Any medications? No  Any foods or other?  No     Past Medical History:  Anxiety  Migraine  Cluster headache syndrome  Systemic sclerosis with limited cutaneous involvement  Interstitial lung disease  Lung nodules  Herpes simplex without mention of complication  Osteoporosis  Lumbar radiculopathy  Raynaud phenomenon  Actinic keratosis  Rheumatoid arthritis      Past Surgical History:  The patient does not have any past pertinent surgical history.     Family History:   Mother: Dementia, depression  Father: Coronary artery disease (history of MI), dementia  Sister: Rheumatoid arthritis, hypertension, depression  Maternal grandmother: Cerebrovascular disease  Paternal grandmother: Other cancer  Paternal Uncle - dementia onset in 60's.      Social History:   Marital Status: Single  Presents to the clinic alone  Tobacco Use: Former cigarette smoker (1 pack/day for 20 years; quit 11/26/1986), never used smokeless tobacco  Alcohol Use: Yes, \"none since 2001\"  PCP: Rangel Garcia MD requests change from Dr Schmid.  She traveled to [facility] last year. She lives in [place] to be closer to her only sister for family support. No children and previously .  Social History     Social History Narrative    .  No children. Sister lives in [place].  Part time at [employer].   in the past    " "Possible asbestos exposure from basement pipes in childhood home.  Lived in  [facility] [facility] [phone].  Denies exposure to pet birds, hot tubs.  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        Physical Exam:   /66   Pulse 64   Temp 98.5  F (36.9  C) (Oral)   Ht 1.67 m (5' 5.75\")   Wt 60.4 kg (133 lb 1.6 oz)   SpO2 100%   BMI 21.65 kg/m       GENERAL APPEARANCE: healthy, alert and no distress  EYES: Eyes grossly normal to inspection, PERRL and conjunctivae and sclerae normal, wears glasses  HENT: ear canals and TM's normal, nose dry but no lesions and mouth without ulcers or lesions, oropharynx clear and oral mucous membranes moist, right cheek has collection of blood vessels that goes along connective tissue  NECK: no adenopathy, no asymmetry, masses, or scars and thyroid normal to palpation  RESP: lungs clear to auscultation - no rales, rhonchi or wheezes  CV: regular rate and rhythm, normal S1 S2, no S3 or S4, no murmur, click or rub, no peripheral edema and peripheral pulses strong  MS: gait is age appropriate without ataxia, rheumatoid arthritis changes proximal interphalangeal joints  SKIN: no suspicious lesions or rashes  NEURO: Normal strength and tone, mentation intact and speech normal, Mini mental status exam intact, remembered three items  PSYCH: mentation appears normal and affect normal/bright  PHQ-2: 0      Assessment and Plan:  Stephanie Bhatia is a 68 year old female with a history of systematic sclerosis, Artur's phenomenon, lung nodules, osteoporosis and lumbar radiculopathy who presents for an establish care visit. I discussed Medicare wellness visit schedule as an option at a later date.    Encounter to establish care with new doctor  I reviewed my practice. Card provided.   Related to her concerns of memory, discussed options of optimize nutrition/ physical activity,  OT evaluation functional capacity or neuropsychiatric testing verses a combination. She " selected nutrition and exercise which is preferable as her mental status exam was normal. PHQ2 was zero today indication situational anxiety therefore we discussed strategy to deal with stressful work environment.   Senile osteoporosis  Ordered a DEXA scan to evaluate status of osteoporosis. Refered to a nutritionist for dietary recommendations for osteoporosis and discuss higher protein in diet for muscle mass. Recommended she ingest at least 1000 mg of calcium daily. Encouraged 30 minutes daily of walking/dancing.  - DEXA HIP/PELVIS/SPINE - Future  - NUTRITION REFERRAL    Rheumatoid arthritis involving both hands with positive rheumatoid factor (H)  Stable.     Special screening for malignant neoplasms, colon  Routine health maintenance.   - Fecal cancer screen FIT  She declined further mammogram  ILD (interstitial lung disease) (H)  Stable.     Angular cheilitis  - desonide (DESOWEN) 0.05 % external ointment  Dispense: 15 g; Refill: 1  - nystatin (MYCOSTATIN) 096536 UNIT/GM external ointment  Dispense: 30 g; Refill: 11   nasal odor: recommended saline nasal spray.    Advanced directive provided   Encouraged SHingrix vaccine.  Follow-up: Return in about 2 months (around 8/10/2019) for medicare wellness.      Scribe Disclosure:  I, Serge Gallardo, am serving as a scribe to document services personally performed by Rangel Garcia MD at this visit, based upon the provider's statements to me. All documentation has been reviewed by the aforementioned provider prior to being entered into the official medical record.     Portions of this medical record were completed by a scribe with many edits UPON MY REVIEW AND AUTHENTICATION BY ELECTRONIC SIGNATURE, this confirms (a) I performed the applicable clinical services, and (b) the record is accurate.   Rangel Garcia MD

## 2019-06-10 NOTE — PATIENT INSTRUCTIONS
Prior to receiving the vaccine, we recommend that you call your insurance carrier and ask them the following questions:    1) Does my insurance cover the Shingrix vaccine and administration of the vaccine?  2) What is my co-pay or deductible for the vaccine?  3) Is there a cost difference if I receive the vaccine at my doctors office or a pharmacy?    The clinic cannot determine your insurance benefits. Please call your insurance provider prior to scheduling an appointment to receive the Shingrix vaccine. If you decide the receive your vaccine at the Primary Care Center, please call [phone] and request a nurse only appointment for the Shingrix vaccine. The vaccine comes in 2 doses. Your second dose should be 2-6 months from your first Shingrix injection.       Nurse visit hours are available Monday, Wednesday and Friday from 9-11:00 am and 1-3:00 pm.    Recommended ocean mist nasal spray for nasal dryness.     [facility] [phone] ([facility] [place] [address]th [facility] [address])     Imaging [phone] [facility] [place]  [phone] [facility]  [phone] [facility]      DEXA Screening Tool    Dexa Scan  Is the patient taking any calcium supplements? yes, if yes patient must stop calcium supplements 24 hours prior to appointment.    Nutrition [phone] (Multiple locations)

## 2019-06-10 NOTE — Clinical Note
Many edits. Please write a narrative update in the social HX section when I discuss with patientsPlease reviewMore detail required on discussion occurring during visit, see my assessment and planThank you for your work.Best wishes,Rangel Garcia MD

## 2019-06-13 ENCOUNTER — HOSPITAL ENCOUNTER (OUTPATIENT)
Facility: CLINIC | Age: 69
Setting detail: SPECIMEN
Discharge: HOME | End: 2019-06-13
Admitting: FAMILY MEDICINE
Payer: MEDICARE

## 2019-06-13 PROCEDURE — 82274 ASSAY TEST FOR BLOOD FECAL: CPT | Performed by: FAMILY MEDICINE

## 2019-06-14 ENCOUNTER — ANCILLARY PROCEDURE (OUTPATIENT)
Dept: BONE DENSITY | Facility: CLINIC | Age: 69
End: 2019-06-14
Attending: FAMILY MEDICINE
Payer: MEDICARE

## 2019-06-14 DIAGNOSIS — M81.0 SENILE OSTEOPOROSIS: ICD-10-CM

## 2019-06-17 LAB — HEMOCCULT STL QL IA: NEGATIVE

## 2019-06-18 ENCOUNTER — MYC REFILL (OUTPATIENT)
Dept: FAMILY MEDICINE | Facility: CLINIC | Age: 69
End: 2019-06-18

## 2019-06-18 DIAGNOSIS — K13.0 ANGULAR CHEILITIS: ICD-10-CM

## 2019-06-20 RX ORDER — DESONIDE 0.5 MG/G
OINTMENT TOPICAL 2 TIMES DAILY
Qty: 15 G | Refills: 1 | OUTPATIENT
Start: 2019-06-20

## 2019-06-20 RX ORDER — NYSTATIN 100000 U/G
OINTMENT TOPICAL 2 TIMES DAILY
Qty: 30 G | Refills: 11 | OUTPATIENT
Start: 2019-06-20

## 2019-06-24 DIAGNOSIS — K13.0 ANGULAR CHEILITIS: ICD-10-CM

## 2019-06-24 RX ORDER — DESONIDE 0.5 MG/G
OINTMENT TOPICAL 2 TIMES DAILY
Qty: 15 G | Refills: 1 | Status: SHIPPED | OUTPATIENT
Start: 2019-06-24 | End: 2021-11-22

## 2019-08-12 ENCOUNTER — OFFICE VISIT (OUTPATIENT)
Dept: FAMILY MEDICINE | Facility: CLINIC | Age: 69
End: 2019-08-12
Payer: MEDICARE

## 2019-08-12 VITALS
BODY MASS INDEX: 24.31 KG/M2 | HEIGHT: 62 IN | SYSTOLIC BLOOD PRESSURE: 98 MMHG | HEART RATE: 73 BPM | TEMPERATURE: 97.6 F | OXYGEN SATURATION: 99 % | WEIGHT: 132.1 LBS | DIASTOLIC BLOOD PRESSURE: 65 MMHG

## 2019-08-12 DIAGNOSIS — J84.9 ILD (INTERSTITIAL LUNG DISEASE) (H): ICD-10-CM

## 2019-08-12 DIAGNOSIS — Z71.89 ADVANCED DIRECTIVES, COUNSELING/DISCUSSION: ICD-10-CM

## 2019-08-12 DIAGNOSIS — M05.742 RHEUMATOID ARTHRITIS INVOLVING BOTH HANDS WITH POSITIVE RHEUMATOID FACTOR (H): ICD-10-CM

## 2019-08-12 DIAGNOSIS — H54.7 VISUAL IMPAIRMENT: ICD-10-CM

## 2019-08-12 DIAGNOSIS — M81.0 AGE RELATED OSTEOPOROSIS, UNSPECIFIED PATHOLOGICAL FRACTURE PRESENCE: ICD-10-CM

## 2019-08-12 DIAGNOSIS — M05.741 RHEUMATOID ARTHRITIS INVOLVING BOTH HANDS WITH POSITIVE RHEUMATOID FACTOR (H): ICD-10-CM

## 2019-08-12 DIAGNOSIS — Z00.00 ENCOUNTER FOR MEDICARE ANNUAL WELLNESS EXAM: Primary | ICD-10-CM

## 2019-08-12 ASSESSMENT — ANXIETY QUESTIONNAIRES
5. BEING SO RESTLESS THAT IT IS HARD TO SIT STILL: NOT AT ALL
2. NOT BEING ABLE TO STOP OR CONTROL WORRYING: SEVERAL DAYS
6. BECOMING EASILY ANNOYED OR IRRITABLE: NOT AT ALL
1. FEELING NERVOUS, ANXIOUS, OR ON EDGE: SEVERAL DAYS
GAD7 TOTAL SCORE: 4
3. WORRYING TOO MUCH ABOUT DIFFERENT THINGS: SEVERAL DAYS
7. FEELING AFRAID AS IF SOMETHING AWFUL MIGHT HAPPEN: NOT AT ALL
IF YOU CHECKED OFF ANY PROBLEMS ON THIS QUESTIONNAIRE, HOW DIFFICULT HAVE THESE PROBLEMS MADE IT FOR YOU TO DO YOUR WORK, TAKE CARE OF THINGS AT HOME, OR GET ALONG WITH OTHER PEOPLE: SOMEWHAT DIFFICULT

## 2019-08-12 ASSESSMENT — PATIENT HEALTH QUESTIONNAIRE - PHQ9
SUM OF ALL RESPONSES TO PHQ QUESTIONS 1-9: 10
5. POOR APPETITE OR OVEREATING: SEVERAL DAYS

## 2019-08-12 ASSESSMENT — MIFFLIN-ST. JEOR: SCORE: 1076.42

## 2019-08-12 ASSESSMENT — PAIN SCALES - GENERAL: PAINLEVEL: NO PAIN (0)

## 2019-08-12 NOTE — PROGRESS NOTES
EYE EXAM    With Glasses   Right Eye 20/25   Left Eye 20/40   Both Eyes 20/25     Without Glasses   Right Eye 20/200   Left Eye 20/200   Both Eyes 20/200     REX Pretty at 8:03 AM on 8/12/2019.

## 2019-08-12 NOTE — NURSING NOTE
Chief Complaint   Patient presents with     Medicare Visit       Doris Warren LPN at 6:59 AM on August 12, 2019

## 2019-08-12 NOTE — PROGRESS NOTES
"[facility]  Primary Care Center   Established Patient Encounter   Rangel Garcia MD  08/12/2019     Medicare Annual Wellness Visit     History of Present Illness:   Stephanie Bhatia is a 68 year old female who presents for evaluation of loss of energy, and anxiety/depression.    Reduced Energy Level  She has concern for her Calcium intake and would like some direction on her general diet. She has noticed a dip in activity and energy throughout the day due to work. These symptoms have caused a lack of motivation to cook and improve her diet per Stephanie. Exercise is present in her daily regimen. She attends classes at her local [employer] and goes for walks with her dogs. 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 Additionally, she feels like her vertigo symptoms have worsen.     Anxiety  Her anxiety and depression is primarily caused by her current job. She has discussed moving in to her siblings house to help reduce daily stressors including taking care of her dog and household chores. She begun conversations with her employer to reduce her work load to only half day shifts. This continuum of worrying comes from believing she could be reaching a point of being unable to take care of herself. However, Stephanie believes she is not at this point yet. Takes appropriate breaks during work to improve mood and reduce symptoms. She is open to exploring new hobbies including volunteer opportunities through [employer] and [employer]. She is not involved in a [employer].    History of Alcohol Abuse/Nicotine  Stephanie notes she has a past history of nicotine and alcohol addiction. She has not had alcohol since 2001. To improve sobriety, she attended brief AA meetings which she found helpful.    Family History of Dementia  Given patient's strong family history of dementia, Stephanie is looking into \"mind sharpening\" activities to reduce potential progression.    Immunizations  Setphanie has " had the first half of her shingles vaccine on 07/10/2019 and is currently waiting on the second shot that can be administered in September. She has recently returned from vacation to [place] to visit a long time friend.    Review of Systems:   Constitutional:  Fevers, night sweats or unintentional weight change ?  NO      Eyes:  Vision change, diplopia or red eyes?  NO      Ears, Nose, Mouth, Throat:  Tinnitus or hearing change, epistaxis or nasal discharge, oral lesions, throat pain ?  NO      Neck:  Stiffness?  NO           Cardiovascular:   Chest pain, palpitations, or pain with walking, orthopnea or PND?  NO   Breasts:  Any bumps or unusual discharge?     NO         Respiratory:  Dyspnea, cough, shortness of breath or wheezing?  NO         GI:  Nausea, vomiting, diarrhea or constipation, abdominal pain ?  NO         :  Change in urine,  dysuria or hematuria, sexual dysfunction ?  NO        Musculoskeletal:   Joint or muscle pain or swelling?  NO            Skin:  Concerning lesions or moles?  NO           Nervous System:  Loss of strength or sensation, numbness or tingling, tremor,  dizziness,  headache?  NO   Endocrine/Hormone:  Polyuria or polydipsia, temperature intolerance?  NO            Blood and Lymphnodes:  Concerning bumps, bleeding problems?  NO            Allergy:  Environmental allergies?  NO            Mental Health: Depression or anxiety, sleep problems?  YES, Anxiety              Medical Care      Have you been to an ER or a hospital in the last year? No  What other specialists or organizations are involved in your medical care?  Dr. Gomez, for interstitial lung disease  Current providers sharing in care for this patient include:  Patient Care Team:  Patient Care Team       Relationship Specialty Notifications Start End    Rangel Garcia MD PCP - General Family Practice  6/10/19     Phone: [phone] Fax: [phone]         [address] [address]    [place]  Prachi Clayton MD MD Family Medicine - Sports Medicine  2/24/15     Phone: [phone] Fax: [phone]         [address] [facility] [address]    Doris Robertson MD MD Dermatology  7/19/16     Phone: [phone] Fax: [phone] [address] [place] [address]    Petros Gunn MD MD Rheumatology  5/25/17     Phone: [phone] Fax: [phone] [address] [address]    Dorene Lieberman [facility] Pharmacist Pharmacist  8/31/18     Phone: [phone] Fax: [phone] [address] [facility] [address] [facility] [address]           Active Medications:      calcium citrate-vitamin D (CITRACAL) 315-200 MG-UNIT TABS per tablet, Take 1 tablet by mouth daily, Disp: , Rfl:      Cholecalciferol (VITAMIN D) 1000 UNITS capsule, Take 1 capsule by mouth daily, Disp: , Rfl:      desonide (DESOWEN) 0.05 % external ointment, Apply topically 2 times daily To rash on the corners of the mouth mixed with nystatin as needed until clear., Disp: 15 g, Rfl: 1     nystatin (MYCOSTATIN) 370798 UNIT/GM external ointment, Apply topically 2 times daily To rash at the corners of the mouth mixed with desonide until clear., Disp: 30 g, Rfl: 11      Allergies:   Patient has no known allergies.      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  Smoking Status: Former Smoker, 1.00 PPD for 20 years, cigarettes, Quit: 11/26/1986   Smokeless Tobacco: never used   Alcohol Use: yes, none since 2001  Marital Status: Single, No current sexual partner  Employment status: Employed   Home: Lived in [place] for 10 years, previously lived in [place], Currently lives in a house alone    Marital Status: Single  Who lives in your household? alone  Does your home have any of the following safety concerns? Loose rugs in the hallway, no grab bars in the bathroom, no handrails on the stairs or have poorly lit areas?  No, Stephanie is aware she must be careful especially moving over rugs  Do you feel threatened or controlled by a partner, ex-partner or anyone in your life? No  Has anyone hurt you physically, for example by pushing, hitting, slapping or kicking you   or forcing you to have sex? No  Do you need help with the phone, transportation, shopping, preparing meals, housework, laundry, medications or managing money? No   Have you noticed any hearing difficulties? No      Risk Behaviors and Healthy Habits      How many servings of fruits and vegetables do you eat a day? 2-3  How often do you exercise and what do you do? 20-30 minutes several days a week recently walking dog  Do you frequently ride without a seatbelt? No  Do you use tobacco?  No  Do you use any other drugs? No                                                                                                             Do you use alcohol? No, not since 2001.      Today's PHQ-2 Score:       Sexual Health      Are you sexually active?  No   Have you had any sexually transmitted infections? No   Any sexual concerns? No      FUNCTIONAL ABILITY/SAFETY SCREENING      Was the patient's timed Up & Go test (Get up from chair walk, 10 feet turn, return to chair and sit down) unsteady or longer than 30  "seconds? No     Hearing evaluation if done: Not completed     EVALUATION OF COGNITIVE FUNCTION      Mood/affect: Anxious  Appearance:Normal  Family member/caregiver input: NA  Mini Cog Scoring   3 points , Daughter, mountain, sunrise  Clock Draw Test result:  Normal     SCREENING FOR PREVENTION and EARLY DETECTION      ECG (if done) not performed     Corrected Visual acuity: See nurses note    Saw Eye Doctor last year    Breast CA Screenin-2 years 50-74years:  Date done 3/28/17   DEXA 19     CV Risk based on Pooled Cohort Risk:  The 10-year ASCVD risk score (Gianni LAMAR Jr., et al., 2013) is: 5.8%    Values used to calculate the score:      Age: 68 years      Sex: Female      Is Non- : No      Diabetic: No      Tobacco smoker: No      Systolic Blood Pressure: 98 mmHg      Is BP treated: No      HDL Cholesterol: 51 mg/dL      Total Cholesterol: 202 mg/dL      Advanced Directives: Discussed and patient desires to be full code. Sister as life care decision maker. Last Advanced Directive was completed in .     Immunization status: up to date and documented.  Immunization History   Administered Date(s) Administered     Influenza (High Dose) 3 valent vaccine 2013, 2014, 10/12/2015, 2016, 2018     Influenza (IIV3) PF 2017     Pneumo Conj 13-V (2010&after) 2014     Pneumococcal 23 valent 10/21/2013     TDAP Vaccine (Adacel) 2012     Typhoid Oral 2017     Yellow Fever, unspecified 2017     First Shingle Vaccination completed 7/10/2019, due for second booster in September     Reviewed Immunization Record Today  Pneumoccocal Vaccine: No   Varicella Vaccine: No  TDaP:No    Physical Exam:   BP 98/65   Pulse 73   Temp 97.6  F (36.4  C) (Oral)   Ht 1.581 m (5' 2.25\")   Wt 59.9 kg (132 lb 1.6 oz)   SpO2 99%   BMI 23.97 kg/m     BMI= Body mass index is 23.97 kg/m .    GENERAL APPEARANCE: healthy, alert and no distress, well groomed, has a " liste with her  EYES: Eyes grossly normal to inspection, PERRL and conjunctivae and sclerae normal  HENT: ear canals and TM's normal, nose and mouth without ulcers or lesions, oropharynx clear and oral mucous membranes moist  NECK: no adenopathy, no asymmetry, masses, or scars and thyroid normal to palpation  RESP: lungs clear to auscultation - no rales, rhonchi or wheezes  CV: regular rate and rhythm, normal S1 S2, no S3 or S4, no murmur, click or rub, no peripheral edema and peripheral pulses strong  MS: no musculoskeletal defects are noted and gait is age appropriate without ataxia  SKIN: no suspicious lesions or rashes  NEURO: Normal strength and tone, mentation intact and speech normal, interactive, able to remember 3 words, clock draw accurate  PSYCH: mentation appears normal and affect normal/bright     PHQ-9 SCORE 2/12/2018 2/20/2018 8/12/2019   PHQ-9 Total Score 12 7 10     NATALI-7 SCORE 7/23/2018 8/12/2019   Total Score 6 (mild anxiety) -   Total Score 6 4       Assessment and Plan:  Medicare Wellness    (Z00.00) Encounter for Medicare annual wellness exam  (primary encounter diagnosis)  Comment: Reinforced proper nutritional standards. Reviewed meal time balance between carbs, proteins, calcium, sodium, and potassium. Provided link to [employer] website for Stephanie to explore volunteer opportunities and other lifestyle choices which I believe could strongly improve her anxiety symptoms.  Plan: Suggested increasing Calcium intake by drinking Orange Juice with a Blue label and yoghurt. Recommended she reduce carbohydrates to improve improve intermittent sluggish affect. Suggested increasing daily sodium intake to reduce lightheadedness symptoms. Stephanie will explore [employer] for appropriate diet and cooking recommendations. Mentioned she could try a cooking class to improve motivation. If frozen dinners are easier, suggested [employer]. Highly recommended 30 minutes of physical activity daily  plus light weight lifting. Suggested she look into retreats such as [employer] for more resources.    (H54.7) Visual impairment  Comment: Based on visual exam performed during visit, suggested ophthalmology referral  Plan: OPHTHALMOLOGY ADULT REFERRAL        Follow up with ophthalmology.    (J84.9) ILD (interstitial lung disease) (H)  Comment: Follows with pulmonology. Presented with no concerning issues today.  Plan: Continue following with pulmonology.    (M81.0) Age related osteoporosis, unspecified pathological fracture presence  Comment: Continues to follow with rheumatology and Dr. Grant in Sports medicine.  Plan: Continue seeing sports medicine and rheumatology.    (M05.741,  M05.742) Rheumatoid arthritis involving both hands with positive rheumatoid factor (H)  Comment: Follows with Dr. Gunn for her rheumatoid arthritis  Plan: Continue following with rheumatology    (Z71.89) Advanced directives, counseling/discussion  Comment: Reviewed current Advanced directives. Her most recent completed forms were from 2001.  Plan: Suggested Stephanie update her advanced directives to review at our next visit.      initial   Medicare Wellness Exam  1. Health Care Maintenance: Normal Physical Exam    PLAN:  1. Reviewed Preventive Services form with patient and preventive service plan is as below Routine follow up in one year.   Stephanie was seen today for medicare visit.    Diagnoses and all orders for this visit:    Encounter for Medicare annual wellness exam    Visual impairment  -     OPHTHALMOLOGY ADULT REFERRAL; Future    ILD (interstitial lung disease) (H)    Age related osteoporosis, unspecified pathological fracture presence    Rheumatoid arthritis involving both hands with positive rheumatoid factor (H)    Advanced directives, counseling/discussion    Other orders  -     Full Code     Follow-up: Return to clinic in about one year.        Scribe Disclosure:  I, Serge Ramirez, am serving as a scribe to document  services personally performed by Rangel Garcia MD at this visit, based upon the provider's statements to me. All documentation has been reviewed by the aforementioned provider prior to being entered into the official medical record.     Portions of this medical record were completed by a scribe. UPON MY REVIEW AND AUTHENTICATION BY ELECTRONIC SIGNATURE, this confirms (a) I performed the applicable clinical services, and (b) the record is accurate.  Rangel Garcia MD

## 2019-08-12 NOTE — PATIENT INSTRUCTIONS
[facility] Medication Refill Request Information:  * Please contact your pharmacy regarding ANY request for medication refills.  ** [facility] Prescription Fax = [phone]  * Please allow 3 business days for routine medication refills.  * Please allow 5 business days for controlled substance medication refills.     [facility] Test Result notification information:  *You will be notified with in 7-10 days of your appointment day regarding the results of your test.  If you are on MyChart you will be notified as soon as the provider has reviewed the results and signed off on them.    [facility]: [phone]       Dear patient:    Your healthcare provider has recommended that you receive the new shingles vaccine called Shingrix to prevent shingles for patients ages 50 and above.   Many private insurance and Medicare part D plans cover Shingrix. However, not all insurance carriers cover the entire cost of the Shingrix vaccine if the vaccine is administered at a primary clinic. The clinic cannot determine your insurance benefits. Please call your insurance carrier prior to getting the vaccine.     Currently there is a nationwide shortage of the Shingrix vaccine. Because the second dose needs to be given 2-6 months from the first dose, the CDC DOES NOT RECOMMEND your 1st Shingrix vaccine until the vaccine is readily back in stock next year (2020).     Prior to receiving the vaccine, we recommend that you call your insurance carrier and ask them the following questions:     * Does my insurance cover the Shingrix vaccine and administration of the vaccine?   * What is my co-pay or deductible for the vaccine?   * Is there a cost difference if I receive the vaccine at my doctors office or at a pharmacy?     If you decide to receive your vaccine at the Primary Care Clinic please call [phone] and request a nurse only appointment for the Shingrix vaccine.     This vaccine comes in two doses. The second  dose should be 2-6 months from your first Shingrix dose.         [email or web address]  Connect with senior services

## 2019-08-13 ASSESSMENT — ANXIETY QUESTIONNAIRES: GAD7 TOTAL SCORE: 4

## 2019-09-03 ENCOUNTER — OFFICE VISIT (OUTPATIENT)
Dept: OPHTHALMOLOGY | Facility: CLINIC | Age: 69
End: 2019-09-03
Attending: OPHTHALMOLOGY
Payer: MEDICARE

## 2019-09-03 DIAGNOSIS — H52.7 REFRACTIVE ERROR: ICD-10-CM

## 2019-09-03 DIAGNOSIS — H26.8 PSEUDOEXFOLIATION OF LENS CAPSULE: Primary | ICD-10-CM

## 2019-09-03 DIAGNOSIS — Z13.5 SCREENING FOR OTHER EYE CONDITIONS: ICD-10-CM

## 2019-09-03 DIAGNOSIS — R26.89 IMBALANCE: ICD-10-CM

## 2019-09-03 PROCEDURE — 92015 DETERMINE REFRACTIVE STATE: CPT | Mod: GY,ZF

## 2019-09-03 PROCEDURE — G0463 HOSPITAL OUTPT CLINIC VISIT: HCPCS | Mod: ZF

## 2019-09-03 ASSESSMENT — TONOMETRY
IOP_METHOD: TONOPEN
OS_IOP_MMHG: 21
OD_IOP_MMHG: 20

## 2019-09-03 ASSESSMENT — VISUAL ACUITY
OS_CC+: +1
OD_CC+: +1
OD_CC: 20/20
CORRECTION_TYPE: GLASSES
OS_CC: 20/20
METHOD: SNELLEN - LINEAR

## 2019-09-03 ASSESSMENT — CONF VISUAL FIELD
OD_NORMAL: 1
METHOD: COUNTING FINGERS
OS_NORMAL: 1

## 2019-09-03 ASSESSMENT — REFRACTION_WEARINGRX
OS_ADD: +3.00
OD_AXIS: 006
OD_CYLINDER: +0.75
OS_AXIS: 016
SPECS_TYPE: TRIFOCAL
OD_SPHERE: +1.75
OS_SPHERE: +1.75
OD_ADD: +3.00
OS_CYLINDER: +0.75

## 2019-09-03 ASSESSMENT — REFRACTION_MANIFEST
OS_SPHERE: +1.75
OS_CYLINDER: +0.75
OS_AXIS: 010
OD_CYLINDER: +0.75
OD_ADD: +2.75
OD_AXIS: 008
OS_ADD: +2.75
OD_SPHERE: +1.75

## 2019-09-03 ASSESSMENT — EXTERNAL EXAM - LEFT EYE: OS_EXAM: NORMAL

## 2019-09-03 ASSESSMENT — SLIT LAMP EXAM - LIDS
COMMENTS: MGD
COMMENTS: MGD

## 2019-09-03 ASSESSMENT — CUP TO DISC RATIO
OD_RATIO: 0.2
OS_RATIO: 0.2

## 2019-09-03 ASSESSMENT — EXTERNAL EXAM - RIGHT EYE: OD_EXAM: NORMAL

## 2019-09-03 NOTE — LETTER
"9/3/2019       RE: Stephanie Bhatia  [address]  [facility] [address]     Dear Colleague,    Thank you for referring your patient, Stephanie Bhatia, to the EYE CLINIC at [facility]. Please see a copy of my visit note below.    Chief Complaint(s) and History of Present Illness(es)     New Patient               Comments     Pt here for yearly eye exam. Pt states that she is dizzy all of the time,   worse this year. Pt states that dizziness is not as bad when not wearing   glasses.  No double vision.    JORDANA Castro  September 3, 2019 7:42 AM                Review of systems for the eyes was negative other than the pertinent positives/negatives listed in the HPI.      Assessment & Plan      Stephanie Bhatia is a 69 year old female with the following diagnoses:   1. Pseudoexfoliation of lens capsule    2. Imbalance    3. Screening for other eye conditions    4. Refractive error         New patient referred by Dr. Garcia for imbalance and difficulty with glasses.  She started to note symptoms over the past year, which is about when her new glasses were made.  She denies \"spinning symptoms,\" but rather feels imbalance/off-kilter.  This is most notable when she is going up/down stairs and getting up from a chair.  She thinks this might be worse with glasses, but doesn't happen all the time when she is wearing them.     The glasses prescription is up to date and the glasses fit well.  There are no ocular movement abnormalities on exam today.  Dilated fundus exam not contributory for imbalance symptoms.  She does note systemic hypotension; for which I suggested exploring the possibility of orthostatis as a potential cause.  She could also consider neurology consult for further work-up +/- balance table testing.  She may consider going to a single distance lens for ambulation.    Incidental pseudoexfoliation noted in the left eye.  Discussed and recommend annual monitoring.  " No evidence of glaucoma at this time.      Patient disposition:   Return in about 1 year (around 9/3/2020) for DFE.           Attending Physician Attestation:  Complete documentation of historical and exam elements from today's encounter can be found in the full encounter summary report (not reduplicated in this progress note).  I personally obtained the chief complaint(s) and history of present illness.  I confirmed and edited as necessary the review of systems, past medical/surgical history, family history, social history, and examination findings as documented by others; and I examined the patient myself.  I personally reviewed the relevant tests, images, and reports as documented above.  I formulated and edited as necessary the assessment and plan and discussed the findings and management plan with the patient and family. . - Serge Parham MD       Again, thank you for allowing me to participate in the care of your patient.      Sincerely,    Serge Parham MD

## 2019-09-03 NOTE — PROGRESS NOTES
"Chief Complaint(s) and History of Present Illness(es)     New Patient               Comments     Pt here for yearly eye exam. Pt states that she is dizzy all of the time,   worse this year. Pt states that dizziness is not as bad when not wearing   glasses.  No double vision.    JORDANA Castro  September 3, 2019 7:42 AM                Review of systems for the eyes was negative other than the pertinent positives/negatives listed in the HPI.      Assessment & Plan      Stephanie Bhatia is a 69 year old female with the following diagnoses:   1. Pseudoexfoliation of lens capsule    2. Imbalance    3. Screening for other eye conditions    4. Refractive error         New patient referred by Dr. Garcia for imbalance and difficulty with glasses.  She started to note symptoms over the past year, which is about when her new glasses were made.  She denies \"spinning symptoms,\" but rather feels imbalance/off-kilter.  This is most notable when she is going up/down stairs and getting up from a chair.  She thinks this might be worse with glasses, but doesn't happen all the time when she is wearing them.     The glasses prescription is up to date and the glasses fit well.  There are no ocular movement abnormalities on exam today.  Dilated fundus exam not contributory for imbalance symptoms.  She does note systemic hypotension; for which I suggested exploring the possibility of orthostatis as a potential cause.  She could also consider neurology consult for further work-up +/- balance table testing.  She may consider going to a single distance lens for ambulation.    Incidental pseudoexfoliation noted in the left eye.  Discussed and recommend annual monitoring.  No evidence of glaucoma at this time.      Patient disposition:   Return in about 1 year (around 9/3/2020) for DFE.           Attending Physician Attestation:  Complete documentation of historical and exam elements from today's encounter can be found in the full " encounter summary report (not reduplicated in this progress note).  I personally obtained the chief complaint(s) and history of present illness.  I confirmed and edited as necessary the review of systems, past medical/surgical history, family history, social history, and examination findings as documented by others; and I examined the patient myself.  I personally reviewed the relevant tests, images, and reports as documented above.  I formulated and edited as necessary the assessment and plan and discussed the findings and management plan with the patient and family. . - Serge Parham MD

## 2019-09-03 NOTE — NURSING NOTE
Chief Complaints and History of Present Illnesses   Patient presents with     New Patient     Chief Complaint(s) and History of Present Illness(es)     New Patient               Comments     Pt here for yearly eye exam. Pt states that she is dizzy all of the time, worse this year. Pt states that dizziness is not as bad when not wearing glasses.  No double vision.    JORDANA Castro  September 3, 2019 7:42 AM

## 2019-09-03 NOTE — PATIENT INSTRUCTIONS
Pseudoexfoliation: Recommend annual eye exam for intraocular pressure measurement and glaucoma screening

## 2019-09-12 ENCOUNTER — ANCILLARY PROCEDURE (OUTPATIENT)
Dept: GENERAL RADIOLOGY | Facility: CLINIC | Age: 69
End: 2019-09-12
Attending: FAMILY MEDICINE
Payer: MEDICARE

## 2019-09-12 ENCOUNTER — OFFICE VISIT (OUTPATIENT)
Dept: ORTHOPEDICS | Facility: CLINIC | Age: 69
End: 2019-09-12
Payer: MEDICARE

## 2019-09-12 VITALS
HEART RATE: 73 BPM | DIASTOLIC BLOOD PRESSURE: 65 MMHG | WEIGHT: 132 LBS | HEIGHT: 62 IN | SYSTOLIC BLOOD PRESSURE: 98 MMHG | BODY MASS INDEX: 24.29 KG/M2

## 2019-09-12 DIAGNOSIS — M54.16 LUMBAR RADICULOPATHY: Primary | ICD-10-CM

## 2019-09-12 DIAGNOSIS — M54.16 LUMBAR RADICULOPATHY: ICD-10-CM

## 2019-09-12 ASSESSMENT — MIFFLIN-ST. JEOR: SCORE: 1070.97

## 2019-09-12 NOTE — PROGRESS NOTES
"      SPORTS & ORTHOPEDIC WALK-IN VISIT 9/12/2019    Primary Care Physician: Dr. Garcia    Left low back pain for 5 days. Some radiation down posterior left leg to heel. Has had intermittent back pain for 15y, with known disc disease throughout lumbar spine including disc protrusion at L4/5 on mri 14 yrs ago.   For current flare, has been using ibuprofen with little relief. Worse with gardening, sitting, forward flexion. However has been better so far today.  H/o osteoporosis.     Reason for visit:     What part of your body is injured / painful?  left low back    What caused the injury /pain? No inciting event     How long ago did your injury occur or pain begin? a week ago    What are your most bothersome symptoms? Pain and Tingling    How would you characterize your symptom?  aching and dull    What makes your symptoms better? Rest and ibuprofen    What makes your symptoms worse? Sitting    Have you been previously seen for this problem? No    Medical History:    Any recent changes to your medical history? No    Any new medication prescribed since last visit? No    Have you had surgery on this body part before? No    Social History:    Occupation: retired    Handedness: Right    Exercise: walk    Review of Systems:    Do you have fever, chills, weight loss? No    Do you have any vision problems? No    Do you have any chest pain or edema? No    Do you have any shortness of breath or wheezing?  No    Do you have stomach problems? No    Do you have any numbness or focal weakness? No    Do you have diabetes? No    Do you have problems with bleeding or clotting? No    Do you have an rashes or other skin lesions? No         Past Medical History, Current Medications, and Allergies are reviewed in the electronic medical record as appropriate.       EXAM:BP 98/65   Pulse 73   Ht 1.581 m (5' 2.25\")   Wt 59.9 kg (132 lb)   BMI 23.95 kg/m      General: alert, pleasant, no distress. sitting comfortably in " chair  Back/Spine: no tenderness to palpation of spinous processes, or paraspinous musculature of lumbar spine. full ROM with flexion, extension, twisting, and side bending without pain. Straight leg raise negative bilaterally. No significant hamstring tightness noted. no pain in back with THERESA testing bilaterally  Neuro: SILT on bilateral lower extremities, can stand on toes and heel walk without difficulty, patellar and achilles reflexes 2+ and symmetric      Imaging: xrays of lumbar spine performed and reviewed independently demonstrating no acute fx or listhesis. disc height narrowing and facet arthropathy in lower lumbar spine. See EMR for formal radiology report.     Assessment: Patient is a 69 year old female with acute on chronic low back pain concerning for a left S1 radiculopathy.  However symptoms seem to be improving over the last couple of days and managed with ibuprofen at this time.    Recommendations:   Reviewed imaging and assessment with the patient in detail  Given that her symptoms are tolerable and well managed with ibuprofen we will continue ibuprofen for 1 to 2 weeks.  Also discussed use of prednisone but we will defer for the time being.  If her symptoms worsen or fail to improve could consider steroid burst.  Discussed tizanidine though the patient declines at the current time  Given home exercises and encouraged follow-up with physical therapy      Prem Archer MD

## 2019-09-23 ENCOUNTER — THERAPY VISIT (OUTPATIENT)
Dept: PHYSICAL THERAPY | Facility: CLINIC | Age: 69
End: 2019-09-23
Attending: FAMILY MEDICINE
Payer: MEDICARE

## 2019-09-23 DIAGNOSIS — M54.16 LUMBAR RADICULOPATHY: ICD-10-CM

## 2019-09-23 PROCEDURE — 97110 THERAPEUTIC EXERCISES: CPT | Mod: GP | Performed by: PHYSICAL THERAPIST

## 2019-09-23 PROCEDURE — 97161 PT EVAL LOW COMPLEX 20 MIN: CPT | Mod: GP | Performed by: PHYSICAL THERAPIST

## 2019-09-23 NOTE — LETTER
DEPARTMENT OF HEALTH AND HUMAN SERVICES  [facility]    PLAN/UPDATED PLAN OF PROGRESS FOR OUTPATIENT REHABILITATION    PATIENTS NAME:  Stephanie Bhatia   : 1950  PROVIDER NUMBER:    [id number]  [id number]   PROVIDER NAME: [facility]YSICAL [facility]ERAP[facility]  MEDICAL RECORD NUMBER: [id number]   START OF CARE DATE:  SOC Date: 19   TYPE:  PT  PRIMARY/TREATMENT DIAGNOSIS: (Pertinent Medical Diagnosis)  Lumbar radiculopathy  VISITS FROM START OF CARE:  Rxs Used: 1     Physical Therapy Initial Evaluation  2019     MD Instructions/Precautions/Restrictions: PT eval and treat.     Therapist Impression:   Stephanie Bhatia presents with findings consistent with lumbar radiculopathy, with related impairments limiting her ability to sit, drive, and return to lifting/gardening. Skilled PT services are necessary in order to reduce impairments and improve independent function.     Subjective:   Date of Onset: 19  C/C:  left glute and back of thigh to calf  Quality of pain is sore, N/T. Pains are described as constant in nature. Pain is worse: as day goes on. Pain is rated 2/10.   History of symptoms: Pain began suddenly as the result of repetitive lifting. Since onset, symptoms are improving.  Worsened by: sitting limited to 10 minutes, laying in bed on back  Alleviated by: [place], sitting   General health as reported by patient: good  Pertinent medical/surgical history: no previous surgeries to lumbar; Refer to health history in EMR. Imaging: x-ray. Current occupational status: sales associates - part time. Patient's goals are: decrease pain, strengthen, sitting. Return to MD:  PRN.     Objective:  LUMBAR:    ALIGNMENT:  flat lumbar spine    Neurological:    Motor Deficit:  Myotomes L R   L1-2 (hip flexion) 5/5 5/5   L3 (knee extension) 5/5 5/5   L4 (ankle DF) 5/5 5/5   L5 (g. toe ext) 5/5 5/5   S1 (ankle PF or knee flex) 5/5 5/5     Sensory Deficit, Reflexes:  Patellar  tendon normal bilaterally, sensation symmetrical    PATIENTS NAME:  Stephanie Bhatia   : 1950    Dural Signs:   L R   Slump -    SLR -      HIP SCREEN:   L R   Flexion WNL WNL   ER WNL WNL   IR WNL WNL   THERESA: (-)  FABIR: (-)    AROM: (Major, Moderate, Minimal or Nil loss)  Movement Loss Elmo Mod Min Nil Pain   Flexion    Full ROM No increase   Extension  50% ROM   No increase   Left Ortiz-bending    WNL -   Right Side-bending    WNL -     Repeated movement testing:   (During: produces, abolishes, increases, decreases, no effect, centralizing, peripheralizing; After: better, worse, no better, no worse, no effect, centralized, peripheralized)    Pre-test Symptoms Standin/10 left low back, left glute, calf   Symptoms During Symptoms After ROM increased ROM decreased No Effect   FIS     X   Rep FIS increase Worse      EIS     X   Rep EIS decrease Better        Assessment/Plan:    The patient is a 69 year old female with chief complaint of lumbar pain with radiculopathy.    The patient has the following significant findings with corresponding treatment plan.  Diagnosis 1:  Lumbar radiculopathy  Pain -  self management and education  Decreased ROM/flexibility - manual therapy, therapeutic exercise and home program  Decreased joint mobility - manual therapy, therapeutic exercise and home program  Decreased strength - therapeutic exercise, therapeutic activities and home program  Impaired balance - neuro re-education, therapeutic activities and home program  Decreased proprioception - neuro re-education and therapeutic activities  Impaired gait - gait training and assistive devices  Impaired muscle performance - neuro re-education and home program  Decreased function - therapeutic activities and home program  Impaired posture - neuro re-education, therapeutic activities and home program  Instability -  Therapeutic Activity, Therapeutic Exercise, Neuromuscular Re-education, Splinting/Taping/Bracing/Orthotic, home  program  Therapy Evaluation Codes:   1) History comprised of:   Personal factors that impact the plan of care:      Please refer to health history in EMR.    Comorbidity factors that impact the plan of care are:      Please refer to health history in EMR.    PATIENTS NAME:  Stephanie Bhatia   : 1950     Medications impacting care: None.  2) Examination of Body Systems comprised of:   Body structures and functions that impact the plan of care:      Lumbar spine.   Activity limitations that impact the plan of care are:      Bending, Driving, Sitting and Laying down.   Clinical presentation characteristics are:    Stable/Uncomplicated.  3) Presentation comprised of:   Presentation scored as Low complexity with uncomplicated characteristics..  4) Decision-Making    Low complexity using standardized patient assessment instrument and/or measureable assessment of functional outcome.  Cumulative Therapy Evaluation is: Low complexity.  Previous and current functional limitations:  (See Goal Flow Sheet for this information)    Short term and Long term goals: (See Goal Flow Sheet for this information)   Communication ability:  Patient appears to be able to clearly communicate and understand verbal and written communication and follow directions correctly.  Treatment Explanation - The following has been discussed with the patient: RX ordered/plan of care, anticipated outcomes, and possible risks and side effects.  This patient would benefit from PT intervention to resume normal activities.   Rehab potential is excellent.  Frequency:  1 X week, once daily  Duration:  for 6 weeks  Discharge Plan: Achieve all LTGs, be independent in home treatment program, and reach maximal therapeutic benefit.  Please refer to the daily flowsheet for treatment today, total treatment time and time spent performing 1:1 timed codes.     Caregiver Signature/Credentials _____________________________ Date ________       Treating Provider: Danae  "Asher PT   I have reviewed and certified the need for these services and plan of treatment while under my care.        PHYSICIAN'S SIGNATURE:   _____________________________________  Date___________    Prem Archer MD    Certification period:  Beginning of Cert date period: 09/23/19 to  End of Cert period date: 12/22/19     Functional Level Progress Report: Please see attached \"Goal Flow sheet for Functional level.\"    ____X____ Continue Services or       ________ DC Services                Service dates: From  SOC Date: 09/23/19 date to present                         "

## 2019-09-23 NOTE — PROGRESS NOTES
Physical Therapy Initial Evaluation  September 23, 2019     MD Instructions/Precautions/Restrictions: PT eval and treat.     Therapist Impression:   Stephanie Bhatia presents with findings consistent with lumbar radiculopathy, with related impairments limiting her ability to sit, drive, and return to lifting/gardening. Skilled PT services are necessary in order to reduce impairments and improve independent function.     Subjective:   Date of Onset: 8/9/19  C/C:  left glute and back of thigh to calf  Quality of pain is sore, N/T. Pains are described as constant in nature. Pain is worse: as day goes on. Pain is rated 2/10.   History of symptoms: Pain began suddenly as the result of repetitive lifting. Since onset, symptoms are improving.  Worsened by: sitting limited to 10 minutes, laying in bed on back  Alleviated by: [place], sitting   General health as reported by patient: good  Pertinent medical/surgical history: no previous surgeries to lumbar; Refer to health history in EMR. Imaging: x-ray. Current occupational status: sales associates - part time. Patient's goals are: decrease pain, strengthen, sitting. Return to MD:  PRN.     Objective:  LUMBAR:    ALIGNMENT:  flat lumbar spine    Neurological:    Motor Deficit:  Myotomes L R   L1-2 (hip flexion) 5/5 5/5   L3 (knee extension) 5/5 5/5   L4 (ankle DF) 5/5 5/5   L5 (g. toe ext) 5/5 5/5   S1 (ankle PF or knee flex) 5/5 5/5     Sensory Deficit, Reflexes:  Patellar tendon normal bilaterally, sensation symmetrical    Dural Signs:   L R   Slump -    SLR -      HIP SCREEN:   L R   Flexion WNL WNL   ER WNL WNL   IR WNL WNL   THERESA: (-)  FABIR: (-)    AROM: (Major, Moderate, Minimal or Nil loss)  Movement Loss Elmo Mod Min Nil Pain   Flexion    Full ROM No increase   Extension  50% ROM   No increase   Left Ortiz-bending    WNL -   Right Side-bending    WNL -     Repeated movement testing:   (During: produces, abolishes, increases, decreases, no effect, centralizing,  peripheralizing; After: better, worse, no better, no worse, no effect, centralized, peripheralized)    Pre-test Symptoms Standin/10 left low back, left glute, calf   Symptoms During Symptoms After ROM increased ROM decreased No Effect   FIS     X   Rep FIS increase Worse      EIS     X   Rep EIS decrease Better        Assessment/Plan:    The patient is a 69 year old female with chief complaint of lumbar pain with radiculopathy.    The patient has the following significant findings with corresponding treatment plan.  Diagnosis 1:  Lumbar radiculopathy  Pain -  self management and education  Decreased ROM/flexibility - manual therapy, therapeutic exercise and home program  Decreased joint mobility - manual therapy, therapeutic exercise and home program  Decreased strength - therapeutic exercise, therapeutic activities and home program  Impaired balance - neuro re-education, therapeutic activities and home program  Decreased proprioception - neuro re-education and therapeutic activities  Impaired gait - gait training and assistive devices  Impaired muscle performance - neuro re-education and home program  Decreased function - therapeutic activities and home program  Impaired posture - neuro re-education, therapeutic activities and home program  Instability -  Therapeutic Activity, Therapeutic Exercise, Neuromuscular Re-education, Splinting/Taping/Bracing/Orthotic, home program    Therapy Evaluation Codes:   1) History comprised of:   Personal factors that impact the plan of care:      Please refer to health history in EMR.    Comorbidity factors that impact the plan of care are:      Please refer to health history in EMR.     Medications impacting care: None.  2) Examination of Body Systems comprised of:   Body structures and functions that impact the plan of care:      Lumbar spine.   Activity limitations that impact the plan of care are:      Bending, Driving, Sitting and Laying down.   Clinical presentation  characteristics are:    Stable/Uncomplicated.  3) Presentation comprised of:   Presentation scored as Low complexity with uncomplicated characteristics..  4) Decision-Making    Low complexity using standardized patient assessment instrument and/or measureable assessment of functional outcome.  Cumulative Therapy Evaluation is: Low complexity.    Previous and current functional limitations:  (See Goal Flow Sheet for this information)    Short term and Long term goals: (See Goal Flow Sheet for this information)     Communication ability:  Patient appears to be able to clearly communicate and understand verbal and written communication and follow directions correctly.  Treatment Explanation - The following has been discussed with the patient: RX ordered/plan of care, anticipated outcomes, and possible risks and side effects.  This patient would benefit from PT intervention to resume normal activities.   Rehab potential is excellent.    Frequency:  1 X week, once daily  Duration:  for 6 weeks  Discharge Plan: Achieve all LTGs, be independent in home treatment program, and reach maximal therapeutic benefit.    Please refer to the daily flowsheet for treatment today, total treatment time and time spent performing 1:1 timed codes.

## 2019-09-28 ENCOUNTER — HEALTH MAINTENANCE LETTER (OUTPATIENT)
Age: 69
End: 2019-09-28

## 2019-09-30 ENCOUNTER — THERAPY VISIT (OUTPATIENT)
Dept: PHYSICAL THERAPY | Facility: CLINIC | Age: 69
End: 2019-09-30
Payer: MEDICARE

## 2019-09-30 DIAGNOSIS — M54.16 LUMBAR RADICULOPATHY: Primary | ICD-10-CM

## 2019-09-30 PROCEDURE — 97140 MANUAL THERAPY 1/> REGIONS: CPT | Mod: GP | Performed by: PHYSICAL THERAPIST

## 2019-09-30 PROCEDURE — 97530 THERAPEUTIC ACTIVITIES: CPT | Mod: GP | Performed by: PHYSICAL THERAPIST

## 2019-09-30 PROCEDURE — 97110 THERAPEUTIC EXERCISES: CPT | Mod: GP | Performed by: PHYSICAL THERAPIST

## 2019-10-07 ENCOUNTER — OFFICE VISIT (OUTPATIENT)
Dept: ORTHOPEDICS | Facility: CLINIC | Age: 69
End: 2019-10-07
Payer: MEDICARE

## 2019-10-07 VITALS — BODY MASS INDEX: 24.29 KG/M2 | HEIGHT: 62 IN | WEIGHT: 132 LBS | RESPIRATION RATE: 16 BRPM

## 2019-10-07 DIAGNOSIS — M54.16 LUMBAR RADICULOPATHY: Primary | ICD-10-CM

## 2019-10-07 RX ORDER — PREDNISONE 20 MG/1
40 TABLET ORAL DAILY
Qty: 10 TABLET | Refills: 0 | Status: SHIPPED | OUTPATIENT
Start: 2019-10-07 | End: 2020-09-23

## 2019-10-07 ASSESSMENT — MIFFLIN-ST. JEOR: SCORE: 1067

## 2019-10-07 NOTE — PROGRESS NOTES
"      SPORTS & ORTHOPEDIC WALK-IN FOLLOW-UP VISIT 10/7/2019    Interval History:     Follow up reason: Lumbar radic     Date of injury: 9/5/19    Date last seen: 9/12/19    Following Therapeutic Plan: Yes     Pain: Unchanged    Function: Unchanged    Interval History: Was doing very well until about a week ago. Went to PT and it was a bit more intense and that evening was painful. Having hard time sleeping. Taking 600-800mg of ibuprofen multiple times daily.  Has pain with sitting and laying down is the worst.  Otherwise pain is in similar distribution to previous.  Localizes to the low back with radiation down the leg.  Some tingling numbness in the left calf.  No weakness noted.  No other new symptoms.    Medical History:    Any recent changes to your medical history? No    Any new medication prescribed since last visit? No    Review of Systems:    Do you have fever, chills, weight loss? No    Do you have any vision problems? No    Do you have any chest pain or edema? No    Do you have any shortness of breath or wheezing?  No    Do you have stomach problems? No    Do you have any numbness or focal weakness? No    Do you have diabetes? No    Do you have problems with bleeding or clotting? No    Do you have an rashes or other skin lesions? No           Past Medical History, Current Medications, and Allergies are reviewed in the electronic medical record as appropriate.       EXAM:Resp 16   Ht 1.575 m (5' 2\")   Wt 59.9 kg (132 lb)   BMI 24.14 kg/m      General: alert, pleasant, no distress. sitting comfortably in chair  Back/Spine: no tenderness to palpation of spinous processes, or paraspinous musculature of lumbar spine. full ROM with flexion, extension, twisting, and side bending without pain. Straight leg raise negative bilaterally. No hamstring tightness noted. No pain in back with THERESA testing bilaterally  Neuro: SILT on bilateral lower extremities, can stand on toes and heel walk without difficulty, "     Imaging: no new imaging this visit      Assessment: Patient is a 69 year old female with left-sided low back pain with radicular symptoms concerning for L5 or S1 radiculopathy.    Recommendations:   Reviewed assessment the patient detail  Recommend course of prednisone in place of ibuprofen.  She may resume ibuprofen as needed after completion of prednisone course  Discussed next steps after completion of prednisone course should the symptoms be inadequately resolved or recur.  This would likely include MRI and possibly epidural steroid injection.    Prem Archer MD

## 2019-10-10 NOTE — TELEPHONE ENCOUNTER
RECORDS RECEIVED FROM: Low back pain - Sciatica -  Past Dr. Archer pt in Walk in. pt requested to see someone in Sports.    Appt per pt.   DATE RECEIVED: 10.11.19   NOTES STATUS DETAILS   OFFICE NOTE from referring provider N/A    OFFICE NOTE from other specialist Internal 10.7.19 Dr. Archer, sports med  9.12.19 Dr. Archer, sports med   DISCHARGE SUMMARY from hospital N/A    DISCHARGE REPORT from the ER N/A    OPERATIVE REPORT N/A    MEDICATION LIST Internal    IMPLANT RECORD/STICKER N/A    LABS     CBC/DIFF Internal 2.5.16   CULTURES N/A    INJECTIONS DONE IN RADIOLOGY N/A    MRI N/A    CT SCAN N/A    XRAYS (IMAGES & REPORTS) Internal 9.12.19 Lumbar  DX hip spine pelvis 6.1.19  2.24.17 DX vertebral fracture   TUMOR     PATHOLOGY  Slides & report N/A

## 2019-10-11 ENCOUNTER — OFFICE VISIT (OUTPATIENT)
Dept: ORTHOPEDICS | Facility: CLINIC | Age: 69
End: 2019-10-11
Payer: MEDICARE

## 2019-10-11 ENCOUNTER — PRE VISIT (OUTPATIENT)
Dept: ORTHOPEDICS | Facility: CLINIC | Age: 69
End: 2019-10-11

## 2019-10-11 VITALS — WEIGHT: 132 LBS | RESPIRATION RATE: 16 BRPM | BODY MASS INDEX: 24.29 KG/M2 | HEIGHT: 62 IN

## 2019-10-11 DIAGNOSIS — M05.741 RHEUMATOID ARTHRITIS INVOLVING RIGHT HAND WITH POSITIVE RHEUMATOID FACTOR (H): Primary | ICD-10-CM

## 2019-10-11 ASSESSMENT — MIFFLIN-ST. JEOR: SCORE: 1067

## 2019-10-11 NOTE — PROGRESS NOTES
Subjective:   Stephanie Bhatia is a 69 year old female who was seen by Dr. Archer for lumbar radiculopathy. She notes worsening pain.  Pain with walking, sitting and standing. She notes left sided tingling.  Wants MRI lumbar, medications?, find out in 3 fingers- can't pull credit card out of the machine.  Sciatica- 9/6/19- Advil 200-400mg  2005- bulging disc at L4-5, had MRI- injections x 2  A little lower, [facility]- Dr. Archer- increased Tylenol, started PT  9/30/19- 7-8/10 after PT, flexible to a degree, Ok during the session  Increased symptoms after injection, flu symptoms and severe pain.  5 days of prednisone 40mg daily  Pain at 3-4/10, worse in the a.m.  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  She  has a past surgical history that includes Hysteroscopic placement cont.  Her family history includes C.A.D. in her father; Cerebrovascular Disease in her maternal grandmother; Coronary Artery Disease in her father; Dementia in her paternal cousin and paternal uncle; Depression in her mother; Hypertension in her sister; Neurologic Disorder in her father and mother; Other Cancer in her paternal grandmother; Rheumatoid Arthritis in her sister.  She reports that she  "quit smoking about 32 years ago. Her smoking use included cigarettes. She has a 20.00 pack-year smoking history. She has never used smokeless tobacco. She reports previous alcohol use. She reports that she does not use drugs.    ALLERGIES: She has No Known Allergies.    CURRENT MEDICATIONS: She has a current medication list which includes the following prescription(s): calcium citrate-vitamin d, vitamin d, desonide, nystatin, and prednisone.     REVIEW OF SYSTEMS: 10 point review of systems is negative except as noted above.     Exam:   Resp 16   Ht 1.575 m (5' 2\")   Wt 59.9 kg (132 lb)   BMI 24.14 kg/m             CONSTITUTIONAL: healthy, alert, no distress and cooperative  HEAD: Normocephalic. No masses, lesions, tenderness or abnormalities  SKIN: no suspicious lesions or rashes  GAIT: normal  NEUROLOGIC: Non-focal, Normal muscle tone and strength, reflexes normal, sensation grossly normal.  PSYCHIATRIC: affect normal/bright and mentation appears normal.    MUSCULOSKELETAL: lumbar, MCP right hand  Tender:  left para lumbar muscles, left calf radiation into left foot  Non-tender:  left parathoracic muscles, right parathoracic muscles, lumbar spinous processes, right para lumbar muscles  Range of Motion:  lumbar flexion  full, lumbar extension  full  Strength:  able to heel walk, able to toe walk  Special tests:  negative straight leg raises    Hip Exam: Hip ROM full         Assessment/Plan:   Pt is a RHD 70 yo white female with PMHx of cluster headaches, osteoporosis, systemic sclerosis and RA +RF presenting with left LBP with left leg radiculopathy and right hand pain  1. Left LBP and left leg radiculopathy-  Discussion regarding further PT for about 6-8 weeks prior to MRI ordered  Finishing prednisone and didn't think it was doing much  Ibuprofen discussion regarding dosing and taking with food  2. Rt hand MCP changes due to RA- weakness in right hand  Hand therapy  Discussed [employer] jar opener and having more of " a lever on faucets and pt reports she's made some of these changes already    RTC 6 weeks    X-RAY INTERPRETATION:   Reviewed  Mild degenerative changes L3-S4

## 2019-10-11 NOTE — LETTER
10/11/2019      RE: Stephanie Bhatia  [address]  [facility] [address]        Subjective:   Stephanie Bhatia is a 69 year old female who was seen by Dr. Archer for lumbar radiculopathy. She notes worsening pain.  Pain with walking, sitting and standing. She notes left sided tingling.  Wants MRI lumbar, medications?, find out in 3 fingers- can't pull credit card out of the machine.  Sciatica- 9/6/19- Advil 200-400mg  2005- bulging disc at L4-5, had MRI- injections x 2  A little lower, [facility]- Dr. Archer- increased Tylenol, started PT  9/30/19- 7-8/10 after PT, flexible to a degree, Ok during the session  Increased symptoms after injection, flu symptoms and severe pain.  5 days of prednisone 40mg daily  Pain at 3-4/10, worse in the a.m.  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  She  has a past surgical history that includes Hysteroscopic placement cont.  Her family history includes C.A.D. in her father; Cerebrovascular Disease in her maternal grandmother; Coronary Artery Disease in her father; Dementia in her paternal cousin and paternal uncle; Depression in her mother; Hypertension in her sister; Neurologic Disorder in her father and mother; Other Cancer  "in her paternal grandmother; Rheumatoid Arthritis in her sister.  She reports that she quit smoking about 32 years ago. Her smoking use included cigarettes. She has a 20.00 pack-year smoking history. She has never used smokeless tobacco. She reports previous alcohol use. She reports that she does not use drugs.    ALLERGIES: She has No Known Allergies.    CURRENT MEDICATIONS: She has a current medication list which includes the following prescription(s): calcium citrate-vitamin d, vitamin d, desonide, nystatin, and prednisone.     REVIEW OF SYSTEMS: 10 point review of systems is negative except as noted above.     Exam:   Resp 16   Ht 1.575 m (5' 2\")   Wt 59.9 kg (132 lb)   BMI 24.14 kg/m              CONSTITUTIONAL: healthy, alert, no distress and cooperative  HEAD: Normocephalic. No masses, lesions, tenderness or abnormalities  SKIN: no suspicious lesions or rashes  GAIT: normal  NEUROLOGIC: Non-focal, Normal muscle tone and strength, reflexes normal, sensation grossly normal.  PSYCHIATRIC: affect normal/bright and mentation appears normal.    MUSCULOSKELETAL: lumbar, MCP right hand  Tender:  left para lumbar muscles, left calf radiation into left foot  Non-tender:  left parathoracic muscles, right parathoracic muscles, lumbar spinous processes, right para lumbar muscles  Range of Motion:  lumbar flexion  full, lumbar extension  full  Strength:  able to heel walk, able to toe walk  Special tests:  negative straight leg raises    Hip Exam: Hip ROM full         Assessment/Plan:   Pt is a RHD 70 yo white female with PMHx of cluster headaches, osteoporosis, systemic sclerosis and RA +RF presenting with left LBP with left leg radiculopathy and right hand pain  1. Left LBP and left leg radiculopathy-  Discussion regarding further PT for about 6-8 weeks prior to MRI ordered  Finishing prednisone and didn't think it was doing much  Ibuprofen discussion regarding dosing and taking with food  2. Rt hand MCP changes due " to RA- weakness in right hand  Hand therapy  Discussed [employer] jar opener and having more of a lever on faucets and pt reports she's made some of these changes already    RTC 6 weeks    X-RAY INTERPRETATION:   Reviewed  Mild degenerative changes L3-S4    Prachi Grant MD

## 2019-10-13 ENCOUNTER — OFFICE VISIT (OUTPATIENT)
Dept: URGENT CARE | Facility: URGENT CARE | Age: 69
End: 2019-10-13
Payer: MEDICARE

## 2019-10-13 VITALS
WEIGHT: 132 LBS | RESPIRATION RATE: 12 BRPM | SYSTOLIC BLOOD PRESSURE: 100 MMHG | TEMPERATURE: 97.9 F | DIASTOLIC BLOOD PRESSURE: 62 MMHG | HEART RATE: 80 BPM | HEIGHT: 62 IN | BODY MASS INDEX: 24.29 KG/M2

## 2019-10-13 DIAGNOSIS — M54.42 LEFT-SIDED LOW BACK PAIN WITH LEFT-SIDED SCIATICA, UNSPECIFIED CHRONICITY: Primary | ICD-10-CM

## 2019-10-13 PROCEDURE — 99214 OFFICE O/P EST MOD 30 MIN: CPT | Mod: 25 | Performed by: FAMILY MEDICINE

## 2019-10-13 PROCEDURE — 96372 THER/PROPH/DIAG INJ SC/IM: CPT | Performed by: FAMILY MEDICINE

## 2019-10-13 RX ORDER — IBUPROFEN 200 MG
200 TABLET ORAL EVERY 4 HOURS PRN
COMMUNITY

## 2019-10-13 RX ORDER — KETOROLAC TROMETHAMINE 30 MG/ML
60 INJECTION, SOLUTION INTRAMUSCULAR; INTRAVENOUS ONCE
Status: COMPLETED | OUTPATIENT
Start: 2019-10-13 | End: 2019-10-13

## 2019-10-13 RX ORDER — HYDROCODONE BITARTRATE AND ACETAMINOPHEN 5; 325 MG/1; MG/1
1 TABLET ORAL EVERY 6 HOURS PRN
Qty: 6 TABLET | Refills: 0 | Status: SHIPPED | OUTPATIENT
Start: 2019-10-13 | End: 2019-10-17

## 2019-10-13 RX ADMIN — KETOROLAC TROMETHAMINE 60 MG: 30 INJECTION, SOLUTION INTRAMUSCULAR; INTRAVENOUS at 16:44

## 2019-10-13 ASSESSMENT — MIFFLIN-ST. JEOR: SCORE: 1067

## 2019-10-13 NOTE — PROGRESS NOTES
SUBJECTIVE:  Chief Complaint   Patient presents with     Urgent Care     Back Pain     suffering from sciatic pain on left side 4-6 weeks, Has been seen at back clinic. Using advil but has gotten much worse     Stephanie Bhatia is a 69 year old female who presents with a chief complaint of left back pain with radiation down left leg.    Symptoms began 4 week(s) ago, initially mild but now has progressed to severe.  Thinks that initially cause flare from gardening and too much moving furniture around.  Patient is followed by [facility] for lumbar radiculopathy, was taking ibuprofen which normally helped but now not working.  Patient is also going to physical therapy.  Developed radiation of pain down leg and now progressed to her feet, really wants to have MRI done to evaluate this.  Patient had finished prednisone burst last week and did not help her symptoms.    Patient states that had bulging disc in the past, underwent 2 injections in her back and did well for the past 14 years.    Past Medical History:   Diagnosis Date     Anxiety      Herpes simplex without mention of complication      Interstitial lung disease (H)     scleroderma ILD, dx by chest CT 5/2017     Lung nodules     4 mm LLL and RLL 2017     Migraine     loss of speech and comprehension, has had w/u and is complex migraine, last one about 2/2017     Raynaud phenomenon      Systemic sclerosis with limited cutaneous involvement (H) 12/02/2014    Dx ~2010, Scl-70 and anti-centromere antibody neg, +RF     Current Outpatient Medications   Medication Sig Dispense Refill     Cholecalciferol (VITAMIN D) 1000 UNITS capsule Take 1 capsule by mouth daily       ibuprofen (ADVIL/MOTRIN) 200 MG tablet Take 200 mg by mouth every 4 hours as needed for mild pain       calcium citrate-vitamin D (CITRACAL) 315-200 MG-UNIT TABS per tablet Take 1 tablet by mouth daily       desonide (DESOWEN) 0.05 % external ointment Apply topically 2 times daily To rash on the corners of the  "mouth mixed with nystatin as needed until clear. 15 g 1     nystatin (MYCOSTATIN) 771093 UNIT/GM external ointment Apply topically 2 times daily To rash at the corners of the mouth mixed with desonide until clear. 30 g 11     predniSONE (DELTASONE) 20 MG tablet Take 2 tablets (40 mg) by mouth daily 10 tablet 0     Social History     Tobacco Use     Smoking status: Former Smoker     Packs/day: 1.00     Years: 20.00     Pack years: 20.00     Types: Cigarettes     Last attempt to quit: 1986     Years since quittin.9     Smokeless tobacco: Never Used   Substance Use Topics     Alcohol use: Not Currently     Comment: none since        ROS:  Review of systems negative except as stated above.    EXAM:   /62   Pulse 80   Temp 97.9  F (36.6  C) (Oral)   Resp 12   Ht 1.575 m (5' 2\")   Wt 59.9 kg (132 lb)   BMI 24.14 kg/m    GENERAL APPEARANCE: healthy, alert and mild distress  PSYCH: alert, affect bright    ASSESSMENT/PLAN:  (M54.42) Left-sided low back pain with left-sided sciatica, unspecified chronicity  (primary encounter diagnosis)  Plan: ketorolac (TORADOL) injection 60 mg,         HYDROcodone-acetaminophen (NORCO) 5-325 MG         tablet, INJECTION INTRAMUSCULAR OR SUB-Q            Here in urgent care due to worsening back pain with sciatica, not responding well to steroid and increase pain.  Will give Toradol 60 mg IM in clinic for pain, small quantity of norco 5/325 mg #6 for worse pain and patient instructed to use this sparingly.  Encourage to follow up with [facility] or primary provider for back pain    Follow up with [facility] or primary provider within 2-3 days.    Jean Marie Brunson MD, MD  2019 6:54 PM            "

## 2019-10-14 ENCOUNTER — OFFICE VISIT (OUTPATIENT)
Dept: ORTHOPEDICS | Facility: CLINIC | Age: 69
End: 2019-10-14
Payer: MEDICARE

## 2019-10-14 ENCOUNTER — ANCILLARY PROCEDURE (OUTPATIENT)
Dept: MRI IMAGING | Facility: CLINIC | Age: 69
End: 2019-10-14
Attending: FAMILY MEDICINE
Payer: MEDICARE

## 2019-10-14 VITALS — RESPIRATION RATE: 16 BRPM | WEIGHT: 132 LBS | BODY MASS INDEX: 24.29 KG/M2 | HEIGHT: 62 IN

## 2019-10-14 DIAGNOSIS — M54.16 LUMBAR RADICULOPATHY: Primary | ICD-10-CM

## 2019-10-14 RX ORDER — LORAZEPAM 0.5 MG/1
0.5 TABLET ORAL EVERY 6 HOURS PRN
Qty: 2 TABLET | Refills: 0 | Status: SHIPPED | OUTPATIENT
Start: 2019-10-14 | End: 2020-08-17

## 2019-10-14 ASSESSMENT — MIFFLIN-ST. JEOR: SCORE: 1067

## 2019-10-16 ENCOUNTER — MYC MEDICAL ADVICE (OUTPATIENT)
Dept: ORTHOPEDICS | Facility: CLINIC | Age: 69
End: 2019-10-16

## 2019-10-16 DIAGNOSIS — M54.16 LUMBAR RADICULOPATHY: Primary | ICD-10-CM

## 2019-10-16 DIAGNOSIS — M54.42 LEFT-SIDED LOW BACK PAIN WITH LEFT-SIDED SCIATICA, UNSPECIFIED CHRONICITY: ICD-10-CM

## 2019-10-17 ENCOUNTER — MYC MEDICAL ADVICE (OUTPATIENT)
Dept: ORTHOPEDICS | Facility: CLINIC | Age: 69
End: 2019-10-17

## 2019-10-17 ENCOUNTER — THERAPY VISIT (OUTPATIENT)
Dept: PHYSICAL THERAPY | Facility: CLINIC | Age: 69
End: 2019-10-17
Payer: MEDICARE

## 2019-10-17 DIAGNOSIS — M54.16 LUMBAR RADICULOPATHY: Primary | ICD-10-CM

## 2019-10-17 PROCEDURE — 97110 THERAPEUTIC EXERCISES: CPT | Mod: GP | Performed by: PHYSICAL THERAPIST

## 2019-10-17 RX ORDER — HYDROCODONE BITARTRATE AND ACETAMINOPHEN 5; 325 MG/1; MG/1
1 TABLET ORAL EVERY 6 HOURS PRN
Qty: 10 TABLET | Refills: 0 | Status: SHIPPED | OUTPATIENT
Start: 2019-10-17 | End: 2020-09-23

## 2019-10-27 ENCOUNTER — MYC MEDICAL ADVICE (OUTPATIENT)
Dept: FAMILY MEDICINE | Facility: CLINIC | Age: 69
End: 2019-10-27

## 2019-10-27 DIAGNOSIS — B00.9 HSV (HERPES SIMPLEX VIRUS) INFECTION: Primary | ICD-10-CM

## 2019-10-28 RX ORDER — ACYCLOVIR 400 MG/1
400 TABLET ORAL EVERY 8 HOURS
Qty: 15 TABLET | Refills: 3 | Status: SHIPPED | OUTPATIENT
Start: 2019-10-28 | End: 2020-05-15

## 2019-10-31 ENCOUNTER — THERAPY VISIT (OUTPATIENT)
Dept: OCCUPATIONAL THERAPY | Facility: CLINIC | Age: 69
End: 2019-10-31
Attending: FAMILY MEDICINE
Payer: MEDICARE

## 2019-10-31 DIAGNOSIS — M05.741 RHEUMATOID ARTHRITIS INVOLVING RIGHT HAND WITH POSITIVE RHEUMATOID FACTOR (H): ICD-10-CM

## 2019-10-31 DIAGNOSIS — R29.898 HAND WEAKNESS: Primary | ICD-10-CM

## 2019-10-31 PROCEDURE — 97165 OT EVAL LOW COMPLEX 30 MIN: CPT | Mod: GO | Performed by: OCCUPATIONAL THERAPIST

## 2019-10-31 NOTE — PROGRESS NOTES
5/13/2020  Please note that I recently discovered that I had not finished this note. I did complete it today and put the charge in for the eval I left the pt a VM and sent a [employer] message - see note.  Prachi Betancourt, MS, OTR/L, CHT      [facility] Initial Evaluation     Current Date: 10/31/2019    Diagnosis: R hand weakness, especially thumb and IF, MF weakness    Subjective:    Difficult things: starting car, opening a jar, fine tasks, tying shoe laces, opens gas cap with 2 hands. Picking small/thin things up can be clumsy.   Her hands are usually quite puffy, could be the scleroderma. Has had issues with  Swelling in the hands for a while, has also had Dx of Raynauds and tries to not let the  hands get cold.    Stephanie Bhatia being seen for hand weakness in three digits arthritis.   Problem began 8/1/2019. Where condition occurred: at home and for unknown reasons.Problem occurred: overuse?  and reported as 0/10 on pain scale. General health as reported by patient is good. Pertinent medical history includes:  Depression, migraines/headaches, osteoporosis and rheumatoid arthritis.    Surgeries include:  None.  Current medications:  Anti-inflammatory.   Primary job tasks include:  Repetitive tasks.  Pain is described as other   Since onset symptoms are unchanged.       Restrictions include:  Working in normal job without restrictions.    Barriers include:  None as reported by patient and lives alone.  Red flags:  Significant weakness.    Occupational Profile Information:  Right hand dominant  Prior functional level:  no limitations  Patient reports symptoms of weakness/loss of strength  Barriers include:none  Mobility: No difficulty  Transportation: drives, bicycles and public transportation  Leisure activities/hobbies:pt notes she had to do some mending recently.    Occupation: Part time . Does need to open small boxes, etc.  Pt does 4-5 hour shifts, 2-3 times a week. 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    Functional Outcome Measure:   Upper Extremity Functional Index Score:  SCORE:   Column Totals: /80: (P) 64   (A lower score indicates greater disability.)    Objective:  Pain Level (Scale 0-10)   10/31/2019   At Rest 0   With Use 0 to fingers, but a little pain to wrist today (raked yesterday 2/10)     Pain Description  Date 10/31/2019   Location hand   Pain Quality None at present, weakness is main concern   Frequency intermittent     Pain is worst  daytime   Exacerbated by  overuse   Relieved by rest   Progression Staying the same     VISUAL INSPECTION:  DATE 10/31/2019 10/31/2019    Right Left   Color:  []   Normal   [x]  Comments:pinkish, blue, red (cold today) []   Normal   []  Comments: pinkish, blue, red (cold today)   Joint enlargements:  []   Normal   [x]  Comments:B IF and MF MPj enlarged, R>L []   Normal   [x]  Comments: IF and MF MPj enlarged   Other observations:  Thumb: no shoulder sign  MPj does tend to HE, IPj hyperflexes Thumb: no shoulder sign  MPj does not HE, IP is WNL          Edema  Mild of the B hands, fingers. This is typical for the patient    Sensation   WNL throughout all nerve distributions; per patient report    ROM   B wrist, elbow. shoulder AROM is WFL  Has a h/o tennis elbow on the R, wears a brace when needed and that helps  Fingers: ful AROM in ext, flexion, no clicking per pt.  Mild swan neck/dynamic to B IF to RF.    ROM  Hand 10/31/2019 10/31/2019   Resting ulnar deviation at MP joints R L   Index MP 36 30        Long MP 28 28        Ring MP 17 13        Small MP 10 5          Strength:  Pain-free /Pinch Test    10/31/2019   Trials R L   1   30 58     Lat Pinch  10/31/2019   Trials R L   1   5 thumb MPj mild HE, IPj in flexion 13 no MPj HE noted     3 Pt Pinch  10/31/2019   Trials R L   1   5 and UD noted at IF and MF MP joints 7     Assessment:  Patient presents with symptoms consistent with diagnosis of right hand  weakness,  with conservative  intervention.     Patient's limitations or Problem List includes:  Pain and Weakness of the right hand which interferes with the patient's ability to perform Work Tasks and Household Chores as compared to previous level of function.    Rehab Potential:  Good - Return to full activity, some limitations    Patient will benefit from skilled Occupational Therapy to increase self management of symptoms   to return to previous activity level and resume normal daily tasks and to reach their rehab potential.    Barriers to Learning:  No barrier    Communication Issues:  Patient appears to be able to clearly communicate and understand verbal and written communication and follow directions correctly.    Chart Review: Brief history including review of medical and/or therapy records relating to the presenting problem    Identified Performance Deficits: home establishment and management, work and leisure activities    Assessment of Occupational Performance:  1-3 Performance Deficits    Clinical Decision Making (Complexity): Low complexity    Treatment Explanation:  The following has been discussed with the patient:  RX ordered/plan of care  Anticipated outcomes  Possible risks and side effects    Plan:  Frequency:  1 X week, once daily  Duration:  for 1 weeks    Treatment Plan:   Therapeutic Exercise:  AROM and Stabilization  Self Care:  Self Care Tasks, Ergonomic Considerations and Work Tasks    Discharge Plan:  Achieve all LTG.  Independent in home treatment program.  Reach maximal therapeutic benefit.    Home Exercise Program:  RA finger walking  EPB AROM for MP joint stabilization

## 2019-11-13 ENCOUNTER — ANCILLARY PROCEDURE (OUTPATIENT)
Dept: GENERAL RADIOLOGY | Facility: CLINIC | Age: 69
End: 2019-11-13
Attending: FAMILY MEDICINE
Payer: MEDICARE

## 2019-11-13 VITALS
HEART RATE: 71 BPM | TEMPERATURE: 97.4 F | SYSTOLIC BLOOD PRESSURE: 120 MMHG | OXYGEN SATURATION: 97 % | DIASTOLIC BLOOD PRESSURE: 68 MMHG | RESPIRATION RATE: 18 BRPM

## 2019-11-13 DIAGNOSIS — M54.16 LUMBAR RADICULOPATHY: ICD-10-CM

## 2019-11-13 RX ORDER — LIDOCAINE HYDROCHLORIDE 10 MG/ML
30 INJECTION, SOLUTION EPIDURAL; INFILTRATION; INTRACAUDAL; PERINEURAL ONCE
Status: COMPLETED | OUTPATIENT
Start: 2019-11-13 | End: 2019-11-13

## 2019-11-13 RX ORDER — METHYLPREDNISOLONE ACETATE 80 MG/ML
80 INJECTION, SUSPENSION INTRA-ARTICULAR; INTRALESIONAL; INTRAMUSCULAR; SOFT TISSUE ONCE
Status: COMPLETED | OUTPATIENT
Start: 2019-11-13 | End: 2019-11-13

## 2019-11-13 RX ORDER — BUPIVACAINE HYDROCHLORIDE 5 MG/ML
10 INJECTION, SOLUTION EPIDURAL; INTRACAUDAL ONCE
Status: COMPLETED | OUTPATIENT
Start: 2019-11-13 | End: 2019-11-13

## 2019-11-13 RX ORDER — IOPAMIDOL 408 MG/ML
20 INJECTION, SOLUTION INTRATHECAL ONCE
Status: COMPLETED | OUTPATIENT
Start: 2019-11-13 | End: 2019-11-13

## 2019-11-13 RX ADMIN — IOPAMIDOL 2 ML: 408 INJECTION, SOLUTION INTRATHECAL at 08:16

## 2019-11-13 RX ADMIN — METHYLPREDNISOLONE ACETATE 80 MG: 80 INJECTION, SUSPENSION INTRA-ARTICULAR; INTRALESIONAL; INTRAMUSCULAR; SOFT TISSUE at 08:16

## 2019-11-13 RX ADMIN — BUPIVACAINE HYDROCHLORIDE 10 MG: 5 INJECTION, SOLUTION EPIDURAL; INTRACAUDAL at 08:16

## 2019-11-13 RX ADMIN — LIDOCAINE HYDROCHLORIDE 5 ML: 10 INJECTION, SOLUTION EPIDURAL; INFILTRATION; INTRACAUDAL; PERINEURAL at 08:16

## 2019-11-13 NOTE — PROGRESS NOTES
Pt did well with the procedure.  No complications. VSS. AVS given and pt escorted to the waiting room.    Yara, Imaging Nurse

## 2019-12-02 NOTE — PROGRESS NOTES
Discharge Note    Progress reporting period is from initial evaluation date (please see noted date below) to Oct 17, 2019.      Stephanie failed to follow up and current status is unknown.  Please see information below for last relevant information on current status.  Patient seen for 3 visits.    SUBJECTIVE  Subjective changes noted by patient:  pain increased in the evening after last PT session; N/T to the foot; Planning on starting pool therapy and injection for lumbar  .  Current pain level is 2/10.     Previous pain level was   .   Changes in function:  Yes (See Goal flowsheet attached for changes in current functional level)  Adverse reaction to treatment or activity: None    OBJECTIVE  Changes noted in objective findings: FLEX: full ROM 1 rep no pain; EXT: 75% ROM; modify to assist with counter     ASSESSMENT/PLAN  Diagnosis: Lumbar Radiculopathy   Updated problem list and treatment plan:   Pain - HEP  STG/LTGs have been met or progress has been made towards goals:  Yes, please see goal flowsheet for most current information  Assessment of Progress: current status is unknown.    Last current status: Pt is progressing as expected   Self Management Plans:  HEP  I have re-evaluated this patient and find that the nature, scope, duration and intensity of the therapy is appropriate for the medical condition of the patient.  Stephanie continues to require the following intervention to meet STG and LTG's:  HEP.    Recommendations:  Discharge with current home program.  Patient to follow up with MD as needed.    Please refer to the daily flowsheet for treatment today, total treatment time and time spent performing 1:1 timed codes.

## 2019-12-06 ENCOUNTER — TRANSFERRED RECORDS (OUTPATIENT)
Dept: HEALTH INFORMATION MANAGEMENT | Facility: CLINIC | Age: 69
End: 2019-12-06

## 2020-01-04 ENCOUNTER — OFFICE VISIT (OUTPATIENT)
Dept: URGENT CARE | Facility: URGENT CARE | Age: 70
End: 2020-01-04
Payer: MEDICARE

## 2020-01-04 VITALS
TEMPERATURE: 97.7 F | HEIGHT: 62 IN | OXYGEN SATURATION: 99 % | BODY MASS INDEX: 24.29 KG/M2 | HEART RATE: 72 BPM | DIASTOLIC BLOOD PRESSURE: 70 MMHG | SYSTOLIC BLOOD PRESSURE: 111 MMHG | WEIGHT: 132 LBS

## 2020-01-04 DIAGNOSIS — R07.81 RIB PAIN ON RIGHT SIDE: Primary | ICD-10-CM

## 2020-01-04 PROCEDURE — 99213 OFFICE O/P EST LOW 20 MIN: CPT | Performed by: FAMILY MEDICINE

## 2020-01-04 ASSESSMENT — MIFFLIN-ST. JEOR: SCORE: 1067

## 2020-01-04 NOTE — PATIENT INSTRUCTIONS
Ibuprofen and or tylenol every 4-6 hours for pain  -- option other than ibuprofen is aleve/naproxen 220-440mg every 12 hours    Sleep on the left side at bed time, may want to use pillows to prop yourself      If you develop difficulty breathing, worsening pain please return to be seen    Expect improvement over the next 2-3 weeks

## 2020-01-04 NOTE — PROGRESS NOTES
Subjective:   Stephanie Bhatia is a 69 year old female who presents for   Chief Complaint   Patient presents with     Urgent Care     Rib Injury     c/o rib pain after a injury today     Patient slipped on her rug at home and hit her right side on the way down on a wooden box.   Injury happened about 3 hours ago. She has taken 2 advil for pain and has applied ice. Hx of osteoporosis. Breathing comfortably but pain if trying to take a full deep breath, attempting her she feels okay.   She is walking okay.     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, she has already cancelled two shifts    Accompanied by her sister      Patient Active Problem List    Diagnosis Date Noted     Rheumatoid arthritis involving both hands with positive rheumatoid factor (H) 06/10/2019     Priority: Medium     Senile osteoporosis 06/10/2019     Priority: Medium     ILD (interstitial lung disease) (H) 05/25/2017     Priority: Medium     Osteoporosis 03/08/2017     Priority: Medium     AK (actinic keratosis) 05/19/2015     Priority: Medium     Systemic sclerosis with limited cutaneous involvement (H) 12/02/2014     Priority: Medium     Cluster headache syndrome 10/21/2013     Priority: Medium     CARDIOVASCULAR SCREENING; LDL GOAL LESS THAN 160 10/02/2013     Priority: Medium     Advanced directives, counseling/discussion 09/27/2013     Priority: Medium     Advance Care Planning:   Receipt of ACP document:  Received: Health Care Directive which was witnessed or notarized on 09/15/2011.  Document not previously scanned.  Validation form completed and sent with document to be scanned.      Confirmed/documented designated decision maker(s). See permanent comments section of demographics in clinical tab. View document(s) and details by clicking on code status.   Added by Elizabeth Hurley on 10/11/2013.             Lumbar radiculopathy 11/18/2011     Priority: Medium     Raynaud phenomenon      Priority: Medium  "    Anxiety      Priority: Medium       Current Outpatient Medications   Medication     acyclovir (ZOVIRAX) 400 MG tablet     calcium citrate-vitamin D (CITRACAL) 315-200 MG-UNIT TABS per tablet     Cholecalciferol (VITAMIN D) 1000 UNITS capsule     desonide (DESOWEN) 0.05 % external ointment     HYDROcodone-acetaminophen (NORCO) 5-325 MG tablet     ibuprofen (ADVIL/MOTRIN) 200 MG tablet     LORazepam (ATIVAN) 0.5 MG tablet     nystatin (MYCOSTATIN) 765230 UNIT/GM external ointment     predniSONE (DELTASONE) 20 MG tablet     No current facility-administered medications for this visit.        ROS:  As above per HPI    Objective:   /70   Pulse 72   Temp 97.7  F (36.5  C) (Oral)   Ht 1.575 m (5' 2\")   Wt 59.9 kg (132 lb)   SpO2 99%   BMI 24.14 kg/m  , Body mass index is 24.14 kg/m .  Gen:  NAD, well-nourished, sitting in chair comfortably  HEENT: EOMI, sclera anicteric, Head normocephalic, ; nares patent; moist mucous membranes  Neck: trachea midline, no thyromegaly  CV:  Hemodynamically stable  Pulm:  no increased work of breathing   Extrem: no cyanosis, edema or clubbing  Skin: no obvious rashes or abnormalities  Psych: Euthymic, linear thoughts, normal rate of speech  Right: around ribs 9/10 in axillary/anterior region there is discomfort occasionally when palpating ; no step off appreciated    No results found for any visits on 01/04/20.    Assessment & Plan:   Stephanieabbe Sinett, 69 year old female who presents with:    Rib pain on right side in lower anterior/axillary region  No bruising seen but has some occasional focal tenderness when palpating the area on exam. She is in no respiratory distress at this time, I doubt pneumothorax given exam findings and her normal o2 saturation. Gave option of doing xray but discussed this Is unlikely to change my decision-making; we opted to not do imaging today. Set expectations symptoms will take 2-3 weeks to resolve, may help to sleep on the opposite side. " Naproxen/tylenol recommended for pain.     Kevon Schneider MD   [facility]    The use of Dragon/Timetovisit dictation services may have been used to construct the content in this note; any grammatical or spelling errors are non-intentional. Please contact the author of this note directly if you are in need of any clarification.

## 2020-05-13 ENCOUNTER — TELEPHONE (OUTPATIENT)
Dept: OCCUPATIONAL THERAPY | Facility: CLINIC | Age: 70
End: 2020-05-13

## 2020-05-13 PROBLEM — R29.898 HAND WEAKNESS: Status: ACTIVE | Noted: 2020-05-13

## 2020-05-13 PROBLEM — R29.898 HAND WEAKNESS: Status: RESOLVED | Noted: 2020-05-13 | Resolved: 2020-05-13

## 2020-05-13 NOTE — TELEPHONE ENCOUNTER
5/13/2020  It came to my attention that I did not finish my evaluation note for service dated 10/31/19. I called the patient to let her know about this and contact me if she has questions. I will be finishing that note. I will also send her a [employer] message with her code to access our online exercise program.    Prachi Betancourt, MS, OTR/L, CHT  [facility]  -  Hand Therapy     [facility]  [address] [place], [place] [address]. [place] [address]    Email: [email or web address]  Voicemail: [phone]   Hand Hotline (urgent hand therapy questions): [phone]  Fax: [phone]

## 2020-05-14 DIAGNOSIS — B00.9 HSV (HERPES SIMPLEX VIRUS) INFECTION: ICD-10-CM

## 2020-05-15 RX ORDER — ACYCLOVIR 400 MG/1
400 TABLET ORAL EVERY 8 HOURS
Qty: 15 TABLET | Refills: 3 | Status: SHIPPED | OUTPATIENT
Start: 2020-05-15 | End: 2021-11-22

## 2020-05-15 NOTE — TELEPHONE ENCOUNTER
Last Clinic Visit: 8/12/19 with recommended 1 year follow up, no visits scheduled, over due for creatinine, 90 day radha RX provided and routed to clinic per protocol

## 2020-08-17 ENCOUNTER — VIRTUAL VISIT (OUTPATIENT)
Dept: PSYCHIATRY | Facility: CLINIC | Age: 70
End: 2020-08-17
Attending: NURSE PRACTITIONER
Payer: MEDICARE

## 2020-08-17 DIAGNOSIS — F41.9 ANXIETY: Primary | ICD-10-CM

## 2020-08-17 NOTE — PROGRESS NOTES
"Video- Visit Details  Type of service:  video visit for medication management  Time of service:    Date:  08/17/2020    Video Start Time:  7:43a      Video End Time: 8:20a    Reason for video visit:  Patient has requested telehealth visit  Originating Site (patient location):  [facility]   Location- Patient's home  Distant Site (provider location):  Remote location  Mode of Communication:  Video Conference via AmWell  Consent:  Patient has given verbal consent for video visit?: Yes          [facility]  Psychiatry Clinic  PSYCHIATRIC PROGRESS NOTE       Stephanie Bhatia is a 69 year old female who prefers the name Stephanie and pronouns she, her, hers, herself.  Therapist: None  PCP: Rangel Garcia  Other Providers: None    PREVIOUS PSYCH MED TRIALS:  - Ativan 0.5mg (prescribed but never filled)  - Nohemi's Wort  - sertraline 25mg (remote trial, \"it made me buzz when I was getting off of it\")    Pertinent Background:  See previous notes.  Psych critical item history includes remote substance use: alcohol.     Interim History     [4, 4]     The patient is a good historian, reports N/A treatment adherence and was last seen by writer on 2/20/2018 when she again declined a med trial.    She reports medical meds and supplements limited vitamin D.    Since the last visit, she's been anxious, primarily anticipating and while dealing with her boss at work.  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 I can still function but I can't clearly understand what my boss is saying\"  Trauma Related:  avoidance, startle response and hypervigilance    ADVERSE EFFECTS: n/a  MEDICAL CONCERNS: none today    APPETITE: OK, weight stable  SLEEP:  Averaging 6-8 hours most nights, wakes up anxious on night before work     Recent Substance Use:  Alcohol- quit in 2002   Caffeine- one cup of coffee daily   Cannabis- quit in 2001         Social/ Family History      [1ea,1ea]            [per patient report]                 FINANCIAL SUPPORT- [employer]  and works part time in retail, [employer]       CHILDREN- None       LIVING SITUATION- lives with sister and RAGHAVENDRA      LEGAL- None  EARLY HISTORY/ EDUCATION- She grew up in a household of women; her Dad was emotionally supportive but unable to financially support the family. He left the family before Stephanie was 9yo. Her parents  after she was conceived but they never . She grew up in [place] for her first 20 years. She lived across the  for 40 years and moved back to care for her Dad with dementia. She has one older sister.   and  twice. Graduated high school. She attended [employer] for physical education and [employer] for a graduate degree in dance.    SOCIAL/ SPIRITUAL SUPPORT- support from her sister and friends, she'd like to work at her spirituality more than she does.        CULTURAL INFLUENCES/ IMPACT- none       TRAUMA HISTORY (self-report)- None  FEELS SAFE AT HOME- Yes  FAMILY HISTORY-  Niece- treated for depression and anxiety    Medical / " Surgical History                                 Patient Active Problem List   Diagnosis     Raynaud phenomenon     Anxiety     Lumbar radiculopathy     Advanced directives, counseling/discussion     CARDIOVASCULAR SCREENING; LDL GOAL LESS THAN 160     Cluster headache syndrome     Systemic sclerosis with limited cutaneous involvement (H)     AK (actinic keratosis)     Osteoporosis     ILD (interstitial lung disease) (H)     Rheumatoid arthritis involving both hands with positive rheumatoid factor (H)     Senile osteoporosis       Past Surgical History:   Procedure Laterality Date     HYSTEROSCOPIC PLACEMENT CONTRACEPTIVE DEVICE        Medical Review of Systems         [2,10]     A comprehensive review of systems was performed and is negative other than noted in the HPI.    Denies TBI, LOC, seizures.     Allergy    Patient has no known allergies.  Current Medications        Current Outpatient Medications   Medication Sig Dispense Refill     acyclovir (ZOVIRAX) 400 MG tablet Take 1 tablet (400 mg) by mouth every 8 hours For 5 days 15 tablet 3     calcium citrate-vitamin D (CITRACAL) 315-200 MG-UNIT TABS per tablet Take 1 tablet by mouth daily       Cholecalciferol (VITAMIN D) 1000 UNITS capsule Take 1 capsule by mouth daily       desonide (DESOWEN) 0.05 % external ointment Apply topically 2 times daily To rash on the corners of the mouth mixed with nystatin as needed until clear. (Patient not taking: Reported on 1/4/2020) 15 g 1     HYDROcodone-acetaminophen (NORCO) 5-325 MG tablet Take 1 tablet by mouth every 6 hours as needed for pain (Patient not taking: Reported on 1/4/2020) 10 tablet 0     ibuprofen (ADVIL/MOTRIN) 200 MG tablet Take 200 mg by mouth every 4 hours as needed for mild pain       LORazepam (ATIVAN) 0.5 MG tablet Take 1 tablet (0.5 mg) by mouth every 6 hours as needed for anxiety (Patient not taking: Reported on 1/4/2020) 2 tablet 0     nystatin (MYCOSTATIN) 831213 UNIT/GM external ointment Apply  topically 2 times daily To rash at the corners of the mouth mixed with desonide until clear. (Patient not taking: Reported on 1/4/2020) 30 g 11     predniSONE (DELTASONE) 20 MG tablet Take 2 tablets (40 mg) by mouth daily (Patient not taking: Reported on 1/4/2020) 10 tablet 0     Vitals         [3, 3]   There were no vitals taken for this visit.   Mental Status Exam        [9, 14 cog gs]     Alertness: alert  and oriented  Appearance: adequately groomed  Behavior/Demeanor: cooperative, pleasant and calm, with fair  eye contact   Speech: normal and regular rate and rhythm  Language: no problems  Psychomotor: normal or unremarkable  Mood: anxious and worried  Affect: appropriate; was congruent to mood; was congruent to content  Thought Process/Associations: unremarkable  Thought Content:  Reports none;  Denies suicidal ideation, violent ideation, delusions, preoccupations, obsessions , phobia , magical thinking, over-valued ideas and paranoid ideation  Perception:  Reports none;  Denies auditory hallucinations, visual hallucinations, visual distortion seen as shadows , depersonalization and derealization  Insight: fair  Judgment: good  Cognition: (6) does  appear grossly intact; formal cognitive testing was not done  Gait/Station and/or Muscle Strength/Tone: n/a    Labs and Data                          Rating Scales:    N/A    PHQ9 Today:    PHQ 2/12/2018 2/20/2018 8/12/2019   PHQ-9 Total Score 12 7 10   Q9: Thoughts of better off dead/self-harm past 2 weeks Not at all Not at all Not at all     Diagnosis      NATALI, r/o panic disorder     Assessment      [m2, h3]     TODAY, the following items were discussed:    [place] Prescription Monitoring Program [] was checked today:  indicates lorazepam 0.5mg #2 last filled 10/2019.    PSYCHOTROPIC DRUG INTERACTIONS: n/a.  Drug Interaction Management: Monitoring for adverse effects, routine vitals, using lowest therapeutic dose of [psychotropics] and patient is aware of  risks    Plan                                                                                                                     m2, h3     1) MEDICATION: discussed options, risks, benefits including therapy plus meds, history of ambivalence re: psychotropics  - she chooses to schedule with therapist first before considering med trial    2) THERAPY: she declines referral options and chooses to schedule with a therapist her sister recommends    RTC: as needed    CRISIS NUMBERS:   Provided routinely in AVS.    Treatment Risk Statement:  The patient understands the risks, benefits, adverse effects and alternatives. Agrees to treatment with the capacity to do so. No medical contraindications to treatment. Agrees to call clinic for any problems. The patient understands to call 911 or go to the nearest ED if life threatening or urgent symptoms occur.     WHODAS 2.0  TODAY total score = N/A; [a 12-item WHODAS 2.0 assessment was not completed by the pt today and/or recorded in EPIC].    PROVIDER:  GRACIELA Martin CNP

## 2020-09-23 ENCOUNTER — VIRTUAL VISIT (OUTPATIENT)
Dept: FAMILY MEDICINE | Facility: CLINIC | Age: 70
End: 2020-09-23
Payer: MEDICARE

## 2020-09-23 DIAGNOSIS — R79.9 ABNORMAL FINDING OF BLOOD CHEMISTRY, UNSPECIFIED: ICD-10-CM

## 2020-09-23 DIAGNOSIS — R42 LIGHTHEADEDNESS: ICD-10-CM

## 2020-09-23 DIAGNOSIS — Z13.220 LIPID SCREENING: ICD-10-CM

## 2020-09-23 DIAGNOSIS — R27.9 UNSPECIFIED LACK OF COORDINATION: ICD-10-CM

## 2020-09-23 DIAGNOSIS — Z12.11 COLON CANCER SCREENING: ICD-10-CM

## 2020-09-23 DIAGNOSIS — R43.0 LOSS OF SMELL: ICD-10-CM

## 2020-09-23 DIAGNOSIS — R43.2 LOSS OF TASTE: Primary | ICD-10-CM

## 2020-09-23 ASSESSMENT — PATIENT HEALTH QUESTIONNAIRE - PHQ9: SUM OF ALL RESPONSES TO PHQ QUESTIONS 1-9: 9

## 2020-09-23 NOTE — PROGRESS NOTES
"Stephanie Bhatia is a 70 year old female who is being evaluated via a billable video visit.      The patient has been notified of following:     \"This video visit will be conducted via a call between you and your physician/provider. We have found that certain health care needs can be provided without the need for an in-person physical exam.  This service lets us provide the care you need with a video conversation.  If a prescription is necessary we can send it directly to your pharmacy.  If lab work is needed we can place an order for that and you can then stop by our lab to have the test done at a later time.    Video visits are billed at different rates depending on your insurance coverage.  Please reach out to your insurance provider with any questions.    If during the course of the call the physician/provider feels a video visit is not appropriate, you will not be charged for this service.\"    Patient has given verbal consent for Video visit? Yes  How would you like to obtain your AVS? MyChart  If you are dropped from the video visit, the video invite should be resent to: Connected  Will anyone else be joining your video visit? No    Subjective     Stephanie Bhatia is a 70 year old female who presents today via video visit for the following health issues:    HPI  She has noted for 1.5 years loss of smell and recently a loss of taste in the last 2 months. She has a HX of aunt with a loss of taste and smell and a friend who had a loss of taste and smell corrected with surgery. She has no viral symptoms.  She has a metallic or dog food smell daily.  No nasal congestion does not use an inhaler.   She has chronic allergies but does not use any medication worse in Spring and fall but not really bothering her. No mucous.  She is concerned about alzheimer's association with loss of smell/ taste. She denies memory concerns but has a family HX of alzheimer's dad age mid 70, paternal uncle and paternal Aunt. Mother had senile " dementia. She occasionally feels off balance no headache of other neurological concerns. She feels her memory is good  Health maintenance  Needs a fit  Test  Needs influenza vaccine  Moved into her sister's and brother in law's house.  Anxiety has increased with pandemic on the waiting list for therapy.    Patient Active Problem List   Diagnosis     Raynaud phenomenon     Anxiety     Lumbar radiculopathy     Advanced directives, counseling/discussion     CARDIOVASCULAR SCREENING; LDL GOAL LESS THAN 160     Cluster headache syndrome     Systemic sclerosis with limited cutaneous involvement (H)     AK (actinic keratosis)     Osteoporosis     ILD (interstitial lung disease) (H)     Rheumatoid arthritis involving both hands with positive rheumatoid factor (H)     Senile osteoporosis     Past Medical History:   Diagnosis Date     Anxiety      Herpes simplex without mention of complication      Interstitial lung disease (H)     scleroderma ILD, dx by chest CT 5/2017     Lung nodules     4 mm LLL and RLL 2017     Migraine     loss of speech and comprehension, has had w/u and is complex migraine, last one about 2/2017     Raynaud phenomenon      Systemic sclerosis with limited cutaneous involvement (H) 12/02/2014    Dx ~2010, Scl-70 and anti-centromere antibody neg, +RF     Family History   Problem Relation Age of Onset     C.A.D. Father         a efw small heart attacks     Neurologic Disorder Father         dementia 70s     Coronary Artery Disease Father      Neurologic Disorder Mother         dementia     Depression Mother      Cerebrovascular Disease Maternal Grandmother         ,     Other Cancer Paternal Grandmother      Rheumatoid Arthritis Sister      Hypertension Sister      Dementia Paternal Cousin      Dementia Paternal Uncle         60s     Breast Cancer No family hx of      Cancer - colorectal No family hx of      Diabetes No family hx of      Cancer No family hx of         no skin cancer     Interstitial Lung  Disease No family hx of      Melanoma No family hx of      Skin Cancer No family hx of      Glaucoma No family hx of      Macular Degeneration No family hx of      Social History     Social History Narrative    .  No children. Sister lives in [place].  Part time at [employer].   in the past    Possible asbestos exposure from basement pipes in childhood home.  Lived in  [facility] [id number].  Denies exposure to pet birds, hot tubs.  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    No ETOH no smoking  Review of Systems   Constitutional, HEENT, cardiovascular, pulmonary, gi and gu systems are negative, except as otherwise noted.      Objective           Vitals:  No vitals were obtained today due to virtual visit.    Physical Exam   Weight stable  GENERAL: Healthy, alert and no distress, no cough, no dysphonia  EYES: Eyes grossly normal to inspection.  No discharge or erythema, or obvious scleral/conjunctival abnormalities.  HEENT: NO apparent abnormalities of nose on mouth on video exam. Teeth in good repair  RESP: No audible wheeze, cough, or visible cyanosis.  No visible retractions or increased work of breathing.    SKIN: Visible skin clear. No significant rash, abnormal pigmentation or lesions.  NEURO: Cranial nerves grossly intact.  Mentation and speech appropriate for age. Memory intact  PSYCH: Mentation appears normal, affect normal/bright, judgement and insight intact, normal speech and appearance well-groomed.    PHQ 2/20/2018 8/12/2019 9/23/2020   PHQ-9 Total Score 7 10 9   Q9: Thoughts of better off dead/self-harm past 2 weeks Not at all Not at all Not at all     NATALI-7 SCORE 7/23/2018 8/12/2019   Total Score 6 (mild anxiety) -   Total Score 6 4     Brett Bhatia is a 70-year-old female previously healthy who presents today via video visit for loss of smell for approximately 1 year recent lost of taste with a perception of a foul taste metallic or dog food  perception over the last 2 months.  She denies any other neurological symptoms other than occasional lightheadedness and increased anxiety related to pandemic awaiting therapy.  She is due for influenza vaccine and would like to schedule a nurse visit will also obtain fasting labs at that visit.  She is also due for FIT testing for colon cancer screening.  I reviewed she is due for Medicare wellness she will check with the coordinators to see if this could be done virtually.  As she has a loss of taste recently I will screen her for COVID then if negative she will follow-up with ENT for next steps related to loss of taste and smell.  Her brief neurological exam appeared normal today.    Stephanie was seen today for consult.    Diagnoses and all orders for this visit:    Loss of taste  -     Asymptomatic COVID-19 Virus (Coronavirus) by PCR; Future  -     OTOLARYNGOLOGY REFERRAL; Future  -     COVID-19 Virus (Coronavirus) Antibody & Titer Reflex; Future    Loss of smell  -     Asymptomatic COVID-19 Virus (Coronavirus) by PCR; Future  -     OTOLARYNGOLOGY REFERRAL; Future  -     COVID-19 Virus (Coronavirus) Antibody & Titer Reflex; Future    Colon cancer screening  -     Fecal cancer screen FIT; Future    Lightheadedness  -     CBC with platelets differential; Future  -     Lipid panel reflex to direct LDL Fasting; Future  -     TSH with free T4 reflex; Future  -     Comprehensive metabolic panel; Future    Lipid screening  -     Lipid panel reflex to direct LDL Fasting; Future    Abnormal finding of blood chemistry, unspecified   -     Lipid panel reflex to direct LDL Fasting; Future    Unspecified lack of coordination, occasional lightheaed  -     TSH with free T4 reflex; Future      Video-Visit Details    Type of service:  Video Visit    Video Time:11:12- 11:37 AM    Originating Location (pt. Location): Home    Distant Location (provider location):  [facility]     Platform used for Video Visit:  AmWell

## 2020-09-23 NOTE — Clinical Note
She is due for influenza vaccine and would like to schedule a nurse visit will also obtain fasting labs at that visit.  She is also due for FIT testing for colon cancer screening.  I reviewed she is due for Medicare wellness she will check with the coordinators to see if this could be done virtually.  As she has a loss of taste recently I will screen her for COVID then if negative she will follow-up with ENT for next steps related to loss of taste and smell.   Best wishes,  Rangel Garcia MD

## 2020-09-23 NOTE — NURSING NOTE
Chief Complaint   Patient presents with     Consult     Pt would like this to be a Medicare wellness or consult.      Depression Response    Patient completed the PHQ-9 assessment for depression and scored >9? No pt scored a 9  Question 9 on the PHQ-9 was positive for suicidality? No    Video Visit Technology for this patient: Nancy Video Visit- Patient was left in waiting room.    Sary Maria LPN at 8:47 AM on 9/23/2020.

## 2020-09-25 ENCOUNTER — TELEPHONE (OUTPATIENT)
Dept: OTOLARYNGOLOGY | Facility: CLINIC | Age: 70
End: 2020-09-25

## 2020-09-25 NOTE — TELEPHONE ENCOUNTER
Called patient to schedule:    Appointment Type - [facility] NEW  Provider - Dr. Abdi  Appointment Notes - Loss of smell, referred by Dr. Garcia ([facility])    LV with scheduling instructions and the ENT call center number. Patient is welcome to schedule in next available slot of provider. Please waitlist if sooner availability is desired.

## 2020-09-25 NOTE — TELEPHONE ENCOUNTER
FUTURE VISIT INFORMATION      FUTURE VISIT INFORMATION:    Date: 10/2/2020    Time: 8AM    [place] [facility] [place]FORMATION:    Referring provider:  Rangel Garcia MD     Referring providers clinic[facility]    Reason for visit/diagnosis  Loss of smell, referred by Dr. Garcia ([facility]); scheduled per Pt; Pt wanted a virtual visit; ok to schedule per Penny message in chart    RECORDS REQUESTED FROM:       Clinic name Comments Records Status Imaging Status   [facility] 9/23/2020 note and referral from Rangel Garcia MD  Epic

## 2020-09-30 ENCOUNTER — ALLIED HEALTH/NURSE VISIT (OUTPATIENT)
Dept: INTERNAL MEDICINE | Facility: CLINIC | Age: 70
End: 2020-09-30
Payer: MEDICARE

## 2020-09-30 DIAGNOSIS — Z13.220 LIPID SCREENING: ICD-10-CM

## 2020-09-30 DIAGNOSIS — R27.9 UNSPECIFIED LACK OF COORDINATION: ICD-10-CM

## 2020-09-30 DIAGNOSIS — R79.9 ABNORMAL FINDING OF BLOOD CHEMISTRY, UNSPECIFIED: ICD-10-CM

## 2020-09-30 DIAGNOSIS — R43.0 LOSS OF SMELL: ICD-10-CM

## 2020-09-30 DIAGNOSIS — Z23 NEED FOR VACCINATION: Primary | ICD-10-CM

## 2020-09-30 DIAGNOSIS — R43.2 LOSS OF TASTE: ICD-10-CM

## 2020-09-30 DIAGNOSIS — R42 LIGHTHEADEDNESS: ICD-10-CM

## 2020-09-30 LAB
ALBUMIN SERPL-MCNC: 3.4 G/DL (ref 3.4–5)
ALP SERPL-CCNC: 50 U/L (ref 40–150)
ALT SERPL W P-5'-P-CCNC: 19 U/L (ref 0–50)
ANION GAP SERPL CALCULATED.3IONS-SCNC: 7 MMOL/L (ref 3–14)
AST SERPL W P-5'-P-CCNC: 19 U/L (ref 0–45)
BASOPHILS # BLD AUTO: 0.1 10E9/L (ref 0–0.2)
BASOPHILS NFR BLD AUTO: 0.8 %
BILIRUB SERPL-MCNC: 0.7 MG/DL (ref 0.2–1.3)
BUN SERPL-MCNC: 14 MG/DL (ref 7–30)
CALCIUM SERPL-MCNC: 8.8 MG/DL (ref 8.5–10.1)
CHLORIDE SERPL-SCNC: 109 MMOL/L (ref 94–109)
CHOLEST SERPL-MCNC: 180 MG/DL
CO2 SERPL-SCNC: 27 MMOL/L (ref 20–32)
CREAT SERPL-MCNC: 0.72 MG/DL (ref 0.52–1.04)
DIFFERENTIAL METHOD BLD: NORMAL
EOSINOPHIL # BLD AUTO: 0.1 10E9/L (ref 0–0.7)
EOSINOPHIL NFR BLD AUTO: 1.9 %
ERYTHROCYTE [DISTWIDTH] IN BLOOD BY AUTOMATED COUNT: 12.6 % (ref 10–15)
GFR SERPL CREATININE-BSD FRML MDRD: 84 ML/MIN/{1.73_M2}
GLUCOSE SERPL-MCNC: 79 MG/DL (ref 70–99)
HCT VFR BLD AUTO: 41.9 % (ref 35–47)
HDLC SERPL-MCNC: 55 MG/DL
HGB BLD-MCNC: 13.5 G/DL (ref 11.7–15.7)
IMM GRANULOCYTES # BLD: 0 10E9/L (ref 0–0.4)
IMM GRANULOCYTES NFR BLD: 0.5 %
LDLC SERPL CALC-MCNC: 111 MG/DL
LYMPHOCYTES # BLD AUTO: 1.4 10E9/L (ref 0.8–5.3)
LYMPHOCYTES NFR BLD AUTO: 21.4 %
MCH RBC QN AUTO: 31.6 PG (ref 26.5–33)
MCHC RBC AUTO-ENTMCNC: 32.2 G/DL (ref 31.5–36.5)
MCV RBC AUTO: 98 FL (ref 78–100)
MONOCYTES # BLD AUTO: 0.5 10E9/L (ref 0–1.3)
MONOCYTES NFR BLD AUTO: 8.3 %
NEUTROPHILS # BLD AUTO: 4.3 10E9/L (ref 1.6–8.3)
NEUTROPHILS NFR BLD AUTO: 67.1 %
NONHDLC SERPL-MCNC: 125 MG/DL
NRBC # BLD AUTO: 0 10*3/UL
NRBC BLD AUTO-RTO: 0 /100
PLATELET # BLD AUTO: 266 10E9/L (ref 150–450)
POTASSIUM SERPL-SCNC: 4.2 MMOL/L (ref 3.4–5.3)
PROT SERPL-MCNC: 6.9 G/DL (ref 6.8–8.8)
RBC # BLD AUTO: 4.27 10E12/L (ref 3.8–5.2)
SODIUM SERPL-SCNC: 142 MMOL/L (ref 133–144)
TRIGL SERPL-MCNC: 68 MG/DL
TSH SERPL DL<=0.005 MIU/L-ACNC: 1.3 MU/L (ref 0.4–4)
WBC # BLD AUTO: 6.4 10E9/L (ref 4–11)

## 2020-09-30 NOTE — NURSING NOTE
Chief Complaint   Patient presents with     Imm/Inj     Pt here for flu shot     Stephanie Bhatia comes into clinic today at the request of Dr. Garcia. Ordering Provider for Flu vaccination    Pt received injection and tolerated well    This service provided today was under the supervising provider of the day Dr. Dubose, who was available if needed.    Kimberly H. Nissen

## 2020-10-01 DIAGNOSIS — Z12.11 COLON CANCER SCREENING: ICD-10-CM

## 2020-10-01 LAB
COVID-19 SPIKE RBD ABY TITER: NORMAL
COVID-19 SPIKE RBD ABY: NEGATIVE

## 2020-10-01 PROCEDURE — 82274 ASSAY TEST FOR BLOOD FECAL: CPT | Performed by: FAMILY MEDICINE

## 2020-10-02 ENCOUNTER — TELEPHONE (OUTPATIENT)
Dept: OTOLARYNGOLOGY | Facility: CLINIC | Age: 70
End: 2020-10-02

## 2020-10-02 ENCOUNTER — PRE VISIT (OUTPATIENT)
Dept: OTOLARYNGOLOGY | Facility: CLINIC | Age: 70
End: 2020-10-02

## 2020-10-02 ENCOUNTER — VIRTUAL VISIT (OUTPATIENT)
Dept: OTOLARYNGOLOGY | Facility: CLINIC | Age: 70
End: 2020-10-02
Attending: FAMILY MEDICINE
Payer: MEDICARE

## 2020-10-02 VITALS — HEIGHT: 65 IN | WEIGHT: 130 LBS | BODY MASS INDEX: 21.66 KG/M2

## 2020-10-02 DIAGNOSIS — R44.2 PHANTOSMIA: Primary | ICD-10-CM

## 2020-10-02 DIAGNOSIS — R43.2 LOSS OF TASTE: ICD-10-CM

## 2020-10-02 DIAGNOSIS — R43.0 LOSS OF SMELL: ICD-10-CM

## 2020-10-02 DIAGNOSIS — J34.89 NASAL CRUSTING: ICD-10-CM

## 2020-10-02 PROCEDURE — 99204 OFFICE O/P NEW MOD 45 MIN: CPT | Mod: 95 | Performed by: OTOLARYNGOLOGY

## 2020-10-02 ASSESSMENT — PAIN SCALES - GENERAL: PAINLEVEL: NO PAIN (0)

## 2020-10-02 ASSESSMENT — MIFFLIN-ST. JEOR: SCORE: 1100.56

## 2020-10-02 NOTE — PATIENT INSTRUCTIONS
You were seen in the ENT clinic today with Dr. Abdi    Recommendations for you:    -MRI      We would like you to follow up in-clinic, following MRI      Please call our clinic for any questions, concerns, and/or worsening symptoms.      Clinic #[phone]       Option 1 for scheduling.    Thank you for allowing us to be apart of your care!    Brooklyn VELEZ RNCC    If you need to reach me my direct line is: [phone]

## 2020-10-02 NOTE — TELEPHONE ENCOUNTER
LVM after being unable to reach pt on multiple attempts. Stated Dr. Abdi would like to have pt followu up after imaging. Left ENT scheduling number for pt to make appt on her own time.       Scheduling instructions: Please assist with scheduling MRI (order in Epic), and follow-up in clinic after imaging is completed

## 2020-10-02 NOTE — LETTER
"10/2/2020       RE: Stephanie Bhatia  [address]  [facility] [address]     Dear Colleague,    Thank you for referring your patient, Stephanie Bhatia, to the [facility] [place] at [facility]. Please see a copy of my visit note below.    Stephanie Bhaita is a 70 year old female who is being evaluated via a billable video visit.      The patient has been notified of following:     \"This video visit will be conducted via a call between you and your physician/provider. We have found that certain health care needs can be provided without the need for an in-person physical exam.  This service lets us provide the care you need with a video conversation.  If a prescription is necessary we can send it directly to your pharmacy.  If lab work is needed we can place an order for that and you can then stop by our lab to have the test done at a later time.    Video visits are billed at different rates depending on your insurance coverage.  Please reach out to your insurance provider with any questions.    If during the course of the call the physician/provider feels a video visit is not appropriate, you will not be charged for this service.\"    Patient has given verbal consent for Video visit? Yes  How would you like to obtain your AVS? MyChart  If you are dropped from the video visit, the video invite should be resent to: Send to e-mail at: [email or web address]  Will anyone else be joining your video visit? No        Video-Visit Details    Type of service:  Video Visit    Video Start Time: 2:00pm  Video End Time: 2:20pm    Originating Location (pt. Location): Home    Distant Location (provider location):  [facility] [place]     Platform used for Video Visit: Nancy Abdi MD                         [facility]                   New Patient Visit      Encounter date: October 2, " "2020    Referring Provider: Rangel Garcia MD  [address]  [place] [address]    Chief Complaint: anosmia, nasal crusting, nasal congestion    History of Present Illness: I am seeing Stephanie Bhatia at the request of Dr. Garcia as a consult for anosmia Stephanie has had approximately 2 years of loss of smell, more recently a loss of taste in the last 2 months.  Associated with this, she notes a foul smell coming from her nose, which she describes as, \"smelling like her insides\".  She notes a history of frequent nose blowing and nasal congestion, associated with some mild nasal crusting as well.  She denies any purulent nasal discharge either anteriorly or posteriorly.  She has family members with a history of Alzheimer's dementia who also reported loss of smell and taste, so there is somewhat of a concern regarding early onset dementia.  She otherwise denies pain or pressure in the face, headaches, history of asthma, or history of allergic rhinitis.  She did have an MRI of her brain in March 2014 which I did review and found no significant evidence of disease which may be causing her anosmia, however she had no anosmia at this time.  There is no history of sinonasal surgery.  She denies any history of head trauma.  She denies any history of viral infections prior to the onset of her anosmia.  She does report that she has cousin who also had anosmia, and had an unknown surgery which resolved her anosmia.       Review of systems: A 14-point review of systems has been conducted and was negative for any notable symptoms, except as dictated in the history of present illness.     Past Medical History:   Diagnosis Date     Anxiety      Herpes simplex without mention of complication      Interstitial lung disease (H)     scleroderma ILD, dx by chest CT 5/2017     Lung nodules     4 mm LLL and RLL 2017     Migraine     loss of speech and comprehension, has had w/u and is complex migraine, last one about " 2017     Raynaud phenomenon      Systemic sclerosis with limited cutaneous involvement (H) 2014    Dx ~2010, Scl-70 and anti-centromere antibody neg, +RF        Past Surgical History:   Procedure Laterality Date     HYSTEROSCOPIC PLACEMENT CONTRACEPTIVE DEVICE          Family History   Problem Relation Age of Onset     C.A.D. Father         a efw small heart attacks     Neurologic Disorder Father         dementia 70s     Coronary Artery Disease Father      Neurologic Disorder Mother         dementia     Depression Mother      Cerebrovascular Disease Maternal Grandmother         ,     Other Cancer Paternal Grandmother      Rheumatoid Arthritis Sister      Hypertension Sister      Dementia Paternal Cousin      Dementia Paternal Uncle         60s     Breast Cancer No family hx of      Cancer - colorectal No family hx of      Diabetes No family hx of      Cancer No family hx of         no skin cancer     Interstitial Lung Disease No family hx of      Melanoma No family hx of      Skin Cancer No family hx of      Glaucoma No family hx of      Macular Degeneration No family hx of         Social History     Socioeconomic History     Marital status: Single     Spouse name:      Number of children: Not on file     Years of education: Not on file     Highest education level: Not on file   Occupational History     Occupation:      Comment: [employer] - part time   Social Needs     Financial resource strain: Not on file     Food insecurity     Worry: Not on file     Inability: Not on file     Transportation needs     Medical: Not on file     Non-medical: Not on file   Tobacco Use     Smoking status: Former Smoker     Packs/day: 1.00     Years: 20.00     Pack years: 20.00     Types: Cigarettes     Quit date: 1986     Years since quittin.8     Smokeless tobacco: Never Used   Substance and Sexual Activity     Alcohol use: Not Currently     Comment: none since      Drug use: No      "Sexual activity: Not Currently     Partners: Male   Lifestyle     Physical activity     Days per week: Not on file     Minutes per session: Not on file     Stress: Not on file   Relationships     Social connections     Talks on phone: Not on file     Gets together: Not on file     Attends [employer]an service: Not on file     Active member of club or organization: Not on file     Attends meetings of clubs or organizations: Not on file     Relationship status: Not on file     Intimate partner violence     Fear of current or ex partner: Not on file     Emotionally abused: Not on file     Physically abused: Not on file     Forced sexual activity: Not on file   Other Topics Concern     Parent/sibling w/ CABG, MI or angioplasty before 65F 55M? No   Social History Narrative    .  No children. Sister lives in [place].  Part time at [employer].   in the past    Possible asbestos exposure from basement pipes in childhood home.  Lived in  [facility] [id number].  Denies exposure to pet birds, hot tubs.  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        Physical Exam:  Vital signs: Ht 1.651 m (5' 5\")   Wt 59 kg (130 lb)   BMI 21.63 kg/m     General Appearance: No acute distress, appropriate demeanor, conversant  Eyes: moist conjunctivae; EOMI; pupils symmetric; visual acuity grossly intact; no proptosis  Head: normocephalic; overall symmetric appearance without deformity  Face: overall symmetric without deformity; HB I/VI  Ears: Normal appearance of external ear;   Nose: No external deformity;  Extremities: No deformity  Neurologic Exam: Cranial nerves II-XII are grossly intact; no focal deficit    Laboratory Review:  COVID-19 Antibody Screen Negative    Comment: No COVID-19 antibodies detected.  Patients within 10 days of symptom onset for    COVID-19 may not produce sufficient levels of detectable antibodies.     Immunocompromised COVID-19 patients may take longer to develop " antibodies.        Imaging Review:  Reviewed MRA brain from 3/2014    Pathology Review:  n/a    Assessment/Medical Decision Making/Plan:  Anosmia  Dysgeusia  Nasal crusting  Phantosmia    We discussed multiple potential etiologies for Ms. Bhatia's smell loss, including neuropsychiatric causes, chronic sinus inflammation, tumor, post viral, traumatic, or idiopathic.  My suspicion is that this may be idiopathic smell loss.  I discussed with Ms. Bhatia my process for working up anosmia, which includes starting with an MRI of the brain.  We will go ahead and schedule this exam for her.  Regarding the foul smell which she can sense coming from her nose: This may be nasal crusting or other phenomenon of her smell loss.  Will further evaluate with a office nasal endoscopy. Mrs. Bhatia will return after her MRI at which point time I will perform an endoscopy and an UPSIT to further quantify her degree of smell loss.       Getachew Abdi MD    [facility]  Rhinology  Endoscopic Skull Base Surgery  [facility]  Department of Otolaryngology - Head & Neck Surgery

## 2020-10-02 NOTE — NURSING NOTE
"Chief Complaint   Patient presents with     Consult     Loss of smell       Height 1.651 m (5' 5\"), weight 59 kg (130 lb), not currently breastfeeding.    Мария Barba, EMT    "

## 2020-10-02 NOTE — PROGRESS NOTES
"Stephanie Bhatia is a 70 year old female who is being evaluated via a billable video visit.      The patient has been notified of following:     \"This video visit will be conducted via a call between you and your physician/provider. We have found that certain health care needs can be provided without the need for an in-person physical exam.  This service lets us provide the care you need with a video conversation.  If a prescription is necessary we can send it directly to your pharmacy.  If lab work is needed we can place an order for that and you can then stop by our lab to have the test done at a later time.    Video visits are billed at different rates depending on your insurance coverage.  Please reach out to your insurance provider with any questions.    If during the course of the call the physician/provider feels a video visit is not appropriate, you will not be charged for this service.\"    Patient has given verbal consent for Video visit? Yes  How would you like to obtain your AVS? MyChart  If you are dropped from the video visit, the video invite should be resent to: Send to e-mail at: [email or web address]  Will anyone else be joining your video visit? No        Video-Visit Details    Type of service:  Video Visit    Video Start Time: 2:00pm  Video End Time: 2:20pm    Originating Location (pt. Location): Home    Distant Location (provider location):  [facility] [place]     Platform used for Video Visit: Nancy Abdi MD                         [facility]                   New Patient Visit      Encounter date: October 2, 2020    Referring Provider: Rangel Garcia MD  [address] [place] [id number]    Chief Complaint: anosmia, nasal crusting, nasal congestion    History of Present Illness: I am seeing Stephanie Bhatia at the request of Dr. Garcia as a consult for anosmia Stephanie has had approximately 2 years of loss of smell, " "more recently a loss of taste in the last 2 months.  Associated with this, she notes a foul smell coming from her nose, which she describes as, \"smelling like her insides\".  She notes a history of frequent nose blowing and nasal congestion, associated with some mild nasal crusting as well.  She denies any purulent nasal discharge either anteriorly or posteriorly.  She has family members with a history of Alzheimer's dementia who also reported loss of smell and taste, so there is somewhat of a concern regarding early onset dementia.  She otherwise denies pain or pressure in the face, headaches, history of asthma, or history of allergic rhinitis.  She did have an MRI of her brain in March 2014 which I did review and found no significant evidence of disease which may be causing her anosmia, however she had no anosmia at this time.  There is no history of sinonasal surgery.  She denies any history of head trauma.  She denies any history of viral infections prior to the onset of her anosmia.  She does report that she has cousin who also had anosmia, and had an unknown surgery which resolved her anosmia.       Review of systems: A 14-point review of systems has been conducted and was negative for any notable symptoms, except as dictated in the history of present illness.     Past Medical History:   Diagnosis Date     Anxiety      Herpes simplex without mention of complication      Interstitial lung disease (H)     scleroderma ILD, dx by chest CT 5/2017     Lung nodules     4 mm LLL and RLL 2017     Migraine     loss of speech and comprehension, has had w/u and is complex migraine, last one about 2/2017     Raynaud phenomenon      Systemic sclerosis with limited cutaneous involvement (H) 12/02/2014    Dx ~2010, Scl-70 and anti-centromere antibody neg, +RF        Past Surgical History:   Procedure Laterality Date     HYSTEROSCOPIC PLACEMENT CONTRACEPTIVE DEVICE          Family History   Problem Relation Age of Onset     " ABRAHAM Father         a efw small heart attacks     Neurologic Disorder Father         dementia 70s     Coronary Artery Disease Father      Neurologic Disorder Mother         dementia     Depression Mother      Cerebrovascular Disease Maternal Grandmother         ,     Other Cancer Paternal Grandmother      Rheumatoid Arthritis Sister      Hypertension Sister      Dementia Paternal Cousin      Dementia Paternal Uncle         60s     Breast Cancer No family hx of      Cancer - colorectal No family hx of      Diabetes No family hx of      Cancer No family hx of         no skin cancer     Interstitial Lung Disease No family hx of      Melanoma No family hx of      Skin Cancer No family hx of      Glaucoma No family hx of      Macular Degeneration No family hx of         Social History     Socioeconomic History     Marital status: Single     Spouse name:      Number of children: Not on file     Years of education: Not on file     Highest education level: Not on file   Occupational History     Occupation:      Comment: [email or web address] - part time   Social Needs     Financial resource strain: Not on file     Food insecurity     Worry: Not on file     Inability: Not on file     Transportation needs     Medical: Not on file     Non-medical: Not on file   Tobacco Use     Smoking status: Former Smoker     Packs/day: 1.00     Years: 20.00     Pack years: 20.00     Types: Cigarettes     Quit date: 1986     Years since quittin.8     Smokeless tobacco: Never Used   Substance and Sexual Activity     Alcohol use: Not Currently     Comment: none since      Drug use: No     Sexual activity: Not Currently     Partners: Male   Lifestyle     Physical activity     Days per week: Not on file     Minutes per session: Not on file     Stress: Not on file   Relationships     Social connections     Talks on phone: Not on file     Gets together: Not on file     Attends Quaker service: Not on file      "Active member of club or organization: Not on file     Attends meetings of clubs or organizations: Not on file     Relationship status: Not on file     Intimate partner violence     Fear of current or ex partner: Not on file     Emotionally abused: Not on file     Physically abused: Not on file     Forced sexual activity: Not on file   Other Topics Concern     Parent/sibling w/ CABG, MI or angioplasty before 65F 55M? No   Social History Narrative    .  No children. Sister lives in [place].  Part time at [employer].   in the past    Possible asbestos exposure from basement pipes in childhood home.  Lived in  [facility] [id number].  Denies exposure to pet birds, hot tubs.  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        Physical Exam:  Vital signs: Ht 1.651 m (5' 5\")   Wt 59 kg (130 lb)   BMI 21.63 kg/m     General Appearance: No acute distress, appropriate demeanor, conversant  Eyes: moist conjunctivae; EOMI; pupils symmetric; visual acuity grossly intact; no proptosis  Head: normocephalic; overall symmetric appearance without deformity  Face: overall symmetric without deformity; HB I/VI  Ears: Normal appearance of external ear;   Nose: No external deformity;  Extremities: No deformity  Neurologic Exam: Cranial nerves II-XII are grossly intact; no focal deficit    Laboratory Review:  COVID-19 Antibody Screen Negative    Comment: No COVID-19 antibodies detected.  Patients within 10 days of symptom onset for    COVID-19 may not produce sufficient levels of detectable antibodies.     Immunocompromised COVID-19 patients may take longer to develop antibodies.        Imaging Review:  Reviewed MRA brain from 3/2014    Pathology Review:  n/a    Assessment/Medical Decision Making/Plan:  Anosmia  Dysgeusia  Nasal crusting  Phantosmia    We discussed multiple potential etiologies for Ms. Bhatia's smell loss, including neuropsychiatric causes, chronic sinus inflammation, " tumor, post viral, traumatic, or idiopathic.  My suspicion is that this may be idiopathic smell loss.  I discussed with Ms. Bhatai my process for working up anosmia, which includes starting with an MRI of the brain.  We will go ahead and schedule this exam for her.  Regarding the foul smell which she can sense coming from her nose: This may be nasal crusting or other phenomenon of her smell loss.  Will further evaluate with a office nasal endoscopy. Mrs. Bhatia will return after her MRI at which point time I will perform an endoscopy and an UPSIT to further quantify her degree of smell loss.       Getachew Abdi MD    [facility]  Rhinology  Endoscopic Skull Base Surgery  [facility]  Department of Otolaryngology - Head & Neck Surgery

## 2020-10-06 LAB — HEMOCCULT STL QL IA: NEGATIVE

## 2020-10-09 ENCOUNTER — VIRTUAL VISIT (OUTPATIENT)
Dept: PULMONOLOGY | Facility: CLINIC | Age: 70
End: 2020-10-09
Attending: INTERNAL MEDICINE
Payer: MEDICARE

## 2020-10-09 DIAGNOSIS — J84.9 ILD (INTERSTITIAL LUNG DISEASE) (H): Primary | ICD-10-CM

## 2020-10-09 PROCEDURE — 99214 OFFICE O/P EST MOD 30 MIN: CPT | Mod: 95 | Performed by: INTERNAL MEDICINE

## 2020-10-09 NOTE — PROGRESS NOTES
"Stephanie Bhatia is a 70 year old female who is being evaluated via a billable video visit.      The patient has been notified of following:     \"This video visit will be conducted via a call between you and your physician/provider. We have found that certain health care needs can be provided without the need for an in-person physical exam.  This service lets us provide the care you need with a video conversation.  If a prescription is necessary we can send it directly to your pharmacy.  If lab work is needed we can place an order for that and you can then stop by our lab to have the test done at a later time.    Video visits are billed at different rates depending on your insurance coverage.  Please reach out to your insurance provider with any questions.    If during the course of the call the physician/provider feels a video visit is not appropriate, you will not be charged for this service.\"    Patient has given verbal consent for Video visit? Yes  How would you like to obtain your AVS? MyChart  If you are dropped from the video visit, the video invite should be resent to: Text to cell phone: [phone]  Will anyone else be joining your video visit? No        Video-Visit Details    Type of service:  Video Visit    Video Start Time: 10:51AM  Video End Time:  11:13AM    Originating Location (pt. Location): Home    Distant Location (provider location):  [facility] FOR LUNG SCIENCE AND [facility]     Platform used for Video Visit: [facility]    Reason for Visit  Stephanie Bhatia is a 70 year old year old female who is being seen for RECHECK (Return video visit )    HPI  Ms. Bhatia is a 70-year-old with scleroderma interstitial lung disease with whom I had a video visit today.  She was initially diagnosed with scleroderma in about 2009 or 2010.  Scleroderma ILD was first diagnosed on chest CT scan in 2/2017.  She also had a few " nodules that were small, 4 mm and did not require follow-up as she was low risk.  She has not required treatment for scleroderma ILD to date.        Today, she reports that she is doing well.  She reports no worsening in her breathing or development of a cough since her last visit with me a year and a half ago.  She is able to do activities without feeling short of breath.  However, she does endorse feeling a little short of breath if she walks up a flight of stairs at a fast pace.  She denies paroxysmal nocturnal dyspnea, fevers, new skin rashes, worsening skin hardness, or new joint pains.  She does have an occasional cough associated with allergies.  She is up-to-date on her pneumonia vaccines.  She did receive her flu vaccine.  She lives with her sister and brother-in-law, and she practices social distancing and wears a mask when she leaves the house.  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  They do curbside  only, and customers do not enter the store.  She does walk her dog every day for 45 minutes in the morning and 25 minutes in the evening.  She goes for occasional bike rides as well.            Current Outpatient Medications   Medication     acyclovir (ZOVIRAX) 400 MG tablet     Cholecalciferol (VITAMIN D) 1000 UNITS capsule     desonide (DESOWEN) 0.05 % external ointment     ibuprofen (ADVIL/MOTRIN) 200 MG tablet     nystatin (MYCOSTATIN) 258881 UNIT/GM external ointment     vitamin B complex with vitamin C (VITAMIN  B COMPLEX) tablet     No current facility-administered medications for this visit.      No Known Allergies  Past Medical History:   Diagnosis Date     Anxiety      Herpes simplex without mention of complication      Interstitial lung disease (H)     scleroderma ILD, dx by chest CT 5/2017     Lung nodules     4 mm LLL and RLL 2017     Migraine     loss of speech and comprehension, has had w/u and is complex migraine, last one about 2/2017      Raynaud phenomenon      Systemic sclerosis with limited cutaneous involvement (H) 2014    Dx ~2010, Scl-70 and anti-centromere antibody neg, +RF       Past Surgical History:   Procedure Laterality Date     HYSTEROSCOPIC PLACEMENT CONTRACEPTIVE DEVICE         Social History     Socioeconomic History     Marital status: Single     Spouse name:      Number of children: Not on file     Years of education: Not on file     Highest education level: Not on file   Occupational History     Occupation:      Comment: [employer] - part time   Social Needs     Financial resource strain: Not on file     Food insecurity     Worry: Not on file     Inability: Not on file     Transportation needs     Medical: Not on file     Non-medical: Not on file   Tobacco Use     Smoking status: Former Smoker     Packs/day: 1.00     Years: 20.00     Pack years: 20.00     Types: Cigarettes     Quit date: 1986     Years since quittin.8     Smokeless tobacco: Never Used   Substance and Sexual Activity     Alcohol use: Not Currently     Comment: none since      Drug use: No     Sexual activity: Not Currently     Partners: Male   Lifestyle     Physical activity     Days per week: Not on file     Minutes per session: Not on file     Stress: Not on file   Relationships     Social connections     Talks on phone: Not on file     Gets together: Not on file     Attends [employer] service: Not on file     Active member of club or organization: Not on file     Attends meetings of clubs or organizations: Not on file     Relationship status: Not on file     Intimate partner violence     Fear of current or ex partner: Not on file     Emotionally abused: Not on file     Physically abused: Not on file     Forced sexual activity: Not on file   Other Topics Concern     Parent/sibling w/ CABG, MI or angioplasty before 65F 55M? No   Social History Narrative    .  No children. Sister lives in [place].  Part time at  [employer].   in the past    Possible asbestos exposure from basement pipes in childhood home.  Lived in  [facility] [id number].  Denies exposure to pet birds, hot tubs.  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       Family History   Problem Relation Age of Onset     C.A.D. Father         a efw small heart attacks     Neurologic Disorder Father         dementia 70s     Coronary Artery Disease Father      Neurologic Disorder Mother         dementia     Depression Mother      Cerebrovascular Disease Maternal Grandmother         ,     Other Cancer Paternal Grandmother      Rheumatoid Arthritis Sister      Hypertension Sister      Dementia Paternal Cousin      Dementia Paternal Uncle         60s     Breast Cancer No family hx of      Cancer - colorectal No family hx of      Diabetes No family hx of      Cancer No family hx of         no skin cancer     Interstitial Lung Disease No family hx of      Melanoma No family hx of      Skin Cancer No family hx of      Glaucoma No family hx of      Macular Degeneration No family hx of            Assessment and plan: Ms. Bhatia is a 70-year-old with scleroderma interstitial lung disease with whom I had a video visit today.    1.  Scleroderma ILD.  Symptomatically, she is stable and has no dyspnea on exertion.  Last PFT in April 2019 was normal.  I encouraged her to walk her dog every day as she is doing.  I also advised her to continue to practice social distancing and wear a mask as she is doing.  There is no reason to start medication for her scleroderma ILD at this time.  If her ILD worsens in the future, then we could consider on nintedanib or mycophenolate for therapy.  I would like to see her back in 6 to 8 months with full PFT.  2.  History of lung nodules.  There were 2 small lung nodules in the left lower lobe and one in the right lower lobe that were 4 mm in 2017.  No follow-up is required as she is low risk for lung  cancer.

## 2020-10-09 NOTE — LETTER
"    10/9/2020         RE: Stephanie Bhatia  [address]  [place] [address]        Dear Colleague,    Thank you for referring your patient, Stephanie Bhatia, to the [facility] AND [facility]. Please see a copy of my visit note below.    Stephanie Bhatia is a 70 year old female who is being evaluated via a billable video visit.      The patient has been notified of following:     \"This video visit will be conducted via a call between you and your physician/provider. We have found that certain health care needs can be provided without the need for an in-person physical exam.  This service lets us provide the care you need with a video conversation.  If a prescription is necessary we can send it directly to your pharmacy.  If lab work is needed we can place an order for that and you can then stop by our lab to have the test done at a later time.    Video visits are billed at different rates depending on your insurance coverage.  Please reach out to your insurance provider with any questions.    If during the course of the call the physician/provider feels a video visit is not appropriate, you will not be charged for this service.\"    Patient has given verbal consent for Video visit? Yes  How would you like to obtain your AVS? MyChart  If you are dropped from the video visit, the video invite should be resent to: Text to cell phone: [phone]  Will anyone else be joining your video visit? No        Video-Visit Details    Type of service:  Video Visit    Video Start Time: 10:51AM  Video End Time:  11:13AM    Originating Location (pt. Location): Home    Distant Location (provider location):  [facility] LUN[facility] AND [facility]     Platform used for Video Visit: [employer]        [facility] Inter[facility] Disease [facility]    Reason for Visit  Stephanie Bhatia is a 70 year old year old female who is being seen for RECHECK (Return " video visit )    HPI  Ms. Bhatia is a 70-year-old with scleroderma interstitial lung disease with whom I had a video visit today.  She was initially diagnosed with scleroderma in about 2009 or 2010.  Scleroderma ILD was first diagnosed on chest CT scan in 2/2017.  She also had a few nodules that were small, 4 mm and did not require follow-up as she was low risk.  She has not required treatment for scleroderma ILD to date.        Today, she reports that she is doing well.  She reports no worsening in her breathing or development of a cough since her last visit with me a year and a half ago.  She is able to do activities without feeling short of breath.  However, she does endorse feeling a little short of breath if she walks up a flight of stairs at a fast pace.  She denies paroxysmal nocturnal dyspnea, fevers, new skin rashes, worsening skin hardness, or new joint pains.  She does have an occasional cough associated with allergies.  She is up-to-date on her pneumonia vaccines.  She did receive her flu vaccine.  She lives with her sister and brother-in-law, and she practices social distancing and wears a mask when she leaves the house.  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  They do curbside  only, and customers do not enter the store.  She does walk her dog every day for 45 minutes in the morning and 25 minutes in the evening.  She goes for occasional bike rides as well.            Current Outpatient Medications   Medication     acyclovir (ZOVIRAX) 400 MG tablet     Cholecalciferol (VITAMIN D) 1000 UNITS capsule     desonide (DESOWEN) 0.05 % external ointment     ibuprofen (ADVIL/MOTRIN) 200 MG tablet     nystatin (MYCOSTATIN) 491671 UNIT/GM external ointment     vitamin B complex with vitamin C (VITAMIN  B COMPLEX) tablet     No current facility-administered medications for this visit.      No Known Allergies  Past Medical History:   Diagnosis Date     Anxiety       Herpes simplex without mention of complication      Interstitial lung disease (H)     scleroderma ILD, dx by chest CT 2017     Lung nodules     4 mm LLL and RLL 2017     Migraine     loss of speech and comprehension, has had w/u and is complex migraine, last one about 2017     Raynaud phenomenon      Systemic sclerosis with limited cutaneous involvement (H) 2014    Dx ~2010, Scl-70 and anti-centromere antibody neg, +RF       Past Surgical History:   Procedure Laterality Date     HYSTEROSCOPIC PLACEMENT CONTRACEPTIVE DEVICE         Social History     Socioeconomic History     Marital status: Single     Spouse name:      Number of children: Not on file     Years of education: Not on file     Highest education level: Not on file   Occupational History     Occupation:      Comment: [employer] - part time   Social Needs     Financial resource strain: Not on file     Food insecurity     Worry: Not on file     Inability: Not on file     Transportation needs     Medical: Not on file     Non-medical: Not on file   Tobacco Use     Smoking status: Former Smoker     Packs/day: 1.00     Years: 20.00     Pack years: 20.00     Types: Cigarettes     Quit date: 1986     Years since quittin.8     Smokeless tobacco: Never Used   Substance and Sexual Activity     Alcohol use: Not Currently     Comment: none since      Drug use: No     Sexual activity: Not Currently     Partners: Male   Lifestyle     Physical activity     Days per week: Not on file     Minutes per session: Not on file     Stress: Not on file   Relationships     Social connections     Talks on phone: Not on file     Gets together: Not on file     Attends [employer] service: Not on file     Active member of club or organization: Not on file     Attends meetings of clubs or organizations: Not on file     Relationship status: Not on file     Intimate partner violence     Fear of current or ex partner: Not on file      Emotionally abused: Not on file     Physically abused: Not on file     Forced sexual activity: Not on file   Other Topics Concern     Parent/sibling w/ CABG, MI or angioplasty before 65F 55M? No   Social History Narrative    .  No children. Sister lives in [place].  Part time at [employer].   in the past    Possible asbestos exposure from basement pipes in childhood home.  Lived in  [facility] [phone].  Denies exposure to pet birds, hot tubs.  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       Family History   Problem Relation Age of Onset     C.A.D. Father         a efw small heart attacks     Neurologic Disorder Father         dementia 70s     Coronary Artery Disease Father      Neurologic Disorder Mother         dementia     Depression Mother      Cerebrovascular Disease Maternal Grandmother         ,     Other Cancer Paternal Grandmother      Rheumatoid Arthritis Sister      Hypertension Sister      Dementia Paternal Cousin      Dementia Paternal Uncle         60s     Breast Cancer No family hx of      Cancer - colorectal No family hx of      Diabetes No family hx of      Cancer No family hx of         no skin cancer     Interstitial Lung Disease No family hx of      Melanoma No family hx of      Skin Cancer No family hx of      Glaucoma No family hx of      Macular Degeneration No family hx of            Assessment and plan: Ms. Bhatia is a 70-year-old with scleroderma interstitial lung disease with whom I had a video visit today.    1.  Scleroderma ILD.  Symptomatically, she is stable and has no dyspnea on exertion.  Last PFT in April 2019 was normal.  I encouraged her to walk her dog every day as she is doing.  I also advised her to continue to practice social distancing and wear a mask as she is doing.  There is no reason to start medication for her scleroderma ILD at this time.  If her ILD worsens in the future, then we could consider on nintedanib or  mycophenolate for therapy.  I would like to see her back in 6 to 8 months with full PFT.  2.  History of lung nodules.  There were 2 small lung nodules in the left lower lobe and one in the right lower lobe that were 4 mm in 2017.  No follow-up is required as she is low risk for lung cancer.  Sincerely,      Tiffanie Gomez MD

## 2020-11-06 ENCOUNTER — ANCILLARY PROCEDURE (OUTPATIENT)
Dept: MRI IMAGING | Facility: CLINIC | Age: 70
End: 2020-11-06
Attending: OTOLARYNGOLOGY
Payer: MEDICARE

## 2020-11-06 ENCOUNTER — OFFICE VISIT (OUTPATIENT)
Dept: OTOLARYNGOLOGY | Facility: CLINIC | Age: 70
End: 2020-11-06
Payer: MEDICARE

## 2020-11-06 VITALS
HEIGHT: 65 IN | TEMPERATURE: 97.4 F | HEART RATE: 66 BPM | OXYGEN SATURATION: 98 % | WEIGHT: 126 LBS | BODY MASS INDEX: 20.99 KG/M2

## 2020-11-06 DIAGNOSIS — R44.2 PHANTOSMIA: ICD-10-CM

## 2020-11-06 DIAGNOSIS — R43.0 LOSS OF SMELL: Primary | ICD-10-CM

## 2020-11-06 DIAGNOSIS — R09.81 NASAL CONGESTION: ICD-10-CM

## 2020-11-06 PROCEDURE — 70553 MRI BRAIN STEM W/O & W/DYE: CPT | Mod: GC | Performed by: RADIOLOGY

## 2020-11-06 PROCEDURE — 99213 OFFICE O/P EST LOW 20 MIN: CPT | Mod: 25 | Performed by: OTOLARYNGOLOGY

## 2020-11-06 PROCEDURE — A9585 GADOBUTROL INJECTION: HCPCS | Performed by: RADIOLOGY

## 2020-11-06 PROCEDURE — 31231 NASAL ENDOSCOPY DX: CPT | Performed by: OTOLARYNGOLOGY

## 2020-11-06 RX ORDER — GADOBUTROL 604.72 MG/ML
7.5 INJECTION INTRAVENOUS ONCE
Status: COMPLETED | OUTPATIENT
Start: 2020-11-06 | End: 2020-11-06

## 2020-11-06 RX ADMIN — GADOBUTROL 7.5 ML: 604.72 INJECTION INTRAVENOUS at 09:44

## 2020-11-06 ASSESSMENT — MIFFLIN-ST. JEOR: SCORE: 1082.41

## 2020-11-06 ASSESSMENT — PAIN SCALES - GENERAL: PAINLEVEL: NO PAIN (0)

## 2020-11-06 NOTE — PROGRESS NOTES
"                   [facility]                   Return Patient Visit      Encounter date: November 6, 2020    Referring Provider:   Rangel Garcia MD  [address]  [place] [address]    Chief Complaint: anosmia, nasal crusting, nasal congestion    History of Present Illness/Initial Visit: I am seeing Stephanie Bhatia at the request of Dr. Garcia as a consult for anosmia Stephanie has had approximately 2 years of loss of smell, more recently a loss of taste in the last 2 months.  Associated with this, she notes a foul smell coming from her nose, which she describes as, \"smelling like her insides\".  She notes a history of frequent nose blowing and nasal congestion, associated with some mild nasal crusting as well.  She denies any purulent nasal discharge either anteriorly or posteriorly.  She has family members with a history of Alzheimer's dementia who also reported loss of smell and taste, so there is somewhat of a concern regarding early onset dementia.  She otherwise denies pain or pressure in the face, headaches, history of asthma, or history of allergic rhinitis.  She did have an MRI of her brain in March 2014 which I did review and found no significant evidence of disease which may be causing her anosmia, however she had no anosmia at this time.  There is no history of sinonasal surgery.  She denies any history of head trauma.  She denies any history of viral infections prior to the onset of her anosmia.  She does report that she has cousin who also had anosmia, and had an unknown surgery which resolved her anosmia.     Interval visit:  Returns for follow up  No sig change to symptoms  Still c/o foul smell from nose  Taste intact, but somewhat dimished  UPSIT today - 32/40      Review of systems: A 14-point review of systems has been conducted and was negative for any notable symptoms, except as dictated in the history of present illness.     Past Medical History:   Diagnosis Date     " Anxiety      Herpes simplex without mention of complication      Interstitial lung disease (H)     scleroderma ILD, dx by chest CT 5/2017     Lung nodules     4 mm LLL and RLL 2017     Migraine     loss of speech and comprehension, has had w/u and is complex migraine, last one about 2/2017     Raynaud phenomenon      Systemic sclerosis with limited cutaneous involvement (H) 12/02/2014    Dx ~2010, Scl-70 and anti-centromere antibody neg, +RF        Past Surgical History:   Procedure Laterality Date     HYSTEROSCOPIC PLACEMENT CONTRACEPTIVE DEVICE          Family History   Problem Relation Age of Onset     C.A.D. Father         a efw small heart attacks     Neurologic Disorder Father         dementia 70s     Coronary Artery Disease Father      Neurologic Disorder Mother         dementia     Depression Mother      Cerebrovascular Disease Maternal Grandmother         ,     Other Cancer Paternal Grandmother      Rheumatoid Arthritis Sister      Hypertension Sister      Dementia Paternal Cousin      Dementia Paternal Uncle         60s     Breast Cancer No family hx of      Cancer - colorectal No family hx of      Diabetes No family hx of      Cancer No family hx of         no skin cancer     Interstitial Lung Disease No family hx of      Melanoma No family hx of      Skin Cancer No family hx of      Glaucoma No family hx of      Macular Degeneration No family hx of         Social History     Socioeconomic History     Marital status: Single     Spouse name:      Number of children: Not on file     Years of education: Not on file     Highest education level: Not on file   Occupational History     Occupation:      Comment: [employer] - part time   Social Needs     Financial resource strain: Not on file     Food insecurity     Worry: Not on file     Inability: Not on file     Transportation needs     Medical: Not on file     Non-medical: Not on file   Tobacco Use     Smoking status: Former Smoker      Packs/day: 1.00     Years: 20.00     Pack years: 20.00     Types: Cigarettes     Quit date: 1986     Years since quittin.9     Smokeless tobacco: Never Used   Substance and Sexual Activity     Alcohol use: Not Currently     Comment: none since      Drug use: No     Sexual activity: Not Currently     Partners: Male   Lifestyle     Physical activity     Days per week: Not on file     Minutes per session: Not on file     Stress: Not on file   Relationships     Social connections     Talks on phone: Not on file     Gets together: Not on file     Attends [employer] service: Not on file     Active member of club or organization: Not on file     Attends meetings of clubs or organizations: Not on file     Relationship status: Not on file     Intimate partner violence     Fear of current or ex partner: Not on file     Emotionally abused: Not on file     Physically abused: Not on file     Forced sexual activity: Not on file   Other Topics Concern     Parent/sibling w/ CABG, MI or angioplasty before 65F 55M? No   Social History Narrative    .  No children. Sister lives in [place].  Part time at [employer]   in the past    Possible asbestos exposure from basement pipes in childhood home.  Lived in  [facility] [id number].  Denies exposure to pet birds, hot tubs.   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        Physical Exam:  Vital signs: There were no vitals taken for this visit.   General Appearance: No acute distress, appropriate demeanor, conversant  Eyes: moist conjunctivae; EOMI; pupils symmetric; visual acuity grossly intact; no proptosis  Head: normocephalic; overall symmetric appearance without deformity  Face: overall symmetric without deformity; HB I/VI  Ears: Normal appearance of external ear;   Nose: No external deformity;  Extremities: No deformity  Neurologic Exam: Cranial nerves II-XII are grossly intact; no focal deficit    Procedure  Note  Procedure performed: Rigid nasal endoscopy  Indication: To evaluate for sinonasal pathology not visualized on routine anterior rhinoscopy; hyposmia; phantosmia  Anesthesia: 4% topical lidocaine with 0.05 % oxymetazoline  Description of procedure: A 30 degree, 3 mm rigid endoscope was inserted into bilateral nasal cavities and the nasal valves, nasal cavity, middle meatus, sphenoethmoid recess, nasopharynx were evaluated for evidence of obstruction, edema, purulence, polyps and/or mass/lesion.     [place] Endoscopic Scoring System  Endoscopic observation Right Left   Polyps in middle meatus (0 = absent, 1 = restricted to middle meatus, 2 = Beyond middle meatus) 0 0   Discharge (0 = absent, 1 = thin and clear, 2 = thick, purulent) 0 0   Edema (0 = absent, 1 = mild-moderate, 2 = moderate-severe) 0 0   Crusting (0 = absent, 1 = mild-moderate, 2 = moderate-severe) 0 0   Scarring (0= absent, 1 = mild-moderate, 2 = moderate-severe) 0 0   Total 0 0     Findings  RT: MM, SER, olfactory cleft clear  LT: MM, SER, olfactory cleft clear    Nasopharynx clear    The patient tolerated the procedure well without complication.       Laboratory Review:  COVID-19 Antibody Screen Negative    Comment: No COVID-19 antibodies detected.  Patients within 10 days of symptom onset for    COVID-19 may not produce sufficient levels of detectable antibodies.     Immunocompromised COVID-19 patients may take longer to develop antibodies.        Imaging Review:  Reviewed MRA brain from 3/2014    Reviewed MRI brain from 11/6/2020:  No findings to explain Stephanie's hyposmia    Pathology Review:  n/a    Assessment/Medical Decision Making/Plan:  Hyposmia  Dysgeusia  Phantosmia    UPSIT performed today  Discussed techniques to preserve smell and taste - olfactory training  Discussed also safety precautions to take in setting of smell loss: updated smoke detectors, checking dates on perishable foods, [facility]      Getachew Abdi MD  Assistant    [facility]  Rhinology  Endoscopic Skull Base Surgery  [facility]  Department of Otolaryngology - Head & Neck Surgery

## 2020-11-06 NOTE — NURSING NOTE
"Chief Complaint   Patient presents with     RECHECK     Follow up after MRi       Pulse 66, temperature 97.4  F (36.3  C), temperature source Temporal, height 1.651 m (5' 5\"), weight 57.2 kg (126 lb), SpO2 98 %, not currently breastfeeding.    Celia Rizvi, EMT  "

## 2020-11-06 NOTE — PATIENT INSTRUCTIONS
You were seen in the ENT clinic today with Dr. Abdi    Recommendations for you:    -Smell Retraining    Please call our clinic for any questions, concerns, and/or worsening symptoms.      Clinic #[phone]       Option 1 for scheduling.    Thank you for allowing us to be apart of your care!    Brooklyn VELEZ RNCC    If you need to reach me my direct line is: [phone]       Purchase the following essential oils online or at a health or beauty shop.    Flowery - Amalia  Fruity - Lemon  Spicy - Cloves  Resinous - Eucalyptus      1 - Pour a few droplets of one of the oils on to a cotton pad or ball  2 - Do not try to sniff the pad immediately; leave it for a few minutes for the fragrance to develop  3 - Hold the first stick/pad up to your nose, about an inch away. The order in which you test the oils does not matter  4 - Relax and try to inhale naturally through the nose - sniffing too quickly and deeply is likely to result in you not being able to detect anything  5 - Try this a couple more times, then rest for five minutes  6 - Move on to the next oil and repeat as above.    Repeat this training daily for 6 months

## 2020-11-06 NOTE — DISCHARGE INSTRUCTIONS
MRI Contrast Discharge Instructions    The IV contrast you received today will pass out of your body in your  urine. This will happen in the next 24 hours. You will not feel this process.  Your urine will not change color.    Drink at least 4 extra glasses of water or juice today (unless your doctor  has restricted your fluids). This reduces the stress on your kidneys.  You may take your regular medicines.    If you are on dialysis: It is best to have dialysis today.    If you have a reaction: Most reactions happen right away. If you have  any new symptoms after leaving the hospital (such as hives or swelling),  call your hospital at the correct number below. Or call your family doctor.  If you have breathing distress or wheezing, call 911.    Special instructions: ***    I have read and understand the above information.    Signature:______________________________________ Date:___________    Staff:__________________________________________ Date:___________     Time:__________    [facility] Departments:    ___[facility]: [phone]  ___[facility]: [phone]  ___[facility]: [phone] ___[facility]: [phone]  ___[facility]: [phone]  ___[facility]: [phone]  ___[phone]  ___[facility]: [phone]  ___[phone]

## 2020-11-06 NOTE — LETTER
"11/6/2020       RE: Stephanie Bhatia  [address]  [facility] [address]     Dear Colleague,    Thank you for referring your patient, Stephanie Bhatia, to the [facility] [place] at [facility]. Please see a copy of my visit note below.        [facility]  Return Patient Visit      Encounter date: November 6, 2020    Referring Provider:   Rangel Garcia MD  [address] [facility] [address]  [place] [address]    Chief Complaint: anosmia, nasal crusting, nasal congestion    History of Present Illness/Initial Visit: I am seeing Stephanie Bhatia at the request of Dr. Garcia as a consult for anosmia Stephanie has had approximately 2 years of loss of smell, more recently a loss of taste in the last 2 months.  Associated with this, she notes a foul smell coming from her nose, which she describes as, \"smelling like her insides\".  She notes a history of frequent nose blowing and nasal congestion, associated with some mild nasal crusting as well.  She denies any purulent nasal discharge either anteriorly or posteriorly.  She has family members with a history of Alzheimer's dementia who also reported loss of smell and taste, so there is somewhat of a concern regarding early onset dementia.  She otherwise denies pain or pressure in the face, headaches, history of asthma, or history of allergic rhinitis.  She did have an MRI of her brain in March 2014 which I did review and found no significant evidence of disease which may be causing her anosmia, however she had no anosmia at this time.  There is no history of sinonasal surgery.  She denies any history of head trauma.  She denies any history of viral infections prior to the onset of her anosmia.  She does report that she has cousin who also had anosmia, and had an unknown surgery which resolved her anosmia.     Interval visit:  Returns for follow up  No sig change to symptoms  Still c/o foul smell " from nose  Taste intact, but somewhat dimished  UPSIT today - 32/40      Review of systems: A 14-point review of systems has been conducted and was negative for any notable symptoms, except as dictated in the history of present illness.     Past Medical History:   Diagnosis Date     Anxiety      Herpes simplex without mention of complication      Interstitial lung disease (H)     scleroderma ILD, dx by chest CT 5/2017     Lung nodules     4 mm LLL and RLL 2017     Migraine     loss of speech and comprehension, has had w/u and is complex migraine, last one about 2/2017     Raynaud phenomenon      Systemic sclerosis with limited cutaneous involvement (H) 12/02/2014    Dx ~2010, Scl-70 and anti-centromere antibody neg, +RF        Past Surgical History:   Procedure Laterality Date     HYSTEROSCOPIC PLACEMENT CONTRACEPTIVE DEVICE          Family History   Problem Relation Age of Onset     C.A.D. Father         a efw small heart attacks     Neurologic Disorder Father         dementia 70s     Coronary Artery Disease Father      Neurologic Disorder Mother         dementia     Depression Mother      Cerebrovascular Disease Maternal Grandmother         ,     Other Cancer Paternal Grandmother      Rheumatoid Arthritis Sister      Hypertension Sister      Dementia Paternal Cousin      Dementia Paternal Uncle         60s     Breast Cancer No family hx of      Cancer - colorectal No family hx of      Diabetes No family hx of      Cancer No family hx of         no skin cancer     Interstitial Lung Disease No family hx of      Melanoma No family hx of      Skin Cancer No family hx of      Glaucoma No family hx of      Macular Degeneration No family hx of         Social History     Socioeconomic History     Marital status: Single     Spouse name:      Number of children: Not on file     Years of education: Not on file     Highest education level: Not on file   Occupational History     Occupation:      Comment:  [employer] - part time   Social Needs     Financial resource strain: Not on file     Food insecurity     Worry: Not on file     Inability: Not on file     Transportation needs     Medical: Not on file     Non-medical: Not on file   Tobacco Use     Smoking status: Former Smoker     Packs/day: 1.00     Years: 20.00     Pack years: 20.00     Types: Cigarettes     Quit date: 1986     Years since quittin.9     Smokeless tobacco: Never Used   Substance and Sexual Activity     Alcohol use: Not Currently     Comment: none since      Drug use: No     Sexual activity: Not Currently     Partners: Male   Lifestyle     Physical activity     Days per week: Not on file     Minutes per session: Not on file     Stress: Not on file   Relationships     Social connections     Talks on phone: Not on file     Gets together: Not on file     Attends [employer] service: Not on file     Active member of club or organization: Not on file     Attends meetings of clubs or organizations: Not on file     Relationship status: Not on file     Intimate partner violence     Fear of current or ex partner: Not on file     Emotionally abused: Not on file     Physically abused: Not on file     Forced sexual activity: Not on file   Other Topics Concern     Parent/sibling w/ CABG, MI or angioplasty before 65F 55M? No   Social History Narrative    .  No children. Sister lives in [place].  Part time at [employer].   in the past    Possible asbestos exposure from basement pipes in childhood home.  Lived in  [facility] [id number].  Denies exposure to pet birds, hot tubs.  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        Physical Exam:  Vital signs: There were no vitals taken for this visit.   General Appearance: No acute distress, appropriate demeanor, conversant  Eyes: moist conjunctivae; EOMI; pupils symmetric; visual acuity grossly intact; no proptosis  Head: normocephalic; overall  symmetric appearance without deformity  Face: overall symmetric without deformity; HB I/VI  Ears: Normal appearance of external ear;   Nose: No external deformity;  Extremities: No deformity  Neurologic Exam: Cranial nerves II-XII are grossly intact; no focal deficit    Procedure Note  Procedure performed: Rigid nasal endoscopy  Indication: To evaluate for sinonasal pathology not visualized on routine anterior rhinoscopy; hyposmia; phantosmia  Anesthesia: 4% topical lidocaine with 0.05 % oxymetazoline  Description of procedure: A 30 degree, 3 mm rigid endoscope was inserted into bilateral nasal cavities and the nasal valves, nasal cavity, middle meatus, sphenoethmoid recess, nasopharynx were evaluated for evidence of obstruction, edema, purulence, polyps and/or mass/lesion.     [place] Endoscopic Scoring System  Endoscopic observation Right Left   Polyps in middle meatus (0 = absent, 1 = restricted to middle meatus, 2 = Beyond middle meatus) 0 0   Discharge (0 = absent, 1 = thin and clear, 2 = thick, purulent) 0 0   Edema (0 = absent, 1 = mild-moderate, 2 = moderate-severe) 0 0   Crusting (0 = absent, 1 = mild-moderate, 2 = moderate-severe) 0 0   Scarring (0= absent, 1 = mild-moderate, 2 = moderate-severe) 0 0   Total 0 0     Findings  RT: MM, SER, olfactory cleft clear  LT: MM, SER, olfactory cleft clear    Nasopharynx clear    The patient tolerated the procedure well without complication.       Laboratory Review:  COVID-19 Antibody Screen Negative    Comment: No COVID-19 antibodies detected.  Patients within 10 days of symptom onset for    COVID-19 may not produce sufficient levels of detectable antibodies.     Immunocompromised COVID-19 patients may take longer to develop antibodies.        Imaging Review:  Reviewed MRA brain from 3/2014    Reviewed MRI brain from 11/6/2020:  No findings to explain Stephanie's hyposmia    Pathology Review:  n/a    Assessment/Medical Decision  Making/Plan:  Hyposmia  Dysgeusia  Phantosmia    UPSIT performed today  Discussed techniques to preserve smell and taste - olfactory training  Discussed also safety precautions to take in setting of smell loss: updated smoke detectors, checking dates on perishable foods, eetc      Getachew Abdi MD    [facility]  Rhinology  Endoscopic Skull Base Surgery  [facility]  Department of Otolaryngology - Head & Neck Surgery

## 2020-11-06 NOTE — NURSING NOTE
Patient completed USIT and scored a 32/40. Based on patient's age and score from UPSIT, patient falls in 'mild microsmia' category.    Celia Rizvi, EMT

## 2020-11-10 ENCOUNTER — OFFICE VISIT (OUTPATIENT)
Dept: OPHTHALMOLOGY | Facility: CLINIC | Age: 70
End: 2020-11-10
Attending: OPHTHALMOLOGY
Payer: MEDICARE

## 2020-11-10 DIAGNOSIS — H25.13 NUCLEAR SCLEROTIC CATARACT OF BOTH EYES: ICD-10-CM

## 2020-11-10 DIAGNOSIS — H52.7 REFRACTIVE ERROR: ICD-10-CM

## 2020-11-10 DIAGNOSIS — H26.8 PSEUDOEXFOLIATION OF LENS CAPSULE: Primary | ICD-10-CM

## 2020-11-10 PROCEDURE — 99214 OFFICE O/P EST MOD 30 MIN: CPT | Performed by: OPHTHALMOLOGY

## 2020-11-10 PROCEDURE — G0463 HOSPITAL OUTPT CLINIC VISIT: HCPCS

## 2020-11-10 PROCEDURE — 92015 DETERMINE REFRACTIVE STATE: CPT

## 2020-11-10 ASSESSMENT — CUP TO DISC RATIO
OD_RATIO: 0.2
OS_RATIO: 0.2

## 2020-11-10 ASSESSMENT — REFRACTION_WEARINGRX
OS_SPHERE: +1.75
OS_AXIS: 016
OS_CYLINDER: +0.75
OD_CYLINDER: +0.75
SPECS_TYPE: TRIFOCAL
OD_ADD: +3.00
OS_ADD: +3.00
OD_AXIS: 006
OD_SPHERE: +1.75

## 2020-11-10 ASSESSMENT — VISUAL ACUITY
OS_CC: 20/25
OS_CC+: +2
CORRECTION_TYPE: GLASSES
OD_CC: 20/20
METHOD: SNELLEN - LINEAR
OD_CC+: -2

## 2020-11-10 ASSESSMENT — SLIT LAMP EXAM - LIDS
COMMENTS: MGD
COMMENTS: MGD

## 2020-11-10 ASSESSMENT — REFRACTION_MANIFEST
OS_SPHERE: +1.50
OD_SPHERE: +1.50
OD_AXIS: 005
OS_AXIS: 179
OD_ADD: +2.75
OD_CYLINDER: +1.00
OS_CYLINDER: +1.25
OS_ADD: +2.75

## 2020-11-10 ASSESSMENT — TONOMETRY
IOP_METHOD: TONOPEN
OS_IOP_MMHG: 19
OD_IOP_MMHG: 18

## 2020-11-10 ASSESSMENT — EXTERNAL EXAM - RIGHT EYE: OD_EXAM: NORMAL

## 2020-11-10 ASSESSMENT — CONF VISUAL FIELD
METHOD: COUNTING FINGERS
OD_NORMAL: 1
OS_NORMAL: 1

## 2020-11-10 ASSESSMENT — EXTERNAL EXAM - LEFT EYE: OS_EXAM: NORMAL

## 2020-11-10 NOTE — PROGRESS NOTES
Chief Complaint(s) and History of Present Illness(es)     Follow Up     Laterality: both eyes    Comments: 1 year follow up Pseudoexfoliation of lens capsule               Comments     Pt states vision is about the same as last visit. No flashes.   Occasional floaters in BE, no changes.  No redness or dryness.    JORDANA Castro November 10, 2020 8:56 AM                Review of systems for the eyes was negative other than the pertinent positives/negatives listed in the HPI.      Assessment & Plan      Stephanie Bhatia is a 70 year old female with the following diagnoses:   1. Pseudoexfoliation of lens capsule    2. Nuclear sclerotic cataract of both eyes    3. Refractive error         Healthy dilated fundus exam again today   No sign of glaucoma  Early cataracts with mildly reduced vision   Refractive options reviewed  Refraction given  Update glasses as needed         Patient disposition:   Return in about 1 year (around 11/10/2021) for DFE.           Attending Physician Attestation:  Complete documentation of historical and exam elements from today's encounter can be found in the full encounter summary report (not reduplicated in this progress note).  I personally obtained the chief complaint(s) and history of present illness.  I confirmed and edited as necessary the review of systems, past medical/surgical history, family history, social history, and examination findings as documented by others; and I examined the patient myself.  I personally reviewed the relevant tests, images, and reports as documented above.  I formulated and edited as necessary the assessment and plan and discussed the findings and management plan with the patient and family. . - Serge Parham MD

## 2020-11-10 NOTE — NURSING NOTE
Chief Complaints and History of Present Illnesses   Patient presents with     Follow Up     1 year follow up Pseudoexfoliation of lens capsule      Chief Complaint(s) and History of Present Illness(es)     Follow Up     Laterality: both eyes    Comments: 1 year follow up Pseudoexfoliation of lens capsule               Comments     Pt states vision is about the same as last visit. No flashes.   Occasional floaters in BE, no changes.  No redness or dryness.    JORDANA Castro November 10, 2020 8:56 AM

## 2020-12-03 ENCOUNTER — ALLIED HEALTH/NURSE VISIT (OUTPATIENT)
Dept: OPHTHALMOLOGY | Facility: CLINIC | Age: 70
End: 2020-12-03
Attending: OPHTHALMOLOGY
Payer: MEDICARE

## 2020-12-03 DIAGNOSIS — H52.13 MYOPIA OF BOTH EYES: Primary | ICD-10-CM

## 2020-12-03 PROCEDURE — 99207 PR NO CHARGE COORDINATED CARE PS: CPT

## 2020-12-03 ASSESSMENT — VISUAL ACUITY
OS_CC: 20/20
OS_CC: J1+
OS_CC+: -2
OD_CC: J1+
OD_CC: 20/20
OD_CC+: -2
METHOD: SNELLEN - LINEAR
CORRECTION_TYPE: GLASSES

## 2020-12-03 ASSESSMENT — CONF VISUAL FIELD
OD_NORMAL: 1
OS_NORMAL: 1
METHOD: COUNTING FINGERS

## 2020-12-03 ASSESSMENT — REFRACTION_WEARINGRX
OD_ADD: +2.75
OS_SPHERE: +1.50
OS_CYLINDER: +1.25
OD_AXIS: 005
OD_CYLINDER: +1.00
SPECS_TYPE: TRIFOCAL
OS_ADD: +2.75
OD_SPHERE: +1.50
OS_AXIS: 179

## 2020-12-03 ASSESSMENT — REFRACTION_MANIFEST
OD_ADD: +2.75
OS_ADD: +2.75
OD_AXIS: 180
OS_SPHERE: +1.75
OS_AXIS: 005
OD_SPHERE: +1.75
OD_CYLINDER: +0.75
OS_CYLINDER: +0.75

## 2020-12-03 ASSESSMENT — TONOMETRY
OS_IOP_MMHG: 22
IOP_METHOD: ICARE
OD_IOP_MMHG: 20
OS_IOP_MMHG: 19
OD_IOP_MMHG: 23
IOP_METHOD: APPLANATION

## 2020-12-03 NOTE — NURSING NOTE
Chief Complaints and History of Present Illnesses   Patient presents with     Follow Up     Tech only glasses check.     Chief Complaint(s) and History of Present Illness(es)     Follow Up     Laterality: left eye    Onset: gradual    Onset: days ago    Course: stable    Associated symptoms: floaters (No change.) and a need for brighter lights.  Negative for eye pain, dryness, tearing, flashes and photophobia    Comments: Tech only glasses check.              Comments     Pt here for glasses recheck.  Left lens>Right lens.  NVA>DVA.    KAE Raphael December 3, 2020 10:36 AM

## 2020-12-31 ENCOUNTER — TELEPHONE (OUTPATIENT)
Dept: FAMILY MEDICINE | Facility: CLINIC | Age: 70
End: 2020-12-31

## 2020-12-31 NOTE — TELEPHONE ENCOUNTER
Patient was reached by phone, and RN reviewed her symptoms of headache with aura that happened this morning.  Patient stayed home from work and has been resting in a quiet, dark place.  RN offered a video appt with Dr. Garcia for next Wed 1/6/21, and patient was agreeable.  Appt was made.  Patient will discuss possible Rx for any future episodes of headache.    Carli Bhatia RN on 12/31/2020 at 12:14 PM

## 2020-12-31 NOTE — TELEPHONE ENCOUNTER
"[facility] Call Center    Phone Message    May a detailed message be left on voicemail: yes     Reason for Call: Symptoms or Concerns     If patient has red-flag symptoms, warm transfer to triage line    Current symptom or concern: cluster headache with ora in vision    Symptoms have been present for: 1-2 hour(s)    Has patient previously been seen for this? No      Are there any new or worsening symptoms? Yes: Patient calling to state that she was washing her face and started to get a \"cluster headache\" with vision blurriness, patient states her vision was in waves, Patient also stated that at the time of the call her headache was not there but curious if she should go to work today or if her headache will come back. Please call to discuss thank you.       Action Taken: Message routed to:  [facility] pcc    Travel Screening: Not Applicable                                                                        "

## 2021-01-06 ENCOUNTER — VIRTUAL VISIT (OUTPATIENT)
Dept: FAMILY MEDICINE | Facility: CLINIC | Age: 71
End: 2021-01-06
Payer: MEDICARE

## 2021-01-06 DIAGNOSIS — G43.109 MIGRAINE WITH AURA AND WITHOUT STATUS MIGRAINOSUS, NOT INTRACTABLE: Primary | ICD-10-CM

## 2021-01-06 PROCEDURE — 99213 OFFICE O/P EST LOW 20 MIN: CPT | Mod: 95 | Performed by: FAMILY MEDICINE

## 2021-01-06 NOTE — PATIENT INSTRUCTIONS
Patient Education     Preventing Migraine Headaches: Triggers  The first step in preventing migraines is to learn what triggers them. You may then be able to control your triggers to avoid or reduce the severity of your migraines.   Know your triggers  Be aware that you may have more than one trigger, and that some triggers may work together. Common migraine triggers include:     Food and nutrition. Skipping meals or not drinking enough water can trigger headaches. So can certain foods, such as caffeine, chocolate, artificial sweeteners, monosodium glutamate (MSG), aged cheese, or sausage.    Alcohol. Red wine and other alcoholic beverages are common migraine triggers.    Chemicals. Scents, cleaning products, gasoline, glue, perfume, and paint can be triggers. So can tobacco smoke, including secondhand smoke.    Emotions. Stress can trigger headaches or make them worse once they start.    Sleep disruption. Staying up late, sleeping late, and traveling across time zones can disrupt your sleep cycle, triggering headaches.    Hormones. Many women notice that migraines tend to happen at a certain point in their menstrual cycle. Birth control pills or hormone replacement therapy may also trigger migraines.    Environment and weather. Air travel, changes in altitude, air pressure changes, hot sun, or bright or flashing lights can be triggers.    Medicine overuse. Frequent use of pain medicines for headache pain can also cause a headache. This may also be called rebound headache.  Control your triggers  These are some of the things you can do to try to control triggers:    Avoid triggers if you can. For example, stay clear of alcohol and foods that trigger your headaches. Use unscented household products. Keep regular sleep habits. Manage stress to help control emotional triggers.    Change your behavior at times when triggers can't be avoided. For example, make sure to get enough rest and drink plenty of water while  "you're traveling. Make sure to carry a hat, sunglasses, and your medicines. Be alert for migraine symptoms, so you can treat a migraine early if it happens.  [employer] last reviewed this educational content on 5/1/2018 2000-2020 [employer]. [address] [place] [address]. All rights reserved. This information is not intended as a substitute for professional medical care. Always follow your healthcare professional's instructions.           Patient Education     Migraine Headache: Stages and Treatment    A migraine headache tends to progress in stages. Learning these stages can help you better understand what is happening. Then you can learn ways to reduce pain and relieve other symptoms. Methods for relieving your symptoms include self-care and medicines.   Migraine stages  Migraines tend to progress through 4 stages. Many people don't have all stages, and stages may differ with each headache:     Prodrome. A few hours to a day or so before the headache, you may feel tired, (yawning many times), uneasy, or mitchell. You may also feel bloated or crave certain foods.    Aura. Up to an hour before the headache starts, some migraine sufferers experience aura--flashing lights, blind spots, other vision problems, confusion, difficulty speaking, or other neurologic symptoms.    Headache. Moderate to severe pain affects one side of the head and then can spread to both sides, often along with nausea. You may be highly sensitive to light, sound, and odors. Vomiting or diarrhea may also happen. This stage lasts 4 to 72 hours.    Postdrome. After your headache ends, you may feel tired, achy, and \"washed out.\" This may last for a day or so.  Self-care during a migraine  Here is what you can do:    Use a cold compress. Wrap a thin cloth around a cold pack, a cold can of soda, or a bag of frozen vegetables. Apply this to your temple or other pain site.    Drink fluids. If nausea makes it hard to drink, try " sucking on ice.    Rest. If possible, lie down. Try not to bend over, as this may increase your pain. Sometimes laying in a dark quiet room can help the migraine from being aggravated.      Try caffeine. Some people find that drinking fluids with caffeine, such as coffee or tea, helps to lessen migraine pain.  Using medicines  Work with your healthcare provider to find the right medicines for you. Medicines for migraine may relieve pain (analgesics), relieve nausea, or attack the migraine's root causes (migraine-specific medicines).   Rebound headache  Taking analgesics each day, or even several times a week, may lead to more frequent and severe headaches. These are called rebound headaches. If you think you're having rebound headaches, tell your healthcare provider. He or she can help you safely decrease your medicine. Rebound caffeine withdrawal headaches can also happen. Certain medicines are addictive and can cause rebound headaches when discontinued abruptly.   [employer] last reviewed this educational content on 5/1/2018 2000-2020 [employer]. [address] [place] [address]. All rights reserved. This information is not intended as a substitute for professional medical care. Always follow your healthcare professional's instructions.           Patient Education     Patient Education    Naproxen Gastro-resistant tablet    Naproxen Oral suspension    Naproxen Oral tablet    Naproxen Oral tablet, biphasic release    Naproxen Oral tablet, extended-release, Naproxen Oral tablet, extended-release    Naproxen Sodium Oral capsule, liquid filled    Naproxen Sodium Oral tablet    Naproxen Sodium Oral tablet, extended-release  Naproxen Oral tablet  What is this medicine?  NAPROXEN (na PROX en) is a non-steroidal anti-inflammatory drug (NSAID). It is used to reduce swelling and to treat pain. This medicine may be used for dental pain, headache, or painful monthly periods. It is also used for  painful joint and muscular problems such as arthritis, tendinitis, bursitis, and gout.  This medicine may be used for other purposes; ask your health care provider or pharmacist if you have questions.  What should I tell my health care provider before I take this medicine?  They need to know if you have any of these conditions:    asthma    cigarette smoker    drink more than 3 alcohol containing drinks a day    heart disease or circulation problems such as heart failure or leg edema (fluid retention)    high blood pressure    kidney disease    liver disease    stomach bleeding or ulcers    an unusual or allergic reaction to naproxen, aspirin, other NSAIDs, other medicines, foods, dyes, or preservatives    pregnant or trying to get pregnant    breast-feeding  How should I use this medicine?  Take this medicine by mouth with a glass of water. Follow the directions on the prescription label. Take it with food if your stomach gets upset. Try to not lie down for at least 10 minutes after you take it. Take your medicine at regular intervals. Do not take your medicine more often than directed. Long-term, continuous use may increase the risk of heart attack or stroke.  A special MedGuide will be given to you by the pharmacist with each prescription and refill. Be sure to read this information carefully each time.  Talk to your pediatrician regarding the use of this medicine in children. Special care may be needed.  Overdosage: If you think you have taken too much of this medicine contact a poison control center or emergency room at once.  NOTE: This medicine is only for you. Do not share this medicine with others.  What if I miss a dose?  If you miss a dose, take it as soon as you can. If it is almost time for your next dose, take only that dose. Do not take double or extra doses.  What may interact with this medicine?    alcohol    aspirin    cidofovir    diuretics    lithium    methotrexate    other drugs for  inflammation like ketorolac or prednisone    pemetrexed    probenecid    warfarin  This list may not describe all possible interactions. Give your health care provider a list of all the medicines, herbs, non-prescription drugs, or dietary supplements you use. Also tell them if you smoke, drink alcohol, or use illegal drugs. Some items may interact with your medicine.  What should I watch for while using this medicine?  Tell your doctor or health care professional if your pain does not get better. Talk to your doctor before taking another medicine for pain. Do not treat yourself.  This medicine does not prevent heart attack or stroke. In fact, this medicine may increase the chance of a heart attack or stroke. The chance may increase with longer use of this medicine and in people who have heart disease. If you take aspirin to prevent heart attack or stroke, talk with your doctor or health care professional.  Do not take other medicines that contain aspirin, ibuprofen, or naproxen with this medicine. Side effects such as stomach upset, nausea, or ulcers may be more likely to occur. Many medicines available without a prescription should not be taken with this medicine.  This medicine can cause ulcers and bleeding in the stomach and intestines at any time during treatment. Do not smoke cigarettes or drink alcohol. These increase irritation to your stomach and can make it more susceptible to damage from this medicine. Ulcers and bleeding can happen without warning symptoms and can cause death.  You may get drowsy or dizzy. Do not drive, use machinery, or do anything that needs mental alertness until you know how this medicine affects you. Do not stand or sit up quickly, especially if you are an older patient. This reduces the risk of dizzy or fainting spells.  This medicine can cause you to bleed more easily. Try to avoid damage to your teeth and gums when you brush or floss your teeth.  What side effects may I notice  from receiving this medicine?  Side effects that you should report to your doctor or health care professional as soon as possible:    black or bloody stools, blood in the urine or vomit    blurred vision    chest pain    difficulty breathing or wheezing    nausea or vomiting    severe stomach pain    skin rash, skin redness, blistering or peeling skin, hives, or itching    slurred speech or weakness on one side of the body    swelling of eyelids, throat, lips    unexplained weight gain or swelling    unusually weak or tired    yellowing of eyes or skin  Side effects that usually do not require medical attention (report to your doctor or health care professional if they continue or are bothersome):    constipation    headache    heartburn  This list may not describe all possible side effects. Call your doctor for medical advice about side effects. You may report side effects to FDA at 9-488-FDA-2364.  Where should I keep my medicine?  Keep out of the reach of children.  Store at room temperature between 15 and 30 degrees C (59 and 86 degrees F). Keep container tightly closed. Throw away any unused medicine after the expiration date.  NOTE:This sheet is a summary. It may not cover all possible information. If you have questions about this medicine, talk to your doctor, pharmacist, or health care provider. Copyright  2016 [employer]         Aleve 1-2 tabs every 12 hours if needed for migraine. If taking more that twice weekly contact me.

## 2021-01-06 NOTE — PROGRESS NOTES
Stephanie Bhatia is a 70 year old female who is being evaluated via a billable video visit.      How would you like to obtain your AVS? MyChart  If the video visit is dropped, the invitation should be resent by: Send to e-mail at: [email or web address]  Will anyone else be joining your video visit? No    Assessment & Plan   Brett Bhatia 70-year-old female who presents today with symptoms of left-sided migraine headache she had and 1231 after eating sugary foods for several days.  She recognizes sugar as a trigger for her migraines she has had previously.  She could not remember what to take to help with migraine headache she had and the 1990s.  I suggested she take Aleve 1 to 2 tablets every 12 hours if needed for migraine headache and connect with me if having more than 2 episodes per week.  She also noted she had not been hydrating very well.  I reviewed with her migraine triggers which include high fructose corn syrup additives and foods such as MSG dehydration.  She has had a recent MRI of her head which was within normal limits and a recent eye visit.  She is in otherwise good health.  Follow-up in 1 year if needed earlier if concerns.  Migraine with aura and without status migrainosus, not intractable          Independent interpretation of a test performed by another physician/other qualified health care professional (not separately reported) - MRI      {Provider  Link to [facility] Help Grid :387528}         Annual visit earlier if concerns    Rangel Garcia MD  [facility]     Stephanie Bhatia is a 70 year old who presents to clinic today for the following health issues     HPI     History  In the 90's of migraine headaches ( previously used unknown medication) and then cluster ( she had difficulty processing for up to 30 minutes.). She had a migraine with aura left sided on 12/31 noted squiggling lines with aura. She indicates the headache went away within 2 days  but she did not go to work due to the aura. She is wondering what to take for the headache. She was binging on sugary products prior no alcohol. She has been in to the EYE [facility] one month ago and had a MRI of her brain due to change of taste/ smell. No other neurological symptoms    She had a recent MRI of brain reviewed non concerning.  Social History     Social History Narrative    .  No children. Sister lives in [place].  Part time at [employer].   in the past    Possible asbestos exposure from basement pipes in childhood home.  Lived in  [facility] [id number].  Denies exposure to pet birds, hot tubs.  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     Patient Active Problem List   Diagnosis     Raynaud phenomenon     Anxiety     Lumbar radiculopathy     Advanced directives, counseling/discussion     CARDIOVASCULAR SCREENING; LDL GOAL LESS THAN 160     Cluster headache syndrome     Systemic sclerosis with limited cutaneous involvement (H)     AK (actinic keratosis)     Osteoporosis     ILD (interstitial lung disease) (H)     Rheumatoid arthritis involving both hands with positive rheumatoid factor (H)     Senile osteoporosis     Past Medical History:   Diagnosis Date     Anxiety      Herpes simplex without mention of complication      Interstitial lung disease (H)     scleroderma ILD, dx by chest CT 5/2017     Lung nodules     4 mm LLL and RLL 2017     Migraine     loss of speech and comprehension, has had w/u and is complex migraine, last one about 2/2017     Raynaud phenomenon      Systemic sclerosis with limited cutaneous involvement (H) 12/02/2014    Dx ~2010, Scl-70 and anti-centromere antibody neg, +RF     Current Outpatient Medications   Medication Sig Dispense Refill     acyclovir (ZOVIRAX) 400 MG tablet Take 1 tablet (400 mg) by mouth every 8 hours For 5 days 15 tablet 3     Cholecalciferol (VITAMIN D) 1000 UNITS capsule Take 1 capsule by mouth daily        desonide (DESOWEN) 0.05 % external ointment Apply topically 2 times daily To rash on the corners of the mouth mixed with nystatin as needed until clear. 15 g 1     ibuprofen (ADVIL/MOTRIN) 200 MG tablet Take 200 mg by mouth every 4 hours as needed for mild pain       nystatin (MYCOSTATIN) 191503 UNIT/GM external ointment Apply topically 2 times daily To rash at the corners of the mouth mixed with desonide until clear. 30 g 11     vitamin B complex with vitamin C (VITAMIN  B COMPLEX) tablet Take 1 tablet by mouth daily       Review of Systems   Constitutional, HEENT, cardiovascular, pulmonary, gi and gu systems are negative, except as otherwise noted.      Objective           Vitals: recent BP  11/2020  No vitals were obtained today due to virtual visit.    Physical Exam   GENERAL: Healthy, alert and no distress  EYES: Eyes grossly normal to inspection.  No discharge or erythema, or obvious scleral/conjunctival abnormalities.  RESP: No audible wheeze, cough, or visible cyanosis.  No visible retractions or increased work of breathing.    SKIN: Visible skin clear. No significant rash, abnormal pigmentation or lesions.  NEURO: Cranial nerves grossly intact.  Mentation and speech appropriate for age.  PSYCH: Mentation appears normal, affect normal/bright, judgement and insight intact, normal speech and appearance well-groomed.    Imaging MRI reviewed            Video-Visit Details    Type of service:  Video Visit    Video  Time:9:16- 9:32 AM    Originating Location (pt. Location): Home    Distant Location (provider location):  [facility]     Platform used for Video Visit: [employer]

## 2021-01-06 NOTE — NURSING NOTE
Chief Complaint   Patient presents with     Headache     pt would like to discuss headchae with aura on 12/31 and future episodes       Buffy Garcia CMA, EMT at 9:01 AM on 1/6/2021.

## 2021-01-10 ENCOUNTER — HEALTH MAINTENANCE LETTER (OUTPATIENT)
Age: 71
End: 2021-01-10

## 2021-01-22 ENCOUNTER — MYC MEDICAL ADVICE (OUTPATIENT)
Dept: FAMILY MEDICINE | Facility: CLINIC | Age: 71
End: 2021-01-22

## 2021-03-30 ENCOUNTER — TELEPHONE (OUTPATIENT)
Dept: FAMILY MEDICINE | Facility: CLINIC | Age: 71
End: 2021-03-30

## 2021-03-31 NOTE — TELEPHONE ENCOUNTER
Patient had her second dose of covid vaccine 11 days ago.  A co-worker, not within the same work space tested positive.  Everyone was wearing mask and workplace has kept the environment clean. All the co-worker who came in contact with the positive confirmed covid were tested.  Patient would like to know if she should be tested and if she should be concerned about patti the virus.  Patient report she lives with 2 other people who are at high risk.  Since patient had no close contact or worked in the same work space with the confirmed positive co-worker, all employees wearing mask, keeping hands and surfaces clean, and patient already completed her 2nd dose of covid vaccine, I am not concerned of her patti the virus.  No covid test needed.      Kenton Naranjo CMA ([facility]) at 10:12 AM on 3/31/2021

## 2021-05-12 ASSESSMENT — ENCOUNTER SYMPTOMS
DEPRESSION: 1
TINGLING: 0
HEADACHES: 0
NUMBNESS: 0
LOSS OF CONSCIOUSNESS: 0
MUSCLE CRAMPS: 1
ARTHRALGIAS: 0
SPEECH CHANGE: 0
PARALYSIS: 0
NERVOUS/ANXIOUS: 1
INSOMNIA: 1
DECREASED CONCENTRATION: 1
STIFFNESS: 0
TREMORS: 0
SEIZURES: 0
NECK PAIN: 0
WEAKNESS: 1
MYALGIAS: 0
DISTURBANCES IN COORDINATION: 1
JOINT SWELLING: 0
DIZZINESS: 1
BACK PAIN: 0
MUSCLE WEAKNESS: 0
PANIC: 0
MEMORY LOSS: 1

## 2021-05-13 ENCOUNTER — OFFICE VISIT (OUTPATIENT)
Dept: PULMONOLOGY | Facility: CLINIC | Age: 71
End: 2021-05-13
Payer: MEDICARE

## 2021-05-13 VITALS
HEART RATE: 65 BPM | OXYGEN SATURATION: 99 % | SYSTOLIC BLOOD PRESSURE: 109 MMHG | BODY MASS INDEX: 21.5 KG/M2 | DIASTOLIC BLOOD PRESSURE: 67 MMHG | WEIGHT: 129.2 LBS

## 2021-05-13 DIAGNOSIS — J84.9 ILD (INTERSTITIAL LUNG DISEASE) (H): Primary | ICD-10-CM

## 2021-05-13 DIAGNOSIS — J84.9 ILD (INTERSTITIAL LUNG DISEASE) (H): ICD-10-CM

## 2021-05-13 PROCEDURE — 99214 OFFICE O/P EST MOD 30 MIN: CPT | Mod: 25 | Performed by: INTERNAL MEDICINE

## 2021-05-13 PROCEDURE — 94729 DIFFUSING CAPACITY: CPT | Performed by: INTERNAL MEDICINE

## 2021-05-13 PROCEDURE — G0463 HOSPITAL OUTPT CLINIC VISIT: HCPCS | Mod: 25

## 2021-05-13 PROCEDURE — 94375 RESPIRATORY FLOW VOLUME LOOP: CPT | Performed by: INTERNAL MEDICINE

## 2021-05-13 PROCEDURE — 94726 PLETHYSMOGRAPHY LUNG VOLUMES: CPT | Performed by: INTERNAL MEDICINE

## 2021-05-13 NOTE — LETTER
"    5/13/2021         RE: Stephanie Bhatia  [address]  [facility] [address]        Dear Colleague,    Thank you for referring your patient, Stephanie Bhatia, to the [facility] [place]. Please see a copy of my visit note below.    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  She was initially diagnosed with scleroderma in about 2009 or 2010.  Scleroderma ILD was first diagnosed on chest CT scan in 2/2017.  She also had a few nodules that were small, 4 mm and did not require follow-up as she was low risk.  She has not required treatment for scleroderma ILD to date.    Patient last seen Oct. 2020 with no significant progression in exertional SOB. Patient notes increase in work duties to 2 days a week and is tolerating well. Patient denies chest pain yet notes intermittent productive cough w/o blood that varies with her hx of post nasal drip in which she is using nasal saline rinses with good efficacy.   Patient notes only exertional SOB that resolves after a few seconds without any episodes of syncope or presyncopal symptoms.   Patient additionally notes occasional arthritis pain in hands bilaterally and occasional fatigue that she relates to \"just getting older\".  Patient denies N,V,D, or sore throat.   Patient received the Covid-19 vaccination 2 months ago and notes no significant side effects.   Noted anosmia from last year for 1-3 months has partially resolved with negative MRI for cause. Patient note this waxes and wanes with post nasal drip symptoms.     Current Outpatient Medications   Medication     acyclovir (ZOVIRAX) 400 MG tablet     Cholecalciferol (VITAMIN D) 1000 UNITS capsule     desonide (DESOWEN) 0.05 % external ointment     ibuprofen (ADVIL/MOTRIN) 200 MG tablet     nystatin (MYCOSTATIN) 519665 UNIT/GM " external ointment     vitamin B complex with vitamin C (VITAMIN  B COMPLEX) tablet     No current facility-administered medications for this visit.      No Known Allergies  Past Medical History:   Diagnosis Date     Anxiety      Herpes simplex without mention of complication      Interstitial lung disease (H)     scleroderma ILD, dx by chest CT 2017     Lung nodules     4 mm LLL and RLL      Migraine     loss of speech and comprehension, has had w/u and is complex migraine, last one about 2017     Raynaud phenomenon      Systemic sclerosis with limited cutaneous involvement (H) 2014    Dx ~2010, Scl-70 and anti-centromere antibody neg, +RF       Past Surgical History:   Procedure Laterality Date     HYSTEROSCOPIC PLACEMENT CONTRACEPTIVE DEVICE         Social History     Socioeconomic History     Marital status: Single     Spouse name:      Number of children: Not on file     Years of education: Not on file     Highest education level: Not on file   Occupational History     Occupation:      Comment: [employer] - part time   Social Needs     Financial resource strain: Not on file     Food insecurity     Worry: Not on file     Inability: Not on file     Transportation needs     Medical: Not on file     Non-medical: Not on file   Tobacco Use     Smoking status: Former Smoker     Packs/day: 1.00     Years: 20.00     Pack years: 20.00     Types: Cigarettes     Quit date: 1986     Years since quittin.4     Smokeless tobacco: Never Used   Substance and Sexual Activity     Alcohol use: Not Currently     Comment: none since      Drug use: No     Sexual activity: Not Currently     Partners: Male   Lifestyle     Physical activity     Days per week: Not on file     Minutes per session: Not on file     Stress: Not on file   Relationships     Social connections     Talks on phone: Not on file     Gets together: Not on file     Attends [employer] service: Not on file     Active  member of club or organization: Not on file     Attends meetings of clubs or organizations: Not on file     Relationship status: Not on file     Intimate partner violence     Fear of current or ex partner: Not on file     Emotionally abused: Not on file     Physically abused: Not on file     Forced sexual activity: Not on file   Other Topics Concern     Parent/sibling w/ CABG, MI or angioplasty before 65F 55M? No   Social History Narrative    .  No children. Sister lives in [place].  Part time at [employer].   in the past    Possible asbestos exposure from basement pipes in childhood home.  Lived in  [facility] [id number].  Denies exposure to pet birds, hot tubs.  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       Family History   Problem Relation Age of Onset     C.A.D. Father         a efw small heart attacks     Neurologic Disorder Father         dementia 70s     Coronary Artery Disease Father      Neurologic Disorder Mother         dementia     Depression Mother      Cerebrovascular Disease Maternal Grandmother         ,     Other Cancer Paternal Grandmother      Rheumatoid Arthritis Sister      Hypertension Sister      Dementia Paternal Cousin      Dementia Paternal Uncle         60s     Breast Cancer No family hx of      Cancer - colorectal No family hx of      Diabetes No family hx of      Cancer No family hx of         no skin cancer     Interstitial Lung Disease No family hx of      Melanoma No family hx of      Skin Cancer No family hx of      Glaucoma No family hx of      Macular Degeneration No family hx of          Vitals: /67   Pulse 65   Wt 58.6 kg (129 lb 3.2 oz)   SpO2 99%   BMI 21.50 kg/m      Exam:   GENERAL APPEARANCE: Well developed, well nourished, alert, and in no apparent distress.  LYMPHATICS: No significant cervical, or supraclavicular nodes.  RESP: good air flow throughout.  No crackles. No rhonchi. No wheezes.  CV: Normal S1, S2,  regular rhythm, normal rate. No murmur.  No LE edema.   MS: extremities normal. No clubbing. No cyanosis. Noted arthritic changes to hands bilaterally without noted tenderness to palpation or loss of  strength.   SKIN: no rash on limited exam.  NEURO: Mentation intact, speech normal, normal gait and stance.  PSYCH: mentation appears normal. and affect normal/bright.    Results:  Recent Results (from the past 168 hour(s))   General PFT Lab (Please always keep checked)    Collection Time: 05/13/21  7:35 AM   Result Value Ref Range    FVC-Pred 2.65 L    FVC-Pre 3.22 L    FVC-%Pred-Pre 121 %    FEV1-Pre 2.67 L    FEV1-%Pred-Pre 128 %    FEV1FVC-Pred 79 %    FEV1FVC-Pre 83 %    FEFMax-Pred 5.65 L/sec    FEFMax-Pre 7.31 L/sec    FEFMax-%Pred-Pre 129 %    FEF2575-Pred 1.75 L/sec    FEF2575-Pre 2.90 L/sec    OJU0398-%Pred-Pre 166 %    ExpTime-Pre 7.03 sec    FIFMax-Pre 6.01 L/sec    VC-Pred 3.15 L    VC-Pre 3.15 L    VC-%Pred-Pre 100 %    IC-Pred 2.26 L    IC-Pre 1.77 L    IC-%Pred-Pre 77 %    ERV-Pred 0.88 L    ERV-Pre 1.38 L    ERV-%Pred-Pre 156 %    FEV1FEV6-Pred 79 %    FEV1FEV6-Pre 83 %    FRCPleth-Pred 2.77 L    FRCPleth-Pre 2.88 L    FRCPleth-%Pred-Pre 103 %    RVPleth-Pred 2.14 L    RVPleth-Pre 1.50 L    RVPleth-%Pred-Pre 69 %    TLCPleth-Pred 5.11 L    TLCPleth-Pre 4.64 L    TLCPleth-%Pred-Pre 90 %    DLCOunc-Pred 19.98 ml/min/mmHg    DLCOunc-Pre 17.65 ml/min/mmHg    DLCOunc-%Pred-Pre 88 %    VA-Pre 4.45 L    VA-%Pred-Pre 91 %    FEV1SVC-Pred 66 %    FEV1SVC-Pre 85 %       I reviewed results with the patient.      Assessment and plan: 70 year old with stable hx of ILD over the past 4 years without progression and stable PFT function. Patient recommended to hydrate adequately, continue exercise regimen, use of nasal saline rinses for post nasal drip, and use of OTC tylenol for occasional arthritic pain in hands. Patient f./u in one year to track PFT progress. Patient is amendable to plans.     I saw and  evaluated patient with Resident.  Case discussed - agree with note.  I reviewed PFT: normal pulmonary function, overall stable since 2017.  No need to treat scleroderma ILD at this time.  If ILD worsens, then we could consider nintedanib, MMF or tocilizumab.    LYNDSEY FANG M.D.

## 2021-05-13 NOTE — NURSING NOTE
Chief Complaint   Patient presents with     Follow Up     scleroderma     Vitals were taken and medications were reconciled.     LIZZETH Patel

## 2021-05-13 NOTE — PROGRESS NOTES
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  She was initially diagnosed with scleroderma in about 2009 or 2010.  Scleroderma ILD was first diagnosed on chest CT scan in 2/2017.  She also had a few nodules that were small, 4 mm and did not require follow-up as she was low risk.  She has not required treatment for scleroderma ILD to date.    Patient last seen Oct. 2020 with no significant progression in exertional SOB. Patient notes increase in work duties to 2 days a week and is tolerating well. Patient denies chest pain yet notes intermittent productive cough w/o blood that varies with her hx of post nasal drip in which she is using nasal saline rinses with good efficacy.   Patient notes only exertional SOB that resolves after a few seconds without any episodes of syncope or presyncopal symptoms.   Patient additionally notes occasional arthritis pain in hands bilaterally and occasional fatigue that she relates to \"just getting older\".  Patient denies N,V,D, or sore throat.   Patient received the Covid-19 vaccination 2 months ago and notes no significant side effects.   Noted anosmia from last year for 1-3 months has partially resolved with negative MRI for cause. Patient note this waxes and wanes with post nasal drip symptoms.     Current Outpatient Medications   Medication     acyclovir (ZOVIRAX) 400 MG tablet     Cholecalciferol (VITAMIN D) 1000 UNITS capsule     desonide (DESOWEN) 0.05 % external ointment     ibuprofen (ADVIL/MOTRIN) 200 MG tablet     nystatin (MYCOSTATIN) 448612 UNIT/GM external ointment     vitamin B complex with vitamin C (VITAMIN  B COMPLEX) tablet     No current facility-administered medications for this visit.      No Known Allergies  Past Medical History:   Diagnosis Date     Anxiety      Herpes simplex without mention of complication      Interstitial " lung disease (H)     scleroderma ILD, dx by chest CT 2017     Lung nodules     4 mm LLL and RLL      Migraine     loss of speech and comprehension, has had w/u and is complex migraine, last one about 2017     Raynaud phenomenon      Systemic sclerosis with limited cutaneous involvement (H) 2014    Dx ~, Scl-70 and anti-centromere antibody neg, +RF       Past Surgical History:   Procedure Laterality Date     HYSTEROSCOPIC PLACEMENT CONTRACEPTIVE DEVICE         Social History     Socioeconomic History     Marital status: Single     Spouse name:      Number of children: Not on file     Years of education: Not on file     Highest education level: Not on file   Occupational History     Occupation:      Comment: [employer] - part time   Social Needs     Financial resource strain: Not on file     Food insecurity     Worry: Not on file     Inability: Not on file     Transportation needs     Medical: Not on file     Non-medical: Not on file   Tobacco Use     Smoking status: Former Smoker     Packs/day: 1.00     Years: 20.00     Pack years: 20.00     Types: Cigarettes     Quit date: 1986     Years since quittin.4     Smokeless tobacco: Never Used   Substance and Sexual Activity     Alcohol use: Not Currently     Comment: none since      Drug use: No     Sexual activity: Not Currently     Partners: Male   Lifestyle     Physical activity     Days per week: Not on file     Minutes per session: Not on file     Stress: Not on file   Relationships     Social connections     Talks on phone: Not on file     Gets together: Not on file     Attends Taoism service: Not on file     Active member of club or organization: Not on file     Attends meetings of clubs or organizations: Not on file     Relationship status: Not on file     Intimate partner violence     Fear of current or ex partner: Not on file     Emotionally abused: Not on file     Physically abused: Not on file      Forced sexual activity: Not on file   Other Topics Concern     Parent/sibling w/ CABG, MI or angioplasty before 65F 55M? No   Social History Narrative    .  No children. Sister lives in [place].  Part time at [employer].   in the past    Possible asbestos exposure from basement pipes in childhood home.  Lived in  [facility] [id number].  Denies exposure to pet birds, hot tubs.  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       Family History   Problem Relation Age of Onset     C.A.D. Father         a efw small heart attacks     Neurologic Disorder Father         dementia 70s     Coronary Artery Disease Father      Neurologic Disorder Mother         dementia     Depression Mother      Cerebrovascular Disease Maternal Grandmother         ,     Other Cancer Paternal Grandmother      Rheumatoid Arthritis Sister      Hypertension Sister      Dementia Paternal Cousin      Dementia Paternal Uncle         60s     Breast Cancer No family hx of      Cancer - colorectal No family hx of      Diabetes No family hx of      Cancer No family hx of         no skin cancer     Interstitial Lung Disease No family hx of      Melanoma No family hx of      Skin Cancer No family hx of      Glaucoma No family hx of      Macular Degeneration No family hx of          Vitals: /67   Pulse 65   Wt 58.6 kg (129 lb 3.2 oz)   SpO2 99%   BMI 21.50 kg/m      Exam:   GENERAL APPEARANCE: Well developed, well nourished, alert, and in no apparent distress.  LYMPHATICS: No significant cervical, or supraclavicular nodes.  RESP: good air flow throughout.  No crackles. No rhonchi. No wheezes.  CV: Normal S1, S2, regular rhythm, normal rate. No murmur.  No LE edema.   MS: extremities normal. No clubbing. No cyanosis. Noted arthritic changes to hands bilaterally without noted tenderness to palpation or loss of  strength.   SKIN: no rash on limited exam.  NEURO: Mentation intact, speech normal, normal  gait and stance.  PSYCH: mentation appears normal. and affect normal/bright.    Results:  Recent Results (from the past 168 hour(s))   General PFT Lab (Please always keep checked)    Collection Time: 05/13/21  7:35 AM   Result Value Ref Range    FVC-Pred 2.65 L    FVC-Pre 3.22 L    FVC-%Pred-Pre 121 %    FEV1-Pre 2.67 L    FEV1-%Pred-Pre 128 %    FEV1FVC-Pred 79 %    FEV1FVC-Pre 83 %    FEFMax-Pred 5.65 L/sec    FEFMax-Pre 7.31 L/sec    FEFMax-%Pred-Pre 129 %    FEF2575-Pred 1.75 L/sec    FEF2575-Pre 2.90 L/sec    SUV7249-%Pred-Pre 166 %    ExpTime-Pre 7.03 sec    FIFMax-Pre 6.01 L/sec    VC-Pred 3.15 L    VC-Pre 3.15 L    VC-%Pred-Pre 100 %    IC-Pred 2.26 L    IC-Pre 1.77 L    IC-%Pred-Pre 77 %    ERV-Pred 0.88 L    ERV-Pre 1.38 L    ERV-%Pred-Pre 156 %    FEV1FEV6-Pred 79 %    FEV1FEV6-Pre 83 %    FRCPleth-Pred 2.77 L    FRCPleth-Pre 2.88 L    FRCPleth-%Pred-Pre 103 %    RVPleth-Pred 2.14 L    RVPleth-Pre 1.50 L    RVPleth-%Pred-Pre 69 %    TLCPleth-Pred 5.11 L    TLCPleth-Pre 4.64 L    TLCPleth-%Pred-Pre 90 %    DLCOunc-Pred 19.98 ml/min/mmHg    DLCOunc-Pre 17.65 ml/min/mmHg    DLCOunc-%Pred-Pre 88 %    VA-Pre 4.45 L    VA-%Pred-Pre 91 %    FEV1SVC-Pred 66 %    FEV1SVC-Pre 85 %       I reviewed results with the patient.      Assessment and plan: 70 year old with stable hx of ILD over the past 4 years without progression and stable PFT function. Patient recommended to hydrate adequately, continue exercise regimen, use of nasal saline rinses for post nasal drip, and use of OTC tylenol for occasional arthritic pain in hands. Patient f./u in one year to track PFT progress. Patient is amendable to plans.     I saw and evaluated patient with Resident.  Case discussed - agree with note.  I reviewed PFT: normal pulmonary function, overall stable since 2017.  No need to treat scleroderma ILD at this time.  If ILD worsens, then we could consider nintedanib, MMF or tocilizumab.    LYNDSEY FANG M.D.

## 2021-05-16 LAB
DLCOUNC-%PRED-PRE: 88 %
DLCOUNC-PRE: 17.65 ML/MIN/MMHG
DLCOUNC-PRED: 19.98 ML/MIN/MMHG
ERV-%PRED-PRE: 156 %
ERV-PRE: 1.38 L
ERV-PRED: 0.88 L
EXPTIME-PRE: 7.03 SEC
FEF2575-%PRED-PRE: 166 %
FEF2575-PRE: 2.9 L/SEC
FEF2575-PRED: 1.75 L/SEC
FEFMAX-%PRED-PRE: 129 %
FEFMAX-PRE: 7.31 L/SEC
FEFMAX-PRED: 5.65 L/SEC
FEV1-%PRED-PRE: 128 %
FEV1-PRE: 2.67 L
FEV1FEV6-PRE: 83 %
FEV1FEV6-PRED: 79 %
FEV1FVC-PRE: 83 %
FEV1FVC-PRED: 79 %
FEV1SVC-PRE: 85 %
FEV1SVC-PRED: 66 %
FIFMAX-PRE: 6.01 L/SEC
FRCPLETH-%PRED-PRE: 103 %
FRCPLETH-PRE: 2.88 L
FRCPLETH-PRED: 2.77 L
FVC-%PRED-PRE: 121 %
FVC-PRE: 3.22 L
FVC-PRED: 2.65 L
IC-%PRED-PRE: 77 %
IC-PRE: 1.77 L
IC-PRED: 2.26 L
RVPLETH-%PRED-PRE: 69 %
RVPLETH-PRE: 1.5 L
RVPLETH-PRED: 2.14 L
TLCPLETH-%PRED-PRE: 90 %
TLCPLETH-PRE: 4.64 L
TLCPLETH-PRED: 5.11 L
VA-%PRED-PRE: 91 %
VA-PRE: 4.45 L
VC-%PRED-PRE: 100 %
VC-PRE: 3.15 L
VC-PRED: 3.15 L

## 2021-06-04 ENCOUNTER — MYC MEDICAL ADVICE (OUTPATIENT)
Dept: INTERNAL MEDICINE | Facility: CLINIC | Age: 71
End: 2021-06-04

## 2021-06-25 ENCOUNTER — OFFICE VISIT (OUTPATIENT)
Dept: INTERNAL MEDICINE | Facility: CLINIC | Age: 71
End: 2021-06-25
Payer: MEDICARE

## 2021-06-25 VITALS
SYSTOLIC BLOOD PRESSURE: 100 MMHG | OXYGEN SATURATION: 97 % | BODY MASS INDEX: 20.63 KG/M2 | WEIGHT: 124 LBS | DIASTOLIC BLOOD PRESSURE: 65 MMHG | HEART RATE: 70 BPM

## 2021-06-25 DIAGNOSIS — Z71.84 COUNSELING FOR TRAVEL: Primary | ICD-10-CM

## 2021-06-25 PROCEDURE — 90715 TDAP VACCINE 7 YRS/> IM: CPT | Performed by: NURSE PRACTITIONER

## 2021-06-25 PROCEDURE — 90471 IMMUNIZATION ADMIN: CPT | Performed by: NURSE PRACTITIONER

## 2021-06-25 PROCEDURE — 99402 PREV MED CNSL INDIV APPRX 30: CPT | Mod: 25 | Performed by: NURSE PRACTITIONER

## 2021-06-25 RX ORDER — AZITHROMYCIN 500 MG/1
TABLET, FILM COATED ORAL
Qty: 6 TABLET | Refills: 0 | Status: SHIPPED | OUTPATIENT
Start: 2021-06-25 | End: 2021-11-22

## 2021-06-25 RX ORDER — DOXYCYCLINE 100 MG/1
CAPSULE ORAL
Qty: 30 CAPSULE | Refills: 3 | Status: SHIPPED | OUTPATIENT
Start: 2021-06-25 | End: 2021-11-22

## 2021-06-25 ASSESSMENT — PAIN SCALES - GENERAL: PAINLEVEL: NO PAIN (0)

## 2021-06-25 NOTE — NURSING NOTE
Chief Complaint   Patient presents with     Travel Clinic     pt here to discuss travel meds/vaccines [facility]       Buffy Garcia CMA, EMT at 8:48 AM on 6/25/2021.

## 2021-06-25 NOTE — PROGRESS NOTES
SUBJECTIVE: Stephanie Bhatia, a 70 year old  female, presents for counseling and information regarding upcoming travel [place] [facility]. She will stay until 9/14/21.  She is going to visit an ill [place] friend of many years who is extremely ill. She has been to [place] in the past. She states where she will be staying is very secure and the water is safe. They will not be travelling in [facility].       Past Medical History:   Diagnosis Date     Anxiety      Herpes simplex without mention of complication      Interstitial lung disease (H)     scleroderma ILD, dx by chest CT 5/2017     Lung nodules     4 mm LLL and RLL 2017     Migraine     loss of speech and comprehension, has had w/u and is complex migraine, last one about 2/2017     Raynaud phenomenon      Systemic sclerosis with limited cutaneous involvement (H) 12/02/2014    Dx ~2010, Scl-70 and anti-centromere antibody neg, +RF      Immunization History   Administered Date(s) Administered     Influenza (High Dose) 3 valent vaccine 09/27/2013, 11/26/2014, 10/12/2015, 09/01/2016, 08/27/2018, 10/14/2019     Influenza (IIV3) PF 09/28/2017     Influenza, Quad, High Dose, Pf, 65yr + 09/30/2020     Pneumo Conj 13-V (2010&after) 11/26/2014     Pneumococcal 23 valent 10/21/2013     TDAP Vaccine (Adacel) 05/01/2012     Typhoid Oral 03/13/2017     Yellow Fever, unspecified 03/13/2017     Zoster vaccine recombinant adjuvanted (SHINGRIX) 07/10/2019, 09/30/2019       Current Outpatient Medications   Medication Sig Dispense Refill     acyclovir (ZOVIRAX) 400 MG tablet Take 1 tablet (400 mg) by mouth every 8 hours For 5 days 15 tablet 3     Cholecalciferol (VITAMIN D) 1000 UNITS capsule Take 1 capsule by mouth daily       desonide (DESOWEN) 0.05 % external ointment Apply topically 2 times daily To rash on the corners of the mouth mixed with nystatin as needed until clear. 15 g 1     ibuprofen (ADVIL/MOTRIN) 200 MG tablet Take 200 mg by mouth every 4 hours as  needed for mild pain       nystatin (MYCOSTATIN) 447863 UNIT/GM external ointment Apply topically 2 times daily To rash at the corners of the mouth mixed with desonide until clear. 30 g 11     vitamin B complex with vitamin C (VITAMIN  B COMPLEX) tablet Take 1 tablet by mouth daily       No Known Allergies     EXAM: deferred    Immunizations discussed include: Tetanus/Diphtheria.  She is UTD with all other travel vaccines.   Malaraia prophylaxis recommended: Malarone  Symptomatic treatment for traveler's diarrhea: She will be prescribed azithromycin 500 mg to take one daily just until diarrhea stops.    ASSESSMENT/PLAN:  No diagnosis found.  I have reviewed general recommendations for safe travel   including: food/water precautions, insect avoidance, safe sex   practices given high prevalence of HIV and other STDs,   roadway safety. Educational materials and links to the CDC   Traveler's health website have been provided.    Total time 30 minutes, greater than 50 percent in counseling   and coordination of care.    Milla OWENS, CNP

## 2021-11-21 NOTE — PROGRESS NOTES
"Medicare Annual Wellness Visit         HPI     This 71 year old female presents as an established patient,  who presents for an subsequent Medicare Wellness Exam.  Patient also reports always feels tired but has not been active.  HX of rheumatoid arthritis needs to connect with her Rheumatologist.   Traveled to [place] to see a friend Feliz July through September    Reduced Energy Level   Decreased energy throughout the day due to work, lack of motivation to cook and improve her diet she eats with the family late in evening. Exercise is present in her daily regimen. She attends classes at her local [employer] and goes for walks with her dogs.    Anxiety  Her anxiety and depression increased during pandemic. She moved in with family 2019 to help reduce daily stressors including taking care of her dog and household chores.  She is a people pleaser and feels obligated to eat with the family at a certain time each day late at night, may have more salt than she would like.  She is involved in a [employer].     History of Alcohol Abuse/Nicotine  Stephanie has past history of nicotine and alcohol addiction has not had alcohol since  to improve sobriety, she attended brief AA meetings which she found helpful.      Family History of Dementia  Strong family history of dementia, practices \"mind sharpening\" .  HCM  Due for annual fit test    Social History     Socioeconomic History     Marital status: Single     Spouse name:      Number of children: 0     Years of education: Not on file     Highest education level: Master's degree (e.g., MA, MS, [place], MEd, MSW, DIANE)   Occupational History     Occupation:      Comment: [employer] - part time   Tobacco Use     Smoking status: Former Smoker     Packs/day: 1.00     Years: 20.00     Pack years: 20.00     Types: Cigarettes     Quit date: 1986     Years since quittin.0     Smokeless tobacco: Never Used   Vaping Use     Vaping Use: Never used   Substance and " Sexual Activity     Alcohol use: Not Currently     Comment: none since 2001     Drug use: No     Sexual activity: Not Currently     Partners: Male   Other Topics Concern     Parent/sibling w/ CABG, MI or angioplasty before 65F 55M? No   Social History Narrative    .  No children. Sister lives in [place].  Part time at [employer].   in the past. Currently lives with family.    Possible asbestos exposure from basement pipes in childhood home.  Lived in  [facility] [address].  Denies exposure to pet birds, hot tubs.  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     Social Determinants of Health     Financial Resource Strain: High Risk     Difficulty of Paying Living Expenses: Hard   Food Insecurity: No Food Insecurity     Worried About Running Out of Food in the Last Year: Never true     Ran Out of Food in the Last Year: Never true   Transportation Needs: No Transportation Needs     Lack of Transportation (Medical): No     Lack of Transportation (Non-Medical): No   Physical Activity: Sufficiently Active     Days of Exercise per Week: 7 days     Minutes of Exercise per Session: 60 min   Stress: Stress Concern Present     Feeling of Stress : To some extent   Social Connections: Moderately Isolated     Frequency of Communication with Friends and Family: More than three times a week     Frequency of Social Gatherings with Friends and Family: More than three times a week     Attends Latter day Services: More than 4 times per year     Active Member of Clubs or Organizations: No     Attends Club or Organization Meetings: Never     Marital Status:    Intimate Partner Violence: Not At Risk     Fear of Current or Ex-Partner: No     Emotionally Abused: No     Physically Abused: No     Sexually Abused: No   Housing Stability: Low Risk      Unable to Pay for Housing in the Last Year: No     Number of Places Lived in the Last Year: 1     Unstable Housing in the Last Year: No      Immunization History   Administered Date(s) Administered     COVID-19,PF,Pfizer (12+ Yrs) 02/26/2021, 03/19/2021, 10/06/2021     Flu, Unspecified 10/06/2021     HepA-Adult 03/13/2017, 09/13/2017     Influenza (High Dose) 3 valent vaccine 09/27/2013, 11/26/2014, 10/12/2015, 09/01/2016, 09/06/2017, 08/27/2018, 10/14/2019     Influenza (IIV3) PF 10/26/2010, 10/28/2011, 10/12/2012, 09/28/2017     Influenza Vaccine IM > 6 months Valent IIV4 (Alfuria,Fluzone) 10/26/2010, 10/28/2011, 10/12/2012, 09/27/2013, 11/26/2014, 10/12/2015, 09/01/2016     Influenza, Quad, High Dose, Pf, 65yr+ (Fluzone HD) 09/30/2020     Pneumo Conj 13-V (2010&after) 11/26/2014     Pneumococcal 23 valent 10/21/2013     TDAP Vaccine (Adacel) 05/01/2012     Tdap (Adacel,Boostrix) 05/01/2012, 06/25/2021     Typhoid Oral 03/13/2017     Yellow Fever 03/13/2017     Yellow Fever, unspecified 03/13/2017     Zoster vaccine recombinant adjuvanted (SHINGRIX) 07/10/2019, 09/30/2019     Patient Active Problem List   Diagnosis     Raynaud phenomenon     Anxiety     Lumbar radiculopathy     Advanced directives, counseling/discussion     CARDIOVASCULAR SCREENING; LDL GOAL LESS THAN 160     Cluster headache syndrome     Systemic sclerosis with limited cutaneous involvement (H)     AK (actinic keratosis)     Osteoporosis     ILD (interstitial lung disease) (H)     Rheumatoid arthritis involving both hands with positive rheumatoid factor (H)     Senile osteoporosis       Past Medical History:   Diagnosis Date     Anxiety      Herpes simplex without mention of complication      Interstitial lung disease (H)     scleroderma ILD, dx by chest CT 5/2017     Lung nodules     4 mm LLL and RLL 2017     Migraine     loss of speech and comprehension, has had w/u and is complex migraine, last one about 2/2017     Raynaud phenomenon      Systemic sclerosis with limited cutaneous involvement (H) 12/02/2014    Dx ~2010, Scl-70 and anti-centromere antibody neg, +RF        Family  History   Problem Relation Age of Onset     C.A.D. Father         a efw small heart attacks     Neurologic Disorder Father         dementia 70s     Coronary Artery Disease Father      Neurologic Disorder Mother         dementia     Depression Mother      Cerebrovascular Disease Maternal Grandmother         ,     Other Cancer Paternal Grandmother      Rheumatoid Arthritis Sister      Hypertension Sister      Dementia Paternal Cousin      Dementia Paternal Uncle         60s     Breast Cancer No family hx of      Cancer - colorectal No family hx of      Diabetes No family hx of      Cancer No family hx of         no skin cancer     Interstitial Lung Disease No family hx of      Melanoma No family hx of      Skin Cancer No family hx of      Glaucoma No family hx of      Macular Degeneration No family hx of          Past Surgical History:   Procedure Laterality Date     HYSTEROSCOPIC PLACEMENT CONTRACEPTIVE DEVICE         Reviewed no other significant FH    Family History and past Medical History reviewed and it is unchanged/updated.       Review of Systems       Constitutional:   fevers, night sweats or unintentional weight change ?  NO      Eyes:   vision change, diplopia or red eyes?  NO      Ears, Nose, Mouth, Throat:   tinnitus or hearing change,  epistaxis or nasal discharge,  oral lesions, throat pain ?  NO      Neck:   stiffness?  NO           Cardiovascular:   chest pain, palpitations, or pain with walking, orthopnea or PND?  NO   Breasts:  Any bumps or unusual discharge?     NO         Respiratory:   dyspnea, cough, shortness of breath or wheezing?  NO         GI:   nausea, vomiting, diarrhea or constipation,  abdominal pain ?  NO         :   change in urine,  dysuria or hematuria,  sexual dysfunction ?  NO        Musculoskeletal:   joint or muscle pain or swelling?  NO            Skin:   concerning lesions or moles?  NO           Nervous System:   loss of strength or sensation,  numbness or tingling,   tremor,  dizziness,  headache?  NO   Endocrine/Homone:   polyuria or polydipsia,  temperature intolerance?  NO            Blood and Lymphnodes:   concerning bumps,  bleeding problems?  NO            Allergy:   environmental allergies?  NO            Mental Health:   depression or anxiety,  sleep problems?  NO               Medical Care     Have you been to an ER or a hospital in the last year? No  What other specialists or organizations are involved in your medical care?    Current providers sharing in care for this patient include:  Patient Care Team:  Rangel Garcia MD as PCP - General (Family Practice)  Prachi Grant MD as MD (Family Medicine - Sports Medicine)  Doris Robertson MD as MD (Dermatology)  Petros Gunn MD as MD (Rheumatology)  Dorene Lieberman [facility] as Pharmacist (Pharmacist)  Carli Bhatia RN as Registered Nurse  Rangel Garcia MD as Referring Physician (Family Practice)  Getachew Abdi MD as MD (Otolaryngology)  Getachew Abdi MD as MD (Otolaryngology)  Tiffanie Gomez MD as Assigned Pulmonology Provider  Marlen Sweet APRN CNP as Assigned Behavioral Health Provider  Rangel Garcia MD as Assigned PCP  Getachew Abdi MD as Assigned Surgical Provider         Social History     Social History     Socioeconomic History     Marital status: Single     Spouse name:      Number of children: 0     Years of education: Not on file     Highest education level: Master's degree (e.g., MA, MS, [place], MEd, MSW, DIANE)   Occupational History     Occupation:      Comment: [employer] - part time   Tobacco Use     Smoking status: Former Smoker     Packs/day: 1.00     Years: 20.00     Pack years: 20.00     Types: Cigarettes     Quit date: 1986     Years since quittin.0     Smokeless tobacco: Never Used   Vaping Use     Vaping Use: Never used   Substance and Sexual Activity     Alcohol use: Not Currently     Comment: none since  2001     Drug use: No     Sexual activity: Not Currently     Partners: Male   Other Topics Concern     Parent/sibling w/ CABG, MI or angioplasty before 65F 55M? No   Social History Narrative    .  No children. Sister lives in [place].  Part time at [employer].   in the past. Currently lives with family.    Possible asbestos exposure from basement pipes in childhood home.  Lived in  [facility] [address].  Denies exposure to pet birds, hot tubs.  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     Social Determinants of Health     Financial Resource Strain: High Risk     Difficulty of Paying Living Expenses: Hard   Food Insecurity: No Food Insecurity     Worried About Running Out of Food in the Last Year: Never true     Ran Out of Food in the Last Year: Never true   Transportation Needs: No Transportation Needs     Lack of Transportation (Medical): No     Lack of Transportation (Non-Medical): No   Physical Activity: Sufficiently Active     Days of Exercise per Week: 7 days     Minutes of Exercise per Session: 60 min   Stress: Stress Concern Present     Feeling of Stress : To some extent   Social Connections: Moderately Isolated     Frequency of Communication with Friends and Family: More than three times a week     Frequency of Social Gatherings with Friends and Family: More than three times a week     Attends Sabianism Services: More than 4 times per year     Active Member of Clubs or Organizations: No     Attends Club or Organization Meetings: Never     Marital Status:    Intimate Partner Violence: Not At Risk     Fear of Current or Ex-Partner: No     Emotionally Abused: No     Physically Abused: No     Sexually Abused: No   Housing Stability: Low Risk      Unable to Pay for Housing in the Last Year: No     Number of Places Lived in the Last Year: 1     Unstable Housing in the Last Year: No     Social History     Tobacco Use     Smoking status: Former Smoker      Packs/day: 1.00     Years: 20.00     Pack years: 20.00     Types: Cigarettes     Quit date: 1986     Years since quittin.0     Smokeless tobacco: Never Used   Substance Use Topics     Alcohol use: Not Currently     Comment: none since      Marital Status:  Who lives in your household? Lives with family since   Does your home have any of the following safety concerns? Loose rugs in the hallway, no grab bars in the bathroom, no handrails on the stairs or have poorly lit areas? Loose rugs but they have handrails    Do you feel threatened or controlled by a partner, ex-partner or anyone in your life? No  Has anyone hurt you physically, for example by pushing, hitting, slapping or kicking you   or forcing you to have sex? No  Do you need help with the phone, transportation, shopping, preparing meals, housework, laundry, medications or managing money? No   Have you noticed any hearing difficulties? No      Risk Behaviors and Healthy Habits     How many servings of fruits and vegetables do you eat a day? 3  How often do you exercise and what do you do? 60 minutes 7 days a week  Do you frequently ride without a seatbelt? No  Do you use tobacco?  No  Do you use any other drugs? No         Do you use alcohol?No    Today's PHQ-2 Score:     0    Sexual Health     Are you sexually active?  No   If yes, with men, women, or both? Male  If yes, do you more than one current partner?No  If yes, are you using condoms?  NA  Have you had any sexually transmitted infections? No   Any sexual concerns? No     FOR WOMEN  What year did you stop having periods? menopausal  Any vaginal bleeding in the last year? No  Have you ever had an abnormal Pap smear? No    FUNCTIONAL ABILITY/SAFETY SCREENING     Fall Risk Assessment Today:   1-2 in the last year slipped no injury    Hearing evaluation if done: No concerns    EVALUATION OF COGNITIVE FUNCTION     Mood/affect:Normal  Appearance:Normal  Family member/caregiver  input: NA    1. Instruct the patient to listen carefully to and remember 3 unrelated words and then to repeat the words.   2. Ask the patient to repeat the 3 previously presented word  Completed all 3      SCREENING FOR PREVENTION and EARLY DETECTION         Corrected Visual acuity follows with an eye provider    CV Risk based on Pooled Cohort Risk:  The 10-year ASCVD risk score (Gianni LAMAR Jr., et al., 2013) is: 10.7%    Values used to calculate the score:      Age: 71 years      Sex: Female      Is Non- : No      Diabetic: No      Tobacco smoker: No      Systolic Blood Pressure: 116 mmHg      Is BP treated: No      HDL Cholesterol: 55 mg/dL      Total Cholesterol: 180 mg/dL  Pooled Cohort Risk Calculator}    Advanced Directives: Discussed and patient desires to be full code.      Immunization History   Administered Date(s) Administered     COVID-19,PF,Pfizer (12+ Yrs) 02/26/2021, 03/19/2021, 10/06/2021     Flu, Unspecified 10/06/2021     HepA-Adult 03/13/2017, 09/13/2017     Influenza (High Dose) 3 valent vaccine 09/27/2013, 11/26/2014, 10/12/2015, 09/01/2016, 09/06/2017, 08/27/2018, 10/14/2019     Influenza (IIV3) PF 10/26/2010, 10/28/2011, 10/12/2012, 09/28/2017     Influenza Vaccine IM > 6 months Valent IIV4 (Alfuria,Fluzone) 10/26/2010, 10/28/2011, 10/12/2012, 09/27/2013, 11/26/2014, 10/12/2015, 09/01/2016     Influenza, Quad, High Dose, Pf, 65yr+ (Fluzone HD) 09/30/2020     Pneumo Conj 13-V (2010&after) 11/26/2014     Pneumococcal 23 valent 10/21/2013     TDAP Vaccine (Adacel) 05/01/2012     Tdap (Adacel,Boostrix) 05/01/2012, 06/25/2021     Typhoid Oral 03/13/2017     Yellow Fever 03/13/2017     Yellow Fever, unspecified 03/13/2017     Zoster vaccine recombinant adjuvanted (SHINGRIX) 07/10/2019, 09/30/2019       Reviewed Immunization Record Today  Pneumoccocal Vaccine:  Up to date   Fall  1-2 times per year           Physical Exam     Vitals: /65 (BP Location: Right arm, Patient  "Position: Sitting, Cuff Size: Adult Small)   Pulse 77   Ht 1.651 m (5' 5\")   Wt 59.3 kg (130 lb 11.2 oz)   SpO2 99%   BMI 21.75 kg/m    BMI= Body mass index is 21.75 kg/m .  GENERAL APPEARANCE: healthy, alert and no distress  EYES: Eyes grossly normal to inspection, PERRL and conjunctivae and sclerae normal  HENT: ear canals and TM's normal, nose and mouth without ulcers or lesions, oropharynx clear and oral mucous membranes moist  NECK: no adenopathy, no asymmetry, masses, or scars and thyroid normal to palpation  RESP: lungs clear to auscultation - no rales, rhonchi or wheezes  BREAST: normal without masses, tenderness or nipple discharge and no palpable axillary masses or adenopathy  CV: regular rate and rhythm, normal S1 S2, no S3 or S4, no murmur, click or rub, no peripheral edema and peripheral pulses strong  ABDOMEN: soft, nontender, no hepatosplenomegaly, no masses and bowel sounds normal  MS:  Bilateral hands Rheumatoid changes fingers and wrists no other musculoskeletal defects are noted and gait is age appropriate without ataxia  SKIN: no suspicious lesions or rashes  NEURO: Normal strength and tone, sensory exam grossly normal, mentation intact and speech normal  PSYCH: mentation appears normal and affect normal/bright  Mood stable  XRAY hands        Assessment and Plan   subsequent   Medicare Wellness Exam  1. Health Care Maintenance: Normal Physical Exam    Assessment & Plan     Encounter for Medicare annual wellness exam  Healthy  Colon cancer screening  Due for screening  - Fecal cancer screen FIT    Rheumatoid arthritis involving both hands with positive rheumatoid factor (H)  Bilateral hands requires visit with her Rheumatologist   - Rheumatology Referral  - XR Hand Bilateral 2 Views    Angular cheilitis  Needs refills  - desonide (DESOWEN) 0.05 % external ointment  Dispense: 15 g; Refill: 1  - nystatin (MYCOSTATIN) 042468 UNIT/GM external ointment  Dispense: 30 g; Refill: 11    HSV (herpes " "simplex virus) infection  Needs refills not active at this time  - acyclovir (ZOVIRAX) 400 MG tablet  Dispense: 15 tablet; Refill: 3    40 minutes spent on the date of the encounter doing chart review, history, exam, diagnostics review, documentation, counseling and coordination of cares as noted.        Return in about 52 weeks (around 11/21/2022).  HPI   Review of Systems    see above        Objective    /65 (BP Location: Right arm, Patient Position: Sitting, Cuff Size: Adult Small)   Pulse 77   Ht 1.651 m (5' 5\")   Wt 59.3 kg (130 lb 11.2 oz)   SpO2 99%   BMI 21.75 kg/m    Body mass index is 21.75 kg/m .  Physical Exam  See above              Options for treatment and follow-up care were reviewed with the Stephanie [place] and/or guardian engaged in the decision making process and verbalized understanding of the options discussed and agreed with the final plan.    Rangel Garcia MD  [facility] PRIMARY [facility]ARE [facility]      RANGEL GARCIA T      "

## 2021-11-21 NOTE — PATIENT INSTRUCTIONS
Personalized Prevention Plan  You are due for the preventive services outlined below.  Your care team is available to assist you in scheduling these services.  If you have already completed any of these items, please share that information with your care team to update in your medical record.  Health Maintenance Due   Topic Date Due     Colorectal Cancer Screening  10/01/2021

## 2021-11-22 ENCOUNTER — TELEPHONE (OUTPATIENT)
Dept: FAMILY MEDICINE | Facility: CLINIC | Age: 71
End: 2021-11-22

## 2021-11-22 ENCOUNTER — LAB (OUTPATIENT)
Dept: LAB | Facility: CLINIC | Age: 71
End: 2021-11-22

## 2021-11-22 ENCOUNTER — OFFICE VISIT (OUTPATIENT)
Dept: FAMILY MEDICINE | Facility: CLINIC | Age: 71
End: 2021-11-22
Payer: MEDICARE

## 2021-11-22 VITALS
SYSTOLIC BLOOD PRESSURE: 116 MMHG | WEIGHT: 130.7 LBS | HEART RATE: 77 BPM | DIASTOLIC BLOOD PRESSURE: 65 MMHG | BODY MASS INDEX: 21.77 KG/M2 | HEIGHT: 65 IN | OXYGEN SATURATION: 99 %

## 2021-11-22 DIAGNOSIS — B00.9 HSV (HERPES SIMPLEX VIRUS) INFECTION: ICD-10-CM

## 2021-11-22 DIAGNOSIS — Z00.00 ENCOUNTER FOR MEDICARE ANNUAL WELLNESS EXAM: Primary | ICD-10-CM

## 2021-11-22 DIAGNOSIS — K13.0 ANGULAR CHEILITIS: ICD-10-CM

## 2021-11-22 DIAGNOSIS — Z12.11 COLON CANCER SCREENING: ICD-10-CM

## 2021-11-22 DIAGNOSIS — M05.742 RHEUMATOID ARTHRITIS INVOLVING BOTH HANDS WITH POSITIVE RHEUMATOID FACTOR (H): ICD-10-CM

## 2021-11-22 DIAGNOSIS — M05.741 RHEUMATOID ARTHRITIS INVOLVING BOTH HANDS WITH POSITIVE RHEUMATOID FACTOR (H): ICD-10-CM

## 2021-11-22 PROCEDURE — G0439 PPPS, SUBSEQ VISIT: HCPCS | Performed by: FAMILY MEDICINE

## 2021-11-22 RX ORDER — ACYCLOVIR 400 MG/1
400 TABLET ORAL EVERY 8 HOURS
Qty: 15 TABLET | Refills: 3 | Status: SHIPPED | OUTPATIENT
Start: 2021-11-22 | End: 2022-11-04

## 2021-11-22 RX ORDER — DESONIDE 0.5 MG/G
OINTMENT TOPICAL 2 TIMES DAILY
Qty: 15 G | Refills: 1 | Status: SHIPPED | OUTPATIENT
Start: 2021-11-22 | End: 2022-09-16

## 2021-11-22 RX ORDER — NYSTATIN 100000 U/G
OINTMENT TOPICAL 2 TIMES DAILY
Qty: 30 G | Refills: 11 | Status: SHIPPED | OUTPATIENT
Start: 2021-11-22 | End: 2022-09-16

## 2021-11-22 SDOH — ECONOMIC STABILITY: HOUSING INSECURITY: IN THE LAST 12 MONTHS, HOW MANY PLACES HAVE YOU LIVED?: 1

## 2021-11-22 SDOH — SOCIAL STABILITY: SOCIAL NETWORK: ARE YOU MARRIED, WIDOWED, DIVORCED, SEPARATED, NEVER MARRIED, OR LIVING WITH A PARTNER?: DIVORCED

## 2021-11-22 SDOH — SOCIAL STABILITY: SOCIAL NETWORK: HOW OFTEN DO YOU GET TOGETHER WITH FRIENDS OR RELATIVES?: MORE THAN THREE TIMES A WEEK

## 2021-11-22 SDOH — HEALTH STABILITY: MENTAL HEALTH
STRESS IS WHEN SOMEONE FEELS TENSE, NERVOUS, ANXIOUS, OR CAN'T SLEEP AT NIGHT BECAUSE THEIR MIND IS TROUBLED. HOW STRESSED ARE YOU?: TO SOME EXTENT

## 2021-11-22 SDOH — SOCIAL STABILITY: SOCIAL INSECURITY
WITHIN THE LAST YEAR, HAVE TO BEEN RAPED OR FORCED TO HAVE ANY KIND OF SEXUAL ACTIVITY BY YOUR PARTNER OR EX-PARTNER?: NO

## 2021-11-22 SDOH — HEALTH STABILITY: MENTAL HEALTH: HOW OFTEN DO YOU HAVE A DRINK CONTAINING ALCOHOL?: NEVER

## 2021-11-22 SDOH — SOCIAL STABILITY: SOCIAL INSECURITY: WITHIN THE LAST YEAR, HAVE YOU BEEN AFRAID OF YOUR PARTNER OR EX-PARTNER?: NO

## 2021-11-22 SDOH — HEALTH STABILITY: MENTAL HEALTH: HOW OFTEN DO YOU HAVE 6 OR MORE DRINKS ON ONE OCCASION?: NEVER

## 2021-11-22 SDOH — SOCIAL STABILITY: SOCIAL INSECURITY
WITHIN THE LAST YEAR, HAVE YOU BEEN KICKED, HIT, SLAPPED, OR OTHERWISE PHYSICALLY HURT BY YOUR PARTNER OR EX-PARTNER?: NO

## 2021-11-22 SDOH — SOCIAL STABILITY: SOCIAL NETWORK: HOW OFTEN DO YOU ATTENT MEETINGS OF THE CLUB OR ORGANIZATION YOU BELONG TO?: NEVER

## 2021-11-22 SDOH — ECONOMIC STABILITY: TRANSPORTATION INSECURITY
IN THE PAST 12 MONTHS, HAS THE LACK OF TRANSPORTATION KEPT YOU FROM MEDICAL APPOINTMENTS OR FROM GETTING MEDICATIONS?: NO

## 2021-11-22 SDOH — ECONOMIC STABILITY: TRANSPORTATION INSECURITY
IN THE PAST 12 MONTHS, HAS LACK OF TRANSPORTATION KEPT YOU FROM MEETINGS, WORK, OR FROM GETTING THINGS NEEDED FOR DAILY LIVING?: NO

## 2021-11-22 SDOH — ECONOMIC STABILITY: FOOD INSECURITY: WITHIN THE PAST 12 MONTHS, THE FOOD YOU BOUGHT JUST DIDN'T LAST AND YOU DIDN'T HAVE MONEY TO GET MORE.: NEVER TRUE

## 2021-11-22 SDOH — SOCIAL STABILITY: SOCIAL NETWORK
DO YOU BELONG TO ANY CLUBS OR ORGANIZATIONS SUCH AS CHURCH GROUPS UNIONS, FRATERNAL OR ATHLETIC GROUPS, OR SCHOOL GROUPS?: NO

## 2021-11-22 SDOH — SOCIAL STABILITY: SOCIAL NETWORK: HOW OFTEN DO YOU ATTEND CHURCH OR RELIGIOUS SERVICES?: MORE THAN 4 TIMES PER YEAR

## 2021-11-22 SDOH — ECONOMIC STABILITY: INCOME INSECURITY: HOW HARD IS IT FOR YOU TO PAY FOR THE VERY BASICS LIKE FOOD, HOUSING, MEDICAL CARE, AND HEATING?: HARD

## 2021-11-22 SDOH — ECONOMIC STABILITY: INCOME INSECURITY: IN THE LAST 12 MONTHS, WAS THERE A TIME WHEN YOU WERE NOT ABLE TO PAY THE MORTGAGE OR RENT ON TIME?: NO

## 2021-11-22 SDOH — SOCIAL STABILITY: SOCIAL INSECURITY: WITHIN THE LAST YEAR, HAVE YOU BEEN HUMILIATED OR EMOTIONALLY ABUSED IN OTHER WAYS BY YOUR PARTNER OR EX-PARTNER?: NO

## 2021-11-22 SDOH — SOCIAL STABILITY: SOCIAL NETWORK
IN A TYPICAL WEEK, HOW MANY TIMES DO YOU TALK ON THE PHONE WITH FAMILY, FRIENDS, OR NEIGHBORS?: MORE THAN THREE TIMES A WEEK

## 2021-11-22 SDOH — HEALTH STABILITY: PHYSICAL HEALTH: ON AVERAGE, HOW MANY DAYS PER WEEK DO YOU ENGAGE IN MODERATE TO STRENUOUS EXERCISE (LIKE A BRISK WALK)?: 7 DAYS

## 2021-11-22 SDOH — ECONOMIC STABILITY: FOOD INSECURITY: WITHIN THE PAST 12 MONTHS, YOU WORRIED THAT YOUR FOOD WOULD RUN OUT BEFORE YOU GOT MONEY TO BUY MORE.: NEVER TRUE

## 2021-11-22 SDOH — HEALTH STABILITY: PHYSICAL HEALTH: ON AVERAGE, HOW MANY MINUTES DO YOU ENGAGE IN EXERCISE AT THIS LEVEL?: 60 MIN

## 2021-11-22 SDOH — ECONOMIC STABILITY: HOUSING INSECURITY
IN THE LAST 12 MONTHS, WAS THERE A TIME WHEN YOU DID NOT HAVE A STEADY PLACE TO SLEEP OR SLEPT IN A SHELTER (INCLUDING NOW)?: NO

## 2021-11-22 ASSESSMENT — PAIN SCALES - GENERAL: PAINLEVEL: SEVERE PAIN (6)

## 2021-11-22 ASSESSMENT — MIFFLIN-ST. JEOR: SCORE: 1098.73

## 2021-11-22 NOTE — LETTER
Stephanie Bhatia  [address] [facility] [address]  : 1950  MRN:  [id number]      2021          To Whom in May Concern:    I am the primary Care Provider for Stephanie Bhatia.  She requires a desonide topical ointment to use sparingly for angular chelitis flares of the face (may have been exacerbated by mask use during pandemic).  This is an appropriate steroid for use on the face and the other alternatives are too potent or fluorinated thus should not be applied to the face.      Sincerely,    Rangel Garcia MD

## 2021-11-22 NOTE — NURSING NOTE
Chief Complaint   Patient presents with     Physical     Wellness visit     Recheck Medication       REX Anderson at 9:22 AM on 11/22/2021.

## 2021-11-22 NOTE — PROGRESS NOTES
Medicare Annual Wellness Visit Previsit note    This 71 year old year old female presents for a  Medicare Wellness Exam.      What is your marital status:Single    Who lives in your household? Me sister and brother in law    Does your home have loose rugs in the hallway?   Yes     Does your home have grab bars in the bathroom?   No    Does your home have handrails on the stairs?   Yes     Does your home have poorly lit areas?  No    How many times have you fallen in the past year? 1-2    How many times have you been in the Emergency Room in the last year? 0     How many times have you been hospitalized in the last year? 0    Do you feel threatened or controlled by a partner, ex-partner or anyone in your life? No    Has anyone hurt you physically, for example by pushing, hitting, slapping or kicking you   or forcing you to have sex? No    Are you sexually active? No     If yes, with men, women, or both? n/a    If yes, do you have more than one current partner? N/A    If yes, are you using condoms? N/A     Have you had any sexually transmitted infections in the last year? Yes I think I still have herpes (vaginia) out breaks on occasion    Do you have any sexual concerns? No       FOR WOMEN ONLY:  1. What year did you stop having periods (approximate age)? 2000    2.   Any vaginal bleeding in the last year? No    3.   Have you ever had an abnormal Pap smear? No        Do you need help with the phone, transportation, shopping, preparing meals, housework, laundry, medications or managing money? No     Have you noticed any hearing difficulties? No    Do you wear hearing aids? No    Have you seen a hearing professional such as an audiologist in the past year? No    Do you have vision difficulties? Yes     Do you wear glasses or contacts? Yes     Have you seen an eye doctor in the past year? Yes     How many servings of fruits and vegetables do you eat a day (1 serving is 1 cup)? 3    How often do you exercise in a week?  Everyday walk 1-2 miles    What kind of exercise do you do and how long? Walk 30-60 min    Do you wear a seatbelt when driving or riding in a vehicle? Yes    Do you use tobacco?  No    Do you use e-cigarettes?  No    Do you use any other drugs? No         Do you drink alcohol? No    If you drink alcohol, how many drinks per week? n/a      PHQ-2: Over the past 2 weeks, how often have you been bothered by any of the following problems?  1) Little interest or pleasure in doing things: 2    2) Feeling down, depressed, or hopeless: 2    Total = 4    Have you completed an Advance Directives document? Yes    If yes, have you given a copy to the clinic? No    Would you like information on Advance Directives today? No      Elian Fowler, EMT at 10:24 AM on 11/22/2021.

## 2021-11-24 ENCOUNTER — ANCILLARY PROCEDURE (OUTPATIENT)
Dept: GENERAL RADIOLOGY | Facility: CLINIC | Age: 71
End: 2021-11-24
Attending: FAMILY MEDICINE
Payer: MEDICARE

## 2021-11-24 DIAGNOSIS — M05.742 RHEUMATOID ARTHRITIS INVOLVING BOTH HANDS WITH POSITIVE RHEUMATOID FACTOR (H): ICD-10-CM

## 2021-11-24 DIAGNOSIS — M05.741 RHEUMATOID ARTHRITIS INVOLVING BOTH HANDS WITH POSITIVE RHEUMATOID FACTOR (H): ICD-10-CM

## 2021-11-24 PROCEDURE — 73120 X-RAY EXAM OF HAND: CPT | Mod: LT | Performed by: RADIOLOGY

## 2021-11-24 NOTE — TELEPHONE ENCOUNTER
Central Prior Authorization Team   Phone: [phone]      PA Initiation    Medication: desonide (DESOWEN) 0.05 % external ointment  Insurance Company: [employer] - Phone [phone] Fax [phone]  Pharmacy Filling the Rx: [id number] [place] [id number] AT [facility] & [address] [place] [address]  Filling Pharmacy Phone: [phone]  Filling Pharmacy Fax:    Start Date: 11/24/2021

## 2021-11-24 NOTE — TELEPHONE ENCOUNTER
PRIOR AUTHORIZATION DENIED    Medication: desonide (DESOWEN) 0.05 % external ointment-DENIED    Denial Date: 11/24/2021    Denial Rational:               Appeal Information:

## 2021-11-26 PROCEDURE — 82274 ASSAY TEST FOR BLOOD FECAL: CPT | Performed by: FAMILY MEDICINE

## 2021-11-26 NOTE — TELEPHONE ENCOUNTER
Medication Appeal Initiation    We have initiated an appeal for the requested medication:    Medication: desonide (DESOWEN) 0.05 % external ointment  Appeal Start Date:  11/26/2021  Insurance Company: [employer] - Phone [phone] Fax [phone]  Comments:  Appeal has been initiated.  I have faxed original denial letter along with Letter of Medical Necessity (letters tab) to [facility] Decisions & Appeals Dept, fax# [phone]. For follow-up, call [phone]. Marked for urgent review.

## 2021-11-26 NOTE — RESULT ENCOUNTER NOTE
Ivan Bhatia   Hand arthritis is worse as noted on clinical exam. Progression bilateral polyarticular osteoarthritis including likely new large area of erosion involving the right lunate.Please make an appointment with your rheumatologist.  Best wishes,  Rangel Garcia MD

## 2021-11-26 NOTE — TELEPHONE ENCOUNTER
November 26, 2021  She requires desonide topical ointment to use sparingly for angular chelitis flares of the face ( may have been exacerbated by mask use during pandemic). This is an appropriate steroid for use on the face and the other alternatives are too potent or fluorinated thus should not be applied to the face.  Please appeal with the above.  Rangel Garcia MD

## 2021-11-27 LAB — HEMOCCULT STL QL IA: NEGATIVE

## 2021-11-28 NOTE — RESULT ENCOUNTER NOTE
Dear Stephanie Bhatia   Congratulations.  Your test for fecal (stool) occult blood colon cancer screen was negative, good result.  Best wishes,  Rangel Garcia MD

## 2021-11-29 NOTE — TELEPHONE ENCOUNTER
MEDICATION APPEAL DENIED    Medication: desonide (DESOWEN) 0.05 % external ointment    Denial Date: 11/26/2021    Denial Rational:           Second Level Appeal Information:   Second level appeals will be managed by the clinic staff and provider. Please contact the [employer] Prior Authorization Team if additional information about the denial is needed.

## 2021-11-29 NOTE — TELEPHONE ENCOUNTER
RN called patient and relayed Dr. Garcia's message about desonide (DESOWEN) 0.05 % external ointment being denied.  Patient relayed she understands, and she appreciates Dr. Garcia trying to appeal the denial.  RN offered an appt with Dr. Garcia to discuss chelitis symptom but patient relayed she will ask her dentist or a dermatologist at an upcoming visit for advice for an Rx for chelitis.    Carli Bhatia RN on 11/29/2021 at 1:55 PM

## 2021-11-29 NOTE — TELEPHONE ENCOUNTER
November 29, 2021  Please notify patient she will need to have a visit if reoccurrence of Chelitis if this occurs as desonide was denies by her insurance.  Best wishes,  Rangel Garcia MD

## 2022-05-12 ENCOUNTER — ANCILLARY PROCEDURE (OUTPATIENT)
Dept: CT IMAGING | Facility: CLINIC | Age: 72
End: 2022-05-12
Attending: INTERNAL MEDICINE
Payer: MEDICARE

## 2022-05-12 ENCOUNTER — OFFICE VISIT (OUTPATIENT)
Dept: PULMONOLOGY | Facility: CLINIC | Age: 72
End: 2022-05-12
Attending: INTERNAL MEDICINE
Payer: MEDICARE

## 2022-05-12 VITALS
DIASTOLIC BLOOD PRESSURE: 71 MMHG | HEART RATE: 76 BPM | BODY MASS INDEX: 21.66 KG/M2 | HEIGHT: 65 IN | SYSTOLIC BLOOD PRESSURE: 116 MMHG | OXYGEN SATURATION: 99 % | WEIGHT: 130 LBS

## 2022-05-12 DIAGNOSIS — J84.9 ILD (INTERSTITIAL LUNG DISEASE) (H): ICD-10-CM

## 2022-05-12 PROCEDURE — 71250 CT THORAX DX C-: CPT | Mod: MG | Performed by: RADIOLOGY

## 2022-05-12 PROCEDURE — G1004 CDSM NDSC: HCPCS | Mod: GC | Performed by: RADIOLOGY

## 2022-05-12 PROCEDURE — 94375 RESPIRATORY FLOW VOLUME LOOP: CPT | Performed by: INTERNAL MEDICINE

## 2022-05-12 PROCEDURE — 94726 PLETHYSMOGRAPHY LUNG VOLUMES: CPT | Performed by: INTERNAL MEDICINE

## 2022-05-12 PROCEDURE — 99215 OFFICE O/P EST HI 40 MIN: CPT | Mod: 25 | Performed by: INTERNAL MEDICINE

## 2022-05-12 PROCEDURE — G0463 HOSPITAL OUTPT CLINIC VISIT: HCPCS | Mod: 25

## 2022-05-12 PROCEDURE — 94729 DIFFUSING CAPACITY: CPT | Performed by: INTERNAL MEDICINE

## 2022-05-12 NOTE — LETTER
5/12/2022         RE: Stephanie Bhatia  [address]  [facility] [address]        Dear Colleague,    Thank you for referring your patient, Stephanie Bhatia, to the [facility] [place]. Please see a copy of my visit note below.    [facility]    Reason for Visit  Stephanie Bhatia is a 71 year old year old female who is being seen for Interstitial Lung Disease (ILD) (ILD Follow up )    HPI  Stephanie Bhatia is a 71 year old year old female who is being seen for f/u for ILD related to scleroderma.  She was initially diagnosed with scleroderma in about 2009 or 2010.  Scleroderma ILD was first diagnosed on chest CT scan in 2/2017.  She also had a few nodules that were small, 4 mm and did not require follow-up as she was low risk.  She has not required treatment for scleroderma ILD to date.     Today, she reports that she has not been exercising and feels a little out of shape.  However, she does walk her dog and averages about 2 miles per day.  She is going to sign up for swim classes as well.  She reports she does get out of breath when she does things, for example, sometimes she feels short of breath when she walks up a flight of stairs.  She does not know if that is worse compared to a year ago.    She endorses a morning cough for many years without change.  She also reports that she can feel lightheaded easily.  Her knees also have started to ache.  She continues to have Raynaud's.  She denies fevers, chest pain, syncope or digital ulcers.    She has had 4 doses of the Pfizer COVID-19 vaccine.  She continues to work part-time at "[employer]        Current Outpatient Medications   Medication     acyclovir (ZOVIRAX) 400 MG tablet     Cholecalciferol (VITAMIN D) 1000 UNITS capsule     desonide (DESOWEN) 0.05 % external ointment     ibuprofen (ADVIL/MOTRIN) 200 MG tablet     nystatin (MYCOSTATIN) 201596 UNIT/GM external  ointment     vitamin B complex with vitamin C (STRESS TAB) tablet     No current facility-administered medications for this visit.     No Known Allergies  Past Medical History:   Diagnosis Date     Anxiety      Herpes simplex without mention of complication      Interstitial lung disease (H)     scleroderma ILD, dx by chest CT 2017     Lung nodules     4 mm LLL and RLL      Migraine     loss of speech and comprehension, has had w/u and is complex migraine, last one about 2017     Raynaud phenomenon      Systemic sclerosis with limited cutaneous involvement (H) 2014    Dx ~2010, Scl-70 and anti-centromere antibody neg, +RF       Past Surgical History:   Procedure Laterality Date     HYSTEROSCOPIC PLACEMENT CONTRACEPTIVE DEVICE         Social History     Socioeconomic History     Marital status: Single     Spouse name:      Number of children: 0     Years of education: Not on file     Highest education level: Master's degree (e.g., MA, MS, [place], MEd, MSW, DIANE)   Occupational History     Occupation:      Comment: [employer] - part time   Tobacco Use     Smoking status: Former Smoker     Packs/day: 1.00     Years: 20.00     Pack years: 20.00     Types: Cigarettes     Quit date: 1986     Years since quittin.4     Smokeless tobacco: Never Used   Vaping Use     Vaping Use: Never used   Substance and Sexual Activity     Alcohol use: Not Currently     Comment: none since      Drug use: No     Sexual activity: Not Currently     Partners: Male   Other Topics Concern     Parent/sibling w/ CABG, MI or angioplasty before 65F 55M? No   Social History Narrative    .  No children. Sister lives in [place].  Part time at [employer].   in the past. Currently lives with family.    Possible asbestos exposure from basement pipes in childhood home.  Lived in  [facility] [address].  Denies exposure to pet birds, hot tubs.   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     Social Determinants of Health     Financial Resource Strain: High Risk     Difficulty of Paying Living Expenses: Hard   Food Insecurity: No Food Insecurity     Worried About Running Out of Food in the Last Year: Never true     Ran Out of Food in the Last Year: Never true   Transportation Needs: No Transportation Needs     Lack of Transportation (Medical): No     Lack of Transportation (Non-Medical): No   Physical Activity: Sufficiently Active     Days of Exercise per Week: 7 days     Minutes of Exercise per Session: 60 min   Stress: Stress Concern Present     Feeling of Stress : To some extent   Social Connections: Moderately Isolated     Frequency of Communication with Friends and Family: More than three times a week     Frequency of Social Gatherings with Friends and Family: More than three times a week     Attends Mormonism Services: More than 4 times per year     Active Member of Clubs or Organizations: No     Attends Club or Organization Meetings: Never     Marital Status:    Intimate Partner Violence: Not At Risk     Fear of Current or Ex-Partner: No     Emotionally Abused: No     Physically Abused: No     Sexually Abused: No   Housing Stability: Low Risk      Unable to Pay for Housing in the Last Year: No     Number of Places Lived in the Last Year: 1     Unstable Housing in the Last Year: No       Family History   Problem Relation Age of Onset     C.A.D. Father         a efw small heart attacks     Neurologic Disorder Father         dementia 70s     Coronary Artery Disease Father      Neurologic Disorder Mother         dementia     Depression Mother      Cerebrovascular Disease Maternal Grandmother         ,     Other Cancer Paternal Grandmother      Rheumatoid Arthritis Sister      Hypertension Sister      Dementia Paternal Cousin      Dementia Paternal Uncle         60s     Breast Cancer No family hx of      Cancer - colorectal No family hx of      Diabetes No  "family hx of      Cancer No family hx of         no skin cancer     Interstitial Lung Disease No family hx of      Melanoma No family hx of      Skin Cancer No family hx of      Glaucoma No family hx of      Macular Degeneration No family hx of              Vitals: /71 (BP Location: Right arm, Patient Position: Chair, Cuff Size: Adult Small)   Pulse 76   Ht 1.651 m (5' 5\")   Wt 59 kg (130 lb)   SpO2 99%   BMI 21.63 kg/m      Exam:   GENERAL APPEARANCE: Well developed, well nourished, alert, and in no apparent distress.  RESP: good air flow throughout.  Bibasilar inspiratory crackles. No rhonchi. No wheezes.  CV: Normal S1, S2, regular rhythm, normal rate. No murmur.  No LE edema.   MS: extremities normal. No clubbing. No cyanosis.  SKIN: no rash on limited exam.  No digital ulcers.  NEURO: Mentation intact, speech normal, normal gait and stance.  PSYCH: mentation appears normal. and affect normal/bright.    Results:  Recent Results (from the past 168 hour(s))   General PFT Lab (Please always keep checked)    Collection Time: 05/12/22  7:33 AM   Result Value Ref Range    FVC-Pred 2.62 L    FVC-Pre 2.95 L    FVC-%Pred-Pre 112 %    FEV1-Pre 2.41 L    FEV1-%Pred-Pre 117 %    FEV1FVC-Pred 78 %    FEV1FVC-Pre 82 %    FEFMax-Pred 5.57 L/sec    FEFMax-Pre 7.13 L/sec    FEFMax-%Pred-Pre 128 %    FEF2575-Pred 1.71 L/sec    FEF2575-Pre 2.48 L/sec    PED6043-%Pred-Pre 144 %    ExpTime-Pre 6.29 sec    FIFMax-Pre 4.01 L/sec    VC-Pred 3.12 L    VC-Pre 3.01 L    VC-%Pred-Pre 96 %    IC-Pred 2.27 L    IC-Pre 1.74 L    IC-%Pred-Pre 76 %    ERV-Pred 0.86 L    ERV-Pre 1.26 L    ERV-%Pred-Pre 147 %    FEV1FEV6-Pred 79 %    FEV1FEV6-Pre 82 %    FRCPleth-Pred 2.77 L    FRCPleth-Pre 2.93 L    FRCPleth-%Pred-Pre 105 %    RVPleth-Pred 2.16 L    RVPleth-Pre 1.67 L    RVPleth-%Pred-Pre 77 %    TLCPleth-Pred 5.11 L    TLCPleth-Pre 4.67 L    TLCPleth-%Pred-Pre 91 %    DLCOunc-Pred 19.91 ml/min/mmHg    DLCOunc-Pre 17.45 ml/min/mmHg    " DLCOunc-%Pred-Pre 87 %    VA-Pre 4.05 L    VA-%Pred-Pre 83 %    FEV1SVC-Pred 65 %    FEV1SVC-Pre 80 %       I reviewed pulmonary function test that was performed today.  This shows normal spirometry, lung volumes and diffusing capacity.  Diffusion and TLC are stable compared to last year, however, there has been a small drop in spirometry although it remains within normal limits.    I reviewed results with the patient.      Assessment and plan:  Stephanie Bhatia is a 71 year old year old female who is being seen for f/u for ILD related to scleroderma.   1.  Scleroderma ILD.  PFT shows a drop in spirometry compared to a year ago.  It is unclear if her symptoms are worse.  I will get a high-resolution chest CT today to further evaluate.  If the ILD looks stable, then I think we can watch and I would have her come back in 6 months with repeat PFT.  The drop might be related to deconditioning and being out of shape.  However, if the chest CT shows worsening ILD, then we should treat the scleroderma ILD.  She is still in the high risk period for progression of her scleroderma ILD.  We briefly discussed the options which include mycophenolate, nintedanib or tocilizumab.  We will call her with the results of the chest CT scan and discuss further treatment with her.  If we do start medication, then she will need to come back in 3 months with PFT for follow-up.  These medications also require baseline labs and regular lab monitoring.  She is in agreement with this plan.  We will contact her with the CT results and and our recommendations.  Addendum:  I reviewed chest CT - fibrosis looks worse in the lower lobes.  Options include mycophenolate, nintedanib or tocilizumab.  Will call patient and discuss options - I would recommend starting medication.  - I called patient and discussed medications with her.  She would like to think about the options and will call my nurses.  I spent 48 minutes reviewing chart, talking with and  examining patient, reviewing test results, formulating plan and documentation on the day of the encounter (excluding PFT review).    Again, thank you for allowing me to participate in the care of your patient.        Sincerely,        Tiffanie Gmoez MD

## 2022-05-12 NOTE — NURSING NOTE
Chief Complaint   Patient presents with     Interstitial Lung Disease (ILD)     ILD Follow up      Vitals were taken and medications were reconciled.     Jael OJEDAA  8:14 AM

## 2022-05-12 NOTE — PROGRESS NOTES
[facility]    Reason for Visit  Stephanie Bhatia is a 71 year old year old female who is being seen for Interstitial Lung Disease (ILD) (ILD Follow up )    HPI  Stephanie Bhatia is a 71 year old year old female who is being seen for f/u for ILD related to scleroderma.  She was initially diagnosed with scleroderma in about 2009 or 2010.  Scleroderma ILD was first diagnosed on chest CT scan in 2/2017.  She also had a few nodules that were small, 4 mm and did not require follow-up as she was low risk.  She has not required treatment for scleroderma ILD to date.     Today, she reports that she has not been exercising and feels a little out of shape.  However, she does walk her dog and averages about 2 miles per day.  She is going to sign up for swim classes as well.  She reports she does get out of breath when she does things, for example, sometimes she feels short of breath when she walks up a flight of stairs.  She does not know if that is worse compared to a year ago.    She endorses a morning cough for many years without change.  She also reports that she can feel lightheaded easily.  Her knees also have started to ache.  She continues to have Raynaud's.  She denies fevers, chest pain, syncope or digital ulcers.    She has had 4 doses of the Pfizer COVID-19 vaccine.  She continues to work part-time at [employer].        Current Outpatient Medications   Medication     acyclovir (ZOVIRAX) 400 MG tablet     Cholecalciferol (VITAMIN D) 1000 UNITS capsule     desonide (DESOWEN) 0.05 % external ointment     ibuprofen (ADVIL/MOTRIN) 200 MG tablet     nystatin (MYCOSTATIN) 013204 UNIT/GM external ointment     vitamin B complex with vitamin C (STRESS TAB) tablet     No current facility-administered medications for this visit.     No Known Allergies  Past Medical History:   Diagnosis Date     Anxiety      Herpes simplex without mention of complication      Interstitial lung disease  (H)     scleroderma ILD, dx by chest CT 2017     Lung nodules     4 mm LLL and RLL      Migraine     loss of speech and comprehension, has had w/u and is complex migraine, last one about 2017     Raynaud phenomenon      Systemic sclerosis with limited cutaneous involvement (H) 2014    Dx ~2010, Scl-70 and anti-centromere antibody neg, +RF       Past Surgical History:   Procedure Laterality Date     HYSTEROSCOPIC PLACEMENT CONTRACEPTIVE DEVICE         Social History     Socioeconomic History     Marital status: Single     Spouse name:      Number of children: 0     Years of education: Not on file     Highest education level: Master's degree (e.g., MA, MS, [place], MEd, MSW, DIANE)   Occupational History     Occupation:      Comment: [employer] - part time   Tobacco Use     Smoking status: Former Smoker     Packs/day: 1.00     Years: 20.00     Pack years: 20.00     Types: Cigarettes     Quit date: 1986     Years since quittin.4     Smokeless tobacco: Never Used   Vaping Use     Vaping Use: Never used   Substance and Sexual Activity     Alcohol use: Not Currently     Comment: none since      Drug use: No     Sexual activity: Not Currently     Partners: Male   Other Topics Concern     Parent/sibling w/ CABG, MI or angioplasty before 65F 55M? No   Social History Narrative    .  No children. Sister lives in [place].  Part time at [employer].   in the past. Currently lives with family.    Possible asbestos exposure from basement pipes in childhood home.  [place] in  [facility] [address].  Denies exposure to pet birds, hot tubs.   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     Social Determinants of Health     Financial Resource Strain: High Risk     Difficulty of Paying Living Expenses: Hard   Food Insecurity: No Food Insecurity     Worried About Running Out of Food in the Last Year: Never true     Ran Out of Food in the Last  Year: Never true   Transportation Needs: No Transportation Needs     Lack of Transportation (Medical): No     Lack of Transportation (Non-Medical): No   Physical Activity: Sufficiently Active     Days of Exercise per Week: 7 days     Minutes of Exercise per Session: 60 min   Stress: Stress Concern Present     Feeling of Stress : To some extent   Social Connections: Moderately Isolated     Frequency of Communication with Friends and Family: More than three times a week     Frequency of Social Gatherings with Friends and Family: More than three times a week     Attends [employer] Services: More than 4 times per year     Active Member of Clubs or Organizations: No     Attends Club or Organization Meetings: Never     Marital Status:    Intimate Partner Violence: Not At Risk     Fear of Current or Ex-Partner: No     Emotionally Abused: No     Physically Abused: No     Sexually Abused: No   Housing Stability: Low Risk      Unable to Pay for Housing in the Last Year: No     Number of Places Lived in the Last Year: 1     Unstable Housing in the Last Year: No       Family History   Problem Relation Age of Onset     C.A.D. Father         a efw small heart attacks     Neurologic Disorder Father         dementia 70s     Coronary Artery Disease Father      Neurologic Disorder Mother         dementia     Depression Mother      Cerebrovascular Disease Maternal Grandmother         ,     Other Cancer Paternal Grandmother      Rheumatoid Arthritis Sister      Hypertension Sister      Dementia Paternal Cousin      Dementia Paternal Uncle         60s     Breast Cancer No family hx of      Cancer - colorectal No family hx of      Diabetes No family hx of      Cancer No family hx of         no skin cancer     Interstitial Lung Disease No family hx of      Melanoma No family hx of      Skin Cancer No family hx of      Glaucoma No family hx of      Macular Degeneration No family hx of              Vitals: /71 (BP Location:  "Right arm, Patient Position: Chair, Cuff Size: Adult Small)   Pulse 76   Ht 1.651 m (5' 5\")   Wt 59 kg (130 lb)   SpO2 99%   BMI 21.63 kg/m      Exam:   GENERAL APPEARANCE: Well developed, well nourished, alert, and in no apparent distress.  RESP: good air flow throughout.  Bibasilar inspiratory crackles. No rhonchi. No wheezes.  CV: Normal S1, S2, regular rhythm, normal rate. No murmur.  No LE edema.   MS: extremities normal. No clubbing. No cyanosis.  SKIN: no rash on limited exam.  No digital ulcers.  NEURO: Mentation intact, speech normal, normal gait and stance.  PSYCH: mentation appears normal. and affect normal/bright.    Results:  Recent Results (from the past 168 hour(s))   General PFT Lab (Please always keep checked)    Collection Time: 05/12/22  7:33 AM   Result Value Ref Range    FVC-Pred 2.62 L    FVC-Pre 2.95 L    FVC-%Pred-Pre 112 %    FEV1-Pre 2.41 L    FEV1-%Pred-Pre 117 %    FEV1FVC-Pred 78 %    FEV1FVC-Pre 82 %    FEFMax-Pred 5.57 L/sec    FEFMax-Pre 7.13 L/sec    FEFMax-%Pred-Pre 128 %    FEF2575-Pred 1.71 L/sec    FEF2575-Pre 2.48 L/sec    SFX1185-%Pred-Pre 144 %    ExpTime-Pre 6.29 sec    FIFMax-Pre 4.01 L/sec    VC-Pred 3.12 L    VC-Pre 3.01 L    VC-%Pred-Pre 96 %    IC-Pred 2.27 L    IC-Pre 1.74 L    IC-%Pred-Pre 76 %    ERV-Pred 0.86 L    ERV-Pre 1.26 L    ERV-%Pred-Pre 147 %    FEV1FEV6-Pred 79 %    FEV1FEV6-Pre 82 %    FRCPleth-Pred 2.77 L    FRCPleth-Pre 2.93 L    FRCPleth-%Pred-Pre 105 %    RVPleth-Pred 2.16 L    RVPleth-Pre 1.67 L    RVPleth-%Pred-Pre 77 %    TLCPleth-Pred 5.11 L    TLCPleth-Pre 4.67 L    TLCPleth-%Pred-Pre 91 %    DLCOunc-Pred 19.91 ml/min/mmHg    DLCOunc-Pre 17.45 ml/min/mmHg    DLCOunc-%Pred-Pre 87 %    VA-Pre 4.05 L    VA-%Pred-Pre 83 %    FEV1SVC-Pred 65 %    FEV1SVC-Pre 80 %       I reviewed pulmonary function test that was performed today.  This shows normal spirometry, lung volumes and diffusing capacity.  Diffusion and TLC are stable compared to last year, " however, there has been a small drop in spirometry although it remains within normal limits.    I reviewed results with the patient.      Assessment and plan:  Stephanie Bhatia is a 71 year old year old female who is being seen for f/u for ILD related to scleroderma.   1.  Scleroderma ILD.  PFT shows a drop in spirometry compared to a year ago.  It is unclear if her symptoms are worse.  I will get a high-resolution chest CT today to further evaluate.  If the ILD looks stable, then I think we can watch and I would have her come back in 6 months with repeat PFT.  The drop might be related to deconditioning and being out of shape.  However, if the chest CT shows worsening ILD, then we should treat the scleroderma ILD.  She is still in the high risk period for progression of her scleroderma ILD.  We briefly discussed the options which include mycophenolate, nintedanib or tocilizumab.  We will call her with the results of the chest CT scan and discuss further treatment with her.  If we do start medication, then she will need to come back in 3 months with PFT for follow-up.  These medications also require baseline labs and regular lab monitoring.  She is in agreement with this plan.  We will contact her with the CT results and and our recommendations.  Addendum:  I reviewed chest CT - fibrosis looks worse in the lower lobes.  Options include mycophenolate, nintedanib or tocilizumab.  Will call patient and discuss options - I would recommend starting medication.  - I called patient and discussed medications with her.  She would like to think about the options and will call my nurses.  I spent 48 minutes reviewing chart, talking with and examining patient, reviewing test results, formulating plan and documentation on the day of the encounter (excluding PFT review).

## 2022-05-13 ENCOUNTER — MYC MEDICAL ADVICE (OUTPATIENT)
Dept: PULMONOLOGY | Facility: CLINIC | Age: 72
End: 2022-05-13
Payer: COMMERCIAL

## 2022-05-13 LAB
DLCOUNC-%PRED-PRE: 87 %
DLCOUNC-PRE: 17.45 ML/MIN/MMHG
DLCOUNC-PRED: 19.91 ML/MIN/MMHG
ERV-%PRED-PRE: 147 %
ERV-PRE: 1.26 L
ERV-PRED: 0.86 L
EXPTIME-PRE: 6.29 SEC
FEF2575-%PRED-PRE: 144 %
FEF2575-PRE: 2.48 L/SEC
FEF2575-PRED: 1.71 L/SEC
FEFMAX-%PRED-PRE: 128 %
FEFMAX-PRE: 7.13 L/SEC
FEFMAX-PRED: 5.57 L/SEC
FEV1-%PRED-PRE: 117 %
FEV1-PRE: 2.41 L
FEV1FEV6-PRE: 82 %
FEV1FEV6-PRED: 79 %
FEV1FVC-PRE: 82 %
FEV1FVC-PRED: 78 %
FEV1SVC-PRE: 80 %
FEV1SVC-PRED: 65 %
FIFMAX-PRE: 4.01 L/SEC
FRCPLETH-%PRED-PRE: 105 %
FRCPLETH-PRE: 2.93 L
FRCPLETH-PRED: 2.77 L
FVC-%PRED-PRE: 112 %
FVC-PRE: 2.95 L
FVC-PRED: 2.62 L
IC-%PRED-PRE: 76 %
IC-PRE: 1.74 L
IC-PRED: 2.27 L
RVPLETH-%PRED-PRE: 77 %
RVPLETH-PRE: 1.67 L
RVPLETH-PRED: 2.16 L
TLCPLETH-%PRED-PRE: 91 %
TLCPLETH-PRE: 4.67 L
TLCPLETH-PRED: 5.11 L
VA-%PRED-PRE: 83 %
VA-PRE: 4.05 L
VC-%PRED-PRE: 96 %
VC-PRE: 3.01 L
VC-PRED: 3.12 L

## 2022-05-20 ENCOUNTER — TELEPHONE (OUTPATIENT)
Dept: PULMONOLOGY | Facility: CLINIC | Age: 72
End: 2022-05-20
Payer: COMMERCIAL

## 2022-05-20 ENCOUNTER — PATIENT OUTREACH (OUTPATIENT)
Dept: PULMONOLOGY | Facility: CLINIC | Age: 72
End: 2022-05-20
Payer: COMMERCIAL

## 2022-05-20 DIAGNOSIS — Z13.220 SCREENING FOR LIPID DISORDERS: ICD-10-CM

## 2022-05-20 DIAGNOSIS — J84.9 ILD (INTERSTITIAL LUNG DISEASE) (H): ICD-10-CM

## 2022-05-20 DIAGNOSIS — M34.9 SCLERODERMA WITH PULMONARY INVOLVEMENT (H): Primary | ICD-10-CM

## 2022-05-20 DIAGNOSIS — Z11.59 NEED FOR HEPATITIS C SCREENING TEST: ICD-10-CM

## 2022-05-20 DIAGNOSIS — Z11.59 NEED FOR HEPATITIS B SCREENING TEST: ICD-10-CM

## 2022-05-20 DIAGNOSIS — M34.9 SCLERODERMA (H): Primary | ICD-10-CM

## 2022-05-20 DIAGNOSIS — Z79.899 OTHER LONG TERM (CURRENT) DRUG THERAPY: ICD-10-CM

## 2022-05-20 DIAGNOSIS — Z11.59 ENCOUNTER FOR SCREENING FOR OTHER VIRAL DISEASES: ICD-10-CM

## 2022-05-20 RX ORDER — TOCILIZUMAB 180 MG/ML
162 INJECTION, SOLUTION SUBCUTANEOUS
Qty: 3.6 ML | Refills: 11 | Status: SHIPPED | OUTPATIENT
Start: 2022-05-20 | End: 2022-09-09

## 2022-05-20 NOTE — PROGRESS NOTES
Contacted patient regarding starting Actemra.  Explained to patient that a PA will need to be done and that baseline labs need to be done prior to starting injections.  Once PA is completed and patient receives medication, she can come in for teaching of auto inject pen if she is not comfortable doing this on her own. Patient will require repeat labs in 6 weeks, then every 3 months.

## 2022-05-20 NOTE — TELEPHONE ENCOUNTER
Spoke with pt regarding scheduling a lab appt for Dr. Gomez for this upcoming Tuesday, per request from RENUKA Connolly. Appt scheduled and details confirmed with pt

## 2022-05-23 ENCOUNTER — TELEPHONE (OUTPATIENT)
Dept: PULMONOLOGY | Facility: CLINIC | Age: 72
End: 2022-05-23

## 2022-05-23 NOTE — TELEPHONE ENCOUNTER
Prior Authorization Not Needed per Insurance    Medication: Actemra - no PA Needed  Insurance Company: Medicare Blue - Phone [phone] Fax [phone]  Expected CoPay:      Pharm[facility]illing the Rx[facility]  [facility]  Pharmacy Notified:  Yes  Patient Notified:  Yes

## 2022-05-26 ENCOUNTER — LAB (OUTPATIENT)
Dept: LAB | Facility: CLINIC | Age: 72
End: 2022-05-26
Payer: MEDICARE

## 2022-05-26 DIAGNOSIS — M34.9 SCLERODERMA (H): ICD-10-CM

## 2022-05-26 DIAGNOSIS — Z11.59 NEED FOR HEPATITIS C SCREENING TEST: ICD-10-CM

## 2022-05-26 DIAGNOSIS — Z13.220 SCREENING FOR LIPID DISORDERS: ICD-10-CM

## 2022-05-26 DIAGNOSIS — Z11.59 NEED FOR HEPATITIS B SCREENING TEST: ICD-10-CM

## 2022-05-26 DIAGNOSIS — Z79.899 OTHER LONG TERM (CURRENT) DRUG THERAPY: ICD-10-CM

## 2022-05-26 LAB
ALBUMIN SERPL-MCNC: 3.3 G/DL (ref 3.4–5)
ALP SERPL-CCNC: 51 U/L (ref 40–150)
ALT SERPL W P-5'-P-CCNC: 21 U/L (ref 0–50)
ANION GAP SERPL CALCULATED.3IONS-SCNC: 5 MMOL/L (ref 3–14)
AST SERPL W P-5'-P-CCNC: 23 U/L (ref 0–45)
BASOPHILS # BLD AUTO: 0 10E3/UL (ref 0–0.2)
BASOPHILS NFR BLD AUTO: 1 %
BILIRUB SERPL-MCNC: 0.8 MG/DL (ref 0.2–1.3)
BUN SERPL-MCNC: 23 MG/DL (ref 7–30)
CALCIUM SERPL-MCNC: 8.6 MG/DL (ref 8.5–10.1)
CHLORIDE BLD-SCNC: 111 MMOL/L (ref 94–109)
CHOLEST SERPL-MCNC: 195 MG/DL
CO2 SERPL-SCNC: 29 MMOL/L (ref 20–32)
CREAT SERPL-MCNC: 0.7 MG/DL (ref 0.52–1.04)
EOSINOPHIL # BLD AUTO: 0.3 10E3/UL (ref 0–0.7)
EOSINOPHIL NFR BLD AUTO: 5 %
ERYTHROCYTE [DISTWIDTH] IN BLOOD BY AUTOMATED COUNT: 12.8 % (ref 10–15)
FASTING STATUS PATIENT QL REPORTED: ABNORMAL
GFR SERPL CREATININE-BSD FRML MDRD: >90 ML/MIN/1.73M2
GLUCOSE BLD-MCNC: 83 MG/DL (ref 70–99)
HAV IGG SER QL IA: REACTIVE
HBV CORE AB SERPL QL IA: NONREACTIVE
HBV SURFACE AB SERPL IA-ACNC: 0 M[IU]/ML
HBV SURFACE AG SERPL QL IA: NONREACTIVE
HCT VFR BLD AUTO: 41.7 % (ref 35–47)
HCV AB SERPL QL IA: NONREACTIVE
HDLC SERPL-MCNC: 56 MG/DL
HGB BLD-MCNC: 13.4 G/DL (ref 11.7–15.7)
IMM GRANULOCYTES # BLD: 0 10E3/UL
IMM GRANULOCYTES NFR BLD: 0 %
LDLC SERPL CALC-MCNC: 122 MG/DL
LYMPHOCYTES # BLD AUTO: 2.1 10E3/UL (ref 0.8–5.3)
LYMPHOCYTES NFR BLD AUTO: 39 %
MCH RBC QN AUTO: 31.5 PG (ref 26.5–33)
MCHC RBC AUTO-ENTMCNC: 32.1 G/DL (ref 31.5–36.5)
MCV RBC AUTO: 98 FL (ref 78–100)
MONOCYTES # BLD AUTO: 0.6 10E3/UL (ref 0–1.3)
MONOCYTES NFR BLD AUTO: 11 %
NEUTROPHILS # BLD AUTO: 2.3 10E3/UL (ref 1.6–8.3)
NEUTROPHILS NFR BLD AUTO: 44 %
NONHDLC SERPL-MCNC: 139 MG/DL
NRBC # BLD AUTO: 0 10E3/UL
NRBC BLD AUTO-RTO: 0 /100
PLATELET # BLD AUTO: 265 10E3/UL (ref 150–450)
POTASSIUM BLD-SCNC: 4.2 MMOL/L (ref 3.4–5.3)
PROT SERPL-MCNC: 6.5 G/DL (ref 6.8–8.8)
RBC # BLD AUTO: 4.25 10E6/UL (ref 3.8–5.2)
SODIUM SERPL-SCNC: 145 MMOL/L (ref 133–144)
TRIGL SERPL-MCNC: 85 MG/DL
WBC # BLD AUTO: 5.2 10E3/UL (ref 4–11)

## 2022-05-26 PROCEDURE — 86803 HEPATITIS C AB TEST: CPT | Performed by: INTERNAL MEDICINE

## 2022-05-26 PROCEDURE — 86704 HEP B CORE ANTIBODY TOTAL: CPT | Performed by: INTERNAL MEDICINE

## 2022-05-26 PROCEDURE — 80053 COMPREHEN METABOLIC PANEL: CPT | Performed by: PATHOLOGY

## 2022-05-26 PROCEDURE — 85025 COMPLETE CBC W/AUTO DIFF WBC: CPT | Performed by: PATHOLOGY

## 2022-05-26 PROCEDURE — 86708 HEPATITIS A ANTIBODY: CPT | Performed by: INTERNAL MEDICINE

## 2022-05-26 PROCEDURE — 36415 COLL VENOUS BLD VENIPUNCTURE: CPT | Performed by: PATHOLOGY

## 2022-05-26 PROCEDURE — 87340 HEPATITIS B SURFACE AG IA: CPT | Performed by: INTERNAL MEDICINE

## 2022-05-26 PROCEDURE — 86706 HEP B SURFACE ANTIBODY: CPT | Performed by: INTERNAL MEDICINE

## 2022-05-26 PROCEDURE — 80061 LIPID PANEL: CPT | Performed by: PATHOLOGY

## 2022-06-01 ENCOUNTER — PATIENT OUTREACH (OUTPATIENT)
Dept: PULMONOLOGY | Facility: CLINIC | Age: 72
End: 2022-06-01
Payer: COMMERCIAL

## 2022-06-01 DIAGNOSIS — R76.8 HEPATITIS A ANTIBODY POSITIVE: Primary | ICD-10-CM

## 2022-06-01 NOTE — PROGRESS NOTES
Spoke to patient last week about lab result for Hepatitis A antibody positive and that we would contact back with plan. Dr Gomez requesting Hepatology referral but patient did not get IGM antibody. Message sent to Dr Gomez to see if she would like to test Hep A IGM lab. Dr Gomez agreed to get Hepatitis A IGM lab to see if active disease vs past vaccine. Left message telling patient about lab being placed and okay to get lab done at any [place] location. Told to contact with questions.

## 2022-06-02 ENCOUNTER — LAB (OUTPATIENT)
Dept: LAB | Facility: CLINIC | Age: 72
End: 2022-06-02
Payer: MEDICARE

## 2022-06-02 ENCOUNTER — HOSPITAL ENCOUNTER (OUTPATIENT)
Facility: CLINIC | Age: 72
Discharge: HOME | End: 2022-06-02
Attending: INTERNAL MEDICINE | Admitting: FAMILY MEDICINE
Payer: MEDICARE

## 2022-06-02 DIAGNOSIS — M34.9 SCLERODERMA (H): ICD-10-CM

## 2022-06-02 DIAGNOSIS — Z11.59 NEED FOR HEPATITIS C SCREENING TEST: ICD-10-CM

## 2022-06-02 DIAGNOSIS — Z79.899 OTHER LONG TERM (CURRENT) DRUG THERAPY: ICD-10-CM

## 2022-06-02 DIAGNOSIS — Z13.220 SCREENING FOR LIPID DISORDERS: ICD-10-CM

## 2022-06-02 DIAGNOSIS — R76.8 HEPATITIS A ANTIBODY POSITIVE: ICD-10-CM

## 2022-06-02 DIAGNOSIS — Z11.59 NEED FOR HEPATITIS B SCREENING TEST: ICD-10-CM

## 2022-06-02 LAB
ALBUMIN SERPL-MCNC: 3.4 G/DL (ref 3.4–5)
ALP SERPL-CCNC: 53 U/L (ref 40–150)
ALT SERPL W P-5'-P-CCNC: 20 U/L (ref 0–50)
ANION GAP SERPL CALCULATED.3IONS-SCNC: 2 MMOL/L (ref 3–14)
AST SERPL W P-5'-P-CCNC: 21 U/L (ref 0–45)
BASOPHILS # BLD AUTO: 0.1 10E3/UL (ref 0–0.2)
BASOPHILS NFR BLD AUTO: 1 %
BILIRUB SERPL-MCNC: 0.6 MG/DL (ref 0.2–1.3)
BUN SERPL-MCNC: 23 MG/DL (ref 7–30)
CALCIUM SERPL-MCNC: 8.8 MG/DL (ref 8.5–10.1)
CHLORIDE BLD-SCNC: 108 MMOL/L (ref 94–109)
CHOLEST SERPL-MCNC: 184 MG/DL
CO2 SERPL-SCNC: 28 MMOL/L (ref 20–32)
CREAT SERPL-MCNC: 0.66 MG/DL (ref 0.52–1.04)
EOSINOPHIL # BLD AUTO: 0.1 10E3/UL (ref 0–0.7)
EOSINOPHIL NFR BLD AUTO: 2 %
ERYTHROCYTE [DISTWIDTH] IN BLOOD BY AUTOMATED COUNT: 12.7 % (ref 10–15)
FASTING STATUS PATIENT QL REPORTED: NO
GFR SERPL CREATININE-BSD FRML MDRD: >90 ML/MIN/1.73M2
GLUCOSE BLD-MCNC: 79 MG/DL (ref 70–99)
HAV IGM SERPL QL IA: NONREACTIVE
HCT VFR BLD AUTO: 39.7 % (ref 35–47)
HDLC SERPL-MCNC: 58 MG/DL
HGB BLD-MCNC: 13 G/DL (ref 11.7–15.7)
IMM GRANULOCYTES # BLD: 0 10E3/UL
IMM GRANULOCYTES NFR BLD: 0 %
LDLC SERPL CALC-MCNC: 105 MG/DL
LYMPHOCYTES # BLD AUTO: 1.7 10E3/UL (ref 0.8–5.3)
LYMPHOCYTES NFR BLD AUTO: 24 %
MCH RBC QN AUTO: 31.4 PG (ref 26.5–33)
MCHC RBC AUTO-ENTMCNC: 32.7 G/DL (ref 31.5–36.5)
MCV RBC AUTO: 96 FL (ref 78–100)
MONOCYTES # BLD AUTO: 0.5 10E3/UL (ref 0–1.3)
MONOCYTES NFR BLD AUTO: 7 %
NEUTROPHILS # BLD AUTO: 4.7 10E3/UL (ref 1.6–8.3)
NEUTROPHILS NFR BLD AUTO: 66 %
NONHDLC SERPL-MCNC: 126 MG/DL
NRBC # BLD AUTO: 0 10E3/UL
NRBC BLD AUTO-RTO: 0 /100
PLATELET # BLD AUTO: 257 10E3/UL (ref 150–450)
POTASSIUM BLD-SCNC: 4 MMOL/L (ref 3.4–5.3)
PROT SERPL-MCNC: 6.7 G/DL (ref 6.8–8.8)
RBC # BLD AUTO: 4.14 10E6/UL (ref 3.8–5.2)
SODIUM SERPL-SCNC: 138 MMOL/L (ref 133–144)
TRIGL SERPL-MCNC: 103 MG/DL
WBC # BLD AUTO: 7.1 10E3/UL (ref 4–11)

## 2022-06-02 PROCEDURE — 80053 COMPREHEN METABOLIC PANEL: CPT | Performed by: PATHOLOGY

## 2022-06-02 PROCEDURE — 80061 LIPID PANEL: CPT | Performed by: PATHOLOGY

## 2022-06-02 PROCEDURE — 99000 SPECIMEN HANDLING OFFICE-LAB: CPT | Performed by: PATHOLOGY

## 2022-06-02 PROCEDURE — 85025 COMPLETE CBC W/AUTO DIFF WBC: CPT | Performed by: PATHOLOGY

## 2022-06-02 PROCEDURE — 86709 HEPATITIS A IGM ANTIBODY: CPT

## 2022-06-02 PROCEDURE — 36415 COLL VENOUS BLD VENIPUNCTURE: CPT | Performed by: PATHOLOGY

## 2022-06-07 NOTE — TELEPHONE ENCOUNTER
PA Initiation    Medication: Actemra - PA Pending  Insurance Company: Medicare Blue - Phone [phone] Fax [phone]  Pharmacy Filling the Rx:    Filling Pharmacy Phone:    Filling Pharmacy Fax:    Start Date: 5/20/2022

## 2022-06-07 NOTE — TELEPHONE ENCOUNTER
Prior Authorization Approval    Authorization Effective Date: 1/1/2022  Authorization Expiration Date: 12/31/2022  Medication: Actemra - PA approved   Approved Dose/Quantity: 162mg / 3.6ml for 28 day supply   Reference #: Key [id number]   Insurance Company: Medicare Blue - Phone [phone] Fax [phone]  Expected CoPay:       CoPay Card Available:      Foundation Assistance Needed:    Which Pharmacy is filling the prescription (Not needed for infusion/clinic administered): [facility]  Pharmacy Notified:    Patient Notified:

## 2022-06-09 ENCOUNTER — HOSPITAL ENCOUNTER (OUTPATIENT)
Facility: CLINIC | Age: 72
Discharge: HOME | End: 2022-06-09
Attending: INTERNAL MEDICINE | Admitting: FAMILY MEDICINE
Payer: MEDICARE

## 2022-06-09 ENCOUNTER — LAB (OUTPATIENT)
Dept: LAB | Facility: CLINIC | Age: 72
End: 2022-06-09
Payer: MEDICARE

## 2022-06-09 DIAGNOSIS — Z11.59 NEED FOR HEPATITIS B SCREENING TEST: ICD-10-CM

## 2022-06-09 DIAGNOSIS — Z11.59 NEED FOR HEPATITIS C SCREENING TEST: ICD-10-CM

## 2022-06-09 DIAGNOSIS — Z79.899 OTHER LONG TERM (CURRENT) DRUG THERAPY: ICD-10-CM

## 2022-06-09 DIAGNOSIS — Z11.59 ENCOUNTER FOR SCREENING FOR OTHER VIRAL DISEASES: ICD-10-CM

## 2022-06-09 DIAGNOSIS — Z13.220 SCREENING FOR LIPID DISORDERS: ICD-10-CM

## 2022-06-09 DIAGNOSIS — M34.9 SCLERODERMA (H): ICD-10-CM

## 2022-06-09 LAB
ALBUMIN SERPL-MCNC: 3.2 G/DL (ref 3.4–5)
ALP SERPL-CCNC: 52 U/L (ref 40–150)
ALT SERPL W P-5'-P-CCNC: 18 U/L (ref 0–50)
ANION GAP SERPL CALCULATED.3IONS-SCNC: 6 MMOL/L (ref 3–14)
AST SERPL W P-5'-P-CCNC: 19 U/L (ref 0–45)
BASOPHILS # BLD AUTO: 0.1 10E3/UL (ref 0–0.2)
BASOPHILS NFR BLD AUTO: 1 %
BILIRUB SERPL-MCNC: 0.4 MG/DL (ref 0.2–1.3)
BUN SERPL-MCNC: 26 MG/DL (ref 7–30)
CALCIUM SERPL-MCNC: 8.6 MG/DL (ref 8.5–10.1)
CHLORIDE BLD-SCNC: 110 MMOL/L (ref 94–109)
CHOLEST SERPL-MCNC: 185 MG/DL
CO2 SERPL-SCNC: 27 MMOL/L (ref 20–32)
CREAT SERPL-MCNC: 0.69 MG/DL (ref 0.52–1.04)
EOSINOPHIL # BLD AUTO: 0.3 10E3/UL (ref 0–0.7)
EOSINOPHIL NFR BLD AUTO: 5 %
ERYTHROCYTE [DISTWIDTH] IN BLOOD BY AUTOMATED COUNT: 12.8 % (ref 10–15)
FASTING STATUS PATIENT QL REPORTED: NO
GFR SERPL CREATININE-BSD FRML MDRD: >90 ML/MIN/1.73M2
GLUCOSE BLD-MCNC: 71 MG/DL (ref 70–99)
HCT VFR BLD AUTO: 40.1 % (ref 35–47)
HDLC SERPL-MCNC: 52 MG/DL
HGB BLD-MCNC: 13 G/DL (ref 11.7–15.7)
IMM GRANULOCYTES # BLD: 0 10E3/UL
IMM GRANULOCYTES NFR BLD: 0 %
LDLC SERPL CALC-MCNC: 119 MG/DL
LYMPHOCYTES # BLD AUTO: 1.8 10E3/UL (ref 0.8–5.3)
LYMPHOCYTES NFR BLD AUTO: 30 %
MCH RBC QN AUTO: 31.1 PG (ref 26.5–33)
MCHC RBC AUTO-ENTMCNC: 32.4 G/DL (ref 31.5–36.5)
MCV RBC AUTO: 96 FL (ref 78–100)
MONOCYTES # BLD AUTO: 0.7 10E3/UL (ref 0–1.3)
MONOCYTES NFR BLD AUTO: 12 %
NEUTROPHILS # BLD AUTO: 3.1 10E3/UL (ref 1.6–8.3)
NEUTROPHILS NFR BLD AUTO: 52 %
NONHDLC SERPL-MCNC: 133 MG/DL
NRBC # BLD AUTO: 0 10E3/UL
NRBC BLD AUTO-RTO: 0 /100
PLATELET # BLD AUTO: 253 10E3/UL (ref 150–450)
POTASSIUM BLD-SCNC: 4.4 MMOL/L (ref 3.4–5.3)
PROT SERPL-MCNC: 6.3 G/DL (ref 6.8–8.8)
RBC # BLD AUTO: 4.18 10E6/UL (ref 3.8–5.2)
SODIUM SERPL-SCNC: 143 MMOL/L (ref 133–144)
TRIGL SERPL-MCNC: 72 MG/DL
WBC # BLD AUTO: 6 10E3/UL (ref 4–11)

## 2022-06-09 PROCEDURE — 80061 LIPID PANEL: CPT | Performed by: PATHOLOGY

## 2022-06-09 PROCEDURE — 86481 TB AG RESPONSE T-CELL SUSP: CPT

## 2022-06-09 PROCEDURE — 80053 COMPREHEN METABOLIC PANEL: CPT | Performed by: PATHOLOGY

## 2022-06-09 PROCEDURE — 99000 SPECIMEN HANDLING OFFICE-LAB: CPT | Performed by: PATHOLOGY

## 2022-06-09 PROCEDURE — 36415 COLL VENOUS BLD VENIPUNCTURE: CPT | Performed by: PATHOLOGY

## 2022-06-09 PROCEDURE — 85025 COMPLETE CBC W/AUTO DIFF WBC: CPT | Performed by: PATHOLOGY

## 2022-06-10 LAB
GAMMA INTERFERON BACKGROUND BLD IA-ACNC: 0 IU/ML
M TB IFN-G BLD-IMP: NEGATIVE
M TB IFN-G CD4+ BCKGRND COR BLD-ACNC: 10 IU/ML
MITOGEN IGNF BCKGRD COR BLD-ACNC: 0.03 IU/ML
MITOGEN IGNF BCKGRD COR BLD-ACNC: 0.04 IU/ML
QUANTIFERON MITOGEN: 10 IU/ML
QUANTIFERON NIL TUBE: 0 IU/ML
QUANTIFERON TB1 TUBE: 0.03 IU/ML
QUANTIFERON TB2 TUBE: 0.04

## 2022-06-14 NOTE — TELEPHONE ENCOUNTER
Patient called and had questions regarding copay. She will call [facility] ([phone]), if copay is unaffordable she will contact me to apply for patient assistance foundation.

## 2022-06-14 NOTE — TELEPHONE ENCOUNTER
Patient called back and is unable to afford medication. Emailed patient consent form for Fannect to [email or web address] and sent Prescriber service form to clinic MD forms for Dr. Patricia xie

## 2022-06-17 NOTE — TELEPHONE ENCOUNTER
Called [employer] and spoke to Annamarie, currently under review and patient may qualify for [facility] ([phone])      Transferred to Steffanie at [facility] and they are unable to use Actemra form, need [facility] form faxed. Steffanie stated pens are currently not available and shipping to patients. Infused medications can be shipped to patients currently. Sent prescriber form and message to care team incase patient qualifies.

## 2022-06-27 DIAGNOSIS — J84.9 ILD (INTERSTITIAL LUNG DISEASE) (H): ICD-10-CM

## 2022-06-27 DIAGNOSIS — M05.741 RHEUMATOID ARTHRITIS INVOLVING BOTH HANDS WITH POSITIVE RHEUMATOID FACTOR (H): Primary | ICD-10-CM

## 2022-06-27 DIAGNOSIS — M34.9 SYSTEMIC SCLEROSIS WITH LIMITED CUTANEOUS INVOLVEMENT (H): ICD-10-CM

## 2022-06-27 DIAGNOSIS — M05.742 RHEUMATOID ARTHRITIS INVOLVING BOTH HANDS WITH POSITIVE RHEUMATOID FACTOR (H): Primary | ICD-10-CM

## 2022-06-28 NOTE — TELEPHONE ENCOUNTER
Called Stephanie and spoke to her with an update. Care team is working on the prescriber form for Actemra pens.

## 2022-06-29 NOTE — TELEPHONE ENCOUNTER
Called [facility] ([phone]) and spoke to Garrett both Actemra pens and syringes are currently unavailable, only IV formulation is available. Sent message to care team with information.

## 2022-06-30 RX ORDER — NALOXONE HYDROCHLORIDE 0.4 MG/ML
0.2 INJECTION, SOLUTION INTRAMUSCULAR; INTRAVENOUS; SUBCUTANEOUS
Status: CANCELLED | OUTPATIENT
Start: 2022-06-30

## 2022-06-30 RX ORDER — ALBUTEROL SULFATE 90 UG/1
1-2 AEROSOL, METERED RESPIRATORY (INHALATION)
Status: CANCELLED
Start: 2022-06-30

## 2022-06-30 RX ORDER — DIPHENHYDRAMINE HCL 25 MG
25 CAPSULE ORAL ONCE
Status: CANCELLED | OUTPATIENT
Start: 2022-06-30

## 2022-06-30 RX ORDER — ALBUTEROL SULFATE 0.83 MG/ML
2.5 SOLUTION RESPIRATORY (INHALATION)
Status: CANCELLED | OUTPATIENT
Start: 2022-06-30

## 2022-06-30 RX ORDER — MEPERIDINE HYDROCHLORIDE 25 MG/ML
25 INJECTION INTRAMUSCULAR; INTRAVENOUS; SUBCUTANEOUS EVERY 30 MIN PRN
Status: CANCELLED | OUTPATIENT
Start: 2022-06-30

## 2022-06-30 RX ORDER — HEPARIN SODIUM,PORCINE 10 UNIT/ML
5 VIAL (ML) INTRAVENOUS
Status: CANCELLED | OUTPATIENT
Start: 2022-06-30

## 2022-06-30 RX ORDER — HEPARIN SODIUM (PORCINE) LOCK FLUSH IV SOLN 100 UNIT/ML 100 UNIT/ML
5 SOLUTION INTRAVENOUS
Status: CANCELLED | OUTPATIENT
Start: 2022-06-30

## 2022-06-30 RX ORDER — METHYLPREDNISOLONE SODIUM SUCCINATE 125 MG/2ML
125 INJECTION, POWDER, LYOPHILIZED, FOR SOLUTION INTRAMUSCULAR; INTRAVENOUS
Status: CANCELLED | OUTPATIENT
Start: 2022-06-30

## 2022-06-30 RX ORDER — METHYLPREDNISOLONE SODIUM SUCCINATE 125 MG/2ML
125 INJECTION, POWDER, LYOPHILIZED, FOR SOLUTION INTRAMUSCULAR; INTRAVENOUS
Status: CANCELLED
Start: 2022-06-30

## 2022-06-30 RX ORDER — DIPHENHYDRAMINE HYDROCHLORIDE 50 MG/ML
50 INJECTION INTRAMUSCULAR; INTRAVENOUS
Status: CANCELLED
Start: 2022-06-30

## 2022-06-30 RX ORDER — ACETAMINOPHEN 325 MG/1
650 TABLET ORAL ONCE
Status: CANCELLED | OUTPATIENT
Start: 2022-06-30

## 2022-06-30 RX ORDER — EPINEPHRINE 1 MG/ML
0.3 INJECTION, SOLUTION, CONCENTRATE INTRAVENOUS EVERY 5 MIN PRN
Status: CANCELLED | OUTPATIENT
Start: 2022-06-30

## 2022-07-06 NOTE — TELEPHONE ENCOUNTER
Avila has done a benefit check on the Infusion side and has secured coverage for the Actemra.  You will just need to let the [employer]  ([id number]) know what location Stephanie wants to use.  I will continue to work on free drug for [place] in case it becomes available.

## 2022-07-08 NOTE — TELEPHONE ENCOUNTER
Spoke to patient she would want to get her infusion at [facility]. Message sent to Infusion finance team.

## 2022-08-26 NOTE — TELEPHONE ENCOUNTER
8/19 - Called [facility] ([phone]) and they need updated prescriber foundation form. Sent to clinic for signature    8/25 - faxed provider portion to [facility]     8/26 - Called [employer] and spoke to Jacqueline and they have not received MD form yet, can take up to 48 hours to load into their system.   Gave insurance information and patient ID: [id number]

## 2022-09-02 ASSESSMENT — ENCOUNTER SYMPTOMS
NECK PAIN: 0
DEPRESSION: 1
INSOMNIA: 0
JOINT SWELLING: 0
DIZZINESS: 1
TINGLING: 0
BACK PAIN: 0
TREMORS: 0
DECREASED CONCENTRATION: 0
MUSCLE CRAMPS: 1
MEMORY LOSS: 0
SEIZURES: 0
MUSCLE WEAKNESS: 1
PANIC: 0
STIFFNESS: 1
MYALGIAS: 0
HEADACHES: 0
ARTHRALGIAS: 1
DISTURBANCES IN COORDINATION: 0
NUMBNESS: 0
PARALYSIS: 0
NERVOUS/ANXIOUS: 0
SPEECH CHANGE: 0
WEAKNESS: 0
LOSS OF CONSCIOUSNESS: 0

## 2022-09-02 NOTE — TELEPHONE ENCOUNTER
Spoke with [employer], they have received the provider forms.  Gave some updates as far as addresses, etc.  With updates, rep was able to review and will approve application.  She will contact patient and go over process of delivery, approval, etc.  Determination letter will be sent to clinic.

## 2022-09-09 ENCOUNTER — OFFICE VISIT (OUTPATIENT)
Dept: PULMONOLOGY | Facility: CLINIC | Age: 72
End: 2022-09-09
Attending: INTERNAL MEDICINE
Payer: MEDICARE

## 2022-09-09 VITALS
BODY MASS INDEX: 21.49 KG/M2 | DIASTOLIC BLOOD PRESSURE: 63 MMHG | SYSTOLIC BLOOD PRESSURE: 101 MMHG | HEART RATE: 76 BPM | OXYGEN SATURATION: 98 % | HEIGHT: 65 IN | WEIGHT: 129 LBS

## 2022-09-09 DIAGNOSIS — J84.9 ILD (INTERSTITIAL LUNG DISEASE) (H): ICD-10-CM

## 2022-09-09 DIAGNOSIS — J84.9 ILD (INTERSTITIAL LUNG DISEASE) (H): Primary | ICD-10-CM

## 2022-09-09 DIAGNOSIS — M34.9 SCLERODERMA WITH PULMONARY INVOLVEMENT (H): ICD-10-CM

## 2022-09-09 LAB
DLCOUNC-%PRED-PRE: 95 %
DLCOUNC-PRE: 18.99 ML/MIN/MMHG
DLCOUNC-PRED: 19.84 ML/MIN/MMHG
ERV-%PRED-PRE: 145 %
ERV-PRE: 1.24 L
ERV-PRED: 0.85 L
EXPTIME-PRE: 6.33 SEC
FEF2575-%PRED-PRE: 161 %
FEF2575-PRE: 2.71 L/SEC
FEF2575-PRED: 1.68 L/SEC
FEFMAX-%PRED-PRE: 124 %
FEFMAX-PRE: 6.85 L/SEC
FEFMAX-PRED: 5.48 L/SEC
FEV1-%PRED-PRE: 125 %
FEV1-PRE: 2.54 L
FEV1FEV6-PRE: 83 %
FEV1FEV6-PRED: 79 %
FEV1FVC-PRE: 83 %
FEV1FVC-PRED: 78 %
FEV1SVC-PRE: 86 %
FEV1SVC-PRED: 65 %
FIFMAX-PRE: 4.12 L/SEC
FRCPLETH-%PRED-PRE: 95 %
FRCPLETH-PRE: 2.65 L
FRCPLETH-PRED: 2.77 L
FVC-%PRED-PRE: 117 %
FVC-PRE: 3.05 L
FVC-PRED: 2.59 L
IC-%PRED-PRE: 75 %
IC-PRE: 1.7 L
IC-PRED: 2.25 L
RVPLETH-%PRED-PRE: 64 %
RVPLETH-PRE: 1.41 L
RVPLETH-PRED: 2.17 L
TLCPLETH-%PRED-PRE: 85 %
TLCPLETH-PRE: 4.36 L
TLCPLETH-PRED: 5.11 L
VA-%PRED-PRE: 84 %
VA-PRE: 4.11 L
VC-%PRED-PRE: 95 %
VC-PRE: 2.95 L
VC-PRED: 3.1 L

## 2022-09-09 PROCEDURE — 94375 RESPIRATORY FLOW VOLUME LOOP: CPT | Performed by: INTERNAL MEDICINE

## 2022-09-09 PROCEDURE — 99215 OFFICE O/P EST HI 40 MIN: CPT | Mod: 25 | Performed by: INTERNAL MEDICINE

## 2022-09-09 PROCEDURE — G0463 HOSPITAL OUTPT CLINIC VISIT: HCPCS | Mod: 25

## 2022-09-09 PROCEDURE — 94729 DIFFUSING CAPACITY: CPT | Performed by: INTERNAL MEDICINE

## 2022-09-09 PROCEDURE — 94726 PLETHYSMOGRAPHY LUNG VOLUMES: CPT | Performed by: INTERNAL MEDICINE

## 2022-09-09 RX ORDER — TOCILIZUMAB 180 MG/ML
162 INJECTION, SOLUTION SUBCUTANEOUS
Qty: 3.6 ML | Refills: 11 | Status: SHIPPED | OUTPATIENT
Start: 2022-09-09 | End: 2023-08-21

## 2022-09-09 ASSESSMENT — PAIN SCALES - GENERAL: PAINLEVEL: NO PAIN (0)

## 2022-09-09 NOTE — NURSING NOTE
Chief Complaint   Patient presents with     Interstitial Lung Disease (ILD)     ILD Follow up      Vitals were taken and medications were reconciled.     Jael Colby RMA  8:35 AM

## 2022-09-09 NOTE — LETTER
9/9/2022         RE: Stephanie Bhatia  [address]  [place] [address]        Dear Colleague,    Thank you for referring your patient, Stephanie Bhatia, to the [facility] [place]. Please see a copy of my visit note below.    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  She was initially diagnosed with scleroderma in about 2009 or 2010.  Scleroderma ILD was first diagnosed on chest CT scan in 2/2017.  She also had a few nodules that were small, 4 mm and did not require follow-up as she was low risk.     At her last visit in May, we talked about starting treatment for her scleroderma ILD because of a drop in her FVC and  the CT scan showing increase in pulmonary fibrosis.  She preferred to start tocilizumab, and she is on the waiting list.  She has not yet started.  We talked again today about tocilizumab and potential side effects.  She reports dyspnea on exertion still, for example she feels short of breath when she walks up 2 flights of stairs.  She has a stable morning cough.  It is a little worse now due to allergies.  She denies fevers or digital ulcers.  She does have very sore knees and is going to see a sports medicine provider next week.    She does regular exercise by swimming, tap dancing and walking her dog.  She has not yet had the new COVID-19 booster shot.        Current Outpatient Medications   Medication     acyclovir (ZOVIRAX) 400 MG tablet     Cholecalciferol (VITAMIN D) 1000 UNITS capsule     ibuprofen (ADVIL/MOTRIN) 200 MG tablet     tocilizumab (ACTEMRA) 162 MG/0.9ML subcutaneous injection     vitamin B complex with vitamin C (STRESS TAB) tablet     desonide (DESOWEN) 0.05 % external  ointment     nystatin (MYCOSTATIN) 898778 UNIT/GM external ointment     No current facility-administered medications for this visit.     No Known Allergies  Past Medical History:   Diagnosis Date     Anxiety      Herpes simplex without mention of complication      Interstitial lung disease (H)     scleroderma ILD, dx by chest CT 2017     Lung nodules     4 mm LLL and RLL      Migraine     loss of speech and comprehension, has had w/u and is complex migraine, last one about 2017     Raynaud phenomenon      Systemic sclerosis with limited cutaneous involvement (H) 2014    Dx ~2010, Scl-70 and anti-centromere antibody neg, +RF       Past Surgical History:   Procedure Laterality Date     HYSTEROSCOPIC PLACEMENT CONTRACEPTIVE DEVICE         Social History     Socioeconomic History     Marital status: Single     Spouse name:      Number of children: 0     Years of education: Not on file     Highest education level: Master's degree (e.g., MA, MS, [place], MEd, MSW, DIANE)   Occupational History     Occupation:      Comment: [employer] - part time   Tobacco Use     Smoking status: Former Smoker     Packs/day: 1.00     Years: 20.00     Pack years: 20.00     Types: Cigarettes     Quit date: 1986     Years since quittin.8     Smokeless tobacco: Never Used   Vaping Use     Vaping Use: Never used   Substance and Sexual Activity     Alcohol use: Not Currently     Comment: none since      Drug use: No     Sexual activity: Not Currently     Partners: Male   Other Topics Concern     Parent/sibling w/ CABG, MI or angioplasty before 65F 55M? No   Social History Narrative    .  No children. Sister lives in [place].  Part time at [employer].   in the past. Currently lives with family.    Possible asbestos exposure from basement pipes in childhood home.  Lived in  [place] [id number].  Denies exposure to pet birds, hot tubs.   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     Social Determinants of Health     Financial Resource Strain: High Risk     Difficulty of Paying Living Expenses: Hard   Food Insecurity: No Food Insecurity     Worried About Running Out of Food in the Last Year: Never true     Ran Out of Food in the Last Year: Never true   Transportation Needs: No Transportation Needs     Lack of Transportation (Medical): No     Lack of Transportation (Non-Medical): No   Physical Activity: Sufficiently Active     Days of Exercise per Week: 7 days     Minutes of Exercise per Session: 60 min   Stress: Stress Concern Present     Feeling of Stress : To some extent   Social Connections: Moderately Isolated     Frequency of Communication with Friends and Family: More than three times a week     Frequency of Social Gatherings with Friends and Family: More than three times a week     Attends Jehovah's witness Services: More than 4 times per year     Active Member of Clubs or Organizations: No     Attends Club or Organization Meetings: Never     Marital Status:    Intimate Partner Violence: Not At Risk     Fear of Current or Ex-Partner: No     Emotionally Abused: No     Physically Abused: No     Sexually Abused: No   Housing Stability: Low Risk      Unable to Pay for Housing in the Last Year: No     Number of Places Lived in the Last Year: 1     Unstable Housing in the Last Year: No       Family History   Problem Relation Age of Onset     C.A.D. Father         a efw small heart attacks     Neurologic Disorder Father         dementia 70s     Coronary Artery Disease Father      Neurologic Disorder Mother         dementia     Depression Mother      Cerebrovascular Disease Maternal Grandmother         ,     Other Cancer Paternal Grandmother      Rheumatoid Arthritis Sister      Hypertension Sister      Dementia Paternal Cousin      Dementia Paternal Uncle         60s     Breast Cancer No family hx of      Cancer - colorectal No family hx of      Diabetes No  "family hx of      Cancer No family hx of         no skin cancer     Interstitial Lung Disease No family hx of      Melanoma No family hx of      Skin Cancer No family hx of      Glaucoma No family hx of      Macular Degeneration No family hx of            Vitals: /63 (BP Location: Right arm, Patient Position: Chair, Cuff Size: Adult Regular)   Pulse 76   Ht 1.651 m (5' 5\")   Wt 58.5 kg (129 lb)   SpO2 98%   BMI 21.47 kg/m      Exam:   GENERAL APPEARANCE: Well developed, well nourished, alert, and in no apparent distress.  RESP: good air flow throughout.  Bibasilar inspiratory crackles. No rhonchi. No wheezes.  CV: Normal S1, S2, regular rhythm, normal rate. No murmur.  No LE edema.   MS: extremities normal. No clubbing. No cyanosis.  She has sausage fingers, which is a chronic finding for her.  SKIN: no rash on limited exam.  No digital ulcers.  NEURO: Mentation intact, speech normal, normal gait and stance.  PSYCH: mentation appears normal. and affect normal/bright.    Results:  Recent Results (from the past 168 hour(s))   General PFT Lab (Please always keep checked)    Collection Time: 09/09/22  7:39 AM   Result Value Ref Range    FVC-Pred 2.59 L    FVC-Pre 3.05 L    FVC-%Pred-Pre 117 %    FEV1-Pre 2.54 L    FEV1-%Pred-Pre 125 %    FEV1FVC-Pred 78 %    FEV1FVC-Pre 83 %    FEFMax-Pred 5.48 L/sec    FEFMax-Pre 6.85 L/sec    FEFMax-%Pred-Pre 124 %    FEF2575-Pred 1.68 L/sec    FEF2575-Pre 2.71 L/sec    XYD2082-%Pred-Pre 161 %    ExpTime-Pre 6.33 sec    FIFMax-Pre 4.12 L/sec    VC-Pred 3.10 L    VC-Pre 2.95 L    VC-%Pred-Pre 95 %    IC-Pred 2.25 L    IC-Pre 1.70 L    IC-%Pred-Pre 75 %    ERV-Pred 0.85 L    ERV-Pre 1.24 L    ERV-%Pred-Pre 145 %    FEV1FEV6-Pred 79 %    FEV1FEV6-Pre 83 %    FRCPleth-Pred 2.77 L    FRCPleth-Pre 2.65 L    FRCPleth-%Pred-Pre 95 %    RVPleth-Pred 2.17 L    RVPleth-Pre 1.41 L    RVPleth-%Pred-Pre 64 %    TLCPleth-Pred 5.11 L    TLCPleth-Pre 4.36 L    TLCPleth-%Pred-Pre 85 %    " DLCOunc-Pred 19.84 ml/min/mmHg    DLCOunc-Pre 18.99 ml/min/mmHg    DLCOunc-%Pred-Pre 95 %    VA-Pre 4.11 L    VA-%Pred-Pre 84 %    FEV1SVC-Pred 65 %    FEV1SVC-Pre 86 %     I reviewed pulmonary function test that was performed today.  This shows normal spirometry, lung volumes and diffusing capacity.  FVC is stable compared to last visit, but overall FVC has dropped since 2014.    I reviewed results with the patient.      Assessment and plan:  Stephanie Bhatia is a 72 year old female who is being seen for f/u for ILD related to scleroderma.    1.  Scleroderma ILD.  FVC is stable compared to May, however FVC has dropped since 2014.  Chest CT at her last visit also showed increase in fibrosis.  We decided on tocilizumab as treatment for scleroderma ILD, but she has not been able to start it yet.  She is on the waiting list to get it.  I will have my nurse talk with her today about the process again and what we can do to help speed up the process.  We already have done the screening lab tests before starting tocilizumab, and they are fine.  I encouraged her to continue regular physical exercise as she is doing.  She should get the flu vaccine in October.  She should get the new COVID 19 booster shot.  I will see her back in 3 to 4 months with full PFT and 6-minute walk test.  I spent 41 minutes reviewing chart, reviewing test results, talking with and examining patient, formulating plan, and documentation on the day of the encounter.        Again, thank you for allowing me to participate in the care of your patient.        Sincerely,        Tiffanie Gomez MD

## 2022-09-09 NOTE — TELEPHONE ENCOUNTER
Per message from care team on update. Routed approval to care team to send prescription to [facility].

## 2022-09-09 NOTE — PROGRESS NOTES
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  She was initially diagnosed with scleroderma in about 2009 or 2010.  Scleroderma ILD was first diagnosed on chest CT scan in 2/2017.  She also had a few nodules that were small, 4 mm and did not require follow-up as she was low risk.     At her last visit in May, we talked about starting treatment for her scleroderma ILD because of a drop in her FVC and  the CT scan showing increase in pulmonary fibrosis.  She preferred to start tocilizumab, and she is on the waiting list.  She has not yet started.  We talked again today about tocilizumab and potential side effects.  She reports dyspnea on exertion still, for example she feels short of breath when she walks up 2 flights of stairs.  She has a stable morning cough.  It is a little worse now due to allergies.  She denies fevers or digital ulcers.  She does have very sore knees and is going to see a sports medicine provider next week.    She does regular exercise by swimming, tap dancing and walking her dog.  She has not yet had the new COVID-19 booster shot.        Current Outpatient Medications   Medication     acyclovir (ZOVIRAX) 400 MG tablet     Cholecalciferol (VITAMIN D) 1000 UNITS capsule     ibuprofen (ADVIL/MOTRIN) 200 MG tablet     tocilizumab (ACTEMRA) 162 MG/0.9ML subcutaneous injection     vitamin B complex with vitamin C (STRESS TAB) tablet     desonide (DESOWEN) 0.05 % external ointment     nystatin (MYCOSTATIN) 524300 UNIT/GM external ointment     No current facility-administered medications for this visit.     No Known Allergies  Past Medical History:   Diagnosis Date     Anxiety      Herpes simplex without mention of complication      Interstitial lung  disease (H)     scleroderma ILD, dx by chest CT 2017     Lung nodules     4 mm LLL and RLL      Migraine     loss of speech and comprehension, has had w/u and is complex migraine, last one about 2017     Raynaud phenomenon      Systemic sclerosis with limited cutaneous involvement (H) 2014    Dx ~2010, Scl-70 and anti-centromere antibody neg, +RF       Past Surgical History:   Procedure Laterality Date     HYSTEROSCOPIC PLACEMENT CONTRACEPTIVE DEVICE         Social History     Socioeconomic History     Marital status: Single     Spouse name:      Number of children: 0     Years of education: Not on file     Highest education level: Master's degree (e.g., MA, MS, [place], MEd, MSW, DIANE)   Occupational History     Occupation:      Comment: [employer] - part time   Tobacco Use     Smoking status: Former Smoker     Packs/day: 1.00     Years: 20.00     Pack years: 20.00     Types: Cigarettes     Quit date: 1986     Years since quittin.8     Smokeless tobacco: Never Used   Vaping Use     Vaping Use: Never used   Substance and Sexual Activity     Alcohol use: Not Currently     Comment: none since      Drug use: No     Sexual activity: Not Currently     Partners: Male   Other Topics Concern     Parent/sibling w/ CABG, MI or angioplasty before 65F 55M? No   Social History Narrative    .  No children. Sister lives in [place].  Part time at [employer].   in the past. Currently lives with family.    Possible asbestos exposure from basement pipes in childhood home.  L[place] in  [facility] [address].  Denies exposure to pet birds, hot tubs.   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     Social Determinants of Health     Financial Resource Strain: High Risk     Difficulty of Paying Living Expenses: Hard   Food Insecurity: No Food Insecurity     Worried About Running Out of Food in the Last Year: Never true     Ran Out of Food in  the Last Year: Never true   Transportation Needs: No Transportation Needs     Lack of Transportation (Medical): No     Lack of Transportation (Non-Medical): No   Physical Activity: Sufficiently Active     Days of Exercise per Week: 7 days     Minutes of Exercise per Session: 60 min   Stress: Stress Concern Present     Feeling of Stress : To some extent   Social Connections: Moderately Isolated     Frequency of Communication with Friends and Family: More than three times a week     Frequency of Social Gatherings with Friends and Family: More than three times a week     Attends Restorationist Services: More than 4 times per year     Active Member of Clubs or Organizations: No     Attends Club or Organization Meetings: Never     Marital Status:    Intimate Partner Violence: Not At Risk     Fear of Current or Ex-Partner: No     Emotionally Abused: No     Physically Abused: No     Sexually Abused: No   Housing Stability: Low Risk      Unable to Pay for Housing in the Last Year: No     Number of Places Lived in the Last Year: 1     Unstable Housing in the Last Year: No       Family History   Problem Relation Age of Onset     C.A.D. Father         a efw small heart attacks     Neurologic Disorder Father         dementia 70s     Coronary Artery Disease Father      Neurologic Disorder Mother         dementia     Depression Mother      Cerebrovascular Disease Maternal Grandmother         ,     Other Cancer Paternal Grandmother      Rheumatoid Arthritis Sister      Hypertension Sister      Dementia Paternal Cousin      Dementia Paternal Uncle         60s     Breast Cancer No family hx of      Cancer - colorectal No family hx of      Diabetes No family hx of      Cancer No family hx of         no skin cancer     Interstitial Lung Disease No family hx of      Melanoma No family hx of      Skin Cancer No family hx of      Glaucoma No family hx of      Macular Degeneration No family hx of            Vitals: /63 (BP  "Location: Right arm, Patient Position: Chair, Cuff Size: Adult Regular)   Pulse 76   Ht 1.651 m (5' 5\")   Wt 58.5 kg (129 lb)   SpO2 98%   BMI 21.47 kg/m      Exam:   GENERAL APPEARANCE: Well developed, well nourished, alert, and in no apparent distress.  RESP: good air flow throughout.  Bibasilar inspiratory crackles. No rhonchi. No wheezes.  CV: Normal S1, S2, regular rhythm, normal rate. No murmur.  No LE edema.   MS: extremities normal. No clubbing. No cyanosis.  She has sausage fingers, which is a chronic finding for her.  SKIN: no rash on limited exam.  No digital ulcers.  NEURO: Mentation intact, speech normal, normal gait and stance.  PSYCH: mentation appears normal. and affect normal/bright.    Results:  Recent Results (from the past 168 hour(s))   General PFT Lab (Please always keep checked)    Collection Time: 09/09/22  7:39 AM   Result Value Ref Range    FVC-Pred 2.59 L    FVC-Pre 3.05 L    FVC-%Pred-Pre 117 %    FEV1-Pre 2.54 L    FEV1-%Pred-Pre 125 %    FEV1FVC-Pred 78 %    FEV1FVC-Pre 83 %    FEFMax-Pred 5.48 L/sec    FEFMax-Pre 6.85 L/sec    FEFMax-%Pred-Pre 124 %    FEF2575-Pred 1.68 L/sec    FEF2575-Pre 2.71 L/sec    DUK1487-%Pred-Pre 161 %    ExpTime-Pre 6.33 sec    FIFMax-Pre 4.12 L/sec    VC-Pred 3.10 L    VC-Pre 2.95 L    VC-%Pred-Pre 95 %    IC-Pred 2.25 L    IC-Pre 1.70 L    IC-%Pred-Pre 75 %    ERV-Pred 0.85 L    ERV-Pre 1.24 L    ERV-%Pred-Pre 145 %    FEV1FEV6-Pred 79 %    FEV1FEV6-Pre 83 %    FRCPleth-Pred 2.77 L    FRCPleth-Pre 2.65 L    FRCPleth-%Pred-Pre 95 %    RVPleth-Pred 2.17 L    RVPleth-Pre 1.41 L    RVPleth-%Pred-Pre 64 %    TLCPleth-Pred 5.11 L    TLCPleth-Pre 4.36 L    TLCPleth-%Pred-Pre 85 %    DLCOunc-Pred 19.84 ml/min/mmHg    DLCOunc-Pre 18.99 ml/min/mmHg    DLCOunc-%Pred-Pre 95 %    VA-Pre 4.11 L    VA-%Pred-Pre 84 %    FEV1SVC-Pred 65 %    FEV1SVC-Pre 86 %     I reviewed pulmonary function test that was performed today.  This shows normal spirometry, lung volumes and " diffusing capacity.  FVC is stable compared to last visit, but overall FVC has dropped since 2014.    I reviewed results with the patient.      Assessment and plan:  Stephanie Bhatia is a 72 year old female who is being seen for f/u for ILD related to scleroderma.    1.  Scleroderma ILD.  FVC is stable compared to May, however FVC has dropped since 2014.  Chest CT at her last visit also showed increase in fibrosis.  We decided on tocilizumab as treatment for scleroderma ILD, but she has not been able to start it yet.  She is on the waiting list to get it.  I will have my nurse talk with her today about the process again and what we can do to help speed up the process.  We already have done the screening lab tests before starting tocilizumab, and they are fine.  I encouraged her to continue regular physical exercise as she is doing.  She should get the flu vaccine in October.  She should get the new COVID 19 booster shot.  I will see her back in 3 to 4 months with full PFT and 6-minute walk test.  I spent 41 minutes reviewing chart, reviewing test results, talking with and examining patient, formulating plan, and documentation on the day of the encounter.

## 2022-09-14 DIAGNOSIS — M25.562 BILATERAL KNEE PAIN: Primary | ICD-10-CM

## 2022-09-14 DIAGNOSIS — M25.561 BILATERAL KNEE PAIN: Primary | ICD-10-CM

## 2022-09-16 ENCOUNTER — ANCILLARY PROCEDURE (OUTPATIENT)
Dept: GENERAL RADIOLOGY | Facility: CLINIC | Age: 72
End: 2022-09-16
Attending: FAMILY MEDICINE
Payer: MEDICARE

## 2022-09-16 ENCOUNTER — OFFICE VISIT (OUTPATIENT)
Dept: ORTHOPEDICS | Facility: CLINIC | Age: 72
End: 2022-09-16
Payer: MEDICARE

## 2022-09-16 VITALS — BODY MASS INDEX: 21.49 KG/M2 | HEIGHT: 65 IN | WEIGHT: 129 LBS

## 2022-09-16 DIAGNOSIS — G89.29 CHRONIC PAIN OF BOTH KNEES: Primary | ICD-10-CM

## 2022-09-16 DIAGNOSIS — M25.561 CHRONIC PAIN OF BOTH KNEES: Primary | ICD-10-CM

## 2022-09-16 DIAGNOSIS — M25.562 CHRONIC PAIN OF BOTH KNEES: Primary | ICD-10-CM

## 2022-09-16 DIAGNOSIS — M17.0 BILATERAL PRIMARY OSTEOARTHRITIS OF KNEE: ICD-10-CM

## 2022-09-16 DIAGNOSIS — M25.561 BILATERAL KNEE PAIN: ICD-10-CM

## 2022-09-16 DIAGNOSIS — M25.562 BILATERAL KNEE PAIN: ICD-10-CM

## 2022-09-16 PROCEDURE — 99213 OFFICE O/P EST LOW 20 MIN: CPT | Performed by: FAMILY MEDICINE

## 2022-09-16 PROCEDURE — 73562 X-RAY EXAM OF KNEE 3: CPT | Mod: GC | Performed by: RADIOLOGY

## 2022-09-16 NOTE — PROGRESS NOTES
Subjective:   Stephanie Bhatia is a 72 year old female who reports R>L knee pain.  Hurt due to dog.  Didn't sleep due to migraine, on back for two days, ibuprofen and knees feel better.  Up early without others around home  Likes to go to swimming pool, hot tub  Tap dance- too off balance  Dog walking and dog runs off  Can't kneel or squat  4 hours- standing and hard to do, legs get tired  Seeing Dr. Garcia in January    Background:   Date of injury: NA   Duration of symptoms: 2 months  Mechanism of Injury: Acute; Activity Related She had 2 falls onto her knees while walking her dogs in the next 2 months.  Intensity: 2/10 at rest, 7/10 with activity   Aggravating factors: Squatting, kneeling, walking, pain at night  Relieving Factors: None  Prior Evaluation: X-rays    PAST MEDICAL, SOCIAL, SURGICAL AND FAMILY HISTORY: She  has a past medical history of Anxiety, Herpes simplex without mention of complication, Interstitial lung disease (H), Lung nodules, Migraine, Raynaud phenomenon, and Systemic sclerosis with limited cutaneous involvement (H) (12/02/2014).  She  has a past surgical history that includes Hysteroscopic placement cont.  Her family history includes C.A.D. in her father; Cerebrovascular Disease in her maternal grandmother; Coronary Artery Disease in her father; Dementia in her paternal cousin and paternal uncle; Depression in her mother; Hypertension in her sister; Neurologic Disorder in her father and mother; Other Cancer in her paternal grandmother; Rheumatoid Arthritis in her sister.  She reports that she quit smoking about 35 years ago. Her smoking use included cigarettes. She has a 20.00 pack-year smoking history. She has never used smokeless tobacco. She reports previous alcohol use. She reports that she does not use drugs.    ALLERGIES: She has No Known Allergies.    CURRENT MEDICATIONS: She has a current medication list which includes the following prescription(s): acyclovir, vitamin d, desonide,  "ibuprofen, nystatin, actemra, and vitamin b complex with vitamin c.     REVIEW OF SYSTEMS: 10 point review of systems is negative except as noted above.     Exam:   Ht 1.651 m (5' 5\")   Wt 58.5 kg (129 lb)   BMI 21.47 kg/m             CONSTITUTIONAL: healthy, alert, mild distress and cooperative  HEAD: Normocephalic. No masses, lesions, tenderness or abnormalities  SKIN: no suspicious lesions or rashes  GAIT: normal  NEUROLOGIC: Non-focal, Normal muscle tone and strength, reflexes normal, sensation grossly normal.  PSYCHIATRIC: affect normal/bright and mentation appears normal.    MUSCULOSKELETAL: bilateral lateral knee pain      Inspection:       AP/lateral alignment normal  Tender: lateral joint line      Non-tender: patellar facets, MCL, LCL, medial joint line, IT band, posterior knee   Active Range of Motion: all normal  Strength: quad  5/5, Hamstrings  5/5  Special tests: normal Valgus stress test, normal Varus, negative Lachman's test,  negative Pricila's, no apprehension with lateral stress of the patella.         Assessment/Plan:   Pt is a 73 yo white female with Pmhx of osteoporosis, RA, raynaud phenomenon presenting with bilateral knee pain    1. Bilateral lateral knee OA-  chondrocalcinosis in the meniscal area  Discussion regarding over-the-counter medications, possibility of future injections  Pt is happy with PT for now, orders placed    RTC 2 months, PRN  X-RAY INTERPRETATION:   X-Ray of the Right/Left Knee: 3-view, Reeves, lateral, sunrise   ordered and interpreted in the office today was positive for Impression:  1. No acute osseous abnormality.  2. Moderate right and mild left-sided degenerative changes of the  knees with chondrocalcinosis.  "

## 2022-09-16 NOTE — LETTER
9/16/2022      RE: Stephanie Bhatia  [address]  [facility] [address]     Dear Colleague,    Thank you for referring your patient, Stephanie Bhatia, to the [facility] [place]. Please see a copy of my visit note below.     Subjective:   Stephanie Bhatia is a 72 year old female who reports R>L knee pain.  Hurt due to dog.  Didn't sleep due to migraine, on back for two days, ibuprofen and knees feel better.  Up early without others around home  Likes to go to swimming pool, hot tub  Tap dance- too off balance  Dog walking and dog runs off  Can't kneel or squat  4 hours- standing and hard to do, legs get tired  Seeing Dr. Garcia in January    Background:   Date of injury: NA   Duration of symptoms: 2 months  Mechanism of Injury: Acute; Activity Related She had 2 falls onto her knees while walking her dogs in the next 2 months.  Intensity: 2/10 at rest, 7/10 with activity   Aggravating factors: Squatting, kneeling, walking, pain at night  Relieving Factors: None  Prior Evaluation: X-rays    PAST MEDICAL, SOCIAL, SURGICAL AND FAMILY HISTORY: She  has a past medical history of Anxiety, Herpes simplex without mention of complication, Interstitial lung disease (H), Lung nodules, Migraine, Raynaud phenomenon, and Systemic sclerosis with limited cutaneous involvement (H) (12/02/2014).  She  has a past surgical history that includes Hysteroscopic placement cont.  Her family history includes C.A.D. in her father; Cerebrovascular Disease in her maternal grandmother; Coronary Artery Disease in her father; Dementia in her paternal cousin and paternal uncle; Depression in her mother; Hypertension in her sister; Neurologic Disorder in her father and mother; Other Cancer in her paternal grandmother; Rheumatoid Arthritis in her sister.  She reports that she quit smoking about 35 years ago. Her smoking use included cigarettes. She has a 20.00 pack-year smoking history. She has never used smokeless  "tobacco. She reports previous alcohol use. She reports that she does not use drugs.    ALLERGIES: She has No Known Allergies.    CURRENT MEDICATIONS: She has a current medication list which includes the following prescription(s): acyclovir, vitamin d, desonide, ibuprofen, nystatin, actemra, and vitamin b complex with vitamin c.     REVIEW OF SYSTEMS: 10 point review of systems is negative except as noted above.     Exam:   Ht 1.651 m (5' 5\")   Wt 58.5 kg (129 lb)   BMI 21.47 kg/m             CONSTITUTIONAL: healthy, alert, mild distress and cooperative  HEAD: Normocephalic. No masses, lesions, tenderness or abnormalities  SKIN: no suspicious lesions or rashes  GAIT: normal  NEUROLOGIC: Non-focal, Normal muscle tone and strength, reflexes normal, sensation grossly normal.  PSYCHIATRIC: affect normal/bright and mentation appears normal.    MUSCULOSKELETAL: bilateral lateral knee pain      Inspection:       AP/lateral alignment normal  Tender: lateral joint line      Non-tender: patellar facets, MCL, LCL, medial joint line, IT band, posterior knee   Active Range of Motion: all normal  Strength: quad  5/5, Hamstrings  5/5  Special tests: normal Valgus stress test, normal Varus, negative Lachman's test,  negative Pricila's, no apprehension with lateral stress of the patella.         Assessment/Plan:   Pt is a 71 yo white female with Pmhx of osteoporosis, RA, raynaud phenomenon presenting with bilateral knee pain    1. Bilateral lateral knee OA-  chondrocalcinosis in the meniscal area  Discussion regarding over-the-counter medications, possibility of future injections  Pt is happy with PT for now, orders placed    RTC 2 months, PRN  X-RAY INTERPRETATION:   X-Ray of the Right/Left Knee: 3-view, Reeves, lateral, sunrise   ordered and interpreted in the office today was positive for Impression:  1. No acute osseous abnormality.  2. Moderate right and mild left-sided degenerative changes of the  knees with " chondrocalcinosis.      Again, thank you for allowing me to participate in the care of your patient.      Sincerely,    Prachi Grant MD

## 2022-09-20 ENCOUNTER — THERAPY VISIT (OUTPATIENT)
Dept: PHYSICAL THERAPY | Facility: CLINIC | Age: 72
End: 2022-09-20
Payer: MEDICARE

## 2022-09-20 DIAGNOSIS — M17.0 BILATERAL PRIMARY OSTEOARTHRITIS OF KNEE: ICD-10-CM

## 2022-09-20 PROCEDURE — 97110 THERAPEUTIC EXERCISES: CPT | Mod: GP | Performed by: PHYSICAL THERAPIST

## 2022-09-20 PROCEDURE — 97161 PT EVAL LOW COMPLEX 20 MIN: CPT | Mod: GP | Performed by: PHYSICAL THERAPIST

## 2022-09-20 PROCEDURE — 97140 MANUAL THERAPY 1/> REGIONS: CPT | Mod: GP | Performed by: PHYSICAL THERAPIST

## 2022-09-20 ASSESSMENT — ACTIVITIES OF DAILY LIVING (ADL)
KNEE_ACTIVITY_OF_DAILY_LIVING_SCORE: 64.29
RAW_SCORE: 45
SQUAT: I AM UNABLE TO DO THE ACTIVITY
STAND: ACTIVITY IS SOMEWHAT DIFFICULT
HOW_WOULD_YOU_RATE_THE_OVERALL_FUNCTION_OF_YOUR_KNEE_DURING_YOUR_USUAL_DAILY_ACTIVITIES?: NEARLY NORMAL
GIVING WAY, BUCKLING OR SHIFTING OF KNEE: I HAVE THE SYMPTOM BUT IT DOES NOT AFFECT MY ACTIVITY
WALK: ACTIVITY IS SOMEWHAT DIFFICULT
KNEE_ACTIVITY_OF_DAILY_LIVING_SUM: 45
AS_A_RESULT_OF_YOUR_KNEE_INJURY,_HOW_WOULD_YOU_RATE_YOUR_CURRENT_LEVEL_OF_DAILY_ACTIVITY?: ABNORMAL
SWELLING: I DO NOT HAVE THE SYMPTOM
SIT WITH YOUR KNEE BENT: ACTIVITY IS NOT DIFFICULT
GO UP STAIRS: ACTIVITY IS SOMEWHAT DIFFICULT
HOW_WOULD_YOU_RATE_THE_CURRENT_FUNCTION_OF_YOUR_KNEE_DURING_YOUR_USUAL_DAILY_ACTIVITIES_ON_A_SCALE_FROM_0_TO_100_WITH_100_BEING_YOUR_LEVEL_OF_KNEE_FUNCTION_PRIOR_TO_YOUR_INJURY_AND_0_BEING_THE_INABILITY_TO_PERFORM_ANY_OF_YOUR_USUAL_DAILY_ACTIVITIES?: 60
PAIN: THE SYMPTOM AFFECTS MY ACTIVITY MODERATELY
RISE FROM A CHAIR: ACTIVITY IS MINIMALLY DIFFICULT
LIMPING: I HAVE THE SYMPTOM BUT IT DOES NOT AFFECT MY ACTIVITY
WEAKNESS: I DO NOT HAVE THE SYMPTOM
GO DOWN STAIRS: ACTIVITY IS SOMEWHAT DIFFICULT
STIFFNESS: THE SYMPTOM AFFECTS MY ACTIVITY SLIGHTLY
KNEEL ON THE FRONT OF YOUR KNEE: ACTIVITY IS VERY DIFFICULT

## 2022-09-20 NOTE — PROGRESS NOTES
[facility] PT FINDINGS:  1) Bilateral effusion, full ROM of knees  2) Poor proprioception  3) Trial of KANDY on the right: improved with in clinic mobility    Physical Therapy Initial Evaluation: Subjective History     Injury/Condition Details:  Presenting Complaint Bilateral Knee   Onset Timing/Date 1 month  9/16 (MD referral)    Mechanism Has had 2 falls recently when walking the dogs. Was able to keep walking from home.   Feels limited to walking around the block.   Prior to the falls, it was harder to kneel and needed the support of a pad. Was still able to squat.   Now she can no longer kneel or squat due to the pain.   Denies swelling.      Symptom Behavior Details    Primary Symptoms Constant symptoms; worsen with activity, pain (Location: infrapatellar region, occasional shoot down the leg, Quality: Sharp and Aching/Throbbing), stiffness, weakness, buckling/shifting/giving way   Worst Pain 7/10 (with see below)   Symptom Provocators Stairs - goes slow, not great  Squatting  Kneeling   Walking - sharp pain.   Sleep disturbances - dull achy pain.   Best Pain 1-2/10    Symptom Relievers Activity modification   Time of day dependent? Worse in evening after activity   Recent symptom change? 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    Orthopaedic history See Epic Chart   Notable medical history See Epic Chart   Patient Reported Health good     Lower Extremity Physical Therapy Examination    Dynamic Movement Screen:  2 leg stance: equal weight bearing, normal appearing alignment.   2 leg squat:deferred > pain with sit to stand    1 leg stance:   Right: proprioceptive challenge and UE support - pain with FWB  Left: proprioceptive  challenge and UE support    Gait: Stiff leg gait pattern on the left, shortened step length and antalgic    Knee Joint ROM: WFL without pain at end range            Hip Joint ROM: NEXT    Lower Extremity Muscle Strength (x/5)   Hip IR Hip ER Knee Ext Hip ABD Hip Ext Knee Flex   Right  4/5 4-/5 4/5 4-/5 4-/5 4/5   Left /5 4-/5 4/5 4-/5 4-/5 4/5     Basic Muscle Activation:  Transversus Abdominus: Fair  Quadriceps: Right: Fair, no lag, Left: Fair, no lag    Knee Joint Effusion (Stroke Test Assessment):  Right: 1+  Left: Trace    Lower Extremity Flexibility Screen:  Hamstrings (Supine SLR): Right: -; Left: -  Gastroc (Supine Active DF): Right: +; Left: +    Palpation:   Tender to palpation at the following structures: Medial joint line on the right    Assessment/Plan:    Patient is a 72 year old female with both sides knee complaints.    Patient has the following significant findings with corresponding treatment plan.                Diagnosis 1:  Knee pain  Pain -  hot/cold therapy, manual therapy, self management and directional preference exercise  Decreased ROM/flexibility - manual therapy, therapeutic exercise and home program  Decreased strength - therapeutic exercise, therapeutic activities and home program  Impaired balance - neuro re-education, therapeutic activities and home program  Decreased proprioception - neuro re-education, therapeutic activities and home program  Impaired gait - gait training and home program  Impaired muscle performance - neuro re-education and home program  Decreased function - therapeutic activities and home program    Therapy Evaluation Codes:   1) History comprised of:   Personal factors that impact the plan of care:      None.    Comorbidity factors that impact the plan of care are:      None.     Medications impacting care: None.  2) Examination of Body Systems comprised of:   Body structures and functions that impact the plan of care:      Knee.   Activity limitations that  impact the plan of care are:      Squatting/kneeling, Stairs and Walking.  3) Clinical presentation characteristics are:   Stable/Uncomplicated.  4) Decision-Making    Low complexity using standardized patient assessment instrument and/or measureable assessment of functional outcome.  Cumulative Therapy Evaluation is: Low complexity.    Previous and current functional limitations:  (See Goal Flow Sheet for this information)    Short term and Long term goals: (See Goal Flow Sheet for this information)     Communication ability:  Patient appears to be able to clearly communicate and understand verbal and written communication and follow directions correctly.  Treatment Explanation - The following has been discussed with the patient:   RX ordered/plan of care  Anticipated outcomes  Possible risks and side effects  This patient would benefit from PT intervention to resume normal activities.   Rehab potential is good.    Frequency:  1 X week, once daily  Duration:  for 6 weeks  Discharge Plan:  Achieve all LTG.  Independent in home treatment program.  Reach maximal therapeutic benefit.    Please refer to the daily flowsheet for treatment today, total treatment time and time spent performing 1:1 timed codes.     Answers for HPI/ROS submitted by the patient on 9/16/2022  Reason for Visit:: knee pain,  strength therapy  When problem began:: 7/20/2022  How problem occurred:: falls on knees  Number scale: 2/10  General health as reported by patient: fair  Please check all that apply to your current or past medical history: depression, menopausal, migraines/headaches, osteoporosis, pain at night/rest, rheumatoid arthritis, weakness  Other Med Hx Detail: loss of balance  Medical allergies: none  Surgeries: none  Medications you are currently taking: anti-inflammatory  What are your primary job tasks: prolonged standing, repetitive tasks, other  Other Tasks Detail: kneeling, squatting

## 2022-09-27 ENCOUNTER — THERAPY VISIT (OUTPATIENT)
Dept: PHYSICAL THERAPY | Facility: CLINIC | Age: 72
End: 2022-09-27
Payer: MEDICARE

## 2022-09-27 DIAGNOSIS — M17.0 BILATERAL PRIMARY OSTEOARTHRITIS OF KNEE: Primary | ICD-10-CM

## 2022-09-27 PROCEDURE — 97110 THERAPEUTIC EXERCISES: CPT | Mod: GP | Performed by: PHYSICAL THERAPIST

## 2022-09-27 PROCEDURE — 97530 THERAPEUTIC ACTIVITIES: CPT | Mod: GP | Performed by: PHYSICAL THERAPIST

## 2022-09-27 PROCEDURE — 97140 MANUAL THERAPY 1/> REGIONS: CPT | Mod: GP | Performed by: PHYSICAL THERAPIST

## 2022-10-09 ENCOUNTER — HEALTH MAINTENANCE LETTER (OUTPATIENT)
Age: 72
End: 2022-10-09

## 2022-10-18 ENCOUNTER — THERAPY VISIT (OUTPATIENT)
Dept: PHYSICAL THERAPY | Facility: CLINIC | Age: 72
End: 2022-10-18
Payer: MEDICARE

## 2022-10-18 DIAGNOSIS — M17.0 BILATERAL PRIMARY OSTEOARTHRITIS OF KNEE: Primary | ICD-10-CM

## 2022-10-18 PROCEDURE — 97530 THERAPEUTIC ACTIVITIES: CPT | Mod: GP | Performed by: PHYSICAL THERAPIST

## 2022-10-18 PROCEDURE — 97110 THERAPEUTIC EXERCISES: CPT | Mod: GP | Performed by: PHYSICAL THERAPIST

## 2022-10-26 NOTE — PROGRESS NOTES
[facility]    OUTPATIENT Physical Therapy ORTHOPEDIC EVALUATION  PLAN OF TREATMENT FOR OUTPATIENT REHABILITATION  (COMPLETE FOR INITIAL CLAIMS ONLY)  Patient's Last Name, First Name, M.I.  YOB: 1950  Stephanie Bhatia       Provider s Name:  [facility]   Medical Record No.  [id number]   Start of Care Date:  09/20/22   Onset Date:   09/16/22   Treatment Diagnosis:  B knee pain Medical Diagnosis:  Bilateral primary osteoarthritis of knee       Goals:     09/20/22 0500   Body Part   Goals listed below are for B Knee   Goal #1   Goal #1 standing   Previous Functional Level No restrictions   Current Functional Level Hours patient can stand   Performance level <1 hour   STG Target Performance Hours patient will be able to stand   Performance level 4 hours shift at work, pain 2/10 or less   Rationale for housekeeping tasks such as vacuuming, bed making, mowing, gardening   Due date 10/11/22   Goal #2   Goal #2 ambulation   Previous Functional Level No restrictions   Current Functional Level Blocks patient can walk   Performance Level 1 block with the dog    LTG Target Performance  Blocks patient will be able to walk   Performance Level 4 blocks without increased pain   Rationale for safe community ambulation   Due Date 11/01/22       Therapy Frequency:  1x/week  Predicted Duration of Therapy Intervention:  6 weeks    Kristen Olvera, PT                 I CERTIFY THE NEED FOR THESE SERVICES FURNISHED UNDER        THIS PLAN OF TREATMENT AND WHILE UNDER MY CARE     (Physician attestation of this document indicates review and certification of the therapy plan).                     Certification Date From:  09/20/22   Certification Date To:  11/01/22    Referring Provider:  Prachi Moe*    Initial Assessment        See Epic Evaluation SOC Date: 09/20/22

## 2022-11-04 ENCOUNTER — MYC MEDICAL ADVICE (OUTPATIENT)
Dept: FAMILY MEDICINE | Facility: CLINIC | Age: 72
End: 2022-11-04

## 2022-11-04 DIAGNOSIS — B00.9 HSV (HERPES SIMPLEX VIRUS) INFECTION: ICD-10-CM

## 2022-11-04 RX ORDER — ACYCLOVIR 400 MG/1
400 TABLET ORAL EVERY 8 HOURS
Qty: 15 TABLET | Refills: 1 | Status: SHIPPED | OUTPATIENT
Start: 2022-11-04 | End: 2024-07-08

## 2022-11-04 NOTE — TELEPHONE ENCOUNTER
ACYCLOVIR  400MG  Last Written Prescription Date:  11/22/21  Last Fill Quantity: 15,   # refills: 3  Last Office Visit : 11/22/22  Future Office visit:  11/28/22  Routing refill request to provider for review/approval because:  Last RF limited amt    Does PCP want to continue med/RF

## 2022-11-07 NOTE — PROGRESS NOTES
[facility]    Reason for Visit  Stephanie Bhatia is a 67 year old year old female who is being seen for RECHECK (Scleroderma)    HPI    Ms. Bhatia is a 67-year old whom I am seeing for follow up of scleroderma ILD.  She was diagnosed with scleroderma ILD in 2017 and has not required treatment to date.  She tries to walk regularly and swims once a week.  She reports no shortness of breath with exercise unless she walks very fast.  She denies chest pain, syncope, new skin rashes or paroxysmal dyspnea.  She endorses a morning cough with postnasal drip.          Current Outpatient Prescriptions   Medication     calcium carbonate (OS-DONTE 500 MG [place]. [place]) 1250 MG tablet     UNABLE TO FIND     saccharomyces boulardii (FLORASTOR) 250 MG capsule     Cholecalciferol (VITAMIN D) 1000 UNITS capsule     desonide (DESOWEN) 0.05 % ointment     nystatin (MYCOSTATIN) ointment     No current facility-administered medications for this visit.      No Known Allergies  Past Medical History:   Diagnosis Date     Anxiety      Interstitial lung disease (H)     scleroderma ILD, dx by chest CT 5/2017     Migraine     loss of speech and comprehension, has had w/u and is complex migraine, last one about 2/2017     Raynaud phenomenon      Systemic sclerosis with limited cutaneous involvement (H) 12/02/2014    Scl-70 and anti-centromere antibody neg, +RF       Past Surgical History:   Procedure Laterality Date     HYSTEROSCOPIC PLACEMENT CONTRACEPTIVE DEVICE         Social History     Social History     Marital status: Single     Spouse name:      Number of children: N/A     Years of education: N/A     Occupational History           [employer] - part time     Social History Main Topics     Smoking status: Former Smoker     Packs/day: 1.00     Years: 10.00     Types: Cigarettes     Quit date: 11/26/1986     Smokeless tobacco: Never Used     Alcohol use No     Drug use: No      "Sexual activity: No     Other Topics Concern     Parent/Sibling W/ Cabg, Mi Or Angioplasty Before 65f 55m? No     Social History Narrative    .  No children.  Part time at [employer].  Possible asbestos exposure from basement pipes in childhood home.  Lived in [place] [id number].  Denies exposure to pet birds, hot tubs.  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       Family History   Problem Relation Age of Onset     C.A.D. Father      a efw small heart attacks     Neurologic Disorder Father      dementia     Neurologic Disorder Mother      dementia     Rheumatoid Arthritis Sister      Hypertension Sister      Breast Cancer No family hx of      Cancer - colorectal No family hx of      DIABETES No family hx of      CANCER No family hx of      no skin cancer     Interstitial Lung Disease No family hx of      Melanoma No family hx of      Skin Cancer No family hx of              ROS Pulmonary  A complete ROS was otherwise negative except as noted in the HPI.    Vitals: /68  Pulse 59  Resp 16  Ht 1.651 m (5' 5\")  Wt 61.6 kg (135 lb 12.8 oz)  SpO2 100%  BMI 22.6 kg/m2    Exam:   GENERAL APPEARANCE: Well developed, well nourished, alert, and in no apparent distress.  RESP: good air flow throughout.  Few bibasilar inspiratory crackles. No rhonchi. No wheezes.  CV: Normal S1, S2, regular rhythm, normal rate. No murmur.  No LE edema.   MS: extremities normal. No clubbing. No cyanosis.  SKIN: no rash on limited exam.  NEURO: Mentation intact, speech normal, normal gait and stance.  PSYCH: mentation appears normal. and affect normal/bright.    Results:  Recent Results (from the past 168 hour(s))   General PFT Lab (Please always keep checked)    Collection Time: 03/01/18  8:00 AM   Result Value Ref Range    FVC-Pred 2.73 L    FVC-Pre 3.35 L    FVC-%Pred-Pre 122 %    FEV1-Pre 2.70 L    FEV1-%Pred-Pre 126 %    FEV1FVC-Pred 76 %    FEV1FVC-Pre 81 %    FEFMax-Pred 5.84 L/sec    " FEFMax-Pre 7.47 L/sec    FEFMax-%Pred-Pre 127 %    FEF2575-Pred 1.83 L/sec    FEF2575-Pre 2.42 L/sec    MVH1112-%Pred-Pre 132 %    ExpTime-Pre 9.21 sec    FIFMax-Pre 4.04 L/sec    VC-Pred 3.20 L    VC-Pre 3.31 L    VC-%Pred-Pre 103 %    IC-Pred 2.34 L    IC-Pre 2.00 L    IC-%Pred-Pre 85 %    ERV-Pred 0.86 L    ERV-Pre 1.31 L    ERV-%Pred-Pre 152 %    FEV1FEV6-Pred 79 %    FEV1FEV6-Pre 79 %    FRCPleth-Pred 2.77 L    FRCPleth-Pre 2.86 L    FRCPleth-%Pred-Pre 103 %    RVPleth-Pred 2.10 L    RVPleth-Pre 1.56 L    RVPleth-%Pred-Pre 74 %    TLCPleth-Pred 5.11 L    TLCPleth-Pre 4.87 L    TLCPleth-%Pred-Pre 95 %    DLCOunc-Pred 20.98 ml/min/mmHg    DLCOunc-Pre 19.52 ml/min/mmHg    DLCOunc-%Pred-Pre 93 %    VA-Pre 4.39 L    VA-%Pred-Pre 83 %    FEV1SVC-Pred 67 %    FEV1SVC-Pre 82 %       I reviewed the pulmonary function test that was performed today.  It shows normal spirometry, total lung capacity and diffusion and is stable.    I reviewed results with the patient.      Assessment and plan:   Ms. Bhatia is a 67-year-old who is here for follow up of scleroderma interstitial lung disease.   1.  Scleroderma interstitial lung disease.  She has normal PFT.  I would not treat her with mycophenolate at this time given that her pulmonary function test is completely normal.  However, she is in the high risk period for progression of her scleroderma ILD and requires regular monitoring.  I will see her back in 6 months with repeat PFT.  If her scleroderma lung disease worsens, then I would recommend mycophenolate for treatment.  I would avoid cyclophosphamide unless absolutely necessary.  I have encouraged her to continue regular physical activity as she is doing.  2.  Lung nodules. There are two 4 mm lung nodules, one in the left lower lobe and one in the right lower lobe, noted in 2017.  No follow up is recommended as she is low risk for lung cancer, per 2017 Fleischner Soceity recs.                [Follow-up Visit ___] : a follow-up visit  for [unfilled]

## 2022-11-27 NOTE — PATIENT INSTRUCTIONS
Patient Education   Personalized Prevention Plan  You are due for the preventive services outlined below.  Your care team is available to assist you in scheduling these services.  If you have already completed any of these items, please share that information with your care team to update in your medical record.  Health Maintenance Due   Topic Date Due    Colorectal Cancer Screening  11/26/2022        Ask about TWINRIX hep A and B vaccine series of three shots as you are not immune noted on testing.      COMMUNITY RESOURCES:    [address] [place] [address] for mental health service emergency  [employer] [place] Mental [employer]ness (www.ga.org): 945.871.6966 or 297-783-8679.   Mental Health Association (www.mentalhealth.org): 441.994.4399 or 940-394-6597.  [place] Crisis Text Line: Text [place] to 709255  Suicide LifeLine Chat: suicidepreventionlifeline.org/chat

## 2022-11-27 NOTE — PROGRESS NOTES
SUBJECTIVE:   Stephanie Bhatia is a 72 year old female who presents for Preventive Visit.  Stephanie Bhatia 72 year old  presents as an established patient for subsequent Medicare Wellness Exam.    Physical activity   Exercise is present in her daily regimen. She attends classes at her local [employer] swim twice weekly and goes for walks with her dog.  Went to PT for knees provided balance exercise.  Falls/ Osteoarthritis/ hiatal hernia  Three times in last year pulled over by dog 50 pounds with knee pain  Took  NSAID for one day. She has been burping and noted acid reflux, increased gas on the right upper abdomen. She has pain on the right side. At one point in 2019 she slipped on a rug and had fracture of ribs. Had CT in May 2022 no bone abnormalities.  Anxiety/ Depression  Her anxiety and depression increased during pandemic. She moved in with family 2019 to help reduce daily stressors including taking care of her dog and household chores.  She is a people pleaser and feels obligated to eat with the family at a certain time each day late at night, may have more salt and sugar than she would like. She has several friends who are dying.  Thus she has occasional suicidal ideation ( no intent) thinking other people would benefit from her home and belongings.  She did not like Zoloft. Her anxiety seems better but depression seems worse. Would like a counsellor first not medication. Feels tired. 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 Has been in [place]. She sometimes eats sugar excessively.   Pulmonary fibrosis scleroderma  HX of rheumatoid arthritis/ Scleroderma/ pulmonary fibrosis follows with Rheumatologist.   Actemra started 9/25/2022 once weekly. She will review the rights sided pain with her provider  "may need follow-up LFT. Declined today as she feels the right sided pain is better.  Family History of Dementia  Strong family history of dementia, practices \"mind sharpening\" .  HCM  Due for annual fit test for colon cancer screening  Declines mammogram  Has an advanced directive, may need updated     SH: Lives with brother-in Law and sister while her niece lived in her home. She will move back to her home soon.Has a 50 pound dog Vernell. Travel to [place] and [place]. Working at [employer] 2 days.    Patient has been advised of split billing requirements and indicates understanding: Yes  Are you in the first 12 months of your Medicare Part B coverage?  No   Questionnaires reviewed see staff note.  Physical Health:  In general, how would you rate your overall physical health? good  Mental Health:    In general, how would you rate your overall mental or emotional health? fair  PHQ-2 Score:      Do you feel safe in your environment? Yes    Have you ever done Advance Care Planning? (For example, a Health Directive, POLST, or a discussion with a medical provider or your loved ones about your wishes): Yes, advance care planning is on file. May need to update new copy provided    Additional concerns to address?  YES see above    Fall risk:  Fallen 2 or more times in the past year?: Yes  Any fall with injury in the past year?: Yes  Timed Up and Go Test (>13.5 is fall risk; contact physician) : 3    Cognitive Screening: No concerns    Reviewed and updated as needed this visit by clinical staff   Tobacco  Allergies  Meds  Problems  Med Hx  Surg Hx  Fam Hx          Reviewed and updated as needed this visit by Provider   Tobacco  Allergies  Meds  Problems  Med Hx  Surg Hx  Fam Hx         Social History     Tobacco Use     Smoking status: Former     Packs/day: 1.00     Years: 20.00     Pack years: 20.00     Types: Cigarettes     Quit date: 1986     Years since quittin.0     Smokeless tobacco: Never "   Substance Use Topics     Alcohol use: Not Currently     Comment: none since 2001                           Current providers sharing in care for this patient include:Patient Care Team:  Rangel Garcia MD as PCP - General (Family Practice)  Prachi Grant MD as MD (Family Medicine - Sports Medicine)  Doris Robertson MD as MD (Dermatology)  Petros Gunn MD as MD (Rheumatology)  Dorene Lieberman [facility] as Pharmacist (Pharmacist)  Carli Bhatia, RN as Registered Nurse  Rangel Garcia MD as Referring Physician (Family Practice)  Getachew Abdi MD as MD (Otolaryngology)  Getachew Abdi MD as MD (Otolaryngology)  Tiffanie Gomez MD as Assigned Pulmonology Provider  Rangel Garcia MD as Assigned PCP    The following health maintenance items are reviewed in Epic and correct as of today:  Health Maintenance   Topic Date Due     COLORECTAL CANCER SCREENING  11/26/2022     MEDICARE ANNUAL WELLNESS VISIT  11/28/2023     FALL RISK ASSESSMENT  11/28/2023     LIPID  06/09/2027     ADVANCE CARE PLANNING  11/28/2027     DTAP/TDAP/TD IMMUNIZATION (3 - Td or Tdap) 06/25/2031     DEXA  06/14/2034     HEPATITIS C SCREENING  Completed     PHQ-2 (once per calendar year)  Completed     INFLUENZA VACCINE  Completed     Pneumococcal Vaccine: 65+ Years  Completed     ZOSTER IMMUNIZATION  Completed     COVID-19 Vaccine  Completed     IPV IMMUNIZATION  Aged Out     MENINGITIS IMMUNIZATION  Aged Out     MAMMO SCREENING  Discontinued     Labs reviewed in EPIC  BP Readings from Last 3 Encounters:   11/28/22 95/62   09/09/22 101/63   05/12/22 116/71    Wt Readings from Last 3 Encounters:   11/28/22 59.6 kg (131 lb 4.8 oz)   09/16/22 58.5 kg (129 lb)   09/09/22 58.5 kg (129 lb)                  Immunization History   Administered Date(s) Administered     COVID-19 Vaccine 12+ (Pfizer 2022) 04/11/2022     COVID-19 Vaccine 12+ (Pfizer) 02/26/2021, 03/19/2021, 10/06/2021     COVID-19 Vaccine Bivalent  Booster 12+ (Pfizer) 2022     Flu, Unspecified 10/06/2021     HepA-Adult 2017, 2017     Influenza (High Dose) 3 valent vaccine 2013, 2014, 10/12/2015, 2016, 2017, 2018, 10/14/2019     Influenza (IIV3) PF 10/26/2010, 10/28/2011, 10/12/2012, 2017     Influenza Vaccine 65+ (Fluzone HD) 2020, 10/06/2021, 10/06/2022     Influenza Vaccine >6 months (Alfuria,Fluzone) 10/26/2010, 10/28/2011, 10/12/2012, 2013, 2014, 10/12/2015, 2016     Pneumo Conj 13-V (2010&after) 2014     Pneumococcal 23 valent 10/21/2013     TDAP Vaccine (Adacel) 2012     Tdap (Adacel,Boostrix) 2012, 2021     Typhoid Oral 2017     Yellow Fever 2017     Yellow Fever, unspecified 2017     Zoster vaccine recombinant adjuvanted (SHINGRIX) 07/10/2019, 2019     Patient Active Problem List   Diagnosis     Raynaud phenomenon     Anxiety and depression     Lumbar radiculopathy     Advanced directives, counseling/discussion     CARDIOVASCULAR SCREENING; LDL GOAL LESS THAN 160     Cluster headache syndrome     Systemic sclerosis with limited cutaneous involvement (H)     AK (actinic keratosis)     Osteoporosis     ILD (interstitial lung disease) (H)     Rheumatoid arthritis involving both hands with positive rheumatoid factor (H)     Senile osteoporosis     Past Surgical History:   Procedure Laterality Date     HYSTEROSCOPIC PLACEMENT CONTRACEPTIVE DEVICE         Social History     Tobacco Use     Smoking status: Former     Packs/day: 1.00     Years: 20.00     Pack years: 20.00     Types: Cigarettes     Quit date: 1986     Years since quittin.0     Smokeless tobacco: Never   Substance Use Topics     Alcohol use: Not Currently     Comment: none since      Family History   Problem Relation Age of Onset     C.A.D. Father         a efw small heart attacks     Neurologic Disorder Father         dementia 70s     Coronary Artery  Disease Father      Neurologic Disorder Mother         dementia     Depression Mother      Cerebrovascular Disease Maternal Grandmother         ,     Other Cancer Paternal Grandmother      Rheumatoid Arthritis Sister      Hypertension Sister      Dementia Paternal Cousin      Dementia Paternal Uncle         60s     Breast Cancer No family hx of      Cancer - colorectal No family hx of      Diabetes No family hx of      Cancer No family hx of         no skin cancer     Interstitial Lung Disease No family hx of      Melanoma No family hx of      Skin Cancer No family hx of      Glaucoma No family hx of      Macular Degeneration No family hx of          Current Outpatient Medications   Medication Sig Dispense Refill     acyclovir (ZOVIRAX) 400 MG tablet Take 1 tablet (400 mg) by mouth every 8 hours For 5 days 15 tablet 1     Cholecalciferol (VITAMIN D) 1000 UNITS capsule Take 1 capsule by mouth daily       cyanocobalamin (VITAMIN B-12) 100 MCG tablet        ibuprofen (ADVIL/MOTRIN) 200 MG tablet Take 200 mg by mouth every 4 hours as needed for mild pain       tocilizumab (ACTEMRA) 162 MG/0.9ML subcutaneous injection Inject 0.9 mLs (162 mg) Subcutaneous every 7 days 3.6 mL 11     vitamin B complex with vitamin C (STRESS TAB) tablet Take 1 tablet by mouth daily       No Known Allergies  Recent Labs   Lab Test 06/09/22  0622 06/02/22  1116 05/26/22  0621 05/26/22  0621 09/30/20  0958 08/06/18  0938 02/14/18  1417   * 105*  --  122* 111*  --   --    HDL 52 58  --  56 55  --   --    TRIG 72 103  --  85 68  --   --    ALT 18 20  --  21 19  --   --    CR 0.69 0.66   < > 0.70 0.72  --  0.90   GFRESTIMATED >90 >90   < > >90 84  --  62   GFRESTBLACK  --   --   --   --  >90  --  75   POTASSIUM 4.4 4.0   < > 4.2 4.2  --  4.9   TSH  --   --   --   --  1.30 1.71  --     < > = values in this interval not displayed.          ROS:    Problem list, PMH, Surgical HX, FH, SH, allergies, medications,immunizations reviewed and  "updated in Epic. 10 point ROS negative other than noted in HPI and ROS.      OBJECTIVE:   BP 95/62 (BP Location: Right arm, Patient Position: Sitting, Cuff Size: Adult Regular)   Pulse 70   Temp 97.4  F (36.3  C) (Oral)   Resp 16   Ht 1.651 m (5' 5\")   Wt 59.6 kg (131 lb 4.8 oz)   SpO2 99%   BMI 21.85 kg/m   Estimated body mass index is 21.85 kg/m  as calculated from the following:    Height as of this encounter: 1.651 m (5' 5\").    Weight as of this encounter: 59.6 kg (131 lb 4.8 oz).  EXAM:   GENERAL APPEARANCE: healthy, alert and no distress, forthcoming with information  EYES: Eyes grossly normal to inspection, PERRL and conjunctivae and sclerae normal  HENT: ear canals and TM's normal, mask removed briefly mouth without ulcers or lesions, oropharynx clear and oral mucous membranes moist  NECK: no adenopathy, no asymmetry, masses, or scars and thyroid normal to palpation  RESP: lungs clear to auscultation - no rales, rhonchi or wheezes  BREAST:deferred  CV: regular rate and rhythm, normal S1 S2, no S3 or S4, no murmur, click or rub, no peripheral edema   ABDOMEN: soft, nontender, no hepatosplenomegaly, no masses and bowel sounds normal  MS:  Bilateral hands Rheumatoid changes fingers and wrists, scleroderma, gait is age appropriate without ataxia  SKIN: no suspicious lesions   NEURO: Normal strength and tone, mentation intact and speech normal  PSYCH: mentation appears normal, affect normal, admits to depression, feels anxiety is less.  Mood stable    PHQ 8/12/2019 9/23/2020 11/28/2022   PHQ-9 Total Score 10 9 16   Q9: Thoughts of better off dead/self-harm past 2 weeks Not at all Not at all Several days     NATALI-7 SCORE 7/23/2018 8/12/2019 11/28/2022   Total Score 6 (mild anxiety) - -   Total Score 6 4 4       ASSESSMENT / PLAN:   Stephanie was seen today for medicare visit and hernia.    Diagnoses and all orders for this visit:    Encounter for Medicare annual wellness exam    Anxiety and depression  Her " "anxiety seems better but depression seems worse. Would like a counsellor first not medication. Feels tired. She likes working. I provided information in AVS regarding contact numbers and provided a referral for counseling. Please follow-up with me if sx concerning  -     Adult Mental Health  Referral; Future    Screening for colon cancer  Due this year  -     Fecal cancer screen FIT; Future    History of alcohol use disorder   No longer active since 2001  -     Adult Mental Health  Referral; Future    30 minutes additional spent on the date of the encounter doing chart review, history, exam, diagnostics review, documentation, counseling and coordination of cares as noted exclusive of preventive cares.      Patient has been advised of split billing requirements and indicates understanding: Yes    COUNSELING:  Reviewed preventive health counseling, as reflected in patient instructions       Regular exercise       Healthy diet/nutrition       Vision screening       Hearing screening       Dental care       Fall risk prevention       Immunizations    Reviewed not immune to Hep A and B         Advanced Planning        Colon cancer screening       Mental health  Estimated body mass index is 21.85 kg/m  as calculated from the following:    Height as of this encounter: 1.651 m (5' 5\").    Weight as of this encounter: 59.6 kg (131 lb 4.8 oz).        She reports that she quit smoking about 36 years ago. Her smoking use included cigarettes. She has a 20.00 pack-year smoking history. She has never used smokeless tobacco.    Appropriate preventive services were discussed with this patient, including applicable screening as appropriate for cardiovascular disease, diabetes, osteopenia/osteoporosis, and glaucoma.  As appropriate for age/gender, discussed screening for colorectal cancer, prostate cancer, breast cancer, and cervical cancer. Checklist reviewing preventive services available has been given to the " patient.    Reviewed patients plan of care and provided an AVS. The Basic Care Plan (routine screening as documented in Health Maintenance) for Stephanie meets the Care Plan requirement. This Care Plan has been established and reviewed with the Patient.    Counseling Resources:  ATP IV Guidelines  Pooled Cohorts Equation Calculator  Breast Cancer Risk Calculator  BRCA-Related Cancer Risk Assessment: FHS-7 Tool  FRAX Risk Assessment  ICSI Preventive Guidelines  Dietary Guidelines for Americans, 2010  [employer]'s MyPlate  ASA Prophylaxis  Lung CA Screening    Rangel Garcia MD  [facility] [place]

## 2022-11-28 ENCOUNTER — OFFICE VISIT (OUTPATIENT)
Dept: FAMILY MEDICINE | Facility: CLINIC | Age: 72
End: 2022-11-28
Payer: MEDICARE

## 2022-11-28 ENCOUNTER — LAB (OUTPATIENT)
Dept: LAB | Facility: CLINIC | Age: 72
End: 2022-11-28
Payer: MEDICARE

## 2022-11-28 VITALS
SYSTOLIC BLOOD PRESSURE: 95 MMHG | DIASTOLIC BLOOD PRESSURE: 62 MMHG | WEIGHT: 131.3 LBS | RESPIRATION RATE: 16 BRPM | BODY MASS INDEX: 21.88 KG/M2 | TEMPERATURE: 97.4 F | HEART RATE: 70 BPM | HEIGHT: 65 IN | OXYGEN SATURATION: 99 %

## 2022-11-28 DIAGNOSIS — Z12.11 SCREENING FOR COLON CANCER: ICD-10-CM

## 2022-11-28 DIAGNOSIS — F32.A ANXIETY AND DEPRESSION: ICD-10-CM

## 2022-11-28 DIAGNOSIS — Z87.898 HISTORY OF ALCOHOL USE DISORDER: ICD-10-CM

## 2022-11-28 DIAGNOSIS — Z00.00 ENCOUNTER FOR MEDICARE ANNUAL WELLNESS EXAM: Primary | ICD-10-CM

## 2022-11-28 DIAGNOSIS — F41.9 ANXIETY AND DEPRESSION: ICD-10-CM

## 2022-11-28 PROCEDURE — G0439 PPPS, SUBSEQ VISIT: HCPCS | Performed by: FAMILY MEDICINE

## 2022-11-28 PROCEDURE — 99213 OFFICE O/P EST LOW 20 MIN: CPT | Mod: 25 | Performed by: FAMILY MEDICINE

## 2022-11-28 RX ORDER — UBIDECARENONE 75 MG
CAPSULE ORAL
COMMUNITY
End: 2024-05-17

## 2022-11-28 ASSESSMENT — ANXIETY QUESTIONNAIRES
2. NOT BEING ABLE TO STOP OR CONTROL WORRYING: SEVERAL DAYS
1. FEELING NERVOUS, ANXIOUS, OR ON EDGE: SEVERAL DAYS
GAD7 TOTAL SCORE: 4
GAD7 TOTAL SCORE: 4
3. WORRYING TOO MUCH ABOUT DIFFERENT THINGS: SEVERAL DAYS
6. BECOMING EASILY ANNOYED OR IRRITABLE: NOT AT ALL
IF YOU CHECKED OFF ANY PROBLEMS ON THIS QUESTIONNAIRE, HOW DIFFICULT HAVE THESE PROBLEMS MADE IT FOR YOU TO DO YOUR WORK, TAKE CARE OF THINGS AT HOME, OR GET ALONG WITH OTHER PEOPLE: SOMEWHAT DIFFICULT
5. BEING SO RESTLESS THAT IT IS HARD TO SIT STILL: NOT AT ALL
7. FEELING AFRAID AS IF SOMETHING AWFUL MIGHT HAPPEN: NOT AT ALL

## 2022-11-28 ASSESSMENT — PATIENT HEALTH QUESTIONNAIRE - PHQ9
SUM OF ALL RESPONSES TO PHQ QUESTIONS 1-9: 16
5. POOR APPETITE OR OVEREATING: SEVERAL DAYS

## 2022-11-28 NOTE — PROGRESS NOTES
Medicare Annual Wellness Questionnaire:  This 72 year old year old female presents for a Medicare Wellness Exam.    Providers/clinics involved in care:  Patient Care Team       Relationship Specialty Notifications Start End    Rangel Garcia MD PCP - General Family Practice  6/10/19     Phone: [phone] Fax: [phone]         [address] [address]    Prachi Grant MD MD Family Medicine - Sports Medicine  2/24/15     Phone: [phone] Fax: [phone]         [address] [facility] [address]    Doris Robertson MD MD Dermatology  7/19/16     Phone: [phone] Fax: [phone]         [address] [place] [address]    EvliaPetros duff MD MD Rheumatology  5/25/17     Phone: [phone] Fax: [phone]         [address] [place] [address]    Dorene Lieberman [facility] Pharmacist Pharmacist  8/31/18     Phone: [phone] Fax: [phone]         [address] [facility] [address] [facility] [address]    Carli Bhatia, RN Registered Nurse   8/12/19     Rangel Garcia MD Referring Physician Family Practice  9/28/20     Referring to [facility]    Phone: [phone] Fax: [phone]         [address] [place] [address]    Getachew Abdi MD MD Otolaryngology  9/28/20     Phone: [phone] Fax: [phone]         [address] [place] [address]    Getachew Abdi MD MD Otolaryngology  10/5/20     Phone: [phone] Fax: [phone]         [address] [place] [address]    Tiffanie Gomez MD Assigned Pulmonology Provider   10/23/20     Phone: [phone] Fax: [phone]         [address] [place] [address]    Rangel Garcia MD Assigned PCP   1/24/21     Phone: [phone] Fax: [phone]         [address] [address]          Fall Risk Assessment:    Have you fallen 2 or more times in the last year?  No    How many times were you injured due to  a fall in the last year?  yes    PHQ-2:    Over the last 2 weeks, how often have you been bothered by feeling down, depressed, or hopeless?     Not at all (0)     Over the last 2 weeks, how often have you had little interest or pleasure in doing things?     Several Days (1)    Social History:    What is your marital status?  Single    Who lives in your household?  Sister, brother in law    Does your home have loose rugs in the hallway:     Yes     Does your home have grab bars in the bathroom:    No    Does your home have handrails on the stairs?  Yes     Does your home have poorly lit areas?    Yes     Do you feel threatened or controlled by a partner, ex-partner or anyone in your life?   No    Has anyone hurt you physically, for example by pushing, hitting, slapping or kicking you   or forcing you to have sex?   No    Do you need help with the phone, transportation, shopping, preparing meals, housework, laundry, medications or managing money?   No    Sexual Health:    Are you sexually active?    No    If yes, with men, women, or both?   n/a    If yes, how many partners?  n/a    If yes, are you using condoms?    N/a    Have you had any sexually transmitted infections in the last year?   Yes    Do you have any sexual concerns?    N/a    Women Only:    Women: What year did you stop having periods (approximate age)?  1999    Women: Any vaginal bleeding in the last year?    No    Women: Have you ever had an abnormal Pap smear?    No    General Health Assessment:    Have you noticed any hearing difficulties?   No    Do you wear hearing aids?   No    Have you seen a hearing professional such as an audiologist in the last 1 year?   Yes     Do you have vision difficulty?    No    Do you wear glasses or contacts?   Yes     Have you seen an eye doctor in the last 1 year?   Yes     How many servings of fruits and vegetables do you eat a day?  2-3 fruits and 2-3 vegetables    How often do you exercise in a week?  2-6    How  long and what kind of exercise do you do?  Walk, swim    Tobacco and Alcohol History:    Do you use tobacco/nicotine products?    No    If yes, please list the method of use and average weekly consumption?  n/a    Do you use any other drugs?   No         Do you drink alcohol?   No    If you drink alcohol, how many drinks per week?  n/a      Advance Directive:    Have you completed an Advance Directives document?  Yes     If yes, have you given a copy to the clinic?   No    Do you need information on Advance Directives?   No

## 2022-11-28 NOTE — NURSING NOTE
Stephanie Bhatia is a 72 year old female that presents in clinic today for the following:     Chief Complaint   Patient presents with     Medicare Visit     Patient stated they started Actemra     Hernia     Hernia flare up this week per patient       The patient's allergies and medications were reviewed. The patient's vitals were obtained without incident. The patient does not have any other questions for the provider.     Elian Fowler, EMT at 8:03 AM on 11/28/2022.  Primary Care Clinic: [phone]

## 2022-12-02 ENCOUNTER — HOSPITAL ENCOUNTER (OUTPATIENT)
Facility: CLINIC | Age: 72
Discharge: HOME | End: 2022-12-02
Attending: FAMILY MEDICINE | Admitting: FAMILY MEDICINE
Payer: MEDICARE

## 2022-12-02 PROCEDURE — 99000 SPECIMEN HANDLING OFFICE-LAB: CPT | Performed by: PATHOLOGY

## 2022-12-02 PROCEDURE — 82274 ASSAY TEST FOR BLOOD FECAL: CPT

## 2022-12-05 LAB — HEMOCCULT STL QL IA: NEGATIVE

## 2023-01-12 ASSESSMENT — ENCOUNTER SYMPTOMS
NIGHT SWEATS: 0
TREMORS: 0
FEVER: 0
MUSCLE CRAMPS: 1
FATIGUE: 1
MUSCLE WEAKNESS: 0
STIFFNESS: 0
CHILLS: 0
NERVOUS/ANXIOUS: 0
MYALGIAS: 0
ALTERED TEMPERATURE REGULATION: 0
HALLUCINATIONS: 0
POLYDIPSIA: 0
LOSS OF CONSCIOUSNESS: 0
MEMORY LOSS: 0
SPEECH CHANGE: 0
DEPRESSION: 1
ARTHRALGIAS: 0
JOINT SWELLING: 0
INCREASED ENERGY: 0
DIZZINESS: 1
WEIGHT GAIN: 0
DISTURBANCES IN COORDINATION: 0
PANIC: 0
HEADACHES: 0
INSOMNIA: 1
PARALYSIS: 0
WEAKNESS: 1
NECK PAIN: 0
POLYPHAGIA: 0
BACK PAIN: 0
WEIGHT LOSS: 0
NUMBNESS: 0
DECREASED CONCENTRATION: 0
SEIZURES: 0
TINGLING: 0
DECREASED APPETITE: 0

## 2023-01-13 ENCOUNTER — MYC MEDICAL ADVICE (OUTPATIENT)
Dept: INTERNAL MEDICINE | Facility: CLINIC | Age: 73
End: 2023-01-13

## 2023-01-13 NOTE — TELEPHONE ENCOUNTER
Erroneous note.     Elian Fowler, EMT at 2:43 PM on 1/13/2023.  Primary Care Clinic: [phone]

## 2023-01-17 ENCOUNTER — OFFICE VISIT (OUTPATIENT)
Dept: INTERNAL MEDICINE | Facility: CLINIC | Age: 73
End: 2023-01-17
Payer: MEDICARE

## 2023-01-17 ENCOUNTER — LAB (OUTPATIENT)
Dept: LAB | Facility: CLINIC | Age: 73
End: 2023-01-17
Payer: MEDICARE

## 2023-01-17 VITALS
HEIGHT: 65 IN | HEART RATE: 65 BPM | DIASTOLIC BLOOD PRESSURE: 66 MMHG | WEIGHT: 131.39 LBS | BODY MASS INDEX: 21.89 KG/M2 | OXYGEN SATURATION: 98 % | SYSTOLIC BLOOD PRESSURE: 100 MMHG

## 2023-01-17 DIAGNOSIS — Z51.81 THERAPEUTIC DRUG MONITORING: ICD-10-CM

## 2023-01-17 DIAGNOSIS — Z71.84 COUNSELING FOR TRAVEL: Primary | ICD-10-CM

## 2023-01-17 DIAGNOSIS — M34.9 SCLERODERMA WITH PULMONARY INVOLVEMENT (H): ICD-10-CM

## 2023-01-17 LAB
ALBUMIN SERPL BCG-MCNC: 4.2 G/DL (ref 3.5–5.2)
ALT SERPL W P-5'-P-CCNC: 15 U/L (ref 10–35)
AST SERPL W P-5'-P-CCNC: 24 U/L (ref 10–35)
CREAT SERPL-MCNC: 0.71 MG/DL (ref 0.51–0.95)
ERYTHROCYTE [DISTWIDTH] IN BLOOD BY AUTOMATED COUNT: 12.6 % (ref 10–15)
GFR SERPL CREATININE-BSD FRML MDRD: 89 ML/MIN/1.73M2
HCT VFR BLD AUTO: 41 % (ref 35–47)
HGB BLD-MCNC: 13.4 G/DL (ref 11.7–15.7)
MCH RBC QN AUTO: 31.6 PG (ref 26.5–33)
MCHC RBC AUTO-ENTMCNC: 32.7 G/DL (ref 31.5–36.5)
MCV RBC AUTO: 97 FL (ref 78–100)
PLATELET # BLD AUTO: 235 10E3/UL (ref 150–450)
RBC # BLD AUTO: 4.24 10E6/UL (ref 3.8–5.2)
WBC # BLD AUTO: 5.9 10E3/UL (ref 4–11)

## 2023-01-17 PROCEDURE — 36415 COLL VENOUS BLD VENIPUNCTURE: CPT | Performed by: PATHOLOGY

## 2023-01-17 PROCEDURE — 84460 ALANINE AMINO (ALT) (SGPT): CPT | Performed by: PATHOLOGY

## 2023-01-17 PROCEDURE — 99402 PREV MED CNSL INDIV APPRX 30: CPT | Mod: 25 | Performed by: NURSE PRACTITIONER

## 2023-01-17 PROCEDURE — 90691 TYPHOID VACCINE IM: CPT | Performed by: NURSE PRACTITIONER

## 2023-01-17 PROCEDURE — 84450 TRANSFERASE (AST) (SGOT): CPT | Performed by: PATHOLOGY

## 2023-01-17 PROCEDURE — 85027 COMPLETE CBC AUTOMATED: CPT | Performed by: PATHOLOGY

## 2023-01-17 PROCEDURE — 82565 ASSAY OF CREATININE: CPT | Performed by: PATHOLOGY

## 2023-01-17 PROCEDURE — 90471 IMMUNIZATION ADMIN: CPT | Performed by: NURSE PRACTITIONER

## 2023-01-17 PROCEDURE — 82040 ASSAY OF SERUM ALBUMIN: CPT | Performed by: PATHOLOGY

## 2023-01-17 RX ORDER — ATOVAQUONE AND PROGUANIL HYDROCHLORIDE 250; 100 MG/1; MG/1
1 TABLET, FILM COATED ORAL DAILY
Qty: 35 TABLET | Refills: 0 | Status: SHIPPED | OUTPATIENT
Start: 2023-01-17 | End: 2023-02-09

## 2023-01-17 RX ORDER — AZITHROMYCIN 500 MG/1
TABLET, FILM COATED ORAL
Qty: 4 TABLET | Refills: 0 | Status: SHIPPED | OUTPATIENT
Start: 2023-01-17 | End: 2023-10-09

## 2023-01-17 NOTE — PROGRESS NOTES
Stephanie Bhatia seen today alone in preparation for travel to [facility], leaving on  2/4/23 and staying for 26 days.. Detailed itinerary: S[place] outside of [facility].   Patient will be staying with friend in malarial areas. Reason for travel: accompanying an ill friend from [place] back to his home in [facility].  Country of birth: [place].  She will be traveling with: Sick friend.    Planned activities during travel:   Nothing exertional. .    .    Past Medical History:   Diagnosis Date     Anxiety      Herpes simplex without mention of complication      Interstitial lung disease (H)     scleroderma ILD, dx by chest CT 5/2017     Lung nodules     4 mm LLL and RLL 2017     Migraine     loss of speech and comprehension, has had w/u and is complex migraine, last one about 2/2017     Raynaud phenomenon      Systemic sclerosis with limited cutaneous involvement (H) 12/02/2014    Dx ~2010, Scl-70 and anti-centromere antibody neg, +RF       Patient Active Problem List   Diagnosis     Raynaud phenomenon     Anxiety and depression     Lumbar radiculopathy     Advanced directives, counseling/discussion     CARDIOVASCULAR SCREENING; LDL GOAL LESS THAN 160     Cluster headache syndrome     Systemic sclerosis with limited cutaneous involvement (H)     AK (actinic keratosis)     Osteoporosis     ILD (interstitial lung disease) (H)     Rheumatoid arthritis involving both hands with positive rheumatoid factor (H)     Senile osteoporosis        Current Outpatient Medications   Medication     acyclovir (ZOVIRAX) 400 MG tablet     Cholecalciferol (VITAMIN D) 1000 UNITS capsule     cyanocobalamin (VITAMIN B-12) 100 MCG tablet     ibuprofen (ADVIL/MOTRIN) 200 MG tablet     tocilizumab (ACTEMRA) 162 MG/0.9ML subcutaneous injection     vitamin B complex with vitamin C (STRESS TAB) tablet     No current facility-administered medications for this visit.       Immunization History   Administered Date(s) Administered     COVID-19 Vaccine  "12+ (Pfizer 2022) 04/11/2022     COVID-19 Vaccine 12+ (Pfizer) 02/26/2021, 03/19/2021, 10/06/2021     COVID-19 Vaccine Bivalent Booster 12+ (Pfizer) 09/13/2022     Flu, Unspecified 10/06/2021     HepA-Adult 03/13/2017, 09/13/2017     Influenza (High Dose) 3 valent vaccine 09/27/2013, 11/26/2014, 10/12/2015, 09/01/2016, 09/06/2017, 08/27/2018, 10/14/2019     Influenza (IIV3) PF 10/26/2010, 10/28/2011, 10/12/2012, 09/28/2017     Influenza Vaccine 65+ (Fluzone HD) 09/30/2020, 10/06/2021, 10/06/2022     Influenza Vaccine >6 months (Alfuria,Fluzone) 10/26/2010, 10/28/2011, 10/12/2012, 09/27/2013, 11/26/2014, 10/12/2015, 09/01/2016     Pneumo Conj 13-V (2010&after) 11/26/2014     Pneumococcal 23 valent 10/21/2013     TDAP Vaccine (Adacel) 05/01/2012     Tdap (Adacel,Boostrix) 05/01/2012, 06/25/2021     Typhoid Oral 03/13/2017     Yellow Fever 03/13/2017     Yellow Fever, unspecified 03/13/2017     Zoster vaccine recombinant adjuvanted (SHINGRIX) 07/10/2019, 09/30/2019       REVIEW OF SYSTEMS:  ROS:9 system ROS reviewed w/o changes except for above    Possible vaccine contraindications:     none .          PE:     General: well-nourished, well-developed female in no acute distress./66 (BP Location: Right arm, Patient Position: Sitting, Cuff Size: Adult Regular)   Pulse 65   Ht 1.651 m (5' 5\")   Wt 59.6 kg (131 lb 6.3 oz)   SpO2 98%   BMI 21.87 kg/m        ASSESSMENT AND PLAN:    Stephanie Bhatia 72 year old old female whom I had the pleasure of seeing today for counseling for international travel.    Counseling on vaccinations for travel:     Stephanie was seen today for travel clinic.    Diagnoses and all orders for this visit:    Counseling for travel  -     azithromycin (ZITHROMAX) 500 MG tablet; 1 tab daily until diarrhea resolves.  -     atovaquone-proguanil (MALARONE) 250-100 MG tablet; Take 1 tablet by mouth daily Start 2 days before travel and continue 7 days after return.    Other orders  -     TYPHOID " "VACCINE, IM      Counseling on malaria and other insect borne diseases    Prescription for malaria: Malarone.    We also discussed general precautions for mosquito, other insect borne and ecto-parasite avoidance.    Counseling on traveler's diarrhea    We discussed precautions for clean water and food preparation as follows:  Recommended bottled or boiled water, including ice, and for tooth brushing.  Peel it, boil it, cook it, or forget it  Gave handout on traveler's diarrhea.  Handwashing or use of sanitizers several times daily and prior to eating  We discussed medications for management of traveler's diarrhea, once symptomatic:  Gave handout to patient.  Recommended going to clinic, if dehydrated or if high fever and/or blood in stool.  Recommended loperamide (Imodium, an antimotility agent), for diarrhea without fever\"}    Counseling on the epidemiology of travel related morbidity and mortality    Discussed precautions for accident avoidance.    Discussed flying: drinking plenty of fluids and exercising legs every 2 hours    Discussed health concerns overseas.    Gave copy of CDC recommendations.    Discussed safety and security:     Other counseling for travel    Recommended checking medicine cabinet - looking for any meds normally used at home for common illnesses.    Provided education regarding sun exposure and use of high SPF sunscreen.    Discussed avoidance of blood and body secretions.     High altitude sickness: not an issue.    Motion sickness.    Jet lag: Not an issue.    HIV Post Exposure : aware        Creating a Medical Travel Kit    Every busy traveler should have a kit containing personal care and medical items that can be easily tossed into a suitcase.  Quantities can be adjusted for the length of travel, for destinations, and availability of replacement at your destination.    Plan to see your Travel medicine Clinic 4-6 weeks prior to departure as some vaccines require time to become " effective.     Items to Pack:    -Basic antibiotics  -Mild laxatives: Metamucil or Senokot, available without a prescription.  -Anti-diarrheal medication: Imodium AD or Pepto Bismol, available in tablet form without a prescription.  -Medical thermometer  -Antacid tablets  -Band aides, guaze bandages, ace wrap, adhesive tape, antibiotic ointment  -Topical ointment or creams for rash or bites (over the counter hydrocortisone cream).    -Motion sickness: Dramamine or Antivert (Meclazine)tablets  -Aspirin, acetaminophen(tylenol) ibuprofen  -Cough medicine or drops  -Anti-fungal foot powder  -Nasal spray or decongestants  -Eyeglasses.  Take an extra pair or copy of your prescription.  _Insect repellent with 30% DEET time release, such as Ultrathon or Ramirez products. Available at [employer]  -Permethrin clothing insecticide treatment.Available at [employer]  -Ear Plugs  -Sunscreen  -Sunglasses, Hat  -Safety pins, toilet paper, money belt, padlock, duct tape    Regular Medications should be left in original containers and carried in your carry on bag. Take enough with you for the entire trip. You might need a letter to give to your insurance to obtain a sufficient amount for your trip.      Carry with you the following info: Known allergies, medical conditions, medications, name of your insurance company and policy number, blood type if known, and phone numbers for emergency contact.        Medical resources  Medical Alert [facility]  [phone]  Provides Medical Alert Tags    [facility] Emergency Services [phone] (9 am TO 5 pm EST)  [phone] (after hours)  Deals with emergencies relating to passport problems.    [facility]  [phone]( Mon-Fri 8:30-10pm EST)  [phone] Emergencies only, 24 hour facilitator)  [phone] ( travel advisories recording) Provides information on current  health conditions around the world.    FOLLOW-UP PLAN:     Please see after visit summary for details regarding the patient's planned vaccination schedule.  For any labs ordered, we will contact the patient with a plan for further care.  The patient was also encouraged to be seen for any post-travel illness, or with future travel plans.    The total time spent during this visit for individual counseling was 30 , all of which was spent in counseling time, including reviewing the past medical and vaccination history, the travel itinerary, the risks of travel and suggested precautions, and the recommended vaccines and medicines with their side effects -- all for upcoming travel.     Milla Jiménez APRN, CNP

## 2023-01-17 NOTE — NURSING NOTE
Stephanie Bhatia is a 72 year old female that presents in clinic today for the following:     Chief Complaint   Patient presents with     Travel Clinic       The patient's allergies and medications were reviewed. The patient's vitals were obtained without incident. The patient does not have any other questions for the provider.     Elian Fowler, EMT at 8:42 AM on 1/17/2023.  Primary Care Clinic: [phone]

## 2023-01-19 ENCOUNTER — OFFICE VISIT (OUTPATIENT)
Dept: PULMONOLOGY | Facility: CLINIC | Age: 73
End: 2023-01-19
Attending: INTERNAL MEDICINE
Payer: MEDICARE

## 2023-01-19 VITALS
BODY MASS INDEX: 21.83 KG/M2 | HEIGHT: 65 IN | OXYGEN SATURATION: 99 % | HEART RATE: 74 BPM | WEIGHT: 131 LBS | DIASTOLIC BLOOD PRESSURE: 70 MMHG | SYSTOLIC BLOOD PRESSURE: 111 MMHG

## 2023-01-19 DIAGNOSIS — J84.9 ILD (INTERSTITIAL LUNG DISEASE) (H): ICD-10-CM

## 2023-01-19 DIAGNOSIS — J84.9 ILD (INTERSTITIAL LUNG DISEASE) (H): Primary | ICD-10-CM

## 2023-01-19 LAB
6 MIN WALK (FT): 1550 FT
6 MIN WALK (M): 472 M

## 2023-01-19 PROCEDURE — G0463 HOSPITAL OUTPT CLINIC VISIT: HCPCS | Performed by: INTERNAL MEDICINE

## 2023-01-19 PROCEDURE — 94729 DIFFUSING CAPACITY: CPT | Performed by: INTERNAL MEDICINE

## 2023-01-19 PROCEDURE — 94618 PULMONARY STRESS TESTING: CPT | Performed by: INTERNAL MEDICINE

## 2023-01-19 PROCEDURE — 94726 PLETHYSMOGRAPHY LUNG VOLUMES: CPT | Performed by: INTERNAL MEDICINE

## 2023-01-19 PROCEDURE — 99207 PR NO CHARGE LOS: CPT

## 2023-01-19 PROCEDURE — 99214 OFFICE O/P EST MOD 30 MIN: CPT | Mod: 25 | Performed by: INTERNAL MEDICINE

## 2023-01-19 PROCEDURE — 94375 RESPIRATORY FLOW VOLUME LOOP: CPT | Performed by: INTERNAL MEDICINE

## 2023-01-19 NOTE — NURSING NOTE
Chief Complaint   Patient presents with     Interstitial Lung Disease (ILD)     ILD Follow      Vitals were taken and medications were reconciled.     Jael MOREAU  8:16 AM

## 2023-01-19 NOTE — PATIENT INSTRUCTIONS
Come back to see Dr. Gomez in 4 months, with repeat pulmonary function tests and a 6-minute walk test at that time.    Take 2 subcutaneous injections before you leave for your trip, and then none while you are in [facility].

## 2023-01-19 NOTE — PROGRESS NOTES
[facility] Interstitial Lung Disease Clinic    Reason for Visit  Stephanie Bhatia is a 72 year old year old female who is being seen for Interstitial Lung Disease (ILD) (ILD Follow )    HPI  Stephanie Bhatia is a 72 year old female who is being seen for f/u for ILD related to scleroderma.  She was initially diagnosed with scleroderma in about 2009 or 2010.  Scleroderma ILD was first diagnosed on chest CT scan in 2/2017.  She also had a few nodules that were small, 4 mm and did not require follow-up as she was low risk.    She continues to have a morning cough, productive of phlegm that she feels comes from her nose. She and her family all have chronic nasal drainage, not responsive to nasal sprays, which fluctuates throughout the year.    At the last visit in September, she started on tocilizumab given progressive drop in PFTs over the last several years, and progression of pulmonary fibrosis on CT imaging. She has been tolerating this medication, except significant associated effects. These include itching for 3 days after injection, difficulty finding an injection site, waiting at home for the delivery of her medications, and the space it takes up (it needs to be refrigerated).    She has not been exercising, hasn't been to the pool in months, because it is hard for her to drive to the pool, or to the mall to walk. She has had a flare in her arthritis, especially her knee, which is just now improving.    She will be travelling to [facility] in a few weeks, to escort a friend back home from receiving medical therapies in [place].        Current Outpatient Medications   Medication     acyclovir (ZOVIRAX) 400 MG tablet     atovaquone-proguanil (MALARONE) 250-100 MG tablet     azithromycin (ZITHROMAX) 500 MG tablet     Cholecalciferol (VITAMIN D) 1000 UNITS capsule     cyanocobalamin (VITAMIN B-12) 100 MCG tablet     ibuprofen (ADVIL/MOTRIN) 200 MG tablet     tocilizumab (ACTEMRA) 162 MG/0.9ML subcutaneous  injection     vitamin B complex with vitamin C (STRESS TAB) tablet     No current facility-administered medications for this visit.     No Known Allergies  Past Medical History:   Diagnosis Date     Anxiety      Herpes simplex without mention of complication      Interstitial lung disease (H)     scleroderma ILD, dx by chest CT 2017     Lung nodules     4 mm LLL and RLL      Migraine     loss of speech and comprehension, has had w/u and is complex migraine, last one about 2017     Raynaud phenomenon      Systemic sclerosis with limited cutaneous involvement (H) 2014    Dx ~, Scl-70 and anti-centromere antibody neg, +RF       Past Surgical History:   Procedure Laterality Date     HYSTEROSCOPIC PLACEMENT CONTRACEPTIVE DEVICE       Social History     Socioeconomic History     Marital status: Single     Spouse name:      Number of children: 0     Years of education: Not on file     Highest education level: Master's degree (e.g., MA, MS, [place], MEd, MSW, DIANE)   Occupational History     Occupation:      Comment: [employer] - part time   Tobacco Use     Smoking status: Former     Packs/day: 1.00     Years: 20.00     Pack years: 20.00     Types: Cigarettes     Quit date: 1986     Years since quittin.1     Smokeless tobacco: Never   Vaping Use     Vaping Use: Never used   Substance and Sexual Activity     Alcohol use: Not Currently     Comment: none since      Drug use: No     Sexual activity: Not Currently     Partners: Male   Other Topics Concern     Parent/sibling w/ CABG, MI or angioplasty before 65F 55M? No   Social History Narrative    .  No children. Sister lives in [place].  Part time at [employer].   in the past. Currently lives with family.    Possible asbestos exposure from basement pipes in childhood home.  Lived in  [facility] [id number].  Denies exposure to pet birds, hot tubs.   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     Social Determinants of Health     Financial Resource Strain: High Risk     Difficulty of Paying Living Expenses: Hard   Food Insecurity: No Food Insecurity     Worried About Running Out of Food in the Last Year: Never true     Ran Out of Food in the Last Year: Never true   Transportation Needs: No Transportation Needs     Lack of Transportation (Medical): No     Lack of Transportation (Non-Medical): No   Physical Activity: Not on file   Stress: Not on file   Social Connections: Not on file   Intimate Partner Violence: Not At Risk     Fear of Current or Ex-Partner: No     Emotionally Abused: No     Physically Abused: No     Sexually Abused: No   Housing Stability: Not on file     Family History   Problem Relation Age of Onset     C.A.D. Father         a efw small heart attacks     Neurologic Disorder Father         dementia 70s     Coronary Artery Disease Father      Neurologic Disorder Mother         dementia     Depression Mother      Cerebrovascular Disease Maternal Grandmother         ,     Other Cancer Paternal Grandmother      Rheumatoid Arthritis Sister      Hypertension Sister      Dementia Paternal Cousin      Dementia Paternal Uncle         60s     Breast Cancer No family hx of      Cancer - colorectal No family hx of      Diabetes No family hx of      Cancer No family hx of         no skin cancer     Interstitial Lung Disease No family hx of      Melanoma No family hx of      Skin Cancer No family hx of      Glaucoma No family hx of      Macular Degeneration No family hx of      Vitals: /70   Pulse 74   Ht 1.651 m (5' 5\")   Wt 59.4 kg (131 lb)   SpO2 99%   BMI 21.80 kg/m      Exam:   GENERAL APPEARANCE: Well developed, well nourished, alert, and in no apparent distress.  RESP: good air flow throughout. Few bibasilar inspiratory crackles. No rhonchi. No wheezes.  CV: Normal S1, S2, regular rhythm, normal rate. No murmur.  No LE edema.   MS: extremities normal. No " clubbing. No cyanosis.  She has dactylitis, which is a chronic finding for her.  SKIN: no rash on limited exam.  No digital ulcers.  NEURO: Mentation intact, speech normal, normal gait and stance.  PSYCH: mentation appears normal. and affect normal/bright.  Results:  Recent Results (from the past 168 hour(s))   CBC with platelets    Collection Time: 01/17/23  7:47 AM   Result Value Ref Range    WBC Count 5.9 4.0 - 11.0 10e3/uL    RBC Count 4.24 3.80 - 5.20 10e6/uL    Hemoglobin 13.4 11.7 - 15.7 g/dL    Hematocrit 41.0 35.0 - 47.0 %    MCV 97 78 - 100 fL    MCH 31.6 26.5 - 33.0 pg    MCHC 32.7 31.5 - 36.5 g/dL    RDW 12.6 10.0 - 15.0 %    Platelet Count 235 150 - 450 10e3/uL   AST    Collection Time: 01/17/23  7:47 AM   Result Value Ref Range    AST 24 10 - 35 U/L   ALT    Collection Time: 01/17/23  7:47 AM   Result Value Ref Range    ALT 15 10 - 35 U/L   Albumin level    Collection Time: 01/17/23  7:47 AM   Result Value Ref Range    Albumin 4.2 3.5 - 5.2 g/dL   Creatinine    Collection Time: 01/17/23  7:47 AM   Result Value Ref Range    Creatinine 0.71 0.51 - 0.95 mg/dL    GFR Estimate 89 >60 mL/min/1.73m2   6 minute walk test    Collection Time: 01/19/23 12:00 AM   Result Value Ref Range    6 min walk (FT) 1,550 ft    6 Min Walk (M) 472 m   General PFT Lab (Please always keep checked)    Collection Time: 01/19/23  6:42 AM   Result Value Ref Range    FVC-Pred 2.59 L    FVC-Pre 2.95 L    FVC-%Pred-Pre 113 %    FEV1-Pre 2.41 L    FEV1-%Pred-Pre 119 %    FEV1FVC-Pred 78 %    FEV1FVC-Pre 82 %    FEFMax-Pred 5.48 L/sec    FEFMax-Pre 6.92 L/sec    FEFMax-%Pred-Pre 126 %    FEF2575-Pred 1.68 L/sec    FEF2575-Pre 2.32 L/sec    SUZ3428-%Pred-Pre 138 %    ExpTime-Pre 6.81 sec    FIFMax-Pre 3.93 L/sec    VC-Pred 3.10 L    VC-Pre 2.89 L    VC-%Pred-Pre 92 %    IC-Pred 2.26 L    IC-Pre 1.62 L    IC-%Pred-Pre 71 %    ERV-Pred 0.84 L    ERV-Pre 1.27 L    ERV-%Pred-Pre 150 %    FEV1FEV6-Pred 79 %    FEV1FEV6-Pre 82 %     FRCPleth-Pred 2.77 L    FRCPleth-Pre 2.76 L    FRCPleth-%Pred-Pre 99 %    RVPleth-Pred 2.17 L    RVPleth-Pre 1.49 L    RVPleth-%Pred-Pre 68 %    TLCPleth-Pred 5.11 L    TLCPleth-Pre 4.38 L    TLCPleth-%Pred-Pre 85 %    DLCOunc-Pred 19.84 ml/min/mmHg    DLCOunc-Pre 16.62 ml/min/mmHg    DLCOunc-%Pred-Pre 83 %    DLCOcor-Pre 16.62 ml/min/mmHg    DLCOcor-%Pred-Pre 83 %    VA-Pre 3.79 L    VA-%Pred-Pre 77 %    FEV1SVC-Pred 65 %    FEV1SVC-Pre 84 %     I reviewed pulmonary function test that was performed today.  This shows normal spirometry, lung volumes and diffusing capacity.  FVC has been generally unchanged over the last several months, but overall FVC has dropped since 2014.  Six minute walk distance is normal, minimally reduced compared to 5 years ago, no hypoxemia or desaturation.    I reviewed results with the patient.    Assessment and plan:  Stephanie Bhatia is a 72 year old female who is being seen for f/u for ILD related to scleroderma.  1.  Scleroderma ILD.  Started on tocilizumab about 3 months ago for progressive slow decline in PFTs, difficulty tolerating the subcutaneous injections. Symptomatically, her pulmonary symptoms are stable. FEV1 andFVC are stable over the last 6 months, down a bit compared to the most recent testing in 9/2022. However, she has been doing less physical activity as well, so feeling more tired due to that. Other scleroderma symptoms including dactylitis are stable.  - continue tocilizumab, but we will work on transition to IV infusion for patient preference.  - take 2 subcutaneous injections just before leaving for [facility], this should last for duration of trip  - return in 4 months with PFTs, 6 minute walk test  2. Immunizations: up to date on COVID (bivalent booster 9/13/2022), and influenza 10/6/2022.  3.  High risk medication immunosuppression monitoring.  Labs are normal.  Will recheck when she returns to clinic, including lipids to monitor tocilizumab.  Patient seen and discussed  with Dr. Fang.    I saw and evaluated patient with Fellow.  Case discussed - agree with note.  I reviewed PFT: normal and stable since 5/2022, but decrease over time from 2014 to 2022.  I also reviewed 6MWT: normal walk distance without hypoxemia.    I reviewed labs from 1/17: normal creatinine, AST and ALT, normal CBC.  I discussed tocilizumab dosing for trip to [place] with Dr. Gunn.    I spent 33 minutes reviewing chart, talking with and examining patient, reviewing test results, formulating plan and documentation on the day of the encounter (excluding PFT review).      LYNDSEY FANG M.D.

## 2023-01-19 NOTE — LETTER
1/19/2023         RE: Stephanie Bhatia  [address]  [facility] [address]        Dear Colleague,    Thank you for referring your patient, Stephanie Bhatia, to the [facility] [place]. Please see a copy of my visit note below.    [facility]    Reason for Visit  Stephanie Bhatia is a 72 year old year old female who is being seen for Interstitial Lung Disease (ILD) (ILD Follow )    HPI  Stephanie Bhatia is a 72 year old female who is being seen for f/u for ILD related to scleroderma.  She was initially diagnosed with scleroderma in about 2009 or 2010.  Scleroderma ILD was first diagnosed on chest CT scan in 2/2017.  She also had a few nodules that were small, 4 mm and did not require follow-up as she was low risk.    She continues to have a morning cough, productive of phlegm that she feels comes from her nose. She and her family all have chronic nasal drainage, not responsive to nasal sprays, which fluctuates throughout the year.    At the last visit in September, she started on tocilizumab given progressive drop in PFTs over the last several years, and progression of pulmonary fibrosis on CT imaging. She has been tolerating this medication, except significant associated effects. These include itching for 3 days after injection, difficulty finding an injection site, waiting at home for the delivery of her medications, and the space it takes up (it needs to be refrigerated).    She has not been exercising, hasn't been to the pool in months, because it is hard for her to drive to the pool, or to the mall to walk. She has had a flare in her arthritis, especially her knee, which is just now improving.    She will be travelling to [facility] in a few weeks, to escort a friend back home from receiving medical therapies in [place].        Current Outpatient Medications   Medication     acyclovir (ZOVIRAX) 400 MG tablet      atovaquone-proguanil (MALARONE) 250-100 MG tablet     azithromycin (ZITHROMAX) 500 MG tablet     Cholecalciferol (VITAMIN D) 1000 UNITS capsule     cyanocobalamin (VITAMIN B-12) 100 MCG tablet     ibuprofen (ADVIL/MOTRIN) 200 MG tablet     tocilizumab (ACTEMRA) 162 MG/0.9ML subcutaneous injection     vitamin B complex with vitamin C (STRESS TAB) tablet     No current facility-administered medications for this visit.     No Known Allergies  Past Medical History:   Diagnosis Date     Anxiety      Herpes simplex without mention of complication      Interstitial lung disease (H)     scleroderma ILD, dx by chest CT 2017     Lung nodules     4 mm LLL and RLL      Migraine     loss of speech and comprehension, has had w/u and is complex migraine, last one about 2017     Raynaud phenomenon      Systemic sclerosis with limited cutaneous involvement (H) 2014    Dx ~, Scl-70 and anti-centromere antibody neg, +RF       Past Surgical History:   Procedure Laterality Date     HYSTEROSCOPIC PLACEMENT CONTRACEPTIVE DEVICE       Social History     Socioeconomic History     Marital status: Single     Spouse name:      Number of children: 0     Years of education: Not on file     Highest education level: Master's degree (e.g., MA, MS, [place], MEd, MSW, DIANE)   Occupational History     Occupation:      Comment: [employer] - part time   Tobacco Use     Smoking status: Former     Packs/day: 1.00     Years: 20.00     Pack years: 20.00     Types: Cigarettes     Quit date: 1986     Years since quittin.1     Smokeless tobacco: Never   Vaping Use     Vaping Use: Never used   Substance and Sexual Activity     Alcohol use: Not Currently     Comment: none since      Drug use: No     Sexual activity: Not Currently     Partners: Male   Other Topics Concern     Parent/sibling w/ CABG, MI or angioplasty before 65F 55M? No   Social History Narrative    .  No children. Sister lives in [place].  " Part time at [employer]   in the past. Currently lives with family.    Possible asbestos exposure from basement pipes in childhood home.  Lived in  [facility] [id number].  Denies exposure to pet birds, hot tubs.  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     Social Determinants of Health     Financial Resource Strain: High Risk     Difficulty of Paying Living Expenses: Hard   Food Insecurity: No Food Insecurity     Worried About Running Out of Food in the Last Year: Never true     Ran Out of Food in the Last Year: Never true   Transportation Needs: No Transportation Needs     Lack of Transportation (Medical): No     Lack of Transportation (Non-Medical): No   Physical Activity: Not on file   Stress: Not on file   Social Connections: Not on file   Intimate Partner Violence: Not At Risk     Fear of Current or Ex-Partner: No     Emotionally Abused: No     Physically Abused: No     Sexually Abused: No   Housing Stability: Not on file     Family History   Problem Relation Age of Onset     C.A.D. Father         a efw small heart attacks     Neurologic Disorder Father         dementia 70s     Coronary Artery Disease Father      Neurologic Disorder Mother         dementia     Depression Mother      Cerebrovascular Disease Maternal Grandmother         ,     Other Cancer Paternal Grandmother      Rheumatoid Arthritis Sister      Hypertension Sister      Dementia Paternal Cousin      Dementia Paternal Uncle         60s     Breast Cancer No family hx of      Cancer - colorectal No family hx of      Diabetes No family hx of      Cancer No family hx of         no skin cancer     Interstitial Lung Disease No family hx of      Melanoma No family hx of      Skin Cancer No family hx of      Glaucoma No family hx of      Macular Degeneration No family hx of      Vitals: /70   Pulse 74   Ht 1.651 m (5' 5\")   Wt 59.4 kg (131 lb)   SpO2 99%   BMI 21.80 kg/m      Exam:   GENERAL " APPEARANCE: Well developed, well nourished, alert, and in no apparent distress.  RESP: good air flow throughout. Few bibasilar inspiratory crackles. No rhonchi. No wheezes.  CV: Normal S1, S2, regular rhythm, normal rate. No murmur.  No LE edema.   MS: extremities normal. No clubbing. No cyanosis.  She has dactylitis, which is a chronic finding for her.  SKIN: no rash on limited exam.  No digital ulcers.  NEURO: Mentation intact, speech normal, normal gait and stance.  PSYCH: mentation appears normal. and affect normal/bright.  Results:  Recent Results (from the past 168 hour(s))   CBC with platelets    Collection Time: 01/17/23  7:47 AM   Result Value Ref Range    WBC Count 5.9 4.0 - 11.0 10e3/uL    RBC Count 4.24 3.80 - 5.20 10e6/uL    Hemoglobin 13.4 11.7 - 15.7 g/dL    Hematocrit 41.0 35.0 - 47.0 %    MCV 97 78 - 100 fL    MCH 31.6 26.5 - 33.0 pg    MCHC 32.7 31.5 - 36.5 g/dL    RDW 12.6 10.0 - 15.0 %    Platelet Count 235 150 - 450 10e3/uL   AST    Collection Time: 01/17/23  7:47 AM   Result Value Ref Range    AST 24 10 - 35 U/L   ALT    Collection Time: 01/17/23  7:47 AM   Result Value Ref Range    ALT 15 10 - 35 U/L   Albumin level    Collection Time: 01/17/23  7:47 AM   Result Value Ref Range    Albumin 4.2 3.5 - 5.2 g/dL   Creatinine    Collection Time: 01/17/23  7:47 AM   Result Value Ref Range    Creatinine 0.71 0.51 - 0.95 mg/dL    GFR Estimate 89 >60 mL/min/1.73m2   6 minute walk test    Collection Time: 01/19/23 12:00 AM   Result Value Ref Range    6 min walk (FT) 1,550 ft    6 Min Walk (M) 472 m   General PFT Lab (Please always keep checked)    Collection Time: 01/19/23  6:42 AM   Result Value Ref Range    FVC-Pred 2.59 L    FVC-Pre 2.95 L    FVC-%Pred-Pre 113 %    FEV1-Pre 2.41 L    FEV1-%Pred-Pre 119 %    FEV1FVC-Pred 78 %    FEV1FVC-Pre 82 %    FEFMax-Pred 5.48 L/sec    FEFMax-Pre 6.92 L/sec    FEFMax-%Pred-Pre 126 %    FEF2575-Pred 1.68 L/sec    FEF2575-Pre 2.32 L/sec    ZVZ1663-%Pred-Pre 138 %     ExpTime-Pre 6.81 sec    FIFMax-Pre 3.93 L/sec    VC-Pred 3.10 L    VC-Pre 2.89 L    VC-%Pred-Pre 92 %    IC-Pred 2.26 L    IC-Pre 1.62 L    IC-%Pred-Pre 71 %    ERV-Pred 0.84 L    ERV-Pre 1.27 L    ERV-%Pred-Pre 150 %    FEV1FEV6-Pred 79 %    FEV1FEV6-Pre 82 %    FRCPleth-Pred 2.77 L    FRCPleth-Pre 2.76 L    FRCPleth-%Pred-Pre 99 %    RVPleth-Pred 2.17 L    RVPleth-Pre 1.49 L    RVPleth-%Pred-Pre 68 %    TLCPleth-Pred 5.11 L    TLCPleth-Pre 4.38 L    TLCPleth-%Pred-Pre 85 %    DLCOunc-Pred 19.84 ml/min/mmHg    DLCOunc-Pre 16.62 ml/min/mmHg    DLCOunc-%Pred-Pre 83 %    DLCOcor-Pre 16.62 ml/min/mmHg    DLCOcor-%Pred-Pre 83 %    VA-Pre 3.79 L    VA-%Pred-Pre 77 %    FEV1SVC-Pred 65 %    FEV1SVC-Pre 84 %     I reviewed pulmonary function test that was performed today.  This shows normal spirometry, lung volumes and diffusing capacity.  FVC has been generally unchanged over the last several months, but overall FVC has dropped since 2014.  Six minute walk distance is normal, minimally reduced compared to 5 years ago, no hypoxemia or desaturation.    I reviewed results with the patient.    Assessment and plan:  Stephanie Bhatia is a 72 year old female who is being seen for f/u for ILD related to scleroderma.  1.  Scleroderma ILD.  Started on tocilizumab about 3 months ago for progressive slow decline in PFTs, difficulty tolerating the subcutaneous injections. Symptomatically, her pulmonary symptoms are stable. FEV1 andFVC are stable over the last 6 months, down a bit compared to the most recent testing in 9/2022. However, she has been doing less physical activity as well, so feeling more tired due to that. Other scleroderma symptoms including dactylitis are stable.  - continue tocilizumab, but we will work on transition to IV infusion for patient preference.  - take 2 subcutaneous injections just before leaving for [facility], this should last for duration of trip  - return in 4 months with PFTs, 6 minute walk test  2.  Immunizations: up to date on COVID (bivalent booster 9/13/2022), and influenza 10/6/2022.  3.  High risk medication immunosuppression monitoring.  Labs are normal.  Will recheck when she returns to clinic, including lipids to monitor tocilizumab.  Patient seen and discussed with Dr. Fang.    I saw and evaluated patient with Fellow.  Case discussed - agree with note.  I reviewed PFT: normal and stable since 5/2022, but decrease over time from 2014 to 2022.  I also reviewed 6MWT: normal walk distance without hypoxemia.    I reviewed labs from 1/17: normal creatinine, AST and ALT, normal CBC.  I discussed tocilizumab dosing for trip to [place] with Dr. Gunn.    I spent 33 minutes reviewing chart, talking with and examining patient, reviewing test results, formulating plan and documentation on the day of the encounter (excluding PFT review).      LYNDSEY FANG M.D.

## 2023-01-20 LAB
DLCOCOR-%PRED-PRE: 83 %
DLCOCOR-PRE: 16.62 ML/MIN/MMHG
DLCOUNC-%PRED-PRE: 83 %
DLCOUNC-PRE: 16.62 ML/MIN/MMHG
DLCOUNC-PRED: 19.84 ML/MIN/MMHG
ERV-%PRED-PRE: 150 %
ERV-PRE: 1.27 L
ERV-PRED: 0.84 L
EXPTIME-PRE: 6.81 SEC
FEF2575-%PRED-PRE: 138 %
FEF2575-PRE: 2.32 L/SEC
FEF2575-PRED: 1.68 L/SEC
FEFMAX-%PRED-PRE: 126 %
FEFMAX-PRE: 6.92 L/SEC
FEFMAX-PRED: 5.48 L/SEC
FEV1-%PRED-PRE: 119 %
FEV1-PRE: 2.41 L
FEV1FEV6-PRE: 82 %
FEV1FEV6-PRED: 79 %
FEV1FVC-PRE: 82 %
FEV1FVC-PRED: 78 %
FEV1SVC-PRE: 84 %
FEV1SVC-PRED: 65 %
FIFMAX-PRE: 3.93 L/SEC
FRCPLETH-%PRED-PRE: 99 %
FRCPLETH-PRE: 2.76 L
FRCPLETH-PRED: 2.77 L
FVC-%PRED-PRE: 113 %
FVC-PRE: 2.95 L
FVC-PRED: 2.59 L
IC-%PRED-PRE: 71 %
IC-PRE: 1.62 L
IC-PRED: 2.26 L
RVPLETH-%PRED-PRE: 68 %
RVPLETH-PRE: 1.49 L
RVPLETH-PRED: 2.17 L
TLCPLETH-%PRED-PRE: 85 %
TLCPLETH-PRE: 4.38 L
TLCPLETH-PRED: 5.11 L
VA-%PRED-PRE: 77 %
VA-PRE: 3.79 L
VC-%PRED-PRE: 92 %
VC-PRE: 2.89 L
VC-PRED: 3.1 L

## 2023-02-08 ENCOUNTER — MYC MEDICAL ADVICE (OUTPATIENT)
Dept: INTERNAL MEDICINE | Facility: CLINIC | Age: 73
End: 2023-02-08
Payer: MEDICARE

## 2023-02-08 DIAGNOSIS — Z71.84 COUNSELING FOR TRAVEL: ICD-10-CM

## 2023-02-09 RX ORDER — ATOVAQUONE AND PROGUANIL HYDROCHLORIDE 250; 100 MG/1; MG/1
1 TABLET, FILM COATED ORAL DAILY
Qty: 11 TABLET | Refills: 0 | Status: SHIPPED | OUTPATIENT
Start: 2023-02-09 | End: 2023-11-16

## 2023-02-12 ENCOUNTER — MYC REFILL (OUTPATIENT)
Dept: INTERNAL MEDICINE | Facility: CLINIC | Age: 73
End: 2023-02-12
Payer: MEDICARE

## 2023-02-12 DIAGNOSIS — Z71.84 COUNSELING FOR TRAVEL: ICD-10-CM

## 2023-02-12 RX ORDER — ATOVAQUONE AND PROGUANIL HYDROCHLORIDE 250; 100 MG/1; MG/1
1 TABLET, FILM COATED ORAL DAILY
Qty: 11 TABLET | Refills: 0 | Status: CANCELLED | OUTPATIENT
Start: 2023-02-12

## 2023-02-14 RX ORDER — ATOVAQUONE AND PROGUANIL HYDROCHLORIDE 250; 100 MG/1; MG/1
1 TABLET, FILM COATED ORAL DAILY
Qty: 35 TABLET | OUTPATIENT
Start: 2023-02-14

## 2023-04-04 ENCOUNTER — TELEPHONE (OUTPATIENT)
Dept: PULMONOLOGY | Facility: CLINIC | Age: 73
End: 2023-04-04
Payer: MEDICARE

## 2023-04-04 NOTE — TELEPHONE ENCOUNTER
Dr. Gomez asked RN to reach out to pt regarding Actemra injections. Pt is going to stay on injections at this time. No longer experiencing itchiness with injections. Pt is scheduled for follow up in May with Dr. Gomez. MD updated.   Briana Galloway RN BSN   Tissue Cultured Epidermal Autograft Text: The defect edges were debeveled with a #15 scalpel blade.  Given the location of the defect, shape of the defect and the proximity to free margins a tissue cultured epidermal autograft was deemed most appropriate.  The graft was then trimmed to fit the size of the defect.  The graft was then placed in the primary defect and oriented appropriately.

## 2023-05-15 ASSESSMENT — ENCOUNTER SYMPTOMS
BACK PAIN: 0
LOSS OF CONSCIOUSNESS: 0
HEADACHES: 0
TREMORS: 0
JOINT SWELLING: 0
NUMBNESS: 0
MUSCLE CRAMPS: 1
POLYPHAGIA: 0
CHILLS: 0
FEVER: 0
FATIGUE: 1
MYALGIAS: 0
STIFFNESS: 0
WEIGHT LOSS: 0
ARTHRALGIAS: 1
TINGLING: 0
ALTERED TEMPERATURE REGULATION: 0
PARALYSIS: 0
POLYDIPSIA: 0
SEIZURES: 0
HALLUCINATIONS: 0
DECREASED APPETITE: 0
INCREASED ENERGY: 0
MUSCLE WEAKNESS: 1
DIZZINESS: 1
MEMORY LOSS: 1
NECK PAIN: 0
SPEECH CHANGE: 0
NIGHT SWEATS: 0
WEAKNESS: 1
WEIGHT GAIN: 0

## 2023-05-19 ENCOUNTER — OFFICE VISIT (OUTPATIENT)
Dept: PULMONOLOGY | Facility: CLINIC | Age: 73
End: 2023-05-19
Attending: INTERNAL MEDICINE
Payer: MEDICARE

## 2023-05-19 VITALS
HEIGHT: 68 IN | WEIGHT: 125 LBS | DIASTOLIC BLOOD PRESSURE: 68 MMHG | OXYGEN SATURATION: 100 % | BODY MASS INDEX: 18.94 KG/M2 | HEART RATE: 71 BPM | SYSTOLIC BLOOD PRESSURE: 109 MMHG

## 2023-05-19 DIAGNOSIS — E71.30 DISORDER OF FATTY-ACID METABOLISM, UNSPECIFIED: ICD-10-CM

## 2023-05-19 DIAGNOSIS — J84.9 ILD (INTERSTITIAL LUNG DISEASE) (H): Primary | ICD-10-CM

## 2023-05-19 DIAGNOSIS — I73.00 RAYNAUD PHENOMENON: ICD-10-CM

## 2023-05-19 LAB
6 MIN WALK (FT): 1625 FT
6 MIN WALK (M): 495 M

## 2023-05-19 PROCEDURE — 94729 DIFFUSING CAPACITY: CPT | Performed by: INTERNAL MEDICINE

## 2023-05-19 PROCEDURE — 99215 OFFICE O/P EST HI 40 MIN: CPT | Mod: 25 | Performed by: INTERNAL MEDICINE

## 2023-05-19 PROCEDURE — 94375 RESPIRATORY FLOW VOLUME LOOP: CPT | Performed by: INTERNAL MEDICINE

## 2023-05-19 PROCEDURE — G0463 HOSPITAL OUTPT CLINIC VISIT: HCPCS | Performed by: INTERNAL MEDICINE

## 2023-05-19 PROCEDURE — 94618 PULMONARY STRESS TESTING: CPT | Performed by: INTERNAL MEDICINE

## 2023-05-19 PROCEDURE — 94726 PLETHYSMOGRAPHY LUNG VOLUMES: CPT | Performed by: INTERNAL MEDICINE

## 2023-05-19 ASSESSMENT — PAIN SCALES - GENERAL: PAINLEVEL: NO PAIN (0)

## 2023-05-19 NOTE — PROGRESS NOTES
[facility] Disease [facility]    Reason for Visit  Stephanie Bhatia is a 72 year old year old female who is being seen for Interstitial Lung Disease (ILD) (ILD Follow up )    HPI  Stephanie Bhatia is a 72 year old female who is being seen for f/u of ILD related to scleroderma.  She was initially diagnosed with scleroderma in about 2009 or 2010.  Scleroderma ILD was first diagnosed on chest CT scan in 2/2017.  She also had a few nodules that were small, 4 mm and did not require follow-up as she was low risk.  She started tocilizumab in approximately September 2022 for progressive drop in PFT and progression of ILD on chest CT.    Today, Stephanie reports that she is back from her trip to [facility].  She had no problems in [place] and was exercising regularly.  However, on her return to [place] she did not exercise because she was packing up for her move.  She moved back into her own house a couple weeks ago.  She has not been exercising, but she likes to swim and walk.  There is no change in her dyspnea on exertion, for example she does feel short of breath when she walks up 1 flight of stairs.  She denies cough, fevers or digital fingertip ulcers.  She endorses Raynaud's.  Her complaint today is that she feels a little unsteady and unbalanced with movement, and this feels like it is getting worse.  She also has experienced soreness and heaviness in her legs, which contributes to her not exercising.    She is doing well with the tocilizumab 162 mg subcu weekly.  She has noticed no side effects currently.  She did have some itching around the injection sites initially, but that has resolved.            Current Outpatient Medications   Medication     acyclovir (ZOVIRAX) 400 MG tablet     Cholecalciferol (VITAMIN D) 1000 UNITS capsule     cyanocobalamin (VITAMIN B-12) 100 MCG tablet     ibuprofen (ADVIL/MOTRIN) 200 MG tablet     tocilizumab (ACTEMRA) 162 MG/0.9ML subcutaneous injection     vitamin B  complex with vitamin C (STRESS TAB) tablet     atovaquone-proguanil (MALARONE) 250-100 MG tablet     azithromycin (ZITHROMAX) 500 MG tablet     No current facility-administered medications for this visit.     No Known Allergies  Past Medical History:   Diagnosis Date     Anxiety      Herpes simplex without mention of complication      Interstitial lung disease (H)     scleroderma ILD, dx by chest CT 2017     Lung nodules     4 mm LLL and RLL      Migraine     loss of speech and comprehension, has had w/u and is complex migraine, last one about 2017     Raynaud phenomenon      Systemic sclerosis with limited cutaneous involvement (H) 2014    Dx ~, Scl-70 and anti-centromere antibody neg, +RF       Past Surgical History:   Procedure Laterality Date     HYSTEROSCOPIC PLACEMENT CONTRACEPTIVE DEVICE         Social History     Socioeconomic History     Marital status: Single     Spouse name:      Number of children: 0     Years of education: Not on file     Highest education level: Master's degree (e.g., MA, MS, [place], MEd, MSW, DIANE)   Occupational History     Occupation:      Comment: [employer] - part time   Tobacco Use     Smoking status: Former     Packs/day: 1.00     Years: 20.00     Pack years: 20.00     Types: Cigarettes     Quit date: 1986     Years since quittin.5     Smokeless tobacco: Never   Vaping Use     Vaping status: Never Used     Passive vaping exposure: Yes   Substance and Sexual Activity     Alcohol use: Not Currently     Comment: none since      Drug use: No     Sexual activity: Not Currently     Partners: Male   Other Topics Concern     Parent/sibling w/ CABG, MI or angioplasty before 65F 55M? No   Social History Narrative    .  No children. Sister lives in [place].  Part time at [employer].   in the past. Currently lives with family.    Possible asbestos exposure from basement pipes in childhood home.  Lived in   [facility] [id number].  Denies exposure to pet birds, hot tubs.  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     Social Determinants of Health     Financial Resource Strain: High Risk (11/22/2021)    Overall Financial Resource Strain (CARDIA)      Difficulty of Paying Living Expenses: Hard   Food Insecurity: No Food Insecurity (11/22/2021)    Hunger Vital Sign      Worried About Running Out of Food in the Last Year: Never true      Ran Out of Food in the Last Year: Never true   Transportation Needs: No Transportation Needs (11/22/2021)    PRAPARE - Transportation      Lack of Transportation (Medical): No      Lack of Transportation (Non-Medical): No   Physical Activity: Sufficiently Active (11/22/2021)    Exercise Vital Sign      Days of Exercise per Week: 7 days      Minutes of Exercise per Session: 60 min   Stress: Stress Concern Present (11/22/2021)    Citizen of the [place] [employer] - Occupational Stress Questionnaire      Feeling of Stress : To some extent   Social Connections: Moderately Isolated (11/22/2021)    Social Connection and Isolation Panel [NHANES]      Frequency of Communication with Friends and Family: More than three times a week      Frequency of Social Gatherings with Friends and Family: More than three times a week      Attends Taoist Services: More than 4 times per year      Active Member of Clubs or Organizations: No      Attends Club or Organization Meetings: Never      Marital Status:    Intimate Partner Violence: Not At Risk (11/22/2021)    Humiliation, Afraid, Rape, and Kick questionnaire      Fear of Current or Ex-Partner: No      Emotionally Abused: No      Physically Abused: No      Sexually Abused: No   Housing Stability: Low Risk  (11/22/2021)    Housing Stability Vital Sign      Unable to Pay for Housing in the Last Year: No      Number of Places Lived in the Last Year: 1      Unstable Housing in the Last Year: No       Family History  "  Problem Relation Age of Onset     C.A.D. Father         a efw small heart attacks     Neurologic Disorder Father         dementia 70s     Coronary Artery Disease Father      Neurologic Disorder Mother         dementia     Depression Mother      Cerebrovascular Disease Maternal Grandmother         ,     Other Cancer Paternal Grandmother      Rheumatoid Arthritis Sister      Hypertension Sister      Dementia Paternal Cousin      Dementia Paternal Uncle         60s     Breast Cancer No family hx of      Cancer - colorectal No family hx of      Diabetes No family hx of      Cancer No family hx of         no skin cancer     Interstitial Lung Disease No family hx of      Melanoma No family hx of      Skin Cancer No family hx of      Glaucoma No family hx of      Macular Degeneration No family hx of            Vitals: /68 (BP Location: Right arm, Patient Position: Chair, Cuff Size: Adult Regular)   Pulse 71   Ht 1.727 m (5' 8\")   Wt 56.7 kg (125 lb)   SpO2 100%   BMI 19.01 kg/m      Exam:   GENERAL APPEARANCE: Well developed, well nourished, alert, and in no apparent distress.  RESP: good air flow throughout. Few bibasilar inspiratory crackles. No rhonchi. No wheezes.  CV: Normal S1, S2, regular rhythm, normal rate. No murmur.  No LE edema.   MS: extremities normal. No clubbing. No cyanosis.  She has dactylitis, which is a chronic finding for her.  SKIN: no rash on limited exam.  No digital ulcers.  NEURO: Mentation intact, speech normal, normal gait and stance.  PSYCH: mentation appears normal. and affect normal/bright.    Results:  Recent Results (from the past 168 hour(s))   6 minute walk test    Collection Time: 05/19/23 12:00 AM   Result Value Ref Range    6 min walk (FT) 1,625 1,200 ft    6 Min Walk (M) 495 366 m   General PFT Lab (Please always keep checked)    Collection Time: 05/19/23  7:33 AM   Result Value Ref Range    FVC-Pred 2.59 L    FVC-Pre 2.96 L    FVC-%Pred-Pre 114 %    FEV1-Pre 2.48 L    " FEV1-%Pred-Pre 123 %    FEV1FVC-Pred 78 %    FEV1FVC-Pre 84 %    FEFMax-Pred 5.48 L/sec    FEFMax-Pre 6.87 L/sec    FEFMax-%Pred-Pre 125 %    FEF2575-Pred 1.68 L/sec    FEF2575-Pre 2.80 L/sec    WMY5087-%Pred-Pre 166 %    ExpTime-Pre 6.42 sec    FIFMax-Pre 3.32 L/sec    VC-Pred 3.10 L    VC-Pre 2.87 L    VC-%Pred-Pre 92 %    IC-Pred 2.24 L    IC-Pre 1.70 L    IC-%Pred-Pre 76 %    ERV-Pred 0.87 L    ERV-Pre 1.17 L    ERV-%Pred-Pre 134 %    FEV1FEV6-Pred 79 %    FEV1FEV6-Pre 84 %    FRCPleth-Pred 2.77 L    FRCPleth-Pre 2.96 L    FRCPleth-%Pred-Pre 106 %    RVPleth-Pred 2.17 L    RVPleth-Pre 1.79 L    RVPleth-%Pred-Pre 82 %    TLCPleth-Pred 5.11 L    TLCPleth-Pre 4.66 L    TLCPleth-%Pred-Pre 91 %    DLCOunc-Pred 19.18 ml/min/mmHg    DLCOunc-Pre 16.03 ml/min/mmHg    DLCOunc-%Pred-Pre 83 %    VA-Pre 4.08 L    VA-%Pred-Pre 87 %    FEV1SVC-Pred 65 %    FEV1SVC-Pre 86 %       I reviewed pulm function test that was performed today.  This shows normal spirometry, lung volumes and diffusing capacity.  Spirometry is stable.  However, it is down compared to her prior baseline from May 2021, but stable for the past year.    I also reviewed 6-minute walk test from today.  This shows normal walk distance that is relatively unchanged from January; there is no exertional hypoxemia on room air.    I reviewed results with the patient.      Assessment and plan:   Stephanie Bhatia is a 72 year old female who is being seen for f/u of ILD related to scleroderma.  1.  Scleroderma ILD.  She is doing well with tocilizumab, and FVC is stable.  We will continue tocilizumab 162 mg subcu weekly.  There is no need to add mycophenolate or nintedanib at this time.  Those could be options in the future if needed.  I did encourage her to restart regular exercise.  I also suggested that she see Dr. Gunn in clinic for evaluation of her legs and unsteadiness.  She is in agreement with this plan.  She will return in 3 months with full PFT.   2.  High-risk  medication immunosuppression monitoring.  We will check labs next month including lipid panel to monitor tocilizumab.  I spent 42 minutes reviewing chart, reviewing test results, talking with and examining patient, formulating plan, and documentation on the day of the encounter.

## 2023-05-19 NOTE — NURSING NOTE
Chief Complaint   Patient presents with     Interstitial Lung Disease (ILD)     ILD Follow up      Vitals were taken and medications were reconciled.     Jael Colby RMA  8:48 AM

## 2023-05-19 NOTE — LETTER
5/19/2023         RE: Stephanie Bhatia  [address]  [facility] [address]        Dear Colleague,    Thank you for referring your patient, Stephanie Bhatia, to the [facility] [place]. Please see a copy of my visit note below.    [facility]    Reason for Visit  Stephanie Bhatia is a 72 year old year old female who is being seen for Interstitial Lung Disease (ILD) (ILD Follow up )    HPI  Stephanie Bhatia is a 72 year old female who is being seen for f/u of ILD related to scleroderma.  She was initially diagnosed with scleroderma in about 2009 or 2010.  Scleroderma ILD was first diagnosed on chest CT scan in 2/2017.  She also had a few nodules that were small, 4 mm and did not require follow-up as she was low risk.  She started tocilizumab in approximately September 2022 for progressive drop in PFT and progression of ILD on chest CT.    Today, Stephanie reports that she is back from her trip to [place].  She had no problems in [place] and was exercising regularly.  However, on her return to [place] she did not exercise because she was packing up for her move.  She moved back into her own house a couple weeks ago.  She has not been exercising, but she likes to swim and walk.  There is no change in her dyspnea on exertion, for example she does feel short of breath when she walks up 1 flight of stairs.  She denies cough, fevers or digital fingertip ulcers.  She endorses Raynaud's.  Her complaint today is that she feels a little unsteady and unbalanced with movement, and this feels like it is getting worse.  She also has experienced soreness and heaviness in her legs, which contributes to her not exercising.    She is doing well with the tocilizumab 162 mg subcu weekly.  She has noticed no side effects currently.  She did have some itching around the injection sites initially, but that has resolved.            Current Outpatient  Medications   Medication    acyclovir (ZOVIRAX) 400 MG tablet    Cholecalciferol (VITAMIN D) 1000 UNITS capsule    cyanocobalamin (VITAMIN B-12) 100 MCG tablet    ibuprofen (ADVIL/MOTRIN) 200 MG tablet    tocilizumab (ACTEMRA) 162 MG/0.9ML subcutaneous injection    vitamin B complex with vitamin C (STRESS TAB) tablet    atovaquone-proguanil (MALARONE) 250-100 MG tablet    azithromycin (ZITHROMAX) 500 MG tablet     No current facility-administered medications for this visit.     No Known Allergies  Past Medical History:   Diagnosis Date    Anxiety     Herpes simplex without mention of complication     Interstitial lung disease (H)     scleroderma ILD, dx by chest CT 2017    Lung nodules     4 mm LLL and RLL     Migraine     loss of speech and comprehension, has had w/u and is complex migraine, last one about 2017    Raynaud phenomenon     Systemic sclerosis with limited cutaneous involvement (H) 2014    Dx ~, Scl-70 and anti-centromere antibody neg, +RF       Past Surgical History:   Procedure Laterality Date    HYSTEROSCOPIC PLACEMENT CONTRACEPTIVE DEVICE         Social History     Socioeconomic History    Marital status: Single     Spouse name:     Number of children: 0    Years of education: Not on file    Highest education level: Master's degree (e.g., MA, MS, [place], MEd, MSW, DIANE)   Occupational History    Occupation:      Comment: [email or web address] - part time   Tobacco Use    Smoking status: Former     Packs/day: 1.00     Years: 20.00     Pack years: 20.00     Types: Cigarettes     Quit date: 1986     Years since quittin.5    Smokeless tobacco: Never   Vaping Use    Vaping status: Never Used     Passive vaping exposure: Yes   Substance and Sexual Activity    Alcohol use: Not Currently     Comment: none since     Drug use: No    Sexual activity: Not Currently     Partners: Male   Other Topics Concern    Parent/sibling w/ CABG, MI or angioplasty before 65F 55M?  No   Social History Narrative    .  No children. Sister lives in [place].  Part time at [employer].   in the past. Currently lives with family.    Possible asbestos exposure from basement pipes in childhood home.  Lived in  [facility] [address].  Denies exposure to pet birds, hot tubs.  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     Social Determinants of Health     Financial Resource Strain: High Risk (11/22/2021)    Overall Financial Resource Strain (CARDIA)     Difficulty of Paying Living Expenses: Hard   Food Insecurity: No Food Insecurity (11/22/2021)    Hunger Vital Sign     Worried About Running Out of Food in the Last Year: Never true     Ran Out of Food in the Last Year: Never true   Transportation Needs: No Transportation Needs (11/22/2021)    PRAPARE - Transportation     Lack of Transportation (Medical): No     Lack of Transportation (Non-Medical): No   Physical Activity: Sufficiently Active (11/22/2021)    Exercise Vital Sign     Days of Exercise per Week: 7 days     Minutes of Exercise per Session: 60 min   Stress: Stress Concern Present (11/22/2021)    [employer] [place] [employer] - Occupational Stress Questionnaire     Feeling of Stress : To some extent   Social Connections: Moderately Isolated (11/22/2021)    Social Connection and Isolation Panel [NHANES]     Frequency of Communication with Friends and Family: More than three times a week     Frequency of Social Gatherings with Friends and Family: More than three times a week     Attends Christian Services: More than 4 times per year     Active Member of Clubs or Organizations: No     Attends Club or Organization Meetings: Never     Marital Status:    Intimate Partner Violence: Not At Risk (11/22/2021)    Humiliation, Afraid, Rape, and Kick questionnaire     Fear of Current or Ex-Partner: No     Emotionally Abused: No     Physically Abused: No     Sexually Abused: No   Housing  "Stability: Low Risk  (11/22/2021)    Housing Stability Vital Sign     Unable to Pay for Housing in the Last Year: No     Number of Places Lived in the Last Year: 1     Unstable Housing in the Last Year: No       Family History   Problem Relation Age of Onset    C.A.D. Father         a efw small heart attacks    Neurologic Disorder Father         dementia 70s    Coronary Artery Disease Father     Neurologic Disorder Mother         dementia    Depression Mother     Cerebrovascular Disease Maternal Grandmother         ,    Other Cancer Paternal Grandmother     Rheumatoid Arthritis Sister     Hypertension Sister     Dementia Paternal Cousin     Dementia Paternal Uncle         60s    Breast Cancer No family hx of     Cancer - colorectal No family hx of     Diabetes No family hx of     Cancer No family hx of         no skin cancer    Interstitial Lung Disease No family hx of     Melanoma No family hx of     Skin Cancer No family hx of     Glaucoma No family hx of     Macular Degeneration No family hx of            Vitals: /68 (BP Location: Right arm, Patient Position: Chair, Cuff Size: Adult Regular)   Pulse 71   Ht 1.727 m (5' 8\")   Wt 56.7 kg (125 lb)   SpO2 100%   BMI 19.01 kg/m      Exam:   GENERAL APPEARANCE: Well developed, well nourished, alert, and in no apparent distress.  RESP: good air flow throughout. Few bibasilar inspiratory crackles. No rhonchi. No wheezes.  CV: Normal S1, S2, regular rhythm, normal rate. No murmur.  No LE edema.   MS: extremities normal. No clubbing. No cyanosis.  She has dactylitis, which is a chronic finding for her.  SKIN: no rash on limited exam.  No digital ulcers.  NEURO: Mentation intact, speech normal, normal gait and stance.  PSYCH: mentation appears normal. and affect normal/bright.    Results:  Recent Results (from the past 168 hour(s))   6 minute walk test    Collection Time: 05/19/23 12:00 AM   Result Value Ref Range    6 min walk (FT) 1,625 1,200 ft    6 Min Walk " (M) 952 366 m   General PFT Lab (Please always keep checked)    Collection Time: 05/19/23  7:33 AM   Result Value Ref Range    FVC-Pred 2.59 L    FVC-Pre 2.96 L    FVC-%Pred-Pre 114 %    FEV1-Pre 2.48 L    FEV1-%Pred-Pre 123 %    FEV1FVC-Pred 78 %    FEV1FVC-Pre 84 %    FEFMax-Pred 5.48 L/sec    FEFMax-Pre 6.87 L/sec    FEFMax-%Pred-Pre 125 %    FEF2575-Pred 1.68 L/sec    FEF2575-Pre 2.80 L/sec    YFQ6911-%Pred-Pre 166 %    ExpTime-Pre 6.42 sec    FIFMax-Pre 3.32 L/sec    VC-Pred 3.10 L    VC-Pre 2.87 L    VC-%Pred-Pre 92 %    IC-Pred 2.24 L    IC-Pre 1.70 L    IC-%Pred-Pre 76 %    ERV-Pred 0.87 L    ERV-Pre 1.17 L    ERV-%Pred-Pre 134 %    FEV1FEV6-Pred 79 %    FEV1FEV6-Pre 84 %    FRCPleth-Pred 2.77 L    FRCPleth-Pre 2.96 L    FRCPleth-%Pred-Pre 106 %    RVPleth-Pred 2.17 L    RVPleth-Pre 1.79 L    RVPleth-%Pred-Pre 82 %    TLCPleth-Pred 5.11 L    TLCPleth-Pre 4.66 L    TLCPleth-%Pred-Pre 91 %    DLCOunc-Pred 19.18 ml/min/mmHg    DLCOunc-Pre 16.03 ml/min/mmHg    DLCOunc-%Pred-Pre 83 %    VA-Pre 4.08 L    VA-%Pred-Pre 87 %    FEV1SVC-Pred 65 %    FEV1SVC-Pre 86 %       I reviewed pulm function test that was performed today.  This shows normal spirometry, lung volumes and diffusing capacity.  Spirometry is stable.  However, it is down compared to her prior baseline from May 2021, but stable for the past year.    I also reviewed 6-minute walk test from today.  This shows normal walk distance that is relatively unchanged from January; there is no exertional hypoxemia on room air.    I reviewed results with the patient.      Assessment and plan:   Stephanie Bhatia is a 72 year old female who is being seen for f/u of ILD related to scleroderma.  1.  Scleroderma ILD.  She is doing well with tocilizumab, and FVC is stable.  We will continue tocilizumab 162 mg subcu weekly.  There is no need to add mycophenolate or nintedanib at this time.  Those could be options in the future if needed.  I did encourage her to restart  regular exercise.  I also suggested that she see Dr. Gunn in clinic for evaluation of her legs and unsteadiness.  She is in agreement with this plan.  She will return in 3 months with full PFT.   2.  High-risk medication immunosuppression monitoring.  We will check labs next month including lipid panel to monitor tocilizumab.  I spent 42 minutes reviewing chart, reviewing test results, talking with and examining patient, formulating plan, and documentation on the day of the encounter.          Tiffanie Gomez MD

## 2023-05-22 ENCOUNTER — TELEPHONE (OUTPATIENT)
Dept: RHEUMATOLOGY | Facility: CLINIC | Age: 73
End: 2023-05-22

## 2023-05-22 LAB
DLCOUNC-%PRED-PRE: 83 %
DLCOUNC-PRE: 16.03 ML/MIN/MMHG
DLCOUNC-PRED: 19.18 ML/MIN/MMHG
ERV-%PRED-PRE: 134 %
ERV-PRE: 1.17 L
ERV-PRED: 0.87 L
EXPTIME-PRE: 6.42 SEC
FEF2575-%PRED-PRE: 166 %
FEF2575-PRE: 2.8 L/SEC
FEF2575-PRED: 1.68 L/SEC
FEFMAX-%PRED-PRE: 125 %
FEFMAX-PRE: 6.87 L/SEC
FEFMAX-PRED: 5.48 L/SEC
FEV1-%PRED-PRE: 123 %
FEV1-PRE: 2.48 L
FEV1FEV6-PRE: 84 %
FEV1FEV6-PRED: 79 %
FEV1FVC-PRE: 84 %
FEV1FVC-PRED: 78 %
FEV1SVC-PRE: 86 %
FEV1SVC-PRED: 65 %
FIFMAX-PRE: 3.32 L/SEC
FRCPLETH-%PRED-PRE: 106 %
FRCPLETH-PRE: 2.96 L
FRCPLETH-PRED: 2.77 L
FVC-%PRED-PRE: 114 %
FVC-PRE: 2.96 L
FVC-PRED: 2.59 L
IC-%PRED-PRE: 76 %
IC-PRE: 1.7 L
IC-PRED: 2.24 L
RVPLETH-%PRED-PRE: 82 %
RVPLETH-PRE: 1.79 L
RVPLETH-PRED: 2.17 L
TLCPLETH-%PRED-PRE: 91 %
TLCPLETH-PRE: 4.66 L
TLCPLETH-PRED: 5.11 L
VA-%PRED-PRE: 87 %
VA-PRE: 4.08 L
VC-%PRED-PRE: 92 %
VC-PRE: 2.87 L
VC-PRED: 3.1 L

## 2023-05-22 NOTE — TELEPHONE ENCOUNTER
[facility] Call Center    Phone Message    May a detailed message be left on voicemail: yes     Reason for Call: Pt would like to be seen by Dr. Gunn (scleroderma and ILD), last seen by him in 2018. Pulmonologist-Dr. Gomez-recommended she schedule an appt with him.   Pt will wait for call back to schedule appt.     Action Taken: Message routed to:  Other: BEAR LAWTON RN    Travel Screening: Not Applicable

## 2023-06-02 ENCOUNTER — TELEPHONE (OUTPATIENT)
Dept: PULMONOLOGY | Facility: CLINIC | Age: 73
End: 2023-06-02

## 2023-06-02 NOTE — TELEPHONE ENCOUNTER
[facility] Call Center    Phone Message    May a detailed message be left on voicemail: yes     Reason for Call: Other: Per pt would like to know if the 6 min walk is still needed? Per pt had it schedule on 08/24/23 @7:15 am but now cancel? Per pt wasnt notified. Please call pt back to confirm that 6 min isnt needed. Thank you!     Action Taken: Message routed to:  [facility] PULM    Travel Screening: Not Applicable

## 2023-06-07 NOTE — TELEPHONE ENCOUNTER
Placed call to patient, left message stating that I will call her back after consulting with Dr. Gunn about appointment timing and any tests needed before she sees him.  Lilia Torres RN  [facility]

## 2023-06-13 NOTE — TELEPHONE ENCOUNTER
Pt returned call to writer Lilia was unable to get a hold of her at time of call. Pt would like a call back on Thursday or Friday.

## 2023-06-29 NOTE — TELEPHONE ENCOUNTER
Patient is calling for an update. She would like a call or a [employer] message ASAP.  Patient will not call again and will assume Dr. Gunn doesn't want to see her.

## 2023-07-12 NOTE — TELEPHONE ENCOUNTER
Date: 7/12/2023    Company: [employer]    Phone Number: [phone]    Representative: Ruth    Details: PAP status is active. Approved 9/2/2022, will reach out in August for annual verification.

## 2023-08-01 NOTE — TELEPHONE ENCOUNTER
Pt calling, still waiting on response regarding scheduling an apt with Dr. Gunn. Would like a call back, if not able to reach her on the phone pt would like a [employer] message please.

## 2023-08-03 ENCOUNTER — MYC MEDICAL ADVICE (OUTPATIENT)
Dept: RHEUMATOLOGY | Facility: CLINIC | Age: 73
End: 2023-08-03
Payer: MEDICARE

## 2023-08-15 NOTE — TELEPHONE ENCOUNTER
Pt is calling to check on status of scheduling with Dr Gunn. Please call pt back at [phone].

## 2023-08-18 ASSESSMENT — ENCOUNTER SYMPTOMS
LOSS OF CONSCIOUSNESS: 0
NUMBNESS: 0
ORTHOPNEA: 0
WEAKNESS: 0
MUSCLE CRAMPS: 1
HYPERTENSION: 0
PARALYSIS: 0
DEPRESSION: 0
TREMORS: 0
PALPITATIONS: 0
LEG PAIN: 0
ARTHRALGIAS: 1
HYPOTENSION: 0
SPEECH CHANGE: 0
DIZZINESS: 1
HEADACHES: 0
PANIC: 0
DISTURBANCES IN COORDINATION: 0
INSOMNIA: 0
SEIZURES: 0
EXERCISE INTOLERANCE: 0
NERVOUS/ANXIOUS: 0
TINGLING: 0
SLEEP DISTURBANCES DUE TO BREATHING: 0
JOINT SWELLING: 0
SYNCOPE: 0
NECK PAIN: 0
LIGHT-HEADEDNESS: 1
BACK PAIN: 0
DECREASED CONCENTRATION: 1
MYALGIAS: 0
MUSCLE WEAKNESS: 0
STIFFNESS: 0
MEMORY LOSS: 1

## 2023-08-18 NOTE — TELEPHONE ENCOUNTER
FREE DRUG APPLICATION APPROVED    Medication:  Actemra  Program Name:  [employer]  Effective Date:  9/2/2022  Expiration Date:  7/9/2024  Pharmacy Filling the Rx: [facility]  Patient Notified: Yes  Additional Information: Reported patient's information for annual reverification to [employer] to continue eligibility in PAP.

## 2023-08-21 ENCOUNTER — TELEPHONE (OUTPATIENT)
Dept: PULMONOLOGY | Facility: CLINIC | Age: 73
End: 2023-08-21
Payer: MEDICARE

## 2023-08-21 DIAGNOSIS — M34.9 SCLERODERMA WITH PULMONARY INVOLVEMENT (H): ICD-10-CM

## 2023-08-21 DIAGNOSIS — J84.9 ILD (INTERSTITIAL LUNG DISEASE) (H): ICD-10-CM

## 2023-08-21 RX ORDER — TOCILIZUMAB 180 MG/ML
162 INJECTION, SOLUTION SUBCUTANEOUS
Qty: 3.6 ML | Refills: 11 | Status: SHIPPED | OUTPATIENT
Start: 2023-08-21 | End: 2024-05-23

## 2023-08-21 NOTE — TELEPHONE ENCOUNTER
Med refilled and sent to [facility] pharmacy.    Carolina Gonsalez RN, BSN  ILD Nurse   [phone]    ----- Message from Lucy Horvath sent at 8/21/2023  1:14 PM CDT -----  Regarding: Actemra Refill  Good afternoon,    We received a fax requesting a new prescription for Actemra be sent to the free drug pharmacy [facility]. Is someone able to help me out with that order?    Let me know, thanks!  Lucy Horvath [facility]  Pharmacy Liaison  [facility]  [email or web address]  [email or web address]  Phone: [phone]  Fax: [phone]

## 2023-08-24 ENCOUNTER — LAB (OUTPATIENT)
Dept: LAB | Facility: CLINIC | Age: 73
End: 2023-08-24
Attending: INTERNAL MEDICINE
Payer: MEDICARE

## 2023-08-24 ENCOUNTER — OFFICE VISIT (OUTPATIENT)
Dept: PULMONOLOGY | Facility: CLINIC | Age: 73
End: 2023-08-24
Attending: INTERNAL MEDICINE
Payer: MEDICARE

## 2023-08-24 VITALS — DIASTOLIC BLOOD PRESSURE: 68 MMHG | SYSTOLIC BLOOD PRESSURE: 104 MMHG | HEART RATE: 66 BPM | OXYGEN SATURATION: 100 %

## 2023-08-24 DIAGNOSIS — K21.9 GASTROESOPHAGEAL REFLUX DISEASE, UNSPECIFIED WHETHER ESOPHAGITIS PRESENT: Primary | ICD-10-CM

## 2023-08-24 DIAGNOSIS — J84.9 ILD (INTERSTITIAL LUNG DISEASE) (H): ICD-10-CM

## 2023-08-24 DIAGNOSIS — E71.30 DISORDER OF FATTY-ACID METABOLISM, UNSPECIFIED: ICD-10-CM

## 2023-08-24 LAB
ALBUMIN SERPL BCG-MCNC: 4.2 G/DL (ref 3.5–5.2)
ALP SERPL-CCNC: 40 U/L (ref 35–104)
ALT SERPL W P-5'-P-CCNC: 17 U/L (ref 0–50)
ANION GAP SERPL CALCULATED.3IONS-SCNC: 10 MMOL/L (ref 7–15)
AST SERPL W P-5'-P-CCNC: 25 U/L (ref 0–45)
BASOPHILS # BLD AUTO: 0.1 10E3/UL (ref 0–0.2)
BASOPHILS NFR BLD AUTO: 1 %
BILIRUB SERPL-MCNC: 0.8 MG/DL
BUN SERPL-MCNC: 21.9 MG/DL (ref 8–23)
CALCIUM SERPL-MCNC: 9 MG/DL (ref 8.8–10.2)
CHLORIDE SERPL-SCNC: 106 MMOL/L (ref 98–107)
CHOLEST SERPL-MCNC: 222 MG/DL
CREAT SERPL-MCNC: 0.76 MG/DL (ref 0.51–0.95)
DEPRECATED HCO3 PLAS-SCNC: 25 MMOL/L (ref 22–29)
DLCOCOR-%PRED-PRE: 88 %
DLCOCOR-PRE: 17.02 ML/MIN/MMHG
DLCOUNC-%PRED-PRE: 87 %
DLCOUNC-PRE: 16.86 ML/MIN/MMHG
DLCOUNC-PRED: 19.18 ML/MIN/MMHG
EOSINOPHIL # BLD AUTO: 0.5 10E3/UL (ref 0–0.7)
EOSINOPHIL NFR BLD AUTO: 10 %
ERV-%PRED-PRE: 181 %
ERV-PRE: 1.31 L
ERV-PRED: 0.72 L
ERYTHROCYTE [DISTWIDTH] IN BLOOD BY AUTOMATED COUNT: 12.3 % (ref 10–15)
EXPTIME-PRE: 5.95 SEC
FEF2575-%PRED-PRE: 165 %
FEF2575-PRE: 2.78 L/SEC
FEF2575-PRED: 1.68 L/SEC
FEFMAX-%PRED-PRE: 129 %
FEFMAX-PRE: 7.11 L/SEC
FEFMAX-PRED: 5.48 L/SEC
FEV1-%PRED-PRE: 124 %
FEV1-PRE: 2.51 L
FEV1FEV6-PRE: 86 %
FEV1FEV6-PRED: 79 %
FEV1FVC-PRE: 86 %
FEV1FVC-PRED: 78 %
FEV1SVC-PRE: 89 %
FEV1SVC-PRED: 69 %
FIFMAX-PRE: 3.12 L/SEC
FRCPLETH-%PRED-PRE: 92 %
FRCPLETH-PRE: 2.72 L
FRCPLETH-PRED: 2.93 L
FVC-%PRED-PRE: 112 %
FVC-PRE: 2.93 L
FVC-PRED: 2.59 L
GFR SERPL CREATININE-BSD FRML MDRD: 82 ML/MIN/1.73M2
GLUCOSE SERPL-MCNC: 85 MG/DL (ref 70–99)
HCT VFR BLD AUTO: 39.6 % (ref 35–47)
HDLC SERPL-MCNC: 61 MG/DL
HGB BLD-MCNC: 13.1 G/DL (ref 11.7–15.7)
IC-%PRED-PRE: 69 %
IC-PRE: 1.5 L
IC-PRED: 2.16 L
IMM GRANULOCYTES # BLD: 0 10E3/UL
IMM GRANULOCYTES NFR BLD: 0 %
LDLC SERPL CALC-MCNC: 146 MG/DL
LYMPHOCYTES # BLD AUTO: 1.5 10E3/UL (ref 0.8–5.3)
LYMPHOCYTES NFR BLD AUTO: 27 %
MCH RBC QN AUTO: 31.9 PG (ref 26.5–33)
MCHC RBC AUTO-ENTMCNC: 33.1 G/DL (ref 31.5–36.5)
MCV RBC AUTO: 96 FL (ref 78–100)
MONOCYTES # BLD AUTO: 0.7 10E3/UL (ref 0–1.3)
MONOCYTES NFR BLD AUTO: 13 %
NEUTROPHILS # BLD AUTO: 2.7 10E3/UL (ref 1.6–8.3)
NEUTROPHILS NFR BLD AUTO: 49 %
NONHDLC SERPL-MCNC: 161 MG/DL
NRBC # BLD AUTO: 0 10E3/UL
NRBC BLD AUTO-RTO: 0 /100
PLATELET # BLD AUTO: 221 10E3/UL (ref 150–450)
POTASSIUM SERPL-SCNC: 4.1 MMOL/L (ref 3.4–5.3)
PROT SERPL-MCNC: 6.1 G/DL (ref 6.4–8.3)
RBC # BLD AUTO: 4.11 10E6/UL (ref 3.8–5.2)
RVPLETH-%PRED-PRE: 66 %
RVPLETH-PRE: 1.41 L
RVPLETH-PRED: 2.11 L
SODIUM SERPL-SCNC: 141 MMOL/L (ref 136–145)
TLCPLETH-%PRED-PRE: 82 %
TLCPLETH-PRE: 4.22 L
TLCPLETH-PRED: 5.11 L
TRIGL SERPL-MCNC: 76 MG/DL
VA-%PRED-PRE: 81 %
VA-PRE: 3.82 L
VC-%PRED-PRE: 95 %
VC-PRE: 2.81 L
VC-PRED: 2.93 L
WBC # BLD AUTO: 5.4 10E3/UL (ref 4–11)

## 2023-08-24 PROCEDURE — 36415 COLL VENOUS BLD VENIPUNCTURE: CPT | Performed by: PATHOLOGY

## 2023-08-24 PROCEDURE — G0463 HOSPITAL OUTPT CLINIC VISIT: HCPCS | Performed by: INTERNAL MEDICINE

## 2023-08-24 PROCEDURE — 80061 LIPID PANEL: CPT | Performed by: PATHOLOGY

## 2023-08-24 PROCEDURE — 80053 COMPREHEN METABOLIC PANEL: CPT | Performed by: PATHOLOGY

## 2023-08-24 PROCEDURE — 94726 PLETHYSMOGRAPHY LUNG VOLUMES: CPT | Performed by: INTERNAL MEDICINE

## 2023-08-24 PROCEDURE — 85025 COMPLETE CBC W/AUTO DIFF WBC: CPT | Performed by: PATHOLOGY

## 2023-08-24 PROCEDURE — 99215 OFFICE O/P EST HI 40 MIN: CPT | Mod: 25 | Performed by: INTERNAL MEDICINE

## 2023-08-24 PROCEDURE — 94729 DIFFUSING CAPACITY: CPT | Performed by: INTERNAL MEDICINE

## 2023-08-24 PROCEDURE — 94375 RESPIRATORY FLOW VOLUME LOOP: CPT | Performed by: INTERNAL MEDICINE

## 2023-08-24 RX ORDER — FAMOTIDINE 20 MG/1
20 TABLET, FILM COATED ORAL DAILY
Qty: 90 TABLET | Refills: 4 | Status: SHIPPED | OUTPATIENT
Start: 2023-08-24 | End: 2024-07-08

## 2023-08-24 NOTE — NURSING NOTE
Chief Complaint   Patient presents with    Interstitial Lung Disease (ILD)     3mo f/u     Vitals were taken and medications were reconciled.     LIZZETH Patel

## 2023-08-24 NOTE — LETTER
8/24/2023         RE: Stephanie Bhatia  [address]  [facility] [address]        Dear Colleague,    Thank you for referring your patient, Stephanie Bhatia, to the [facility] [place]. Please see a copy of my visit note below.    [facility]    Reason for Visit  Stephanie Bhatia is a 72 year old year old female who is being seen for Interstitial Lung Disease (ILD) (3mo f/u)    HPI  Stephanie Bhatia is a 72 year old female who is being seen for f/u of ILD related to scleroderma.  She was initially diagnosed with scleroderma in about 2009 or 2010.  Scleroderma ILD was first diagnosed on chest CT scan in 2/2017.  She also had a few nodules that were small, 4 mm and did not require follow-up as she was low risk.  She started tocilizumab in approximately September 2022 for progressive drop in PFT and progression of ILD on chest CT.     Today, Stephanie reports that she is tolerating tocilizumab well other than slight itching with the injections.  She has started to walk daily for ~1 mile.  She denies RILEY with walking.  However, she continues to feel short of breath when she walks up 1 flight of stairs, and that is unchanged.  She also endorses a cough, which is chronic.  She denies digital fingertip ulcers or new skin thickening.  She reports occasional heartburn, and she does not take any meds for GERD.    She moved into her own house since last visit.  She continues to work part time at [employer].          Current Outpatient Medications   Medication     acyclovir (ZOVIRAX) 400 MG tablet     Cholecalciferol (VITAMIN D) 1000 UNITS capsule     cyanocobalamin (VITAMIN B-12) 100 MCG tablet     famotidine (PEPCID) 20 MG tablet     ibuprofen (ADVIL/MOTRIN) 200 MG tablet     tocilizumab (ACTEMRA) 162 MG/0.9ML subcutaneous injection     vitamin B complex with vitamin C (STRESS TAB) tablet     atovaquone-proguanil (MALARONE) 250-100 MG  tablet     azithromycin (ZITHROMAX) 500 MG tablet     No current facility-administered medications for this visit.     No Known Allergies  Past Medical History:   Diagnosis Date     Anxiety      Herpes simplex without mention of complication      Interstitial lung disease (H)     scleroderma ILD, dx by chest CT 2017     Lung nodules     4 mm LLL and RLL      Migraine     loss of speech and comprehension, has had w/u and is complex migraine, last one about 2017     Raynaud phenomenon      Systemic sclerosis with limited cutaneous involvement (H) 2014    Dx ~2010, Scl-70 and anti-centromere antibody neg, +RF       Past Surgical History:   Procedure Laterality Date     HYSTEROSCOPIC PLACEMENT CONTRACEPTIVE DEVICE         Social History     Socioeconomic History     Marital status: Single     Spouse name:      Number of children: 0     Years of education: Not on file     Highest education level: Master's degree (e.g., MA, MS, [place], MEd, MSW, DIANE)   Occupational History     Occupation:      Comment: [employer] - part time   Tobacco Use     Smoking status: Former     Packs/day: 1.00     Years: 20.00     Pack years: 20.00     Types: Cigarettes     Quit date: 1986     Years since quittin.7     Smokeless tobacco: Never   Vaping Use     Vaping Use: Never used   Substance and Sexual Activity     Alcohol use: Not Currently     Comment: none since      Drug use: No     Sexual activity: Not Currently     Partners: Male   Other Topics Concern     Parent/sibling w/ CABG, MI or angioplasty before 65F 55M? No   Social History Narrative    .  No children. Sister lives in [place].  Part time at [employer].   in the past. Currently lives with family.    Possible asbestos exposure from basement pipes in childhood home.  Lived in  [facility] [address].  Denies exposure to pet birds, hot tubs.   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     Social Determinants of Health     Financial Resource Strain: High Risk (11/22/2021)    Overall Financial Resource Strain (CARDIA)      Difficulty of Paying Living Expenses: Hard   Food Insecurity: No Food Insecurity (11/22/2021)    Hunger Vital Sign      Worried About Running Out of Food in the Last Year: Never true      Ran Out of Food in the Last Year: Never true   Transportation Needs: No Transportation Needs (11/22/2021)    PRAPARE - Transportation      Lack of Transportation (Medical): No      Lack of Transportation (Non-Medical): No   Physical Activity: Sufficiently Active (11/22/2021)    Exercise Vital Sign      Days of Exercise per Week: 7 days      Minutes of Exercise per Session: 60 min   Stress: Stress Concern Present (11/22/2021)    [place] [employer] - Occupational Stress Questionnaire      Feeling of Stress : To some extent   Social Connections: Moderately Isolated (11/22/2021)    Social Connection and Isolation Panel [NHANES]      Frequency of Communication with Friends and Family: More than three times a week      Frequency of Social Gatherings with Friends and Family: More than three times a week      Attends Anabaptist Services: More than 4 times per year      Active Member of Clubs or Organizations: No      Attends Club or Organization Meetings: Never      Marital Status:    Intimate Partner Violence: Not At Risk (11/22/2021)    Humiliation, Afraid, Rape, and Kick questionnaire      Fear of Current or Ex-Partner: No      Emotionally Abused: No      Physically Abused: No      Sexually Abused: No   Housing Stability: Low Risk  (11/22/2021)    Housing Stability Vital Sign      Unable to Pay for Housing in the Last Year: No      Number of Places Lived in the Last Year: 1      Unstable Housing in the Last Year: No       Family History   Problem Relation Age of Onset     C.A.D. Father         a efw small heart attacks     Neurologic Disorder Father         dementia  70s     Coronary Artery Disease Father      Neurologic Disorder Mother         dementia     Depression Mother      Cerebrovascular Disease Maternal Grandmother         ,     Other Cancer Paternal Grandmother      Rheumatoid Arthritis Sister      Hypertension Sister      Dementia Paternal Cousin      Dementia Paternal Uncle         60s     Breast Cancer No family hx of      Cancer - colorectal No family hx of      Diabetes No family hx of      Cancer No family hx of         no skin cancer     Interstitial Lung Disease No family hx of      Melanoma No family hx of      Skin Cancer No family hx of      Glaucoma No family hx of      Macular Degeneration No family hx of              Vitals: /68   Pulse 66   SpO2 100%     Exam:   GENERAL APPEARANCE: Well developed, well nourished, alert, and in no apparent distress.  RESP: good air flow throughout.  Bibasilar inspiratory crackles. No rhonchi. No wheezes.  CV: Normal S1, S2, regular rhythm, normal rate. No murmur.  No LE edema.   MS: extremities normal. No clubbing. No cyanosis.  SKIN: no rash on limited exam.  NEURO: Mentation intact, speech normal, normal gait and stance.  PSYCH: mentation appears normal. and affect normal/bright.    Results:  Recent Results (from the past 168 hour(s))   Comprehensive metabolic panel    Collection Time: 08/24/23  7:17 AM   Result Value Ref Range    Sodium 141 136 - 145 mmol/L    Potassium 4.1 3.4 - 5.3 mmol/L    Chloride 106 98 - 107 mmol/L    Carbon Dioxide (CO2) 25 22 - 29 mmol/L    Anion Gap 10 7 - 15 mmol/L    Urea Nitrogen 21.9 8.0 - 23.0 mg/dL    Creatinine 0.76 0.51 - 0.95 mg/dL    Calcium 9.0 8.8 - 10.2 mg/dL    Glucose 85 70 - 99 mg/dL    Alkaline Phosphatase 40 35 - 104 U/L    AST 25 0 - 45 U/L    ALT 17 0 - 50 U/L    Protein Total 6.1 (L) 6.4 - 8.3 g/dL    Albumin 4.2 3.5 - 5.2 g/dL    Bilirubin Total 0.8 <=1.2 mg/dL    GFR Estimate 82 >60 mL/min/1.73m2   Lipid Profile    Collection Time: 08/24/23  7:17 AM   Result  Value Ref Range    Cholesterol 222 (H) <200 mg/dL    Triglycerides 76 <150 mg/dL    Direct Measure HDL 61 >=50 mg/dL    LDL Cholesterol Calculated 146 (H) <=100 mg/dL    Non HDL Cholesterol 161 (H) <130 mg/dL   CBC with platelets and differential    Collection Time: 08/24/23  7:17 AM   Result Value Ref Range    WBC Count 5.4 4.0 - 11.0 10e3/uL    RBC Count 4.11 3.80 - 5.20 10e6/uL    Hemoglobin 13.1 11.7 - 15.7 g/dL    Hematocrit 39.6 35.0 - 47.0 %    MCV 96 78 - 100 fL    MCH 31.9 26.5 - 33.0 pg    MCHC 33.1 31.5 - 36.5 g/dL    RDW 12.3 10.0 - 15.0 %    Platelet Count 221 150 - 450 10e3/uL    % Neutrophils 49 %    % Lymphocytes 27 %    % Monocytes 13 %    % Eosinophils 10 %    % Basophils 1 %    % Immature Granulocytes 0 %    NRBCs per 100 WBC 0 <1 /100    Absolute Neutrophils 2.7 1.6 - 8.3 10e3/uL    Absolute Lymphocytes 1.5 0.8 - 5.3 10e3/uL    Absolute Monocytes 0.7 0.0 - 1.3 10e3/uL    Absolute Eosinophils 0.5 0.0 - 0.7 10e3/uL    Absolute Basophils 0.1 0.0 - 0.2 10e3/uL    Absolute Immature Granulocytes 0.0 <=0.4 10e3/uL    Absolute NRBCs 0.0 10e3/uL   General PFT Lab (Please always keep checked)    Collection Time: 08/24/23  7:28 AM   Result Value Ref Range    FVC-Pred 2.59 L    FVC-Pre 2.93 L    FVC-%Pred-Pre 112 %    FEV1-Pre 2.51 L    FEV1-%Pred-Pre 124 %    FEV1FVC-Pred 78 %    FEV1FVC-Pre 86 %    FEFMax-Pred 5.48 L/sec    FEFMax-Pre 7.11 L/sec    FEFMax-%Pred-Pre 129 %    FEF2575-Pred 1.68 L/sec    FEF2575-Pre 2.78 L/sec    UDQ7129-%Pred-Pre 165 %    ExpTime-Pre 5.95 sec    FIFMax-Pre 3.12 L/sec    VC-Pred 2.93 L    VC-Pre 2.81 L    VC-%Pred-Pre 95 %    IC-Pred 2.16 L    IC-Pre 1.50 L    IC-%Pred-Pre 69 %    ERV-Pred 0.72 L    ERV-Pre 1.31 L    ERV-%Pred-Pre 181 %    FEV1FEV6-Pred 79 %    FEV1FEV6-Pre 86 %    FRCPleth-Pred 2.93 L    FRCPleth-Pre 2.72 L    FRCPleth-%Pred-Pre 92 %    RVPleth-Pred 2.11 L    RVPleth-Pre 1.41 L    RVPleth-%Pred-Pre 66 %    TLCPleth-Pred 5.11 L    TLCPleth-Pre 4.22 L     TLCPleth-%Pred-Pre 82 %    DLCOunc-Pred 19.18 ml/min/mmHg    DLCOunc-Pre 16.86 ml/min/mmHg    DLCOunc-%Pred-Pre 87 %    DLCOcor-Pre 17.02 ml/min/mmHg    DLCOcor-%Pred-Pre 88 %    VA-Pre 3.82 L    VA-%Pred-Pre 81 %    FEV1SVC-Pred 69 %    FEV1SVC-Pre 89 %       I reviewed PFT today: normal and stable.  FVC has stabilized since starting tocilizumab.  Labs today are normal except for elevated cholesterol and LDL.  Lab was fasting today.    I reviewed results with the patient.      Assessment and plan:   Stephanie Bhatia is a 72 year old female who is being seen for f/u of ILD related to scleroderma.   Scleroderma ILD.  She is doing well, and PFT has stabilized on tocilizumab.  She is tolerating it well, and we will continue 162 mg subcu weekly.  I also encouraged her to continue walking daily.  There is no need to change medications or to add MMF or nintedanib at this time.  She will RTC in 3 mo with PFT and 6MWT.  High risk medication immunosuppression monitoring.  Labs today are normal except for elevated cholesterol and LDL.  I advised her to decrease fat intake in her diet, and she is in agreement.  I will recheck labs in 3 mo to monitor tocilizumab.  I will recheck lipids in 6 mo.  I recommended flu vaccine this fall and the new COVID-19 booster when it's available.  She should get the RSV vaccine now.  We discussed that she is immunosuppressed and as such, she is more prone to get infections.  GERD.  I will start famotidine 20 mg at bedtime.  I spent 46 minutes reviewing chart, talking with and examining patient, reviewing test results, formulating plan and documentation on the day of the encounter (excluding PFT review).                Again, thank you for allowing me to participate in the care of your patient.        Sincerely,        Tiffanie Gomez MD

## 2023-08-24 NOTE — PATIENT INSTRUCTIONS
Start famotidine (Pepcid) 20 mg once daily at bedtime  Get the flu vaccine, updated COVID-19 booster and the new RSV vaccine

## 2023-08-24 NOTE — PROGRESS NOTES
[facility] Disease [facility]    Reason for Visit  Stephanie Bhatia is a 72 year old year old female who is being seen for Interstitial Lung Disease (ILD) (3mo f/u)    HPI  Stephanie Bhatia is a 72 year old female who is being seen for f/u of ILD related to scleroderma.  She was initially diagnosed with scleroderma in about 2009 or 2010.  Scleroderma ILD was first diagnosed on chest CT scan in 2/2017.  She also had a few nodules that were small, 4 mm and did not require follow-up as she was low risk.  She started tocilizumab in approximately September 2022 for progressive drop in PFT and progression of ILD on chest CT.     Today, Stephanie reports that she is tolerating tocilizumab well other than slight itching with the injections.  She has started to walk daily for ~1 mile.  She denies RILEY with walking.  However, she continues to feel short of breath when she walks up 1 flight of stairs, and that is unchanged.  She also endorses a cough, which is chronic.  She denies digital fingertip ulcers or new skin thickening.  She reports occasional heartburn, and she does not take any meds for GERD.    She moved into her own house since last visit.  She continues to work part time at [employer].          Current Outpatient Medications   Medication    acyclovir (ZOVIRAX) 400 MG tablet    Cholecalciferol (VITAMIN D) 1000 UNITS capsule    cyanocobalamin (VITAMIN B-12) 100 MCG tablet    famotidine (PEPCID) 20 MG tablet    ibuprofen (ADVIL/MOTRIN) 200 MG tablet    tocilizumab (ACTEMRA) 162 MG/0.9ML subcutaneous injection    vitamin B complex with vitamin C (STRESS TAB) tablet    atovaquone-proguanil (MALARONE) 250-100 MG tablet    azithromycin (ZITHROMAX) 500 MG tablet     No current facility-administered medications for this visit.     No Known Allergies  Past Medical History:   Diagnosis Date    Anxiety     Herpes simplex without mention of complication     Interstitial lung disease (H)     scleroderma ILD, dx by  chest CT 2017    Lung nodules     4 mm LLL and RLL     Migraine     loss of speech and comprehension, has had w/u and is complex migraine, last one about 2017    Raynaud phenomenon     Systemic sclerosis with limited cutaneous involvement (H) 2014    Dx ~, Scl-70 and anti-centromere antibody neg, +RF       Past Surgical History:   Procedure Laterality Date    HYSTEROSCOPIC PLACEMENT CONTRACEPTIVE DEVICE         Social History     Socioeconomic History    Marital status: Single     Spouse name:     Number of children: 0    Years of education: Not on file    Highest education level: Master's degree (e.g., MA, MS, [place], MEd, MSW, DIANE)   Occupational History    Occupation:      Comment: [employer] - part time   Tobacco Use    Smoking status: Former     Packs/day: 1.00     Years: 20.00     Pack years: 20.00     Types: Cigarettes     Quit date: 1986     Years since quittin.7    Smokeless tobacco: Never   Vaping Use    Vaping Use: Never used   Substance and Sexual Activity    Alcohol use: Not Currently     Comment: none since     Drug use: No    Sexual activity: Not Currently     Partners: Male   Other Topics Concern    Parent/sibling w/ CABG, MI or angioplasty before 65F 55M? No   Social History Narrative    .  No children. Sister lives in [place].  Part time at [employer].   in the past. Currently lives with family.    Possible asbestos exposure from basement pipes in childhood home.  Lived in  [facility] [address].  Denies exposure to pet birds, hot tubs.  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     Social Determinants of Health     Financial Resource Strain: High Risk (2021)    Overall Financial Resource Strain (CARDIA)     Difficulty of Paying Living Expenses: Hard   Food Insecurity: No Food Insecurity (2021)    Hunger Vital Sign     Worried About Running Out of Food in the Last Year: Never true      Ran Out of Food in the Last Year: Never true   Transportation Needs: No Transportation Needs (11/22/2021)    PRAPARE - Transportation     Lack of Transportation (Medical): No     Lack of Transportation (Non-Medical): No   Physical Activity: Sufficiently Active (11/22/2021)    Exercise Vital Sign     Days of Exercise per Week: 7 days     Minutes of Exercise per Session: 60 min   Stress: Stress Concern Present (11/22/2021)    [place] [employer] - Occupational Stress Questionnaire     Feeling of Stress : To some extent   Social Connections: Moderately Isolated (11/22/2021)    Social Connection and Isolation Panel [NHANES]     Frequency of Communication with Friends and Family: More than three times a week     Frequency of Social Gatherings with Friends and Family: More than three times a week     Attends Presybeterian Services: More than 4 times per year     Active Member of Clubs or Organizations: No     Attends Club or Organization Meetings: Never     Marital Status:    Intimate Partner Violence: Not At Risk (11/22/2021)    Humiliation, Afraid, Rape, and Kick questionnaire     Fear of Current or Ex-Partner: No     Emotionally Abused: No     Physically Abused: No     Sexually Abused: No   Housing Stability: Low Risk  (11/22/2021)    Housing Stability Vital Sign     Unable to Pay for Housing in the Last Year: No     Number of Places Lived in the Last Year: 1     Unstable Housing in the Last Year: No       Family History   Problem Relation Age of Onset    C.A.D. Father         a efw small heart attacks    Neurologic Disorder Father         dementia 70s    Coronary Artery Disease Father     Neurologic Disorder Mother         dementia    Depression Mother     Cerebrovascular Disease Maternal Grandmother         ,    Other Cancer Paternal Grandmother     Rheumatoid Arthritis Sister     Hypertension Sister     Dementia Paternal Cousin     Dementia Paternal Uncle         60s    Breast Cancer No  family hx of     Cancer - colorectal No family hx of     Diabetes No family hx of     Cancer No family hx of         no skin cancer    Interstitial Lung Disease No family hx of     Melanoma No family hx of     Skin Cancer No family hx of     Glaucoma No family hx of     Macular Degeneration No family hx of              Vitals: /68   Pulse 66   SpO2 100%     Exam:   GENERAL APPEARANCE: Well developed, well nourished, alert, and in no apparent distress.  RESP: good air flow throughout.  Bibasilar inspiratory crackles. No rhonchi. No wheezes.  CV: Normal S1, S2, regular rhythm, normal rate. No murmur.  No LE edema.   MS: extremities normal. No clubbing. No cyanosis.  SKIN: no rash on limited exam.  NEURO: Mentation intact, speech normal, normal gait and stance.  PSYCH: mentation appears normal. and affect normal/bright.    Results:  Recent Results (from the past 168 hour(s))   Comprehensive metabolic panel    Collection Time: 08/24/23  7:17 AM   Result Value Ref Range    Sodium 141 136 - 145 mmol/L    Potassium 4.1 3.4 - 5.3 mmol/L    Chloride 106 98 - 107 mmol/L    Carbon Dioxide (CO2) 25 22 - 29 mmol/L    Anion Gap 10 7 - 15 mmol/L    Urea Nitrogen 21.9 8.0 - 23.0 mg/dL    Creatinine 0.76 0.51 - 0.95 mg/dL    Calcium 9.0 8.8 - 10.2 mg/dL    Glucose 85 70 - 99 mg/dL    Alkaline Phosphatase 40 35 - 104 U/L    AST 25 0 - 45 U/L    ALT 17 0 - 50 U/L    Protein Total 6.1 (L) 6.4 - 8.3 g/dL    Albumin 4.2 3.5 - 5.2 g/dL    Bilirubin Total 0.8 <=1.2 mg/dL    GFR Estimate 82 >60 mL/min/1.73m2   Lipid Profile    Collection Time: 08/24/23  7:17 AM   Result Value Ref Range    Cholesterol 222 (H) <200 mg/dL    Triglycerides 76 <150 mg/dL    Direct Measure HDL 61 >=50 mg/dL    LDL Cholesterol Calculated 146 (H) <=100 mg/dL    Non HDL Cholesterol 161 (H) <130 mg/dL   CBC with platelets and differential    Collection Time: 08/24/23  7:17 AM   Result Value Ref Range    WBC Count 5.4 4.0 - 11.0 10e3/uL    RBC Count 4.11 3.80  - 5.20 10e6/uL    Hemoglobin 13.1 11.7 - 15.7 g/dL    Hematocrit 39.6 35.0 - 47.0 %    MCV 96 78 - 100 fL    MCH 31.9 26.5 - 33.0 pg    MCHC 33.1 31.5 - 36.5 g/dL    RDW 12.3 10.0 - 15.0 %    Platelet Count 221 150 - 450 10e3/uL    % Neutrophils 49 %    % Lymphocytes 27 %    % Monocytes 13 %    % Eosinophils 10 %    % Basophils 1 %    % Immature Granulocytes 0 %    NRBCs per 100 WBC 0 <1 /100    Absolute Neutrophils 2.7 1.6 - 8.3 10e3/uL    Absolute Lymphocytes 1.5 0.8 - 5.3 10e3/uL    Absolute Monocytes 0.7 0.0 - 1.3 10e3/uL    Absolute Eosinophils 0.5 0.0 - 0.7 10e3/uL    Absolute Basophils 0.1 0.0 - 0.2 10e3/uL    Absolute Immature Granulocytes 0.0 <=0.4 10e3/uL    Absolute NRBCs 0.0 10e3/uL   General PFT Lab (Please always keep checked)    Collection Time: 08/24/23  7:28 AM   Result Value Ref Range    FVC-Pred 2.59 L    FVC-Pre 2.93 L    FVC-%Pred-Pre 112 %    FEV1-Pre 2.51 L    FEV1-%Pred-Pre 124 %    FEV1FVC-Pred 78 %    FEV1FVC-Pre 86 %    FEFMax-Pred 5.48 L/sec    FEFMax-Pre 7.11 L/sec    FEFMax-%Pred-Pre 129 %    FEF2575-Pred 1.68 L/sec    FEF2575-Pre 2.78 L/sec    EEB8568-%Pred-Pre 165 %    ExpTime-Pre 5.95 sec    FIFMax-Pre 3.12 L/sec    VC-Pred 2.93 L    VC-Pre 2.81 L    VC-%Pred-Pre 95 %    IC-Pred 2.16 L    IC-Pre 1.50 L    IC-%Pred-Pre 69 %    ERV-Pred 0.72 L    ERV-Pre 1.31 L    ERV-%Pred-Pre 181 %    FEV1FEV6-Pred 79 %    FEV1FEV6-Pre 86 %    FRCPleth-Pred 2.93 L    FRCPleth-Pre 2.72 L    FRCPleth-%Pred-Pre 92 %    RVPleth-Pred 2.11 L    RVPleth-Pre 1.41 L    RVPleth-%Pred-Pre 66 %    TLCPleth-Pred 5.11 L    TLCPleth-Pre 4.22 L    TLCPleth-%Pred-Pre 82 %    DLCOunc-Pred 19.18 ml/min/mmHg    DLCOunc-Pre 16.86 ml/min/mmHg    DLCOunc-%Pred-Pre 87 %    DLCOcor-Pre 17.02 ml/min/mmHg    DLCOcor-%Pred-Pre 88 %    VA-Pre 3.82 L    VA-%Pred-Pre 81 %    FEV1SVC-Pred 69 %    FEV1SVC-Pre 89 %       I reviewed PFT today: normal and stable.  FVC has stabilized since starting tocilizumab.  Labs today are normal  except for elevated cholesterol and LDL.  Lab was fasting today.    I reviewed results with the patient.      Assessment and plan:   Stephanie Bhatia is a 72 year old female who is being seen for f/u of ILD related to scleroderma.   Scleroderma ILD.  She is doing well, and PFT has stabilized on tocilizumab.  She is tolerating it well, and we will continue 162 mg subcu weekly.  I also encouraged her to continue walking daily.  There is no need to change medications or to add MMF or nintedanib at this time.  She will RTC in 3 mo with PFT and 6MWT.  High risk medication immunosuppression monitoring.  Labs today are normal except for elevated cholesterol and LDL.  I advised her to decrease fat intake in her diet, and she is in agreement.  I will recheck labs in 3 mo to monitor tocilizumab.  I will recheck lipids in 6 mo.  I recommended flu vaccine this fall and the new COVID-19 booster when it's available.  She should get the RSV vaccine now.  We discussed that she is immunosuppressed and as such, she is more prone to get infections.  GERD.  I will start famotidine 20 mg at bedtime.  I spent 46 minutes reviewing chart, talking with and examining patient, reviewing test results, formulating plan and documentation on the day of the encounter (excluding PFT review).

## 2023-08-29 NOTE — TELEPHONE ENCOUNTER
Spoke to patient, who accepted an appointment with Dr. Gunn on 10/12/23 at 8:30 am.  Lilia Torres RN  [facility]

## 2023-08-30 ENCOUNTER — TELEPHONE (OUTPATIENT)
Dept: FAMILY MEDICINE | Facility: CLINIC | Age: 73
End: 2023-08-30
Payer: MEDICARE

## 2023-08-31 NOTE — CONFIDENTIAL NOTE
NOTES Status Details   OFFICE NOTE from referring provider     OFFICE NOTE from other specialist Internal 05.12.2022 Tiffanie Gomez MD     11.22.2021 Rangel Garcia MD    DISCHARGE SUMMARY from hospital     DISCHARGE REPORT from the ER     MEDICATION LIST Internal    LABS (Any and all labs)      Internal    Biopsy/pathology (Anything related to diagnoses I.e. fluid aspirations, lip biopsy, muscle biopsy)               Imaging (All imaging related to diagnoses)     Echo     HRCT     CXR     EMG                    Scleroderma/Dermatomyositis diagnoses     Previous Cardiology notes      Previous Pulmonary notes     Previous Dermatology notes     Previous GI notes     Lupus diagnoses     Previous Nephrology notes     Previous Dermatology notes     Previous Cardiology notes

## 2023-10-02 ASSESSMENT — ENCOUNTER SYMPTOMS
ORTHOPNEA: 0
WEIGHT LOSS: 0
SPEECH CHANGE: 0
COUGH DISTURBING SLEEP: 0
FEVER: 0
SEIZURES: 0
LEG PAIN: 0
ALTERED TEMPERATURE REGULATION: 0
CHILLS: 0
POLYPHAGIA: 0
BLOOD IN STOOL: 0
HYPERTENSION: 0
BOWEL INCONTINENCE: 0
COUGH: 1
RECTAL PAIN: 0
NUMBNESS: 0
SYNCOPE: 0
HYPOTENSION: 0
HEMOPTYSIS: 0
POOR WOUND HEALING: 0
SKIN CHANGES: 0
BLOATING: 0
DISTURBANCES IN COORDINATION: 1
TREMORS: 0
INCREASED ENERGY: 0
SHORTNESS OF BREATH: 0
PARALYSIS: 0
TASTE DISTURBANCE: 1
POLYDIPSIA: 0
HEARTBURN: 0
TROUBLE SWALLOWING: 1
PALPITATIONS: 0
SINUS CONGESTION: 0
DECREASED CONCENTRATION: 1
SPUTUM PRODUCTION: 1
SORE THROAT: 0
JAUNDICE: 0
POSTURAL DYSPNEA: 0
SLEEP DISTURBANCES DUE TO BREATHING: 0
DEPRESSION: 1
FATIGUE: 1
TINGLING: 0
EXERCISE INTOLERANCE: 0
LIGHT-HEADEDNESS: 1
WEIGHT GAIN: 0
NECK MASS: 0
MEMORY LOSS: 1
HEADACHES: 0
DIARRHEA: 1
NERVOUS/ANXIOUS: 0
HOARSE VOICE: 1
NAUSEA: 0
ABDOMINAL PAIN: 0
LOSS OF CONSCIOUSNESS: 0
DIZZINESS: 1
SNORES LOUDLY: 0
CONSTIPATION: 0
SMELL DISTURBANCE: 1
PANIC: 0
NAIL CHANGES: 0
SINUS PAIN: 0
NIGHT SWEATS: 0
HALLUCINATIONS: 0
DECREASED APPETITE: 0
VOMITING: 0
INSOMNIA: 1
DYSPNEA ON EXERTION: 1
WEAKNESS: 1
WHEEZING: 0

## 2023-10-02 NOTE — COMMUNITY RESOURCES LIST (ENGLISH)
10/02/2023   [facility] SwingTime  N/A  For questions about this resource list or additional care needs, please contact your primary care clinic or care manager.  Phone: [phone]   Email: N/A   Address: [address] [place] [address]   Hours: N/A        Financial Stability       Rent and mortgage payment assistance  1  [facility] Distance: 2.25 miles      In-Person   [address] [place] [address]  Language: English  Hours: Mon - Fri 8:00 AM - 3:00 PM  Fees: Free   Phone: (175) 510-2530 Ext.14 Email: neighborhood@[facility] Backup Website: http://www.[employer].[facility]     2  [facility] (NA) - [facility] [employer] Distance: 2.28 miles      Phone/Virtual   [address] [address]  Language: English, Setswana  Hours: Mon - Fri 10:00 AM - 4:00 PM  Fees: Free   Phone: (362) 125-1123 Email: info@[employer].org Website: http://[employer].org          Important Numbers & Websites       Emergency Services   911  City Services   311  Poison Control   (539) 215-1623  Suicide Prevention Lifeline   (534) 908-3440 (TALK)  Child Abuse Hotline   (704) 188-7630 (4-A-Child)  Sexual Assault Hotline   (818) 623-6735 (HOPE)  National Runaway Safeline   (947) 638-4467 (RUNAWAY)  All-Options Talkline   (756) 267-8352  Substance Abuse Referral   (666) 813-9645 (HELP)

## 2023-10-09 ENCOUNTER — OFFICE VISIT (OUTPATIENT)
Dept: FAMILY MEDICINE | Facility: CLINIC | Age: 73
End: 2023-10-09
Payer: MEDICARE

## 2023-10-09 VITALS
HEART RATE: 84 BPM | TEMPERATURE: 97.4 F | SYSTOLIC BLOOD PRESSURE: 108 MMHG | OXYGEN SATURATION: 100 % | HEIGHT: 65 IN | RESPIRATION RATE: 20 BRPM | WEIGHT: 134.4 LBS | BODY MASS INDEX: 22.39 KG/M2 | DIASTOLIC BLOOD PRESSURE: 69 MMHG

## 2023-10-09 DIAGNOSIS — J84.9 ILD (INTERSTITIAL LUNG DISEASE) (H): Primary | ICD-10-CM

## 2023-10-09 DIAGNOSIS — R25.2 MUSCLE CRAMPS: ICD-10-CM

## 2023-10-09 DIAGNOSIS — Z23 ENCOUNTER FOR IMMUNIZATION: ICD-10-CM

## 2023-10-09 DIAGNOSIS — R42 LIGHTHEADEDNESS: ICD-10-CM

## 2023-10-09 PROCEDURE — 90480 ADMN SARSCOV2 VAC 1/ONLY CMP: CPT | Performed by: FAMILY MEDICINE

## 2023-10-09 PROCEDURE — 90472 IMMUNIZATION ADMIN EACH ADD: CPT | Performed by: FAMILY MEDICINE

## 2023-10-09 PROCEDURE — 90662 IIV NO PRSV INCREASED AG IM: CPT | Performed by: FAMILY MEDICINE

## 2023-10-09 PROCEDURE — G0008 ADMIN INFLUENZA VIRUS VAC: HCPCS | Performed by: FAMILY MEDICINE

## 2023-10-09 PROCEDURE — 99215 OFFICE O/P EST HI 40 MIN: CPT | Mod: 25 | Performed by: FAMILY MEDICINE

## 2023-10-09 ASSESSMENT — ANXIETY QUESTIONNAIRES
IF YOU CHECKED OFF ANY PROBLEMS ON THIS QUESTIONNAIRE, HOW DIFFICULT HAVE THESE PROBLEMS MADE IT FOR YOU TO DO YOUR WORK, TAKE CARE OF THINGS AT HOME, OR GET ALONG WITH OTHER PEOPLE: SOMEWHAT DIFFICULT
5. BEING SO RESTLESS THAT IT IS HARD TO SIT STILL: NOT AT ALL
GAD7 TOTAL SCORE: 4
6. BECOMING EASILY ANNOYED OR IRRITABLE: NOT AT ALL
1. FEELING NERVOUS, ANXIOUS, OR ON EDGE: SEVERAL DAYS
2. NOT BEING ABLE TO STOP OR CONTROL WORRYING: SEVERAL DAYS
3. WORRYING TOO MUCH ABOUT DIFFERENT THINGS: NOT AT ALL
GAD7 TOTAL SCORE: 4
7. FEELING AFRAID AS IF SOMETHING AWFUL MIGHT HAPPEN: SEVERAL DAYS

## 2023-10-09 ASSESSMENT — PATIENT HEALTH QUESTIONNAIRE - PHQ9
SUM OF ALL RESPONSES TO PHQ QUESTIONS 1-9: 10
5. POOR APPETITE OR OVEREATING: SEVERAL DAYS

## 2023-10-09 NOTE — NURSING NOTE
"Stephanie Bhatia is a 73 year old female patient that presents today in clinic for the following:    Chief Complaint   Patient presents with    Balance/ Vestibular     Light headedness, balance issues and cholesterol levels concerns per pt     The patient's allergies and medications were reviewed as noted. A set of vitals were recorded as noted without incident: /69 (BP Location: Right arm, Patient Position: Sitting, Cuff Size: Adult Regular)   Pulse 84   Temp 97.4  F (36.3  C)   Resp 20   Ht 1.651 m (5' 5\")   Wt 61 kg (134 lb 6.4 oz)   SpO2 100%   BMI 22.37 kg/m  . The patient does not have any other questions for the provider.    Greta Hastings, EMT at 8:19 AM on 10/9/2023    "

## 2023-10-09 NOTE — PROGRESS NOTES
Assessment & Plan     ILD (interstitial lung disease) (H)  She follows with pulmonary for interstitial lung disease has an information session coming up.  I encouraged her to be as physically active as possible congratulated her on her physical activity walking and current plans for the fall and winter through health club for at least having plan for physical activity at home such as her PT balance strengthening.    Encounter for immunization  Provided COVID and influenza vaccines today encouraged her to get RSV immunization through local pharmacy  - COVID-19 12+ (2023-24) (PFIZER)  - INFLUENZA VACCINE 65+ (FLUZONE HD)    Lightheadedness  She is due for labs in the future I added a few labs to check such as thyroid function B12 folate.  I encouraged wearing compression stockings when she is working as this will help with symptoms of lightheadedness.  Adequate hydration.  - TSH with free T4 reflex  - Vitamin B12  - Folate    Muscle cramps  She has been having muscle cramps I encouraged restarting yogurt for calcium and making sure she gets potassium rich food per day we will check magnesium and vitamin D level encouraged adequate hydration.  - Magnesium  - Vitamin D Deficiency               Depression Screening Follow Up  She declined need for medication to help with depression at this time.  I encouraged her to try melatonin to help her have better night sleep and will reassess at follow-up.        10/9/2023     8:39 AM   PHQ   PHQ-9 Total Score 10   Q9: Thoughts of better off dead/self-harm past 2 weeks Not at all     42 minutes spent on the date of the encounter doing chart review, history, exam, diagnostics review, documentation, counseling and coordination of cares as noted.     Rangel Garcia MD  [facility] is a 73 year old, presenting for the following health issues:  Balance/ Vestibular (Light headedness, balance issues and cholesterol levels  "concerns per pt)    HPI   Stephanie Bhatia 73  presents as an established patient for concerns.     Anxiety/ Depression  Anxiety and depression increased during pandemic but seems better. She moved in with family 2019 to help reduce daily stressors including dog cares and household chores, she moved back into her home alone in May 2023.  She strives to stay active.  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  She enjoys this but sometimes she feels a little anxious because of the tasks involved including change to a new computer system [employer] system.  She has been waking at 3:30 am at times with her mind over thinking. She tries to avoid sugar and sleeps better if she avoids sugar.  Trying to eat protein.  She changed her nutrition because she saw her lipids were elevated stopped eating yogurt.    Lightheaded and feels off balance.  Walk daily up to 2.5 miles. No falls in the house. Has not been doing her PT balance exercises at home.  She is considering restarting her physical therapy balance exercises as they were quite helpful.  She tries to hydrate.  She works standing on her feet does note swelling in the lower extremities does not always wear compression stockings and will start after a discussion.  Family History of Dementia  Strong family history of dementia, practices \"mind sharpening\" .  History of Alcohol Abuse/Nicotine  Stephanie has past history of nicotine and alcohol addiction ( Binge drinking) has not had alcohol since 2001. For sobriety, she attends brief AA meetings which she found helpful. Has been in [place]. She sometimes eats sugar excessively.  Pulmonary fibrosis scleroderma  HX of rheumatoid arthritis/ Scleroderma/ pulmonary fibrosis follows with Rheumatologist. Actemra started 9/25/2022 once weekly.   Falls/ Osteoarthritis/ hiatal hernia  At one point in 2019 she slipped on a rug and had fracture of ribs. Had CT in May 2022 no bone abnormalities.  HCM  Due for covid and flu " vaccines  Declines mammogram      SH:  Exercise is present in her daily regimen, previously attended classes at local [facility] swim twice weekly and goes for walks. PT for knees provided balance exercise. 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      Labs reviewed in EPIC  BP Readings from Last 3 Encounters:   10/09/23 108/69   08/24/23 104/68   05/19/23 109/68    Wt Readings from Last 3 Encounters:   10/09/23 61 kg (134 lb 6.4 oz)   05/19/23 56.7 kg (125 lb)   01/19/23 59.4 kg (131 lb)               Immunization History   Administered Date(s) Administered    COVID-19 Bivalent 12+ (Pfizer) 09/13/2022    COVID-19 MONOVALENT 12+ (Pfizer) 02/26/2021, 03/19/2021, 10/06/2021    COVID-19 Monovalent 12+ (Pfizer 2022) 04/11/2022    Flu, Unspecified 10/06/2021    Hepatitis A (ADULT 19+) 03/13/2017, 09/13/2017    Influenza (High Dose) 3 valent vaccine 09/27/2013, 11/26/2014, 10/12/2015, 09/01/2016, 09/06/2017, 08/27/2018, 10/14/2019    Influenza (IIV3) PF 10/26/2010, 10/28/2011, 10/12/2012, 09/28/2017    Influenza Vaccine 65+ (Fluzone HD) 09/30/2020, 10/06/2021, 10/06/2022    Influenza Vaccine >6 months (Alfuria,Fluzone) 10/26/2010, 10/28/2011, 10/12/2012, 09/27/2013, 11/26/2014, 10/12/2015, 09/01/2016    Pneumo Conj 13-V (2010&after) 11/26/2014    Pneumococcal 23 valent 10/21/2013    TDAP (Adacel,Boostrix) 05/01/2012, 06/25/2021    TDAP Vaccine (Adacel) 05/01/2012    Typhoid IM 01/17/2023    Typhoid Oral 03/13/2017    Yellow Fever 03/13/2017    Yellow Fever, unspecified 03/13/2017    Zoster recombinant adjuvanted (SHINGRIX) 07/10/2019, 09/30/2019         Patient Active Problem List   Diagnosis    Raynaud phenomenon    Anxiety and depression    Lumbar radiculopathy    Advanced directives, counseling/discussion    CARDIOVASCULAR SCREENING; LDL GOAL LESS THAN 160    Cluster headache syndrome    Systemic sclerosis with limited cutaneous involvement (H)    AK  (actinic keratosis)    Osteoporosis    ILD (interstitial lung disease) (H)    Rheumatoid arthritis involving both hands with positive rheumatoid factor (H)    Senile osteoporosis     Past Surgical History:   Procedure Laterality Date    HYSTEROSCOPIC PLACEMENT CONTRACEPTIVE DEVICE         Social History     Tobacco Use    Smoking status: Former     Packs/day: 1.00     Years: 20.00     Pack years: 20.00     Types: Cigarettes     Quit date: 1986     Years since quittin.8    Smokeless tobacco: Never   Substance Use Topics    Alcohol use: Not Currently     Comment: none since      Family History   Problem Relation Age of Onset    C.A.D. Father         a efw small heart attacks    Neurologic Disorder Father         dementia 70s    Coronary Artery Disease Father     Neurologic Disorder Mother         dementia    Depression Mother     Cerebrovascular Disease Maternal Grandmother         ,    Other Cancer Paternal Grandmother     Rheumatoid Arthritis Sister     Hypertension Sister     Dementia Paternal Cousin     Dementia Paternal Uncle         60s    Breast Cancer No family hx of     Cancer - colorectal No family hx of     Diabetes No family hx of     Cancer No family hx of         no skin cancer    Interstitial Lung Disease No family hx of     Melanoma No family hx of     Skin Cancer No family hx of     Glaucoma No family hx of     Macular Degeneration No family hx of          Current Outpatient Medications   Medication Sig Dispense Refill    Cholecalciferol (VITAMIN D) 1000 UNITS capsule Take 1 capsule by mouth daily      cyanocobalamin (VITAMIN B-12) 100 MCG tablet       ibuprofen (ADVIL/MOTRIN) 200 MG tablet Take 200 mg by mouth every 4 hours as needed for mild pain      tocilizumab (ACTEMRA) 162 MG/0.9ML subcutaneous injection Inject 0.9 mLs (162 mg) Subcutaneous every 7 days 3.6 mL 11    vitamin B complex with vitamin C (STRESS TAB) tablet Take 1 tablet by mouth daily      acyclovir (ZOVIRAX) 400 MG  "tablet Take 1 tablet (400 mg) by mouth every 8 hours For 5 days (Patient not taking: Reported on 10/9/2023) 15 tablet 1    atovaquone-proguanil (MALARONE) 250-100 MG tablet Take 1 tablet by mouth daily Start 2 days before travel and continue 7 days after return. 11 tablet 0    azithromycin (ZITHROMAX) 500 MG tablet 1 tab daily until diarrhea resolves. 4 tablet 0    famotidine (PEPCID) 20 MG tablet Take 1 tablet (20 mg) by mouth daily (Patient not taking: Reported on 10/9/2023) 90 tablet 4     No Known Allergies  Recent Labs   Lab Test 08/24/23  0717 01/17/23  0747 06/09/22  0622 06/02/22  1116 05/26/22  0621 09/30/20  0958 08/06/18  0938 02/14/18  1417   *  --  119* 105*   < > 111*  --   --    HDL 61  --  52 58   < > 55  --   --    TRIG 76  --  72 103   < > 68  --   --    ALT 17 15 18 20   < > 19  --   --    CR 0.76 0.71 0.69 0.66   < > 0.72  --  0.90   GFRESTIMATED 82 89 >90 >90   < > 84  --  62   GFRESTBLACK  --   --   --   --   --  >90  --  75   POTASSIUM 4.1  --  4.4 4.0   < > 4.2  --  4.9   TSH  --   --   --   --   --  1.30 1.71  --     < > = values in this interval not displayed.       Review of Systems   Problem list, PMH, Surgical HX, FH, SH, allergies, medications,immunizations reviewed and updated in Epic. 10 point ROS negative other than noted in HPI and ROS.       Objective    /69 (BP Location: Right arm, Patient Position: Sitting, Cuff Size: Adult Regular)   Pulse 84   Temp 97.4  F (36.3  C)   Resp 20   Ht 1.651 m (5' 5\")   Wt 61 kg (134 lb 6.4 oz)   SpO2 100%   BMI 22.37 kg/m    Body mass index is 22.37 kg/m .  Physical Exam   GENERAL APPEARANCE: healthy, alert and no distress, forthcoming with information  EYES: Eyes grossly normal to inspection, PERRL and conjunctivae and sclerae normal  HENT: ear canals and TM's normal, mouth without ulcers or lesions, oropharynx clear and oral mucous membranes moist  NECK: no adenopathy, no asymmetry, masses, or scars and thyroid normal to " palpation  RESP: lungs clear to auscultation - no rales, rhonchi or wheezes  CV: regular rate and rhythm, normal S1 S2, no S3 or S4, no murmur, click or rub, trace peripheral edema   ABDOMEN: soft, nontender, no hepatosplenomegaly, no masses and bowel sounds normal  MS:  Bilateral hands Rheumatoid changes fingers and wrists, scleroderma, gait is age appropriate without ataxia  SKIN: no suspicious lesions   NEURO: Normal strength and tone, mentation intact and speech normal finger-to-nose accurate, extraocular movements intact no signs of nystagmus.  PSYCH: mentation appears normal, affect normal, admits to depression at times but feels this is under control, feels anxiety is less, mood stable.             9/23/2020     8:41 AM 11/28/2022     8:22 AM 10/9/2023     8:39 AM   PHQ   PHQ-9 Total Score 9 16 10   Q9: Thoughts of better off dead/self-harm past 2 weeks Not at all Several days Not at all          8/12/2019     8:02 AM 11/28/2022     8:22 AM 10/9/2023     8:39 AM   NATALI-7 SCORE   Total Score 4 4 4

## 2023-10-09 NOTE — COMMUNITY RESOURCES LIST (ENGLISH)
10/09/2023   [facility] - Outpatient Clinics  N/A  For additional resource needs, please contact your health insurance member services or your primary care team.  Phone: [phone]   Email: N/A   Address: [address] [place] [id number]   Hours: N/A        Financial Stability       Rent and mortgage payment assistance  1  [facility] Distance: 2.25 miles      In-Person   [address] [place] [address]  Language: English  Hours: Mon - Fri 8:00 AM - 3:00 PM  Fees: Free   Phone: (422) 520-8007 Ext.14 Email: neighborhood@[email or web address].[employer] Website: http://[email or web address] [email or web address].KeepFu     2  [facility] [employer] (PPNA) - [facility] Center[facility] [employer] Support Fund Distance: 2.28 miles      Phone/Virtual   [address] [address]  Language: English, Turkmen  Hours: Mon - Fri 10:00 AM - 4:00 PM  Fees: Free   Phone: (160) 171-8154 Email: info@ppna.org Website: http://ppna.org          Important Numbers & Websites       [facility]   211 211itedway.org  Poison Control   (874) 541-6485 [place]  Suicide and Crisis Lifeline   [address] [place], [place] [address]line.org  Childhelp National Child Abuse Hotline   767.638.4152 Childhelphotline.org  [place] Sexual Assault Hotline   (809) 669-3235 (HOPE) [facility].org  National Runaway Safeline   (615) 651-3441 (RUNAWAY) [facility] Health[facility]aUni2.org  Pregnancy & Postpartum Support [place]   Call/text 640-858-5814 Ppsupportmn.org  Substance Abuse National Helpline ([facility]   651-545-HELP (8716) Findtreatment.gov  Emergency Services   911

## 2023-10-12 ENCOUNTER — OFFICE VISIT (OUTPATIENT)
Dept: PULMONOLOGY | Facility: CLINIC | Age: 73
End: 2023-10-12
Attending: INTERNAL MEDICINE
Payer: MEDICARE

## 2023-10-12 ENCOUNTER — PRE VISIT (OUTPATIENT)
Dept: RHEUMATOLOGY | Facility: CLINIC | Age: 73
End: 2023-10-12
Payer: MEDICARE

## 2023-10-12 VITALS — OXYGEN SATURATION: 99 % | DIASTOLIC BLOOD PRESSURE: 75 MMHG | HEART RATE: 77 BPM | SYSTOLIC BLOOD PRESSURE: 111 MMHG

## 2023-10-12 DIAGNOSIS — K21.9 GASTROESOPHAGEAL REFLUX DISEASE, UNSPECIFIED WHETHER ESOPHAGITIS PRESENT: Primary | ICD-10-CM

## 2023-10-12 DIAGNOSIS — Z51.81 ENCOUNTER FOR MONITORING TOCILIZUMAB THERAPY: ICD-10-CM

## 2023-10-12 DIAGNOSIS — M34.9 SCLERODERMA WITH PULMONARY INVOLVEMENT (H): ICD-10-CM

## 2023-10-12 DIAGNOSIS — I73.00 RAYNAUD'S PHENOMENON WITHOUT GANGRENE: ICD-10-CM

## 2023-10-12 DIAGNOSIS — Z79.899 ENCOUNTER FOR MONITORING TOCILIZUMAB THERAPY: ICD-10-CM

## 2023-10-12 DIAGNOSIS — Z51.81 THERAPEUTIC DRUG MONITORING: ICD-10-CM

## 2023-10-12 DIAGNOSIS — R06.09 OTHER FORMS OF DYSPNEA: ICD-10-CM

## 2023-10-12 PROCEDURE — 99214 OFFICE O/P EST MOD 30 MIN: CPT | Mod: GC | Performed by: INTERNAL MEDICINE

## 2023-10-12 PROCEDURE — G0463 HOSPITAL OUTPT CLINIC VISIT: HCPCS | Performed by: INTERNAL MEDICINE

## 2023-10-12 ASSESSMENT — PAIN SCALES - GENERAL: PAINLEVEL: NO PAIN (0)

## 2023-10-12 NOTE — PROGRESS NOTES
[facility] - Rheumatology Clinic Visit     Stephanie Bhatia MRN# [id number]   YOB: 1950      Primary care provider: Alysa Gutierrez          Assessment and Plan:     #1 Limited cutaneous systemic sclerosis with secondary raynaud's phenomenon and abnormal capillaries in nailfolds; polyarthralgia;Scl-70 neg; anti-centromere neg  #2 Strongly positive rheumatoid factor  #3 Chronic fatigue; cough; episodes of shortness of breath  #4 Spells of speech arrest X on and off since 2013 - neurology eval negative  #5 Chronic on and off migraines  #6 Episodes of perianal rash     Limited cutaneous systemic sclerosis is largely stable with new burping at night without reflex symptoms. She has no Raynaud, skin thickening or arthralgia.    She follows with Dr. Gomez with her ILD and is on Tocilizumab every 1 week. She has stable PFT, last on 8/2023 showing TLC 82%, DLCO 88%.     She probably has early reflux with her new burping and difficulty swallowing. CT chest in 5/2022 showed no esophagus dilatation. She is a former smoker and she quitted 30+years ago. She has not tried PPI. I will order standard dose PPI daily with dinner. If her symptom does not improve at 3 months, I will suggest EGD or motility study to further evaluate her dysphagia. I will monitor her symptom closely because it can be a risk for ILD with microaspiration.    Her last echo in 2017 was normal. With her worsening ILD over recent years but now stably dyspnea, I recommend another echocardiogram.     She can follow up with me in 1-2 years but I am happy to see her sooner for any problems.        Joann Marinelli MD  Internal Medicine, PGY-1  October 12, 2023     I saw the patient with the resident.  My exam and recomendations are as described.      Petros Gunn MD      Current Outpatient Medications   Medication Sig Dispense Refill     Cholecalciferol (VITAMIN D) 1000 UNITS capsule Take 1 capsule by mouth daily        cyanocobalamin (VITAMIN B-12) 100 MCG tablet        ibuprofen (ADVIL/MOTRIN) 200 MG tablet Take 200 mg by mouth every 4 hours as needed for mild pain       tocilizumab (ACTEMRA) 162 MG/0.9ML subcutaneous injection Inject 0.9 mLs (162 mg) Subcutaneous every 7 days 3.6 mL 11     vitamin B complex with vitamin C (STRESS TAB) tablet Take 1 tablet by mouth daily       acyclovir (ZOVIRAX) 400 MG tablet Take 1 tablet (400 mg) by mouth every 8 hours For 5 days (Patient not taking: Reported on 10/9/2023) 15 tablet 1     atovaquone-proguanil (MALARONE) 250-100 MG tablet Take 1 tablet by mouth daily Start 2 days before travel and continue 7 days after return. 11 tablet 0     famotidine (PEPCID) 20 MG tablet Take 1 tablet (20 mg) by mouth daily (Patient not taking: Reported on 10/9/2023) 90 tablet 4                   Active Problem List:     Patient Active Problem List    Diagnosis Date Noted     Rheumatoid arthritis involving both hands with positive rheumatoid factor (H) 06/10/2019     Priority: Medium     Senile osteoporosis 06/10/2019     Priority: Medium     ILD (interstitial lung disease) (H) 05/25/2017     Priority: Medium     Osteoporosis 03/08/2017     Priority: Medium     AK (actinic keratosis) 05/19/2015     Priority: Medium     Systemic sclerosis with limited cutaneous involvement (H) 12/02/2014     Priority: Medium     Cluster headache syndrome 10/21/2013     Priority: Medium     CARDIOVASCULAR SCREENING; LDL GOAL LESS THAN 160 10/02/2013     Priority: Medium     Advanced directives, counseling/discussion 09/27/2013     Priority: Medium     Advance Care Planning:   Receipt of ACP document:  Received: Health Care Directive which was witnessed or notarized on 09/15/2011.  Document not previously scanned.  Validation form completed and sent with document to be scanned.      Confirmed/documented designated decision maker(s). See permanent comments section of demographics in clinical tab. View document(s) and  "details by clicking on code status.   Added by Elizabeth Hurley on 10/11/2013.             Lumbar radiculopathy 11/18/2011     Priority: Medium     Raynaud phenomenon      Priority: Medium     Anxiety and depression      Priority: Medium            History of Present Illness:     Chief Complaint   Patient presents with     New Patient     New scleroderma      Original presentation:  Ms. Stephanie Bhatia is a very pleasant 63 year old lady who is not on any prescription meds.   She works in [employer] and uses her hands a lot. She says she is here because her PCP asked her to. Ms. Bhatia thinks she can improve her fatigue on diet and exercise.   1995- c/o fatigue, migraine headache, raynauds phenomenon. Saw a rheumatologist in [place]. 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   In 2000 she moved to [facility] and was there until 2009. All those years, her raynauds and migraine went into remission on its own. But had fatigue.  In 2005 saw Dr. Kristen Agee for fatigue in [facility]. They checked her serologies. COLLEEN screen, CCP, C3, C4, neg. dsDNA neg. KRYSTLE panel was negative. PFT was \"borderline\" as per patient. Echo was \"alright\" at that time.   She thinks she has \"sciatica\".  In 2009 moved to [place].   Raynaud's phenomenon:  Onset: 1995  Digits: all fingers except thumb  Digital ulcers: none  Color changes: white and purple in past; past few years purple only  Duration of episode: not >10 mins  Pain during episode: none  Family history of Raynauds: none    GERD: occasionally.  Has had \"thick fingers\" \"always\"  Never been on any DMARDs.    Fatigue - worsening over the past few years  Migraine - 2- 3 episodes in the last one year  No muscle weakness.   Arthralgia: hands, knees, hips, wrists  Rash in forehead this winter on and off  Antiviral on and off for genital herpes (lab tested in past). She does get episodes of perianal lesions which go away in about a week's time. " "She thinks these lesions are looking different than herpes nowadays.   With exercise, she does feels that \"she cant get enough air\". Chronic cough+  She thinks she might have had couple \"spells\". One episode was 8 months ago and the other was about 4 months ago. Few seconds of blanking out and was having brain fog of about 20 mins. First episode was many years ago when she was in [place].     CXR neg.   Echo no evidence of Pulmonary HTN  MRA/MRI brain:  1. No evidence of acute infarction or intracranial hemorrhage.  2. Small amount of fluid in the left mastoid air cells .  3. Head MRA demonstrates no definite aneurysm or stenosis of the major  intracranial arteries.  4. Neck MRA demonstrates patent major cervical arteries.    Neurology visit with Olesya Jesus NP: no etiology found for the spell.   6MW test: 1700 feet  PFT within normal limits     No h/o myalgia, weight loss, loss of appetite, fevers, chills, night sweats, swollen glands  Patient denies any, malar rash, photosensitivity, recurrent mouth ulcers, sicca symptoms, recurrent sinusitis/rhinitis,swallowing difficulty, hearing or visual changes recently.   No h/o arterial/venous thrombosis in the past  No h/o persistent cough, chest pain  No h/o persistent nausea, vomiting, constipation, diarrhea, abdominal pain  No h/o hematochezia, hematuria, hemoptysis, hematemesis  No persistent headache, tingling, numbness, weakness. No h/o seizures    PULMONARY STRESS TEST, SIMPLE  MRA and MRI of brain and neck  X-ray Chest 2 views  KRYSTLE antibody panel  DNA double stranded antibodies  CBC with platelets differential  Comprehensive metabolic panel  Antinuclear antibody screen by EIA  Centromere Antibody IgG  Andreia 1 Antibody IgG  Complement C4  Complement C3  UA with Microscopic reflex to Culture  NEUROLOGY ADULT REFERRAL  EKG 12-lead complete w/read - Clinics  Echocardiogram  PFT Lab Testing    COLLEEN 1.4  Anti-centromere and anti-topoisomerase negative.     March 1, " 2017  No new changes in the skin.   Raynaud's - about the same.   No acid reflux symptoms.   No muscle weakness on daily basis.  No daily joint pains except chronic on and off neck pain.   Morning stiffness X 30 mins.  Chronic shortness of breath +ve. Not climbing stairs as much nowadays.      Chronic fatigue +ve 6-8/10  \  March 8, 2018, INTERVAL HISTORY:  I am seeing the patient today 1 year roughly after she was seen by Dr. Case.  Over that timeframe, she has really been quite well and she believes completely stable.      She continues to get Raynaud's phenomena episodes.  She can almost always break these within 15 minutes.  Indeed, they are not so bad that she cannot swim, which she really likes to do, although occasionally she gets attacks while in the pool and she fixes that simply by getting out and getting in the hot tub.  She has not developed any open digital ulcers, although she does note a little bit slower wound healing over the dorsum of her hands, but has not really had any problems in the fingertips.      From a gastrointestinal standpoint, she denies any significant GERD, any significant dysphagia, and has no features suggestive of bacterial small bowel overgrowth.      There has been some discussion with her PCP about using an oral bisphosphonate and she is a little bit concerned about that.  She has never had a barium esophagram.  I have reviewed her most recent chest x-ray and CT scans and they really do show features of a patulous esophagus, so it is unlikely she would have problems, but we did discuss the possible use of a barium esophagram to further reassure before using an oral bisphosphonate.      She continues to see Dr. Gomez for her pulmonary status and is doing every 6 month visits with pulmonary function tests which have been stable.  She believes her exercise tolerance is likewise stable.      She believes her skin is stable and she has not had significant joint pain with morning  stiffness lasting more than 10 minutes.  This includes MCP joints which do have some slight swelling, but these just do not seem to ever hurt her.      She denies any keratoconjunctivitis sicca features.      Finally, she denies any heart palpitations and really has no other new features on a complete 12-point review of systems.      Her only other symptom is just a sense of fatigue.  She is frustrated that she cannot get outside to exercise more and does not have the compelling need to do so that she had a year ago because her dog  and she decided because of an intermittently heavy work schedule not to get another dog that would have to be at home all alone often.     Internal History 2023    She has developed difficulty swallowing a pill and feels like a pill got struck in the throat for around 1 month. She also has some burping every night but no heartburn or reflex symptoms. Her last meal is 2 hours before she lays down. She has no problem with chewing food. Noted that she has hiatal hernia since  without symptoms. Her weight has been stable.     She almost never has Raynaud recently.   She has no joint pain and no morning stiffness. She fell during walking a dog that make her knee sore. She has cramping in hands and legs that happens during daytime and at night. She denies fever or sicca symptom. She has chronic fatigue that still bothers her.    Her ILD is stable and she is having Tocilizumab q 1 week. She follows with Dr. Gomez every 3 months.  She can walk 2 miles without shortness of breath. She walks 20 minutes everyday. She has SOB with walking up a stair, which has been stable.         Review of Systems:     Complete ROS negative except for symptoms mentioned in the HPI          Past Medical History:     Past Medical History:   Diagnosis Date     Anxiety      Herpes simplex without mention of complication      Interstitial lung disease (H)     scleroderma ILD, dx by chest CT 2017      Lung nodules     4 mm LLL and RLL 2017     Migraine     loss of speech and comprehension, has had w/u and is complex migraine, last one about 2017     Raynaud phenomenon      Systemic sclerosis with limited cutaneous involvement (H) 2014    Dx ~2010, Scl-70 and anti-centromere antibody neg, +RF     Past Surgical History:   Procedure Laterality Date     HYSTEROSCOPIC PLACEMENT CONTRACEPTIVE DEVICE              Social History:     Social History     Occupational History     Occupation:      Comment: [employer] - part time   Tobacco Use     Smoking status: Former     Packs/day: 1.00     Years: 20.00     Additional pack years: 0.00     Total pack years: 20.00     Types: Cigarettes     Quit date: 1986     Years since quittin.9     Smokeless tobacco: Never   Vaping Use     Vaping Use: Never used   Substance and Sexual Activity     Alcohol use: Not Currently     Comment: none since      Drug use: No     Sexual activity: Not Currently     Partners: Male            Family History:     Family History   Problem Relation Age of Onset     C.A.D. Father         a efw small heart attacks     Neurologic Disorder Father         dementia 70s     Coronary Artery Disease Father      Neurologic Disorder Mother         dementia     Depression Mother      Cerebrovascular Disease Maternal Grandmother         ,     Other Cancer Paternal Grandmother      Rheumatoid Arthritis Sister      Hypertension Sister      Dementia Paternal Cousin      Dementia Paternal Uncle         60s     Breast Cancer No family hx of      Cancer - colorectal No family hx of      Diabetes No family hx of      Cancer No family hx of         no skin cancer     Interstitial Lung Disease No family hx of      Melanoma No family hx of      Skin Cancer No family hx of      Glaucoma No family hx of      Macular Degeneration No family hx of             Allergies:   No Known Allergies         Medications:     Current Outpatient  Medications   Medication Sig Dispense Refill     Cholecalciferol (VITAMIN D) 1000 UNITS capsule Take 1 capsule by mouth daily       cyanocobalamin (VITAMIN B-12) 100 MCG tablet        ibuprofen (ADVIL/MOTRIN) 200 MG tablet Take 200 mg by mouth every 4 hours as needed for mild pain       tocilizumab (ACTEMRA) 162 MG/0.9ML subcutaneous injection Inject 0.9 mLs (162 mg) Subcutaneous every 7 days 3.6 mL 11     vitamin B complex with vitamin C (STRESS TAB) tablet Take 1 tablet by mouth daily       acyclovir (ZOVIRAX) 400 MG tablet Take 1 tablet (400 mg) by mouth every 8 hours For 5 days (Patient not taking: Reported on 10/9/2023) 15 tablet 1     atovaquone-proguanil (MALARONE) 250-100 MG tablet Take 1 tablet by mouth daily Start 2 days before travel and continue 7 days after return. 11 tablet 0     famotidine (PEPCID) 20 MG tablet Take 1 tablet (20 mg) by mouth daily (Patient not taking: Reported on 10/9/2023) 90 tablet 4            Physical Exam:   Blood pressure 111/75, pulse 77, SpO2 99%, not currently breastfeeding.  Wt Readings from Last 4 Encounters:   10/09/23 61 kg (134 lb 6.4 oz)   05/19/23 56.7 kg (125 lb)   01/19/23 59.4 kg (131 lb)   01/17/23 59.6 kg (131 lb 6.3 oz)       Constitutional: well-developed, appearing stated age; cooperative  Eyes: nl EOM, PERRLA, vision, conjunctiva, sclera  ENT: nl external ears, nose, hearing, lips, teeth, gums, throat  No mucous membrane lesions, normal saliva pool  Neck: no mass or thyroid enlargement  Resp: fine crepitation at bilateral lower lung 1/4  CV: RRR, no murmurs, rubs or gallops, no edema, no split S2  GI: no ABD mass or tenderness, no HSM  : not tested  Lymph: no cervical, supraclavicular or epitrochlear nodes  MS: All shoulder, elbow, wrist, MCP/PIP/DIP, spine, hip, knee, ankle, and foot MTP/IP joints were examined and  found normal. No active synovitis or deformity. Full ROM.  Normal  strength. Fist 100%.  No dactylitis, enthesopathy.  Swelling of all MCPs  without redness or tenderness, ulnar drift both hands (R>L)  Skin: no skin thickening, no digital ulcers, digit swelling  Psych: nl judgement, orientation, memory, affect.         Data:     No results found for any visits on 10/12/23.      Recent Labs   Lab Test  03/21/14   1044  10/21/13   1304  11/18/11   1049   WBC  8.2  6.5  7.1   RBC  4.41  4.15  4.48   HGB  13.5  13.0  13.9   HCT  41.9  39.0  42.0   MCV  95  94  94   RDW  13.1  12.8  12.9   PLT  281  274  283   ALBUMIN  3.9   --   3.9   CRP   --   <5.0   --    BUN  14  18  20      Recent Labs   Lab Test  10/21/13   1304  11/18/11   1049 10/10/08   TSH  1.57  1.10  1.09     Division of Rheumatology  [facility]  Phone (Patients line): [phone]  Pager (healthcare professionals only): [phone]  Phone (healthcare professionals only line): [phone]

## 2023-10-12 NOTE — NURSING NOTE
Chief Complaint   Patient presents with    New Patient     New scleroderma      Vitals were taken and medications were reconciled.     Jael Colby RMA  8:45 AM

## 2023-10-12 NOTE — LETTER
10/12/2023         RE: Stephanie Bhatia  [address]  [facility] [address]        Dear Colleague,    Thank you for referring your patient, Stephanie Bhatia, to the [facility] CLINIC [place]. Please see a copy of my visit note below.    [facility] - Rheumatology Clinic Visit     Stephanie Bhatia MRN# [id number]   YOB: 1950      Primary care provider: Alysa Gutierrez          Assessment and Plan:     #1 Limited cutaneous systemic sclerosis with secondary raynaud's phenomenon and abnormal capillaries in nailfolds; polyarthralgia;Scl-70 neg; anti-centromere neg  #2 Strongly positive rheumatoid factor  #3 Chronic fatigue; cough; episodes of shortness of breath  #4 Spells of speech arrest X on and off since 2013 - neurology eval negative  #5 Chronic on and off migraines  #6 Episodes of perianal rash     Limited cutaneous systemic sclerosis is largely stable with new burping at night without reflex symptoms. She has no Raynaud, skin thickening or arthralgia.    She follows with Dr. Gomez with her ILD and is on Tocilizumab every 1 week. She has stable PFT, last on 8/2023 showing TLC 82%, DLCO 88%.     She probably has early reflux with her new burping and difficulty swallowing. CT chest in 5/2022 showed no esophagus dilatation. She is a former smoker and she quitted 30+years ago. She has not tried PPI. I will order standard dose PPI daily with dinner. If her symptom does not improve at 3 months, I will suggest EGD or motility study to further evaluate her dysphagia. I will monitor her symptom closely because it can be a risk for ILD with microaspiration.    Her last echo in 2017 was normal. With her worsening ILD over recent years but now stably dyspnea, I recommend another echocardiogram.     She can follow up with me in 1-2 years but I am happy to see her sooner for any problems.        Joann Marinelli MD  Internal Medicine,  PGY-1  October 12, 2023     I saw the patient with the resident.  My exam and recomendations are as described.      Petros Gunn MD      Current Outpatient Medications   Medication Sig Dispense Refill    Cholecalciferol (VITAMIN D) 1000 UNITS capsule Take 1 capsule by mouth daily      cyanocobalamin (VITAMIN B-12) 100 MCG tablet       ibuprofen (ADVIL/MOTRIN) 200 MG tablet Take 200 mg by mouth every 4 hours as needed for mild pain      tocilizumab (ACTEMRA) 162 MG/0.9ML subcutaneous injection Inject 0.9 mLs (162 mg) Subcutaneous every 7 days 3.6 mL 11    vitamin B complex with vitamin C (STRESS TAB) tablet Take 1 tablet by mouth daily      acyclovir (ZOVIRAX) 400 MG tablet Take 1 tablet (400 mg) by mouth every 8 hours For 5 days (Patient not taking: Reported on 10/9/2023) 15 tablet 1    atovaquone-proguanil (MALARONE) 250-100 MG tablet Take 1 tablet by mouth daily Start 2 days before travel and continue 7 days after return. 11 tablet 0    famotidine (PEPCID) 20 MG tablet Take 1 tablet (20 mg) by mouth daily (Patient not taking: Reported on 10/9/2023) 90 tablet 4                   Active Problem List:     Patient Active Problem List    Diagnosis Date Noted    Rheumatoid arthritis involving both hands with positive rheumatoid factor (H) 06/10/2019     Priority: Medium    Senile osteoporosis 06/10/2019     Priority: Medium    ILD (interstitial lung disease) (H) 05/25/2017     Priority: Medium    Osteoporosis 03/08/2017     Priority: Medium    AK (actinic keratosis) 05/19/2015     Priority: Medium    Systemic sclerosis with limited cutaneous involvement (H) 12/02/2014     Priority: Medium    Cluster headache syndrome 10/21/2013     Priority: Medium    CARDIOVASCULAR SCREENING; LDL GOAL LESS THAN 160 10/02/2013     Priority: Medium    Advanced directives, counseling/discussion 09/27/2013     Priority: Medium     Advance Care Planning:   Receipt of ACP document:  Received: Health Care Directive which was witnessed or  "notarized on 09/15/2011.  Document not previously scanned.  Validation form completed and sent with document to be scanned.      Confirmed/documented designated decision maker(s). See permanent comments section of demographics in clinical tab. View document(s) and details by clicking on code status.   Added by Elizabeth Hurley on 10/11/2013.            Lumbar radiculopathy 11/18/2011     Priority: Medium    Raynaud phenomenon      Priority: Medium    Anxiety and depression      Priority: Medium            History of Present Illness:     Chief Complaint   Patient presents with    New Patient     New scleroderma      Original presentation:  Ms. Stephanie Bhatia is a very pleasant 63 year old lady who is not on any prescription meds.   She works in [employer] and uses her hands a lot. She says she is here because her PCP asked her to. Ms. Bhatia thinks she can improve her fatigue on diet and exercise.   1995- c/o fatigue, migraine headache, raynauds phenomenon. Saw a rheumatologist in [place]. 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   In 2000 she moved to [facility] and was there until 2009. All those years, her raynauds and migraine went into remission on its own. But had fatigue.  In 2005 saw Dr. Kristen Agee for fatigue in [facility]. They checked her serologies. COLLEEN screen, CCP, C3, C4, neg. dsDNA neg. KRYSTLE panel was negative. PFT was \"borderline\" as per patient. Echo was \"alright\" at that time.   She thinks she has \"sciatica\".  In 2009 moved to [place].   Raynaud's phenomenon:  Onset: 1995  Digits: all fingers except thumb  Digital ulcers: none  Color changes: white and purple in past; past few years purple only  Duration of episode: not >10 mins  Pain during episode: none  Family history of Raynauds: none    GERD: occasionally.  Has had \"thick fingers\" \"always\"  Never been on any DMARDs.    Fatigue - worsening over the past few years  Migraine - 2- 3 episodes in the " "last one year  No muscle weakness.   Arthralgia: hands, knees, hips, wrists  Rash in forehead this winter on and off  Antiviral on and off for genital herpes (lab tested in past). She does get episodes of perianal lesions which go away in about a week's time. She thinks these lesions are looking different than herpes nowadays.   With exercise, she does feels that \"she cant get enough air\". Chronic cough+  She thinks she might have had couple \"spells\". One episode was 8 months ago and the other was about 4 months ago. Few seconds of blanking out and was having brain fog of about 20 mins. First episode was many years ago when she was in [place].     CXR neg.   Echo no evidence of Pulmonary HTN  MRA/MRI brain:  1. No evidence of acute infarction or intracranial hemorrhage.  2. Small amount of fluid in the left mastoid air cells .  3. Head MRA demonstrates no definite aneurysm or stenosis of the major  intracranial arteries.  4. Neck MRA demonstrates patent major cervical arteries.    Neurology visit with Olesya Jesus NP: no etiology found for the spell.   6MW test: 1700 feet  PFT within normal limits     No h/o myalgia, weight loss, loss of appetite, fevers, chills, night sweats, swollen glands  Patient denies any, malar rash, photosensitivity, recurrent mouth ulcers, sicca symptoms, recurrent sinusitis/rhinitis,swallowing difficulty, hearing or visual changes recently.   No h/o arterial/venous thrombosis in the past  No h/o persistent cough, chest pain  No h/o persistent nausea, vomiting, constipation, diarrhea, abdominal pain  No h/o hematochezia, hematuria, hemoptysis, hematemesis  No persistent headache, tingling, numbness, weakness. No h/o seizures    PULMONARY STRESS TEST, SIMPLE  MRA and MRI of brain and neck  X-ray Chest 2 views  KRYSTLE antibody panel  DNA double stranded antibodies  CBC with platelets differential  Comprehensive metabolic panel  Antinuclear antibody screen by EIA  Centromere Antibody IgG  Andreia " 1 Antibody IgG  Complement C4  Complement C3  UA with Microscopic reflex to Culture  NEUROLOGY ADULT REFERRAL  EKG 12-lead complete w/read - Clinics  Echocardiogram  PFT Lab Testing    COLLEEN 1.4  Anti-centromere and anti-topoisomerase negative.     March 1, 2017  No new changes in the skin.   Raynaud's - about the same.   No acid reflux symptoms.   No muscle weakness on daily basis.  No daily joint pains except chronic on and off neck pain.   Morning stiffness X 30 mins.  Chronic shortness of breath +ve. Not climbing stairs as much nowadays.      Chronic fatigue +ve 6-8/10  \  March 8, 2018, INTERVAL HISTORY:  I am seeing the patient today 1 year roughly after she was seen by Dr. Case.  Over that timeframe, she has really been quite well and she believes completely stable.      She continues to get Raynaud's phenomena episodes.  She can almost always break these within 15 minutes.  Indeed, they are not so bad that she cannot swim, which she really likes to do, although occasionally she gets attacks while in the pool and she fixes that simply by getting out and getting in the hot tub.  She has not developed any open digital ulcers, although she does note a little bit slower wound healing over the dorsum of her hands, but has not really had any problems in the fingertips.      From a gastrointestinal standpoint, she denies any significant GERD, any significant dysphagia, and has no features suggestive of bacterial small bowel overgrowth.      There has been some discussion with her PCP about using an oral bisphosphonate and she is a little bit concerned about that.  She has never had a barium esophagram.  I have reviewed her most recent chest x-ray and CT scans and they really do show features of a patulous esophagus, so it is unlikely she would have problems, but we did discuss the possible use of a barium esophagram to further reassure before using an oral bisphosphonate.      She continues to see Dr. Gomez for  her pulmonary status and is doing every 6 month visits with pulmonary function tests which have been stable.  She believes her exercise tolerance is likewise stable.      She believes her skin is stable and she has not had significant joint pain with morning stiffness lasting more than 10 minutes.  This includes MCP joints which do have some slight swelling, but these just do not seem to ever hurt her.      She denies any keratoconjunctivitis sicca features.      Finally, she denies any heart palpitations and really has no other new features on a complete 12-point review of systems.      Her only other symptom is just a sense of fatigue.  She is frustrated that she cannot get outside to exercise more and does not have the compelling need to do so that she had a year ago because her dog  and she decided because of an intermittently heavy work schedule not to get another dog that would have to be at home all alone often.     Internal History 2023    She has developed difficulty swallowing a pill and feels like a pill got struck in the throat for around 1 month. She also has some burping every night but no heartburn or reflex symptoms. Her last meal is 2 hours before she lays down. She has no problem with chewing food. Noted that she has hiatal hernia since  without symptoms. Her weight has been stable.     She almost never has Raynaud recently.   She has no joint pain and no morning stiffness. She fell during walking a dog that make her knee sore. She has cramping in hands and legs that happens during daytime and at night. She denies fever or sicca symptom. She has chronic fatigue that still bothers her.    Her ILD is stable and she is having Tocilizumab q 1 week. She follows with Dr. Gomez every 3 months.  She can walk 2 miles without shortness of breath. She walks 20 minutes everyday. She has SOB with walking up a stair, which has been stable.         Review of Systems:     Complete ROS negative  except for symptoms mentioned in the HPI          Past Medical History:     Past Medical History:   Diagnosis Date    Anxiety     Herpes simplex without mention of complication     Interstitial lung disease (H)     scleroderma ILD, dx by chest CT 2017    Lung nodules     4 mm LLL and RLL     Migraine     loss of speech and comprehension, has had w/u and is complex migraine, last one about 2017    Raynaud phenomenon     Systemic sclerosis with limited cutaneous involvement (H) 2014    Dx ~, Scl-70 and anti-centromere antibody neg, +RF     Past Surgical History:   Procedure Laterality Date    HYSTEROSCOPIC PLACEMENT CONTRACEPTIVE DEVICE              Social History:     Social History     Occupational History    Occupation:      Comment: [employer] - part time   Tobacco Use    Smoking status: Former     Packs/day: 1.00     Years: 20.00     Additional pack years: 0.00     Total pack years: 20.00     Types: Cigarettes     Quit date: 1986     Years since quittin.9    Smokeless tobacco: Never   Vaping Use    Vaping Use: Never used   Substance and Sexual Activity    Alcohol use: Not Currently     Comment: none since     Drug use: No    Sexual activity: Not Currently     Partners: Male            Family History:     Family History   Problem Relation Age of Onset    C.A.D. Father         a efw small heart attacks    Neurologic Disorder Father         dementia 70s    Coronary Artery Disease Father     Neurologic Disorder Mother         dementia    Depression Mother     Cerebrovascular Disease Maternal Grandmother         ,    Other Cancer Paternal Grandmother     Rheumatoid Arthritis Sister     Hypertension Sister     Dementia Paternal Cousin     Dementia Paternal Uncle         60s    Breast Cancer No family hx of     Cancer - colorectal No family hx of     Diabetes No family hx of     Cancer No family hx of         no skin cancer    Interstitial Lung Disease No family hx of      Melanoma No family hx of     Skin Cancer No family hx of     Glaucoma No family hx of     Macular Degeneration No family hx of             Allergies:   No Known Allergies         Medications:     Current Outpatient Medications   Medication Sig Dispense Refill    Cholecalciferol (VITAMIN D) 1000 UNITS capsule Take 1 capsule by mouth daily      cyanocobalamin (VITAMIN B-12) 100 MCG tablet       ibuprofen (ADVIL/MOTRIN) 200 MG tablet Take 200 mg by mouth every 4 hours as needed for mild pain      tocilizumab (ACTEMRA) 162 MG/0.9ML subcutaneous injection Inject 0.9 mLs (162 mg) Subcutaneous every 7 days 3.6 mL 11    vitamin B complex with vitamin C (STRESS TAB) tablet Take 1 tablet by mouth daily      acyclovir (ZOVIRAX) 400 MG tablet Take 1 tablet (400 mg) by mouth every 8 hours For 5 days (Patient not taking: Reported on 10/9/2023) 15 tablet 1    atovaquone-proguanil (MALARONE) 250-100 MG tablet Take 1 tablet by mouth daily Start 2 days before travel and continue 7 days after return. 11 tablet 0    famotidine (PEPCID) 20 MG tablet Take 1 tablet (20 mg) by mouth daily (Patient not taking: Reported on 10/9/2023) 90 tablet 4            Physical Exam:   Blood pressure 111/75, pulse 77, SpO2 99%, not currently breastfeeding.  Wt Readings from Last 4 Encounters:   10/09/23 61 kg (134 lb 6.4 oz)   05/19/23 56.7 kg (125 lb)   01/19/23 59.4 kg (131 lb)   01/17/23 59.6 kg (131 lb 6.3 oz)       Constitutional: well-developed, appearing stated age; cooperative  Eyes: nl EOM, PERRLA, vision, conjunctiva, sclera  ENT: nl external ears, nose, hearing, lips, teeth, gums, throat  No mucous membrane lesions, normal saliva pool  Neck: no mass or thyroid enlargement  Resp: fine crepitation at bilateral lower lung 1/4  CV: RRR, no murmurs, rubs or gallops, no edema, no split S2  GI: no ABD mass or tenderness, no HSM  : not tested  Lymph: no cervical, supraclavicular or epitrochlear nodes  MS: All shoulder, elbow, wrist, MCP/PIP/DIP,  spine, hip, knee, ankle, and foot MTP/IP joints were examined and  found normal. No active synovitis or deformity. Full ROM.  Normal  strength. Fist 100%.  No dactylitis, enthesopathy.  Swelling of all MCPs without redness or tenderness, ulnar drift both hands (R>L)  Skin: no skin thickening, no digital ulcers, digit swelling  Psych: nl judgement, orientation, memory, affect.         Data:     No results found for any visits on 10/12/23.      Recent Labs   Lab Test  03/21/14   1044  10/21/13   1304  11/18/11   1049   WBC  8.2  6.5  7.1   RBC  4.41  4.15  4.48   HGB  13.5  13.0  13.9   HCT  41.9  39.0  42.0   MCV  95  94  94   RDW  13.1  12.8  12.9   PLT  281  274  283   ALBUMIN  3.9   --   3.9   CRP   --   <5.0   --    BUN  14  18  20      Recent Labs   Lab Test  10/21/13   1304  11/18/11   1049 10/10/08   TSH  1.57  1.10  1.09     Division of Rheumatology  [facility]  Phone (Patients line): [phone]  Pager (healthcare professionals only): [phone]  Phone (healthcare professionals only line): [phone]        Again, thank you for allowing me to participate in the care of your patient.        Sincerely,        Petros Gunn MD

## 2023-10-20 ENCOUNTER — ANCILLARY PROCEDURE (OUTPATIENT)
Dept: CARDIOLOGY | Facility: CLINIC | Age: 73
End: 2023-10-20
Attending: INTERNAL MEDICINE
Payer: MEDICARE

## 2023-10-20 DIAGNOSIS — R06.09 OTHER FORMS OF DYSPNEA: ICD-10-CM

## 2023-10-20 DIAGNOSIS — I73.00 RAYNAUD'S PHENOMENON WITHOUT GANGRENE: ICD-10-CM

## 2023-10-20 DIAGNOSIS — M34.9 SCLERODERMA WITH PULMONARY INVOLVEMENT (H): ICD-10-CM

## 2023-10-20 DIAGNOSIS — K21.9 GASTROESOPHAGEAL REFLUX DISEASE, UNSPECIFIED WHETHER ESOPHAGITIS PRESENT: ICD-10-CM

## 2023-10-20 LAB — LVEF ECHO: NORMAL

## 2023-10-20 PROCEDURE — 93306 TTE W/DOPPLER COMPLETE: CPT | Performed by: INTERNAL MEDICINE

## 2023-10-20 PROCEDURE — 99207 PR STATISTIC IV PUSH SINGLE INITIAL SUBSTANCE: CPT | Performed by: INTERNAL MEDICINE

## 2023-10-20 RX ADMIN — Medication 6 ML: at 08:32

## 2023-10-31 PROBLEM — K21.9 GASTROESOPHAGEAL REFLUX DISEASE, UNSPECIFIED WHETHER ESOPHAGITIS PRESENT: Status: ACTIVE | Noted: 2023-10-31

## 2023-10-31 PROBLEM — M34.9: Status: ACTIVE | Noted: 2023-10-31

## 2023-11-03 DIAGNOSIS — K22.4 ESOPHAGEAL DYSMOTILITY: ICD-10-CM

## 2023-11-03 DIAGNOSIS — M34.9 SYSTEMIC SCLEROSIS WITH LIMITED CUTANEOUS INVOLVEMENT (H): Primary | ICD-10-CM

## 2023-11-03 DIAGNOSIS — M34.9 SCLERODERMA WITH PULMONARY INVOLVEMENT (H): ICD-10-CM

## 2023-11-03 DIAGNOSIS — K21.9 GASTROESOPHAGEAL REFLUX DISEASE, UNSPECIFIED WHETHER ESOPHAGITIS PRESENT: ICD-10-CM

## 2023-11-03 DIAGNOSIS — I73.00 RAYNAUD'S PHENOMENON WITHOUT GANGRENE: ICD-10-CM

## 2023-11-03 NOTE — TELEPHONE ENCOUNTER
OMEPRAZOLE DR 20 MG CAPSULE   Last Written Prescription Date:  10/12/2023  Last Fill Quantity: 30,   # refills: 11  Last Office Visit :  10/12/2023  Future Office visit:  None    Routing refill request to provider for review/approval because:    Pharmacy comment: REQUEST FOR 90 DAYS PRESCRIPTION. DX Code Needed.   Please send new updated order.     Melissa Saldivar RN  Central Triage Red Flags/Med Refills

## 2023-11-16 ENCOUNTER — OFFICE VISIT (OUTPATIENT)
Dept: OPHTHALMOLOGY | Facility: CLINIC | Age: 73
End: 2023-11-16
Attending: OPHTHALMOLOGY
Payer: MEDICARE

## 2023-11-16 DIAGNOSIS — H25.13 NUCLEAR SCLEROTIC CATARACT OF BOTH EYES: Primary | ICD-10-CM

## 2023-11-16 DIAGNOSIS — M05.741 RHEUMATOID ARTHRITIS INVOLVING BOTH HANDS WITH POSITIVE RHEUMATOID FACTOR (H): ICD-10-CM

## 2023-11-16 DIAGNOSIS — M05.742 RHEUMATOID ARTHRITIS INVOLVING BOTH HANDS WITH POSITIVE RHEUMATOID FACTOR (H): ICD-10-CM

## 2023-11-16 DIAGNOSIS — H26.8 PSEUDOEXFOLIATION OF LENS CAPSULE: ICD-10-CM

## 2023-11-16 PROCEDURE — 92014 COMPRE OPH EXAM EST PT 1/>: CPT | Performed by: OPHTHALMOLOGY

## 2023-11-16 PROCEDURE — G0463 HOSPITAL OUTPT CLINIC VISIT: HCPCS | Performed by: OPHTHALMOLOGY

## 2023-11-16 ASSESSMENT — CONF VISUAL FIELD
OD_SUPERIOR_NASAL_RESTRICTION: 0
OS_INFERIOR_TEMPORAL_RESTRICTION: 0
OD_INFERIOR_TEMPORAL_RESTRICTION: 0
OD_NORMAL: 1
METHOD: COUNTING FINGERS
OS_INFERIOR_NASAL_RESTRICTION: 0
OS_SUPERIOR_NASAL_RESTRICTION: 0
OD_SUPERIOR_TEMPORAL_RESTRICTION: 0
OD_INFERIOR_NASAL_RESTRICTION: 0
OS_SUPERIOR_TEMPORAL_RESTRICTION: 0
OS_NORMAL: 1

## 2023-11-16 ASSESSMENT — VISUAL ACUITY
OD_CC: 20/20
OD_CC+: -3
METHOD: SNELLEN - LINEAR
OS_CC: J3
OS_CC: 20/20
OD_CC: J3
OS_CC+: -2

## 2023-11-16 ASSESSMENT — REFRACTION_WEARINGRX
OS_SPHERE: +1.50
OS_ADD: +2.75
OS_CYLINDER: +1.25
OS_AXIS: 179
SPECS_TYPE: TRIFOCAL
OD_SPHERE: +1.50
OD_ADD: +2.75
OD_AXIS: 005
OD_CYLINDER: +1.00

## 2023-11-16 ASSESSMENT — TONOMETRY
OD_IOP_MMHG: 23
OS_IOP_MMHG: 23
IOP_METHOD: TONOPEN

## 2023-11-16 ASSESSMENT — CUP TO DISC RATIO
OD_RATIO: 0.2
OS_RATIO: 0.2

## 2023-11-16 ASSESSMENT — SLIT LAMP EXAM - LIDS
COMMENTS: MGD
COMMENTS: MGD

## 2023-11-16 ASSESSMENT — EXTERNAL EXAM - RIGHT EYE: OD_EXAM: NORMAL

## 2023-11-16 ASSESSMENT — EXTERNAL EXAM - LEFT EYE: OS_EXAM: NORMAL

## 2023-11-16 NOTE — PROGRESS NOTES
Chief Complaint(s) and History of Present Illness(es)     Annual Eye Exam            Laterality: both eyes    Onset: gradual          Comments    Stephanie is here for a complete eye exam. She was last here in December of 2020. Today she states driving at night is more difficult. She is here to   have glasses checked  and to be dilated She would also like her cataracts   checked to find if they are worsening.     Reginaldo Hurd COT 2:25 PM November 16, 2023                Review of systems for the eyes was negative other than the pertinent positives/negatives listed in the HPI.      Assessment & Plan      Stephanie Bhatia is a 73 year old female with the following diagnoses:   1. Nuclear sclerotic cataract of both eyes    2. 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  Some increased difficulty with night driving, otherwise doing well  Intraocular pressure borderline both eyes today   Healthy ON with no sign of glaucoma    Early cataracts with mildly reduced vision   Deferred refraction today   Return precautions reviewed        Patient disposition:   Return in about 1 year (around 11/16/2024) for DFE, OCT NFL.           Attending Physician Attestation:  Complete documentation of historical and exam elements from today's encounter can be found in the full encounter summary report (not reduplicated in this progress note).  I personally obtained the chief complaint(s) and history of present illness.  I confirmed and edited as necessary the review of systems, past medical/surgical history, family history, social history, and examination findings as documented by others; and I examined the patient myself.  I personally reviewed the relevant tests, images, and reports as documented above.  I formulated and edited as necessary the assessment and plan and discussed the findings and management plan with the patient and  family. . - Serge Parham MD

## 2023-11-16 NOTE — NURSING NOTE
Chief Complaints and History of Present Illnesses   Patient presents with    Annual Eye Exam     Chief Complaint(s) and History of Present Illness(es)       Annual Eye Exam              Laterality: both eyes    Onset: gradual              Comments    Stephanie is here for a complete eye exam. She was last here in December of 2020. Today she states driving at night is more difficult. She is here to have glasses checked  and to be dilated She would also like her cataracts checked to find if they are worsening    Reginaldo Hurd COT 2:25 PM November 16, 2023

## 2023-11-17 ENCOUNTER — OFFICE VISIT (OUTPATIENT)
Dept: PULMONOLOGY | Facility: CLINIC | Age: 73
End: 2023-11-17
Attending: INTERNAL MEDICINE
Payer: MEDICARE

## 2023-11-17 ENCOUNTER — LAB (OUTPATIENT)
Dept: LAB | Facility: CLINIC | Age: 73
End: 2023-11-17
Attending: INTERNAL MEDICINE
Payer: MEDICARE

## 2023-11-17 VITALS
SYSTOLIC BLOOD PRESSURE: 110 MMHG | HEIGHT: 65 IN | OXYGEN SATURATION: 100 % | RESPIRATION RATE: 17 BRPM | DIASTOLIC BLOOD PRESSURE: 71 MMHG | HEART RATE: 76 BPM | WEIGHT: 128 LBS | BODY MASS INDEX: 21.33 KG/M2

## 2023-11-17 DIAGNOSIS — R25.2 MUSCLE CRAMPS: ICD-10-CM

## 2023-11-17 DIAGNOSIS — R42 LIGHTHEADEDNESS: ICD-10-CM

## 2023-11-17 DIAGNOSIS — J84.9 ILD (INTERSTITIAL LUNG DISEASE) (H): ICD-10-CM

## 2023-11-17 LAB
6 MIN WALK (FT): 1650 FT
6 MIN WALK (M): 503 M
ALBUMIN SERPL BCG-MCNC: 4.3 G/DL (ref 3.5–5.2)
ALP SERPL-CCNC: 43 U/L (ref 40–150)
ALT SERPL W P-5'-P-CCNC: 13 U/L (ref 0–50)
AST SERPL W P-5'-P-CCNC: 26 U/L (ref 0–45)
BASOPHILS # BLD AUTO: 0.1 10E3/UL (ref 0–0.2)
BASOPHILS NFR BLD AUTO: 2 %
BILIRUB DIRECT SERPL-MCNC: 0.22 MG/DL (ref 0–0.3)
BILIRUB SERPL-MCNC: 1.1 MG/DL
EOSINOPHIL # BLD AUTO: 0.3 10E3/UL (ref 0–0.7)
EOSINOPHIL NFR BLD AUTO: 8 %
ERYTHROCYTE [DISTWIDTH] IN BLOOD BY AUTOMATED COUNT: 12.1 % (ref 10–15)
FOLATE SERPL-MCNC: 29.1 NG/ML (ref 4.6–34.8)
HCT VFR BLD AUTO: 41.7 % (ref 35–47)
HGB BLD-MCNC: 14.2 G/DL (ref 11.7–15.7)
IMM GRANULOCYTES # BLD: 0 10E3/UL
IMM GRANULOCYTES NFR BLD: 0 %
LYMPHOCYTES # BLD AUTO: 1.3 10E3/UL (ref 0.8–5.3)
LYMPHOCYTES NFR BLD AUTO: 33 %
MAGNESIUM SERPL-MCNC: 2 MG/DL (ref 1.7–2.3)
MCH RBC QN AUTO: 32.3 PG (ref 26.5–33)
MCHC RBC AUTO-ENTMCNC: 34.1 G/DL (ref 31.5–36.5)
MCV RBC AUTO: 95 FL (ref 78–100)
MONOCYTES # BLD AUTO: 0.5 10E3/UL (ref 0–1.3)
MONOCYTES NFR BLD AUTO: 14 %
NEUTROPHILS # BLD AUTO: 1.7 10E3/UL (ref 1.6–8.3)
NEUTROPHILS NFR BLD AUTO: 43 %
NRBC # BLD AUTO: 0 10E3/UL
NRBC BLD AUTO-RTO: 0 /100
PLATELET # BLD AUTO: 202 10E3/UL (ref 150–450)
PROT SERPL-MCNC: 6.6 G/DL (ref 6.4–8.3)
RBC # BLD AUTO: 4.39 10E6/UL (ref 3.8–5.2)
TSH SERPL DL<=0.005 MIU/L-ACNC: 2.51 UIU/ML (ref 0.3–4.2)
VIT B12 SERPL-MCNC: 1325 PG/ML (ref 232–1245)
VIT D+METAB SERPL-MCNC: 29 NG/ML (ref 20–50)
WBC # BLD AUTO: 3.8 10E3/UL (ref 4–11)

## 2023-11-17 PROCEDURE — 85025 COMPLETE CBC W/AUTO DIFF WBC: CPT | Performed by: PATHOLOGY

## 2023-11-17 PROCEDURE — 82746 ASSAY OF FOLIC ACID SERUM: CPT | Performed by: FAMILY MEDICINE

## 2023-11-17 PROCEDURE — 84443 ASSAY THYROID STIM HORMONE: CPT | Performed by: PATHOLOGY

## 2023-11-17 PROCEDURE — 99214 OFFICE O/P EST MOD 30 MIN: CPT | Mod: 25 | Performed by: INTERNAL MEDICINE

## 2023-11-17 PROCEDURE — G0463 HOSPITAL OUTPT CLINIC VISIT: HCPCS | Performed by: INTERNAL MEDICINE

## 2023-11-17 PROCEDURE — 36415 COLL VENOUS BLD VENIPUNCTURE: CPT | Performed by: PATHOLOGY

## 2023-11-17 PROCEDURE — 82607 VITAMIN B-12: CPT | Performed by: FAMILY MEDICINE

## 2023-11-17 PROCEDURE — 94729 DIFFUSING CAPACITY: CPT | Performed by: INTERNAL MEDICINE

## 2023-11-17 PROCEDURE — 94375 RESPIRATORY FLOW VOLUME LOOP: CPT | Performed by: INTERNAL MEDICINE

## 2023-11-17 PROCEDURE — 80076 HEPATIC FUNCTION PANEL: CPT | Performed by: PATHOLOGY

## 2023-11-17 PROCEDURE — 94618 PULMONARY STRESS TESTING: CPT | Performed by: INTERNAL MEDICINE

## 2023-11-17 PROCEDURE — 94726 PLETHYSMOGRAPHY LUNG VOLUMES: CPT | Performed by: INTERNAL MEDICINE

## 2023-11-17 PROCEDURE — 82306 VITAMIN D 25 HYDROXY: CPT | Performed by: FAMILY MEDICINE

## 2023-11-17 PROCEDURE — 99000 SPECIMEN HANDLING OFFICE-LAB: CPT | Performed by: PATHOLOGY

## 2023-11-17 PROCEDURE — 83735 ASSAY OF MAGNESIUM: CPT | Performed by: PATHOLOGY

## 2023-11-17 RX ORDER — MULTIVIT WITH MINERALS/LUTEIN
TABLET ORAL
COMMUNITY
Start: 2022-09-01 | End: 2024-05-17

## 2023-11-17 ASSESSMENT — PAIN SCALES - GENERAL: PAINLEVEL: NO PAIN (0)

## 2023-11-17 NOTE — PROGRESS NOTES
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  She was initially diagnosed with scleroderma in about 2009 or 2010.  Scleroderma ILD was first diagnosed on chest CT scan in 2/2017.  She also had a few nodules that were small, 4 mm and did not require follow-up as she was low risk.  She started tocilizumab in approximately September 2022 for progressive drop in PFT and progression of ILD on chest CT. she opted for tocilizumab instead of mycophenolate or nintedanib.    Today, Stephanie reports that she is doing well.  She did start famotidine for GERD, but she stopped it for a short time because of burping.  However, she did restart it and is tolerating it well.  She also does not eat for 3 hours before bed.  She also stopped caffeine other than as an occasional treat.  She has had no bad heartburn episodes since starting famotidine, and she has been more aware of her diet.    She stopped walking about 2 weeks ago because of the cold and has not gotten back into it.  Her dyspnea on exertion remains unchanged, for example she does feel a little short of breath when she walks up 1 flight of stairs.  Her cough overall is better, and this might might be related to treatment of her GERD and/or tocilizumab treating her ILD.  She denies paroxysmal nocturnal dyspnea.  She denies digital fingertip ulcers and denies arthralgias.    She did buy a portable humidifier for her bedroom but has not used it yet.  She did receive the flu vaccine, RSV vaccine and the updated COVID-19 booster vaccine.  She cut ice cream out of her diet because of high cholesterol level earlier this year.          Current Outpatient Medications   Medication    Cholecalciferol (VITAMIN D) 1000 UNITS capsule    cyanocobalamin  (VITAMIN B-12) 100 MCG tablet    ibuprofen (ADVIL/MOTRIN) 200 MG tablet    tocilizumab (ACTEMRA) 162 MG/0.9ML subcutaneous injection    vitamin B complex with vitamin C (STRESS TAB) tablet    vitamin C (ASCORBIC ACID) 1000 MG TABS    acyclovir (ZOVIRAX) 400 MG tablet    famotidine (PEPCID) 20 MG tablet     No current facility-administered medications for this visit.     No Known Allergies  Past Medical History:   Diagnosis Date    Anxiety     Herpes simplex without mention of complication     Interstitial lung disease (H)     scleroderma ILD, dx by chest CT 2017    Lung nodules     4 mm LLL and RLL     Migraine     loss of speech and comprehension, has had w/u and is complex migraine, last one about 2017    Raynaud phenomenon     Systemic sclerosis with limited cutaneous involvement (H) 2014    Dx ~, Scl-70 and anti-centromere antibody neg, +RF       Past Surgical History:   Procedure Laterality Date    HYSTEROSCOPIC PLACEMENT CONTRACEPTIVE DEVICE         Social History     Socioeconomic History    Marital status: Single     Spouse name:     Number of children: 0    Years of education: Not on file    Highest education level: Master's degree (e.g., MA, MS, [place], MEd, MSW, DIANE)   Occupational History    Occupation:      Comment: [employer] - part time   Tobacco Use    Smoking status: Former     Packs/day: 1.00     Years: 20.00     Additional pack years: 0.00     Total pack years: 20.00     Types: Cigarettes     Quit date: 1986     Years since quittin.0    Smokeless tobacco: Never   Vaping Use    Vaping Use: Never used   Substance and Sexual Activity    Alcohol use: Not Currently     Comment: none since     Drug use: No    Sexual activity: Not Currently     Partners: Male   Other Topics Concern    Parent/sibling w/ CABG, MI or angioplasty before 65F 55M? No   Social History Narrative    .  No children. Sister lives in [place].  Part time at [employer].    in the past. Currently lives with family.    Possible asbestos exposure from basement pipes in childhood home.  [facility] in  [facility] [id number].  Denies exposure to pet birds, hot tubs.  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     Social Determinants of Health     Financial Resource Strain: Low Risk  (10/2/2023)    Financial Resource Strain     Within the past 12 months, have you or your family members you live with been unable to get utilities (heat, electricity) when it was really needed?: No   Food Insecurity: Low Risk  (10/2/2023)    Food Insecurity     Within the past 12 months, did you worry that your food would run out before you got money to buy more?: No     Within the past 12 months, did the food you bought just not last and you didn t have money to get more?: No   Transportation Needs: Low Risk  (10/2/2023)    Transportation Needs     Within the past 12 months, has lack of transportation kept you from medical appointments, getting your medicines, non-medical meetings or appointments, work, or from getting things that you need?: No   Physical Activity: Sufficiently Active (11/22/2021)    Exercise Vital Sign     Days of Exercise per Week: 7 days     Minutes of Exercise per Session: 60 min   Stress: Stress Concern Present (11/22/2021)    [employer] [place] [employer] - Occupational Stress Questionnaire     Feeling of Stress : To some extent   Social Connections: Moderately Isolated (11/22/2021)    Social Connection and Isolation Panel [NHANES]     Frequency of Communication with Friends and Family: More than three times a week     Frequency of Social Gatherings with Friends and Family: More than three times a week     Attends Sikh Services: More than 4 times per year     Active Member of Clubs or Organizations: No     Attends Club or Organization Meetings: Never     Marital Status:    Interpersonal Safety: Not At Risk (11/22/2021)     "Humiliation, Afraid, Rape, and Kick questionnaire     Fear of Current or Ex-Partner: No     Emotionally Abused: No     Physically Abused: No     Sexually Abused: No   Housing Stability: High Risk (10/2/2023)    Housing Stability     Do you have housing? : Yes     Are you worried about losing your housing?: Yes       Family History   Problem Relation Age of Onset    C.A.D. Father         a efw small heart attacks    Neurologic Disorder Father         dementia 70s    Coronary Artery Disease Father     Neurologic Disorder Mother         dementia    Depression Mother     Cerebrovascular Disease Maternal Grandmother         ,    Other Cancer Paternal Grandmother     Rheumatoid Arthritis Sister     Hypertension Sister     Dementia Paternal Cousin     Dementia Paternal Uncle         60s    Breast Cancer No family hx of     Cancer - colorectal No family hx of     Diabetes No family hx of     Cancer No family hx of         no skin cancer    Interstitial Lung Disease No family hx of     Melanoma No family hx of     Skin Cancer No family hx of     Glaucoma No family hx of     Macular Degeneration No family hx of            Vitals: /71   Pulse 76   Resp 17   Ht 1.651 m (5' 5\")   Wt 58.1 kg (128 lb)   SpO2 100%   BMI 21.30 kg/m      Exam:   GENERAL APPEARANCE: Well developed, well nourished, alert, and in no apparent distress.  RESP: good air flow throughout.  Bibasilar inspiratory crackles. No rhonchi. No wheezes.  CV: Normal S1, S2, regular rhythm, normal rate. No murmur.  No LE edema.   MS: extremities normal. No clubbing. No cyanosis.  SKIN: no rash on limited exam.  NEURO: Mentation intact, speech normal, normal gait and stance.  PSYCH: mentation appears normal. and affect normal/bright.    Results:  Recent Results (from the past 168 hour(s))   6 minute walk test    Collection Time: 11/17/23 12:00 AM   Result Value Ref Range    6 min walk (FT) 1,650 1,184 ft    6 Min Walk (M) 503 361 m   General PFT Lab (Please " always keep checked)    Collection Time: 11/17/23  6:41 AM   Result Value Ref Range    FVC-Pred 2.56 L    FVC-Pre 2.80 L    FVC-%Pred-Pre 109 %    FEV1-Pre 2.41 L    FEV1-%Pred-Pre 121 %    FEV1FVC-Pred 78 %    FEV1FVC-Pre 86 %    FEFMax-Pred 5.38 L/sec    FEFMax-Pre 6.76 L/sec    FEFMax-%Pred-Pre 125 %    FEF2575-Pred 1.64 L/sec    FEF2575-Pre 2.73 L/sec    KJT6284-%Pred-Pre 166 %    ExpTime-Pre 7.16 sec    FIFMax-Pre 3.85 L/sec    VC-Pred 3.14 L    VC-Pre 2.65 L    VC-%Pred-Pre 84 %    IC-Pred 1.93 L    IC-Pre 1.60 L    IC-%Pred-Pre 82 %    ERV-Pred 0.97 L    ERV-Pre 1.05 L    ERV-%Pred-Pre 108 %    FEV1FEV6-Pred 78 %    FEV1FEV6-Pre 85 %    FRCPleth-Pred 2.77 L    FRCPleth-Pre 3.05 L    FRCPleth-%Pred-Pre 110 %    RVPleth-Pred 2.19 L    RVPleth-Pre 2.01 L    RVPleth-%Pred-Pre 91 %    TLCPleth-Pred 5.11 L    TLCPleth-Pre 4.65 L    TLCPleth-%Pred-Pre 91 %    DLCOunc-Pred 19.10 ml/min/mmHg    DLCOunc-Pre 18.71 ml/min/mmHg    DLCOunc-%Pred-Pre 97 %    VA-Pre 3.22 L    VA-%Pred-Pre 69 %    FEV1SVC-Pred 63 %    FEV1SVC-Pre 91 %   Magnesium    Collection Time: 11/17/23  7:32 AM   Result Value Ref Range    Magnesium 2.0 1.7 - 2.3 mg/dL   Hepatic function panel    Collection Time: 11/17/23  7:32 AM   Result Value Ref Range    Protein Total 6.6 6.4 - 8.3 g/dL    Albumin 4.3 3.5 - 5.2 g/dL    Bilirubin Total 1.1 <=1.2 mg/dL    Alkaline Phosphatase 43 40 - 150 U/L    AST 26 0 - 45 U/L    ALT 13 0 - 50 U/L    Bilirubin Direct 0.22 0.00 - 0.30 mg/dL   CBC with platelets and differential    Collection Time: 11/17/23  7:32 AM   Result Value Ref Range    WBC Count 3.8 (L) 4.0 - 11.0 10e3/uL    RBC Count 4.39 3.80 - 5.20 10e6/uL    Hemoglobin 14.2 11.7 - 15.7 g/dL    Hematocrit 41.7 35.0 - 47.0 %    MCV 95 78 - 100 fL    MCH 32.3 26.5 - 33.0 pg    MCHC 34.1 31.5 - 36.5 g/dL    RDW 12.1 10.0 - 15.0 %    Platelet Count 202 150 - 450 10e3/uL    % Neutrophils 43 %    % Lymphocytes 33 %    % Monocytes 14 %    % Eosinophils 8 %    %  Basophils 2 %    % Immature Granulocytes 0 %    NRBCs per 100 WBC 0 <1 /100    Absolute Neutrophils 1.7 1.6 - 8.3 10e3/uL    Absolute Lymphocytes 1.3 0.8 - 5.3 10e3/uL    Absolute Monocytes 0.5 0.0 - 1.3 10e3/uL    Absolute Eosinophils 0.3 0.0 - 0.7 10e3/uL    Absolute Basophils 0.1 0.0 - 0.2 10e3/uL    Absolute Immature Granulocytes 0.0 <=0.4 10e3/uL    Absolute NRBCs 0.0 10e3/uL       I reviewed PFT that was performed today.  This shows normal spirometry, lung volumes and diffusing capacity.  Lung function is stable.  I also reviewed 6-minute walk test from today, and this shows normal walk distance without exertional hypoxemia on room air.  Walk distance is improved compared to January 2023.    I also reviewed labs from today.  Her white count is 3.9, but she is not neutropenic.  LFTs are normal.    I reviewed results with the patient.      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   1.  Scleroderma ILD.  She is doing well and tolerating tocilizumab.  We will continue 162 mg subcu weekly.  PFT has stabilized.  I encouraged her to restart regular exercise, and she is in agreement.  Since her ILD is stable, there is no need to change medications.  Additional options if her ILD worsens in the future could be mycophenolate or nintedanib.  I will see her back in 3 months with full PFT.  2.  High risk medication immunosuppression monitoring.  She is leukopenic today but not neutropenic.  I will recheck labs in 3 months including CBC with differential and platelets, LFTs, and lipid panel to monitor tocilizumab.  3.  GERD.  She will continue famotidine 20 mg daily and n.p.o. for 3 hours before bedtime.  4.  1 minute sit to stand study.  She is currently enrolled.  I spent 38 minutes reviewing chart, talking with and examining patient, reviewing test results, formulating plan and documentation on the day of the encounter (excluding PFT  review).

## 2023-11-17 NOTE — NURSING NOTE
Chief Complaint   Patient presents with    RECHECK     Return Interstitial Lung     Medications reviewed and vital signs taken.   Isabel Cohen, CMA

## 2023-11-17 NOTE — RESULT ENCOUNTER NOTE
Results in acceptable range.  Vit D on low side of normal in winter take 2000 international unit(s) vit d daily.  Best wishes,  Rangel Garcia MD

## 2023-11-17 NOTE — LETTER
11/17/2023         RE: Stephanie Bhatia  [address]  [facility] [address]        Dear Colleague,    Thank you for referring your patient, Stephanie Bhatia, to the [facility] [place]. Please see a copy of my visit note below.    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  She was initially diagnosed with scleroderma in about 2009 or 2010.  Scleroderma ILD was first diagnosed on chest CT scan in 2/2017.  She also had a few nodules that were small, 4 mm and did not require follow-up as she was low risk.  She started tocilizumab in approximately September 2022 for progressive drop in PFT and progression of ILD on chest CT. she opted for tocilizumab instead of mycophenolate or nintedanib.    Today, Stephanie reports that she is doing well.  She did start famotidine for GERD, but she stopped it for a short time because of burping.  However, she did restart it and is tolerating it well.  She also does not eat for 3 hours before bed.  She also stopped caffeine other than as an occasional treat.  She has had no bad heartburn episodes since starting famotidine, and she has been more aware of her diet.    She stopped walking about 2 weeks ago because of the cold and has not gotten back into it.  Her dyspnea on exertion remains unchanged, for example she does feel a little short of breath when she walks up 1 flight of stairs.  Her cough overall is better, and this might might be related to treatment of her GERD and/or tocilizumab treating her ILD.  She denies paroxysmal nocturnal dyspnea.  She denies digital fingertip ulcers and denies arthralgias.    She did buy a portable humidifier for her bedroom but has not used it yet.  She did  receive the flu vaccine, RSV vaccine and the updated COVID-19 booster vaccine.  She cut ice cream out of her diet because of high cholesterol level earlier this year.          Current Outpatient Medications   Medication     Cholecalciferol (VITAMIN D) 1000 UNITS capsule     cyanocobalamin (VITAMIN B-12) 100 MCG tablet     ibuprofen (ADVIL/MOTRIN) 200 MG tablet     tocilizumab (ACTEMRA) 162 MG/0.9ML subcutaneous injection     vitamin B complex with vitamin C (STRESS TAB) tablet     vitamin C (ASCORBIC ACID) 1000 MG TABS     acyclovir (ZOVIRAX) 400 MG tablet     famotidine (PEPCID) 20 MG tablet     No current facility-administered medications for this visit.     No Known Allergies  Past Medical History:   Diagnosis Date     Anxiety      Herpes simplex without mention of complication      Interstitial lung disease (H)     scleroderma ILD, dx by chest CT 2017     Lung nodules     4 mm LLL and RLL      Migraine     loss of speech and comprehension, has had w/u and is complex migraine, last one about 2017     Raynaud phenomenon      Systemic sclerosis with limited cutaneous involvement (H) 2014    Dx ~, Scl-70 and anti-centromere antibody neg, +RF       Past Surgical History:   Procedure Laterality Date     HYSTEROSCOPIC PLACEMENT CONTRACEPTIVE DEVICE         Social History     Socioeconomic History     Marital status: Single     Spouse name:      Number of children: 0     Years of education: Not on file     Highest education level: Master's degree (e.g., MA, MS, [place], MEd, MSW, DIANE)   Occupational History     Occupation:      Comment: [employer] - part time   Tobacco Use     Smoking status: Former     Packs/day: 1.00     Years: 20.00     Additional pack years: 0.00     Total pack years: 20.00     Types: Cigarettes     Quit date: 1986     Years since quittin.0     Smokeless tobacco: Never   Vaping Use     Vaping Use: Never used   Substance and Sexual Activity      Alcohol use: Not Currently     Comment: none since 2001     Drug use: No     Sexual activity: Not Currently     Partners: Male   Other Topics Concern     Parent/sibling w/ CABG, MI or angioplasty before 65F 55M? No   Social History Narrative    .  No children. Sister lives in [place].  Part time at [employer].   in the past. Currently lives with family.    Possible asbestos exposure from basement pipes in childhood home.  L[place] in  [facility] [id number].  Denies exposure to pet birds, hot tubs.  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     Social Determinants of Health     Financial Resource Strain: Low Risk  (10/2/2023)    Financial Resource Strain      Within the past 12 months, have you or your family members you live with been unable to get utilities (heat, electricity) when it was really needed?: No   Food Insecurity: Low Risk  (10/2/2023)    Food Insecurity      Within the past 12 months, did you worry that your food would run out before you got money to buy more?: No      Within the past 12 months, did the food you bought just not last and you didn t have money to get more?: No   Transportation Needs: Low Risk  (10/2/2023)    Transportation Needs      Within the past 12 months, has lack of transportation kept you from medical appointments, getting your medicines, non-medical meetings or appointments, work, or from getting things that you need?: No   Physical Activity: Sufficiently Active (11/22/2021)    Exercise Vital Sign      Days of Exercise per Week: 7 days      Minutes of Exercise per Session: 60 min   Stress: Stress Concern Present (11/22/2021)    [employer] - Occupational Stress Questionnaire      Feeling of Stress : To some extent   Social Connections: Moderately Isolated (11/22/2021)    Social Connection and Isolation Panel [NHANES]      Frequency of Communication with Friends and Family: More than three times a week     "  Frequency of Social Gatherings with Friends and Family: More than three times a week      Attends Yarsanism Services: More than 4 times per year      Active Member of Clubs or Organizations: No      Attends Club or Organization Meetings: Never      Marital Status:    Interpersonal Safety: Not At Risk (11/22/2021)    Humiliation, Afraid, Rape, and Kick questionnaire      Fear of Current or Ex-Partner: No      Emotionally Abused: No      Physically Abused: No      Sexually Abused: No   Housing Stability: High Risk (10/2/2023)    Housing Stability      Do you have housing? : Yes      Are you worried about losing your housing?: Yes       Family History   Problem Relation Age of Onset     C.A.D. Father         a efw small heart attacks     Neurologic Disorder Father         dementia 70s     Coronary Artery Disease Father      Neurologic Disorder Mother         dementia     Depression Mother      Cerebrovascular Disease Maternal Grandmother         ,     Other Cancer Paternal Grandmother      Rheumatoid Arthritis Sister      Hypertension Sister      Dementia Paternal Cousin      Dementia Paternal Uncle         60s     Breast Cancer No family hx of      Cancer - colorectal No family hx of      Diabetes No family hx of      Cancer No family hx of         no skin cancer     Interstitial Lung Disease No family hx of      Melanoma No family hx of      Skin Cancer No family hx of      Glaucoma No family hx of      Macular Degeneration No family hx of            Vitals: /71   Pulse 76   Resp 17   Ht 1.651 m (5' 5\")   Wt 58.1 kg (128 lb)   SpO2 100%   BMI 21.30 kg/m      Exam:   GENERAL APPEARANCE: Well developed, well nourished, alert, and in no apparent distress.  RESP: good air flow throughout.  Bibasilar inspiratory crackles. No rhonchi. No wheezes.  CV: Normal S1, S2, regular rhythm, normal rate. No murmur.  No LE edema.   MS: extremities normal. No clubbing. No cyanosis.  SKIN: no rash on limited " exam.  NEURO: Mentation intact, speech normal, normal gait and stance.  PSYCH: mentation appears normal. and affect normal/bright.    Results:  Recent Results (from the past 168 hour(s))   6 minute walk test    Collection Time: 11/17/23 12:00 AM   Result Value Ref Range    6 min walk (FT) 1,650 1,184 ft    6 Min Walk (M) 503 361 m   General PFT Lab (Please always keep checked)    Collection Time: 11/17/23  6:41 AM   Result Value Ref Range    FVC-Pred 2.56 L    FVC-Pre 2.80 L    FVC-%Pred-Pre 109 %    FEV1-Pre 2.41 L    FEV1-%Pred-Pre 121 %    FEV1FVC-Pred 78 %    FEV1FVC-Pre 86 %    FEFMax-Pred 5.38 L/sec    FEFMax-Pre 6.76 L/sec    FEFMax-%Pred-Pre 125 %    FEF2575-Pred 1.64 L/sec    FEF2575-Pre 2.73 L/sec    OKK5646-%Pred-Pre 166 %    ExpTime-Pre 7.16 sec    FIFMax-Pre 3.85 L/sec    VC-Pred 3.14 L    VC-Pre 2.65 L    VC-%Pred-Pre 84 %    IC-Pred 1.93 L    IC-Pre 1.60 L    IC-%Pred-Pre 82 %    ERV-Pred 0.97 L    ERV-Pre 1.05 L    ERV-%Pred-Pre 108 %    FEV1FEV6-Pred 78 %    FEV1FEV6-Pre 85 %    FRCPleth-Pred 2.77 L    FRCPleth-Pre 3.05 L    FRCPleth-%Pred-Pre 110 %    RVPleth-Pred 2.19 L    RVPleth-Pre 2.01 L    RVPleth-%Pred-Pre 91 %    TLCPleth-Pred 5.11 L    TLCPleth-Pre 4.65 L    TLCPleth-%Pred-Pre 91 %    DLCOunc-Pred 19.10 ml/min/mmHg    DLCOunc-Pre 18.71 ml/min/mmHg    DLCOunc-%Pred-Pre 97 %    VA-Pre 3.22 L    VA-%Pred-Pre 69 %    FEV1SVC-Pred 63 %    FEV1SVC-Pre 91 %   Magnesium    Collection Time: 11/17/23  7:32 AM   Result Value Ref Range    Magnesium 2.0 1.7 - 2.3 mg/dL   Hepatic function panel    Collection Time: 11/17/23  7:32 AM   Result Value Ref Range    Protein Total 6.6 6.4 - 8.3 g/dL    Albumin 4.3 3.5 - 5.2 g/dL    Bilirubin Total 1.1 <=1.2 mg/dL    Alkaline Phosphatase 43 40 - 150 U/L    AST 26 0 - 45 U/L    ALT 13 0 - 50 U/L    Bilirubin Direct 0.22 0.00 - 0.30 mg/dL   CBC with platelets and differential    Collection Time: 11/17/23  7:32 AM   Result Value Ref Range    WBC Count 3.8 (L) 4.0 -  11.0 10e3/uL    RBC Count 4.39 3.80 - 5.20 10e6/uL    Hemoglobin 14.2 11.7 - 15.7 g/dL    Hematocrit 41.7 35.0 - 47.0 %    MCV 95 78 - 100 fL    MCH 32.3 26.5 - 33.0 pg    MCHC 34.1 31.5 - 36.5 g/dL    RDW 12.1 10.0 - 15.0 %    Platelet Count 202 150 - 450 10e3/uL    % Neutrophils 43 %    % Lymphocytes 33 %    % Monocytes 14 %    % Eosinophils 8 %    % Basophils 2 %    % Immature Granulocytes 0 %    NRBCs per 100 WBC 0 <1 /100    Absolute Neutrophils 1.7 1.6 - 8.3 10e3/uL    Absolute Lymphocytes 1.3 0.8 - 5.3 10e3/uL    Absolute Monocytes 0.5 0.0 - 1.3 10e3/uL    Absolute Eosinophils 0.3 0.0 - 0.7 10e3/uL    Absolute Basophils 0.1 0.0 - 0.2 10e3/uL    Absolute Immature Granulocytes 0.0 <=0.4 10e3/uL    Absolute NRBCs 0.0 10e3/uL       I reviewed PFT that was performed today.  This shows normal spirometry, lung volumes and diffusing capacity.  Lung function is stable.  I also reviewed 6-minute walk test from today, and this shows normal walk distance without exertional hypoxemia on room air.  Walk distance is improved compared to January 2023.    I also reviewed labs from today.  Her white count is 3.9, but she is not neutropenic.  LFTs are normal.    I reviewed results with the patient.      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   1.  Scleroderma ILD.  She is doing well and tolerating tocilizumab.  We will continue 162 mg subcu weekly.  PFT has stabilized.  I encouraged her to restart regular exercise, and she is in agreement.  Since her ILD is stable, there is no need to change medications.  Additional options if her ILD worsens in the future could be mycophenolate or nintedanib.  I will see her back in 3 months with full PFT.  2.  High risk medication immunosuppression monitoring.  She is leukopenic today but not neutropenic.  I will recheck labs in 3 months including CBC with differential and platelets, LFTs, and lipid panel to monitor  tocilizumab.  3.  GERD.  She will continue famotidine 20 mg daily and n.p.o. for 3 hours before bedtime.  4.  1 minute sit to stand study.  She is currently enrolled.  I spent 38 minutes reviewing chart, talking with and examining patient, reviewing test results, formulating plan and documentation on the day of the encounter (excluding PFT review).                Again, thank you for allowing me to participate in the care of your patient.        Sincerely,        Tiffanie Gomez MD

## 2023-11-22 LAB
DLCOUNC-%PRED-PRE: 97 %
DLCOUNC-PRE: 18.71 ML/MIN/MMHG
DLCOUNC-PRED: 19.1 ML/MIN/MMHG
ERV-%PRED-PRE: 108 %
ERV-PRE: 1.05 L
ERV-PRED: 0.97 L
EXPTIME-PRE: 7.16 SEC
FEF2575-%PRED-PRE: 166 %
FEF2575-PRE: 2.73 L/SEC
FEF2575-PRED: 1.64 L/SEC
FEFMAX-%PRED-PRE: 125 %
FEFMAX-PRE: 6.76 L/SEC
FEFMAX-PRED: 5.38 L/SEC
FEV1-%PRED-PRE: 121 %
FEV1-PRE: 2.41 L
FEV1FEV6-PRE: 85 %
FEV1FEV6-PRED: 78 %
FEV1FVC-PRE: 86 %
FEV1FVC-PRED: 78 %
FEV1SVC-PRE: 91 %
FEV1SVC-PRED: 63 %
FIFMAX-PRE: 3.85 L/SEC
FRCPLETH-%PRED-PRE: 110 %
FRCPLETH-PRE: 3.05 L
FRCPLETH-PRED: 2.77 L
FVC-%PRED-PRE: 109 %
FVC-PRE: 2.8 L
FVC-PRED: 2.56 L
IC-%PRED-PRE: 82 %
IC-PRE: 1.6 L
IC-PRED: 1.93 L
RVPLETH-%PRED-PRE: 91 %
RVPLETH-PRE: 2.01 L
RVPLETH-PRED: 2.19 L
TLCPLETH-%PRED-PRE: 91 %
TLCPLETH-PRE: 4.65 L
TLCPLETH-PRED: 5.11 L
VA-%PRED-PRE: 69 %
VA-PRE: 3.22 L
VC-%PRED-PRE: 84 %
VC-PRE: 2.65 L
VC-PRED: 3.14 L

## 2023-12-03 NOTE — PROGRESS NOTES
SUBJECTIVE:   Stephanie is a 73 year old, presenting for the following:  Physical (Soreness in sacrum )        12/4/2023     3:31 PM   Additional Questions   Roomed by [facility] EMT       Are you in the first 12 months of your Medicare coverage?  No    HPI    Stephanie Bhatia 72 year old  presents as an established patient for subsequent Medicare Wellness Exam.     Physical activity   Previous exercise  not walking currently.   Went to PT for knees provided balance exercise.  Has been more sedentary sometimes has pain in the sacrum.  Realizes she should start walking again, considering a treadmill.  Pulmonary fibrosis scleroderma  HX of rheumatoid arthritis/ Scleroderma/ pulmonary fibrosis follows with Rheumatologist. Actemra started 9/25/2022 once weekly.   Falls/ Osteoarthritis/ hiatal hernia  At one point in 2019 she slipped on a rug and had fracture of ribs. Had CT in May 2022 no bone abnormalities. Reflux taking pepcid. Getting calcium through nutrition. Taking Vit D 2000 international unit(s) daily.  No recent falls.  Anxiety/ Depression  Her anxiety and depression increased during pandemic,moved in with family 2019 to help reduce daily stressors  now back at home.Feels tired. 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  History of Alcohol Abuse/Nicotine  Stephanie has past history of nicotine and alcohol addiction ( Binge drinking) has not had alcohol since 2001 to improve sobriety, she attended brief AA meetings which she found helpful. Has been in [place]. She sometimes eats sugar excessively.  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 Thinking about a treadmill.   Maybe will be traveling to [place].  Have you ever done Advance Care Planning? (For example, a Health Directive, POLST, or a discussion with a medical provider or your loved ones about your wishes): Yes, advance care planning is on file.       Fall risk  Fallen 2 or more  times in the past year?: No  Any fall with injury in the past year?: No  Cognitive Screening Intact        Reviewed and updated as needed this visit by clinical staff   Tobacco  Allergies  Meds  Problems  Med Hx  Surg Hx  Fam Hx          Reviewed and updated as needed this visit by Provider   Tobacco  Allergies  Meds  Problems  Med Hx  Surg Hx  Fam Hx         Social History     Tobacco Use    Smoking status: Former     Packs/day: 1.00     Years: 20.00     Additional pack years: 0.00     Total pack years: 20.00     Types: Cigarettes     Quit date: 1986     Years since quittin.0    Smokeless tobacco: Never   Substance Use Topics    Alcohol use: Not Currently     Comment: none since      Do you have a current opioid prescription? No  Do you use any other controlled substances or medications that are not prescribed by a provider? None  Current providers sharing in care for this patient include: Patient Care Team:  Rangel Garcia MD as PCP - General (Family Practice)  Prachi Grant MD as MD (Family Medicine - Sports Medicine)  Doris Robertson MD as MD (Dermatology)  Petros Gunn MD as MD (Rheumatology)  Dorene Lieberman [facility] as Pharmacist (Pharmacist)  Rangel Garcia MD as Referring Physician (Family Practice)  Getachew Abdi MD as MD (Otolaryngology)  Getacehw Abdi MD as MD (Otolaryngology)  Tiffanie Gomez MD as Assigned Pulmonology Provider  Rangel Garcia MD as Assigned PCP  Milla Jiménez APRN CNP as Nurse Practitioner (Internal Medicine)  Petros Gunn MD as Assigned Rheumatology Provider  Serge Parham MD as MD (Ophthalmology)  Serge Parham MD as Assigned Surgical Provider    The following health maintenance items are reviewed in Epic and correct as of today:  Health Maintenance   Topic Date Due    ANNUAL REVIEW OF HM ORDERS  Never done    COLORECTAL CANCER SCREENING  2023    COVID-19 Vaccine (7 -  2023-24 season) 12/04/2023    MEDICARE ANNUAL WELLNESS VISIT  01/17/2024    FALL RISK ASSESSMENT  12/04/2024    ADVANCE CARE PLANNING  11/28/2027    LIPID  08/24/2028    DTAP/TDAP/TD IMMUNIZATION (4 - Td or Tdap) 06/25/2031    DEXA  06/14/2034    HEPATITIS C SCREENING  Completed    PHQ-2 (once per calendar year)  Completed    INFLUENZA VACCINE  Completed    Pneumococcal Vaccine: 65+ Years  Completed    ZOSTER IMMUNIZATION  Completed    RSV VACCINE (Pregnancy & 60+)  Completed    IPV IMMUNIZATION  Aged Out    HPV IMMUNIZATION  Aged Out    MENINGITIS IMMUNIZATION  Aged Out    RSV MONOCLONAL ANTIBODY  Aged Out    MAMMO SCREENING  Discontinued     Labs reviewed in EPIC  BP Readings from Last 3 Encounters:   12/04/23 123/81   11/17/23 110/71   10/12/23 111/75    Wt Readings from Last 3 Encounters:   12/04/23 58.2 kg (128 lb 3.2 oz)   11/17/23 58.1 kg (128 lb)   10/09/23 61 kg (134 lb 6.4 oz)            Immunization History   Administered Date(s) Administered    COVID-19 12+ (2023-24) (Pfizer) 10/09/2023    COVID-19 Bivalent 12+ (Pfizer) 09/13/2022    COVID-19 MONOVALENT 12+ (Pfizer) 02/26/2021, 03/19/2021, 10/06/2021, 10/09/2023    COVID-19 Monovalent 12+ (Pfizer 2022) 04/11/2022    Flu, Unspecified 10/06/2021    Hepatitis A (ADULT 19+) 03/13/2017, 09/13/2017    Influenza (High Dose) 3 valent vaccine 09/27/2013, 11/26/2014, 10/12/2015, 09/01/2016, 09/06/2017, 08/27/2018, 10/14/2019    Influenza (IIV3) PF 10/26/2010, 10/28/2011, 10/12/2012, 09/28/2017    Influenza Vaccine 65+ (Fluzone HD) 09/30/2020, 10/06/2021, 10/06/2022, 10/09/2023    Influenza Vaccine >6 months,quad, PF 10/26/2010, 10/28/2011, 10/12/2012, 09/27/2013, 11/26/2014, 10/12/2015, 09/01/2016    Pneumo Conj 13-V (2010&after) 11/26/2014    Pneumococcal 23 valent 10/21/2013    RSV Vaccine (Arexvy) 10/16/2023    TDAP (Adacel,Boostrix) 05/01/2012, 06/25/2021    TDAP Vaccine (Adacel) 05/01/2012    Typhoid IM 01/17/2023    Typhoid Oral 03/13/2017    Yellow Fever  2017    Yellow Fever, unspecified 2017    Zoster recombinant adjuvanted (SHINGRIX) 07/10/2019, 2019            Patient Active Problem List   Diagnosis    Raynaud phenomenon    Anxiety and depression    Lumbar radiculopathy    Encounter for monitoring tocilizumab therapy    CARDIOVASCULAR SCREENING; LDL GOAL LESS THAN 160    Cluster headache syndrome    Systemic sclerosis with limited cutaneous involvement (H)    AK (actinic keratosis)    Osteoporosis    ILD (interstitial lung disease) (H)    Rheumatoid arthritis involving both hands with positive rheumatoid factor (H)    Senile osteoporosis    Scleroderma with pulmonary involvement (H)    Gastroesophageal reflux disease, unspecified whether esophagitis present     Past Surgical History:   Procedure Laterality Date    HYSTEROSCOPIC PLACEMENT CONTRACEPTIVE DEVICE         Social History     Tobacco Use    Smoking status: Former     Packs/day: 1.00     Years: 20.00     Additional pack years: 0.00     Total pack years: 20.00     Types: Cigarettes     Quit date: 1986     Years since quittin.0    Smokeless tobacco: Never   Substance Use Topics    Alcohol use: Not Currently     Comment: none since      Family History   Problem Relation Age of Onset    C.A.D. Father         a efw small heart attacks    Neurologic Disorder Father         dementia 70s    Coronary Artery Disease Father     Neurologic Disorder Mother         dementia    Depression Mother     Cerebrovascular Disease Maternal Grandmother         ,    Other Cancer Paternal Grandmother     Rheumatoid Arthritis Sister     Hypertension Sister     Dementia Paternal Cousin     Dementia Paternal Uncle         60s    Depression Sister     Breast Cancer No family hx of     Cancer - colorectal No family hx of     Diabetes No family hx of     Cancer No family hx of         no skin cancer    Interstitial Lung Disease No family hx of     Melanoma No family hx of     Skin Cancer No family hx of      Glaucoma No family hx of     Macular Degeneration No family hx of          Current Outpatient Medications   Medication Sig Dispense Refill    Cholecalciferol (VITAMIN D) 1000 UNITS capsule Take 1 capsule by mouth daily      cyanocobalamin (VITAMIN B-12) 100 MCG tablet       ibuprofen (ADVIL/MOTRIN) 200 MG tablet Take 200 mg by mouth every 4 hours as needed for mild pain      tocilizumab (ACTEMRA) 162 MG/0.9ML subcutaneous injection Inject 0.9 mLs (162 mg) Subcutaneous every 7 days 3.6 mL 11    vitamin B complex with vitamin C (STRESS TAB) tablet Take 1 tablet by mouth daily      vitamin C (ASCORBIC ACID) 1000 MG TABS       acyclovir (ZOVIRAX) 400 MG tablet Take 1 tablet (400 mg) by mouth every 8 hours For 5 days (Patient not taking: Reported on 10/9/2023) 15 tablet 1    famotidine (PEPCID) 20 MG tablet Take 1 tablet (20 mg) by mouth daily (Patient not taking: Reported on 10/9/2023) 90 tablet 4     No Known Allergies  Recent Labs   Lab Test 11/17/23  0732 08/24/23  0717 01/17/23  0747 06/09/22  0622 06/02/22  1116 05/26/22  0621 09/30/20  0958 08/06/18  0938 02/14/18  1417   LDL  --  146*  --  119* 105*   < > 111*  --   --    HDL  --  61  --  52 58   < > 55  --   --    TRIG  --  76  --  72 103   < > 68  --   --    ALT 13 17 15 18 20   < > 19  --   --    CR  --  0.76 0.71 0.69 0.66   < > 0.72  --  0.90   GFRESTIMATED  --  82 89 >90 >90   < > 84  --  62   GFRESTBLACK  --   --   --   --   --   --  >90  --  75   POTASSIUM  --  4.1  --  4.4 4.0   < > 4.2  --  4.9   TSH 2.51  --   --   --   --   --  1.30   < >  --     < > = values in this interval not displayed.        Mammogram discontinued   Review of Systems  Problem list, PMH, Surgical HX, FH, SH, allergies, medications,immunizations reviewed and updated in Epic. 10 point ROS negative other than noted in HPI and ROS.     OBJECTIVE:   /81 (BP Location: Right arm, Patient Position: Sitting, Cuff Size: Adult Regular)   Pulse 78   Temp 97.5  F (36.4  C) (Oral)   " Ht 1.651 m (5' 5\")   Wt 58.2 kg (128 lb 3.2 oz)   SpO2 97%   BMI 21.33 kg/m   Estimated body mass index is 21.33 kg/m  as calculated from the following:    Height as of this encounter: 1.651 m (5' 5\").    Weight as of this encounter: 58.2 kg (128 lb 3.2 oz).  Physical Exam  GENERAL APPEARANCE: healthy, alert and no distress  EYES: Eyes grossly normal to inspection, PERRL and conjunctivae and sclerae normal  HENT: ear canals and TM's normal,mouth without ulcers or lesions, oropharynx clear and oral mucous membranes moist  NECK: no adenopathy, no asymmetry, masses, or scars and thyroid normal to palpation  RESP: lungs clear to auscultation - no rales, rhonchi or wheezes  BREAST:no masses  CV: regular rate and rhythm, normal S1 S2, no S3 or S4, no murmur, click or rub, no peripheral edema   ABDOMEN: soft, nontender, no hepatosplenomegaly, no masses and bowel sounds normal  MS:  Bilateral hands Rheumatoid changes fingers and wrists, scleroderma, gait is age appropriate without ataxia  SKIN: no suspicious lesions   NEURO: Normal strength and tone, mentation intact and speech normal  PSYCH: mentation appears normal, affect normal, Mood stable    ASSESSMENT / PLAN:   Stephanie was seen today for physical.    Diagnoses and all orders for this visit:    Encounter for Medicare annual wellness exam  Encouraged physical activity as tolerated  -     REVIEW OF HEALTH MAINTENANCE PROTOCOL ORDERS    Screen for colon cancer  Due for fit testing  -     Fecal colorectal cancer screen FIT - Future (S+30); Future    ILD (interstitial lung disease) (H)  Rheumatoid arthritis involving both hands with positive rheumatoid factor (H)  Follows with Dr Gomez Pulmonary and Dr Gunn recent assessment Rheumatology              COUNSELING:  Reviewed preventive health counseling, as reflected in patient instructions       Regular exercise       Healthy diet/nutrition       Dental care       Fall risk prevention       Immunizations  Up to date      "    Colon cancer screening FIT ordered        She reports that she quit smoking about 37 years ago. Her smoking use included cigarettes. She has a 20.00 pack-year smoking history. She has never used smokeless tobacco.      Appropriate preventive services were discussed with this patient, including applicable screening as appropriate for fall prevention, nutrition, physical activity, Tobacco-use cessation, weight loss and cognition.  Checklist reviewing preventive services available has been given to the patient.    Reviewed patients plan of care and provided an AVS. The Basic Care Plan (routine screening as documented in Health Maintenance) for Stephanie meets the Care Plan requirement. This Care Plan has been established and reviewed with the Patient.          Rangel Garcia MD  [facility] [place]    Identified Health Risks: no controlled medictions

## 2023-12-03 NOTE — PATIENT INSTRUCTIONS
Patient Education   Personalized Prevention Plan  You are due for the preventive services outlined below.  Your care team is available to assist you in scheduling these services.  If you have already completed any of these items, please share that information with your care team to update in your medical record.  Health Maintenance Due   Topic Date Due    Colorectal Cancer Screening  12/02/2023

## 2023-12-04 ENCOUNTER — OFFICE VISIT (OUTPATIENT)
Dept: FAMILY MEDICINE | Facility: CLINIC | Age: 73
End: 2023-12-04
Payer: MEDICARE

## 2023-12-04 ENCOUNTER — LAB (OUTPATIENT)
Dept: LAB | Facility: CLINIC | Age: 73
End: 2023-12-04
Payer: MEDICARE

## 2023-12-04 VITALS
TEMPERATURE: 97.5 F | DIASTOLIC BLOOD PRESSURE: 81 MMHG | HEART RATE: 78 BPM | OXYGEN SATURATION: 97 % | WEIGHT: 128.2 LBS | BODY MASS INDEX: 21.36 KG/M2 | SYSTOLIC BLOOD PRESSURE: 123 MMHG | HEIGHT: 65 IN

## 2023-12-04 DIAGNOSIS — Z00.00 ENCOUNTER FOR MEDICARE ANNUAL WELLNESS EXAM: Primary | ICD-10-CM

## 2023-12-04 DIAGNOSIS — M05.742 RHEUMATOID ARTHRITIS INVOLVING BOTH HANDS WITH POSITIVE RHEUMATOID FACTOR (H): ICD-10-CM

## 2023-12-04 DIAGNOSIS — Z12.11 SCREEN FOR COLON CANCER: ICD-10-CM

## 2023-12-04 DIAGNOSIS — M05.741 RHEUMATOID ARTHRITIS INVOLVING BOTH HANDS WITH POSITIVE RHEUMATOID FACTOR (H): ICD-10-CM

## 2023-12-04 DIAGNOSIS — J84.9 ILD (INTERSTITIAL LUNG DISEASE) (H): ICD-10-CM

## 2023-12-04 PROCEDURE — G0439 PPPS, SUBSEQ VISIT: HCPCS | Performed by: FAMILY MEDICINE

## 2023-12-04 NOTE — PROGRESS NOTES
Medicare Annual Wellness Questionnaire:  This 73 year old year old female presents for a Medicare Wellness Exam.    Fall Risk Assessment:  Have you fallen 2 or more times in the last year?  No    How many times were you injured due to a fall in the last year?  N/A    PHQ-2:  Over the last 2 weeks, how often have you been bothered by feeling down, depressed, or hopeless?  Several Days (1)   Over the last 2 weeks, how often have you had little interest or pleasure in doing things?  Several Days (1)  Social History:  What is your marital status?  Single    Who lives in your household?  Me     Does your home have loose rugs in the hallway:     No    Does your home have grab bars in the bathroom:    Yes     Does your home have handrails on the stairs?  Yes     Does your home have poorly lit areas?    No    Do you feel threatened or controlled by a partner, ex-partner or anyone in your life?   No    Has anyone hurt you physically, for example by pushing, hitting, slapping or kicking you or forcing you to have sex?   No    Do you need help with the phone, transportation, shopping, preparing meals, housework, laundry, medications or managing money?   No    Sexual Health:  Are you sexually active?    No    Women Only:  Women: What year did you stop having periods (approximate age)?  1999    Women: Any vaginal bleeding in the last year?    No    Women: Have you ever had an abnormal Pap smear?    No    General Health Assessment:  Have you noticed any hearing difficulties?   No    Do you wear hearing aids?   No    Have you seen a hearing professional such as an audiologist in the last 1 year?   No    Do you have vision difficulty?    Yes     Do you wear glasses or contacts?   Yes     Have you seen an eye doctor in the last 1 year?   Yes     How many servings of fruits and vegetables do you eat a day?  Fruit: 2  Vegetables: 2    How often do you exercise in a week?  0    How long and what kind of exercise do you  do?  N/A    Tobacco and Alcohol History:  Do you use tobacco/nicotine products?    No    If yes, please list the method of use and average weekly consumption?  N/A    Do you use any other drugs?   No         Do you drink alcohol?   No    If you drink alcohol, how many drinks per week?  N/A    Advanced Directive:  Have you completed an Advance Directives document?  Yes     If yes, have you given a copy to the clinic?   No    Do you need information on Advance Directives?   Yes     REX Hull at 4:11 PM on 12/4/2023Stephanie is a 73 year old that presents in clinic today for the following:     Chief Complaint   Patient presents with    Physical     Soreness in sacrum            12/4/2023     3:31 PM   Additional Questions   Roomed by SK EMT       Screenings from encounters over the past 10 days    No data recorded       Shweta Boyd MA at 3:34 PM on 12/4/2023

## 2023-12-07 ENCOUNTER — TELEPHONE (OUTPATIENT)
Facility: CLINIC | Age: 73
End: 2023-12-07
Payer: MEDICARE

## 2023-12-07 NOTE — TELEPHONE ENCOUNTER
PA Initiation    Medication: ACTEMRA ACTPEN 162 MG/0.9ML SC SOAJ  Insurance Company: [employer] - Phone [phone] Fax [phone]  [facility] Filling the Rx: [facility] [place] - [address] [place] [address]  Filling Pharmacy Phone:    Filling Pharmacy Fax:    Start Date: 12/7/2023    [id number]

## 2023-12-08 NOTE — TELEPHONE ENCOUNTER
Prior Authorization Approval    Medication: ACTEMRA ACTPEN 162 MG/0.9ML SC SOAJ  Authorization Effective Date: 1/1/2023  Authorization Expiration Date: 12/31/2023  Approved Dose/Quantity: #3.6mLs per 28 days  Reference #: Key: [id number]   Insurance Company: [employer] - Phone [phone] Fax [phone]  Expected CoPay: $ 0  CoPay Card Available:      Financial Assistance Needed: Already has  Which Pharmacy is filling the prescription: [facility] [place] - [address] [facility]  Pharmacy Notified: No, renewal  Patient Notified: No, renewal

## 2023-12-10 PROCEDURE — 82274 ASSAY TEST FOR BLOOD FECAL: CPT | Performed by: FAMILY MEDICINE

## 2023-12-10 PROCEDURE — 99000 SPECIMEN HANDLING OFFICE-LAB: CPT | Performed by: PATHOLOGY

## 2023-12-12 LAB — HEMOCCULT STL QL IA: NEGATIVE

## 2023-12-13 NOTE — RESULT ENCOUNTER NOTE
Congratulations.  Your test for fecal (stool) occult blood colon cancer screen was negative, good result.  Best wishes,  Rangel Garcia MD

## 2024-01-04 ENCOUNTER — TELEPHONE (OUTPATIENT)
Dept: PULMONOLOGY | Facility: CLINIC | Age: 74
End: 2024-01-04
Payer: MEDICARE

## 2024-01-04 NOTE — TELEPHONE ENCOUNTER
ARDEN INITIATED    Medication: ACTEMRA ACTPEN 162 MG/0.9ML SC SOAJ  [facility] [place] Name: [facility]  Date submitted: 1/4/2024  5:22 PM   [facility] [place] Phone:    [facility] [place] Fax:     Sent MyChart to patient about aredn, waiting on response.

## 2024-01-31 ENCOUNTER — OFFICE VISIT (OUTPATIENT)
Dept: URGENT CARE | Facility: URGENT CARE | Age: 74
End: 2024-01-31
Payer: MEDICARE

## 2024-01-31 ENCOUNTER — MYC MEDICAL ADVICE (OUTPATIENT)
Dept: PULMONOLOGY | Facility: CLINIC | Age: 74
End: 2024-01-31
Payer: MEDICARE

## 2024-01-31 VITALS
RESPIRATION RATE: 16 BRPM | TEMPERATURE: 97.7 F | BODY MASS INDEX: 21.3 KG/M2 | WEIGHT: 128 LBS | SYSTOLIC BLOOD PRESSURE: 105 MMHG | DIASTOLIC BLOOD PRESSURE: 61 MMHG | OXYGEN SATURATION: 99 % | HEART RATE: 74 BPM

## 2024-01-31 DIAGNOSIS — R22.0 SWOLLEN UPPER LIP: Primary | ICD-10-CM

## 2024-01-31 PROCEDURE — 99213 OFFICE O/P EST LOW 20 MIN: CPT | Performed by: PHYSICIAN ASSISTANT

## 2024-01-31 RX ORDER — PREDNISONE 10 MG/1
TABLET ORAL
Qty: 11 TABLET | Refills: 0 | Status: SHIPPED | OUTPATIENT
Start: 2024-01-31 | End: 2024-02-08

## 2024-01-31 NOTE — PATIENT INSTRUCTIONS
Aquaphor for dry lips    Benadryl 50 mg at bedtime    Zyrtec 10 mg in the morning      911 with swelling of tongue, or throat, or difficulty breathing

## 2024-01-31 NOTE — PROGRESS NOTES
SUBJECTIVE:  Stephanie Bhatia is a 73 year old female who presents to the clinic today for swelling of upper lip x4 days. Lip was dry, has been using a variety of sticks to calm the dryness, now slightly swollen. No difficulty breathing or swelling of mouth/throat/eyes.     Past Medical History:   Diagnosis Date    Anxiety     Arthritis 1997    scleroderma    Depressive disorder 1997    Herpes simplex without mention of complication     Interstitial lung disease (H)     scleroderma ILD, dx by chest CT 5/2017    Lung nodules     4 mm LLL and RLL 2017    Migraine     loss of speech and comprehension, has had w/u and is complex migraine, last one about 2/2017    Raynaud phenomenon     Systemic sclerosis with limited cutaneous involvement (H) 12/02/2014    Dx ~2010, Scl-70 and anti-centromere antibody neg, +RF     Current Outpatient Medications   Medication Sig Dispense Refill    predniSONE (DELTASONE) 10 MG tablet Take 2 tablets (20 mg) by mouth daily for 4 days, THEN 1 tablet (10 mg) daily for 2 days, THEN 0.5 tablets (5 mg) daily for 2 days. 11 tablet 0    tocilizumab (ACTEMRA) 162 MG/0.9ML subcutaneous injection Inject 0.9 mLs (162 mg) Subcutaneous every 7 days 3.6 mL 11    acyclovir (ZOVIRAX) 400 MG tablet Take 1 tablet (400 mg) by mouth every 8 hours For 5 days (Patient not taking: Reported on 10/9/2023) 15 tablet 1    Cholecalciferol (VITAMIN D) 1000 UNITS capsule Take 1 capsule by mouth daily      cyanocobalamin (VITAMIN B-12) 100 MCG tablet       famotidine (PEPCID) 20 MG tablet Take 1 tablet (20 mg) by mouth daily 90 tablet 4    ibuprofen (ADVIL/MOTRIN) 200 MG tablet Take 200 mg by mouth every 4 hours as needed for mild pain      vitamin B complex with vitamin C (STRESS TAB) tablet Take 1 tablet by mouth daily      vitamin C (ASCORBIC ACID) 1000 MG TABS        Social History     Tobacco Use    Smoking status: Former     Packs/day: 1.00     Years: 20.00     Additional pack years: 0.00     Total pack years: 20.00      Types: Cigarettes     Quit date: 1986     Years since quittin.2    Smokeless tobacco: Never   Substance Use Topics    Alcohol use: Not Currently     Comment: none since        ROS:  Review of systems negative except as stated above.    EXAM:   /61   Pulse 74   Temp 97.7  F (36.5  C) (Temporal)   Resp 16   Wt 58.1 kg (128 lb)   SpO2 99%   BMI 21.30 kg/m    GENERAL: alert, no acute distress.  GENERAL APPEARANCE: healthy, alert and no distress  EYES: conjunctiva clear  HENT: subjective swelling of upper lip  NECK: supple, non-tender to palpation, no adenopathy noted  RESP: lungs clear to auscultation - no rales, rhonchi or wheezes  CV: regular rates and rhythm, normal S1 S2, no murmur noted  NEURO: Normal strength and tone, sensory exam grossly normal,  normal speech and mentation    ASSESSMENT:  (R22.0) Swollen upper lip  (primary encounter diagnosis)  Comment: samina local reaction to topicals  Plan: predniSONE (DELTASONE) 10 MG tablet        Patient Instructions   Aquaphor for dry lips    Benadryl 50 mg at bedtime    Zyrtec 10 mg in the morning      911 with swelling of tongue, or throat, or difficulty breathing

## 2024-02-23 ENCOUNTER — OFFICE VISIT (OUTPATIENT)
Dept: PULMONOLOGY | Facility: CLINIC | Age: 74
End: 2024-02-23
Attending: INTERNAL MEDICINE
Payer: MEDICARE

## 2024-02-23 ENCOUNTER — LAB (OUTPATIENT)
Dept: LAB | Facility: CLINIC | Age: 74
End: 2024-02-23
Attending: INTERNAL MEDICINE
Payer: MEDICARE

## 2024-02-23 VITALS
BODY MASS INDEX: 21.33 KG/M2 | HEART RATE: 57 BPM | SYSTOLIC BLOOD PRESSURE: 123 MMHG | HEIGHT: 65 IN | WEIGHT: 128 LBS | OXYGEN SATURATION: 99 % | DIASTOLIC BLOOD PRESSURE: 75 MMHG

## 2024-02-23 DIAGNOSIS — J84.9 ILD (INTERSTITIAL LUNG DISEASE) (H): Primary | ICD-10-CM

## 2024-02-23 DIAGNOSIS — J84.9 ILD (INTERSTITIAL LUNG DISEASE) (H): ICD-10-CM

## 2024-02-23 LAB
ALBUMIN SERPL BCG-MCNC: 4 G/DL (ref 3.5–5.2)
ALP SERPL-CCNC: 40 U/L (ref 40–150)
ALT SERPL W P-5'-P-CCNC: 13 U/L (ref 0–50)
AST SERPL W P-5'-P-CCNC: 24 U/L (ref 0–45)
BASOPHILS # BLD AUTO: 0.1 10E3/UL (ref 0–0.2)
BASOPHILS NFR BLD AUTO: 1 %
BILIRUB DIRECT SERPL-MCNC: 0.27 MG/DL (ref 0–0.3)
BILIRUB SERPL-MCNC: 1.3 MG/DL
CHOLEST SERPL-MCNC: 215 MG/DL
EOSINOPHIL # BLD AUTO: 0.3 10E3/UL (ref 0–0.7)
EOSINOPHIL NFR BLD AUTO: 7 %
ERYTHROCYTE [DISTWIDTH] IN BLOOD BY AUTOMATED COUNT: 12.6 % (ref 10–15)
FASTING STATUS PATIENT QL REPORTED: YES
HCT VFR BLD AUTO: 39.5 % (ref 35–47)
HDLC SERPL-MCNC: 63 MG/DL
HGB BLD-MCNC: 13 G/DL (ref 11.7–15.7)
IMM GRANULOCYTES # BLD: 0 10E3/UL
IMM GRANULOCYTES NFR BLD: 0 %
LDLC SERPL CALC-MCNC: 133 MG/DL
LYMPHOCYTES # BLD AUTO: 1.4 10E3/UL (ref 0.8–5.3)
LYMPHOCYTES NFR BLD AUTO: 31 %
MCH RBC QN AUTO: 31.9 PG (ref 26.5–33)
MCHC RBC AUTO-ENTMCNC: 32.9 G/DL (ref 31.5–36.5)
MCV RBC AUTO: 97 FL (ref 78–100)
MONOCYTES # BLD AUTO: 0.7 10E3/UL (ref 0–1.3)
MONOCYTES NFR BLD AUTO: 16 %
NEUTROPHILS # BLD AUTO: 1.9 10E3/UL (ref 1.6–8.3)
NEUTROPHILS NFR BLD AUTO: 45 %
NONHDLC SERPL-MCNC: 152 MG/DL
NRBC # BLD AUTO: 0 10E3/UL
NRBC BLD AUTO-RTO: 0 /100
PLATELET # BLD AUTO: 208 10E3/UL (ref 150–450)
PROT SERPL-MCNC: 6.1 G/DL (ref 6.4–8.3)
RBC # BLD AUTO: 4.07 10E6/UL (ref 3.8–5.2)
TRIGL SERPL-MCNC: 93 MG/DL
WBC # BLD AUTO: 4.3 10E3/UL (ref 4–11)

## 2024-02-23 PROCEDURE — 80076 HEPATIC FUNCTION PANEL: CPT | Performed by: PATHOLOGY

## 2024-02-23 PROCEDURE — 85025 COMPLETE CBC W/AUTO DIFF WBC: CPT | Performed by: PATHOLOGY

## 2024-02-23 PROCEDURE — 36415 COLL VENOUS BLD VENIPUNCTURE: CPT | Performed by: PATHOLOGY

## 2024-02-23 PROCEDURE — 94726 PLETHYSMOGRAPHY LUNG VOLUMES: CPT | Performed by: INTERNAL MEDICINE

## 2024-02-23 PROCEDURE — G0463 HOSPITAL OUTPT CLINIC VISIT: HCPCS | Performed by: INTERNAL MEDICINE

## 2024-02-23 PROCEDURE — 94375 RESPIRATORY FLOW VOLUME LOOP: CPT | Performed by: INTERNAL MEDICINE

## 2024-02-23 PROCEDURE — 99215 OFFICE O/P EST HI 40 MIN: CPT | Mod: 25 | Performed by: INTERNAL MEDICINE

## 2024-02-23 PROCEDURE — 94729 DIFFUSING CAPACITY: CPT | Performed by: INTERNAL MEDICINE

## 2024-02-23 PROCEDURE — 80061 LIPID PANEL: CPT | Performed by: PATHOLOGY

## 2024-02-23 ASSESSMENT — PAIN SCALES - GENERAL: PAINLEVEL: NO PAIN (0)

## 2024-02-23 NOTE — PROGRESS NOTES
"[facility]    Reason for Visit  Stephanie Bhatia is a 73 year old year old female who is being seen for Interstitial Lung Disease (ILD) (3 month follow up )    HPI  Stephanie Bhatia is a 73 year old who is being seen for f/u of ILD related to scleroderma.  She was initially diagnosed with scleroderma in about 2009 or 2010.  Scleroderma ILD was first diagnosed on chest CT scan in 2/2017.  She also had a few nodules that were small, 4 mm and did not require follow-up as she was low risk.  She started tocilizumab in approximately September 2022 for progressive drop in PFT and progression of ILD on chest CT.  She opted for tocilizumab instead of mycophenolate or nintedanib.     Today, Stephanie reports that she has not been exercising over the winter.  She thinks she has a little bit of seasonal affective disorder.  She also is working more than she used to.  She thinks she feels \"a little\" more short of breath with activity, for example when she walks up a flight of stairs.  She is attributing this to lack of exercise and deconditioning.  She has a morning cough that has been chronic.  She also continues to feel dizzy.  We got an echocardiogram in October that was normal with no suggestion of pulmonary hypertension.  She continues to take tocilizumab subcu 162 mg weekly.  She denies side effects.    She continues to have heartburn symptoms, or she says she may be is focusing on it more.  Dr. Gunn changed the famotidine to PPI, but Yara reports that did not help, so she went back to famotidine.  She is taking it twice a day and has questions about how to dose it.  She reports that it seems to work better if she takes it before a meal.  In terms of her skin, she denies digital fingertip ulcers.  She received the flu vaccine, RSV vaccine and the updated COVID-19 vaccine.            Current Outpatient Medications   Medication    Cholecalciferol (VITAMIN D) 1000 UNITS capsule    " famotidine (PEPCID) 20 MG tablet    ibuprofen (ADVIL/MOTRIN) 200 MG tablet    tocilizumab (ACTEMRA) 162 MG/0.9ML subcutaneous injection    acyclovir (ZOVIRAX) 400 MG tablet    cyanocobalamin (VITAMIN B-12) 100 MCG tablet    vitamin B complex with vitamin C (STRESS TAB) tablet    vitamin C (ASCORBIC ACID) 1000 MG TABS     No current facility-administered medications for this visit.     No Known Allergies  Past Medical History:   Diagnosis Date    Anxiety     Arthritis     scleroderma    Depressive disorder     Herpes simplex without mention of complication     Interstitial lung disease (H)     scleroderma ILD, dx by chest CT 2017    Lung nodules     4 mm LLL and RLL     Migraine     loss of speech and comprehension, has had w/u and is complex migraine, last one about 2017    Raynaud phenomenon     Systemic sclerosis with limited cutaneous involvement (H) 2014    Dx ~, Scl-70 and anti-centromere antibody neg, +RF       Past Surgical History:   Procedure Laterality Date    HYSTEROSCOPIC PLACEMENT CONTRACEPTIVE DEVICE         Social History     Socioeconomic History    Marital status: Single     Spouse name:     Number of children: 0    Years of education: Not on file    Highest education level: Master's degree (e.g., MA, MS, [place], MEd, MSW, DIANE)   Occupational History    Occupation:      Comment: [employer] - part time   Tobacco Use    Smoking status: Former     Packs/day: 1.00     Years: 20.00     Additional pack years: 0.00     Total pack years: 20.00     Types: Cigarettes     Quit date: 1986     Years since quittin.2    Smokeless tobacco: Never   Vaping Use    Vaping Use: Never used   Substance and Sexual Activity    Alcohol use: Not Currently     Comment: none since     Drug use: No    Sexual activity: Not Currently     Partners: Male   Other Topics Concern    Parent/sibling w/ CABG, MI or angioplasty before 65F 55M? No   Social History Narrative     .  No children. Sister lives in [place].  Part time at [employer].   in the past. Currently lives with family.    Possible asbestos exposure from basement pipes in childhood home.  Lived in  [place] [id number].  Denies exposure to pet birds, hot tubs.  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     Social Determinants of Health     Financial Resource Strain: Low Risk  (12/3/2023)    Financial Resource Strain     Within the past 12 months, have you or your family members you live with been unable to get utilities (heat, electricity) when it was really needed?: No   Food Insecurity: Low Risk  (12/3/2023)    Food Insecurity     Within the past 12 months, did you worry that your food would run out before you got money to buy more?: No     Within the past 12 months, did the food you bought just not last and you didn t have money to get more?: No   Transportation Needs: Low Risk  (12/3/2023)    Transportation Needs     Within the past 12 months, has lack of transportation kept you from medical appointments, getting your medicines, non-medical meetings or appointments, work, or from getting things that you need?: No   Physical Activity: Sufficiently Active (11/22/2021)    Exercise Vital Sign     Days of Exercise per Week: 7 days     Minutes of Exercise per Session: 60 min   Stress: Stress Concern Present (11/22/2021)    [employer] [place] [employer] - Occupational Stress Questionnaire     Feeling of Stress : To some extent   Social Connections: Moderately Isolated (11/22/2021)    Social Connection and Isolation Panel [NHANES]     Frequency of Communication with Friends and Family: More than three times a week     Frequency of Social Gatherings with Friends and Family: More than three times a week     Attends Rastafari Services: More than 4 times per year     Active Member of Clubs or Organizations: No     Attends Club or Organization Meetings: Never      "Marital Status:    Interpersonal Safety: Low Risk  (12/4/2023)    Interpersonal Safety     Do you feel physically and emotionally safe where you currently live?: Yes     Within the past 12 months, have you been hit, slapped, kicked or otherwise physically hurt by someone?: No     Within the past 12 months, have you been humiliated or emotionally abused in other ways by your partner or ex-partner?: No   Housing Stability: Low Risk  (12/3/2023)    Housing Stability     Do you have housing? : Yes     Are you worried about losing your housing?: No   Recent Concern: Housing Stability - High Risk (10/2/2023)    Housing Stability     Do you have housing? : Yes     Are you worried about losing your housing?: Yes       Family History   Problem Relation Age of Onset    C.A.D. Father         a efw small heart attacks    Neurologic Disorder Father         dementia 70s    Coronary Artery Disease Father     Neurologic Disorder Mother         dementia    Depression Mother     Cerebrovascular Disease Maternal Grandmother         ,    Other Cancer Paternal Grandmother     Rheumatoid Arthritis Sister     Hypertension Sister     Dementia Paternal Cousin     Dementia Paternal Uncle         60s    Depression Sister     Breast Cancer No family hx of     Cancer - colorectal No family hx of     Diabetes No family hx of     Cancer No family hx of         no skin cancer    Interstitial Lung Disease No family hx of     Melanoma No family hx of     Skin Cancer No family hx of     Glaucoma No family hx of     Macular Degeneration No family hx of            Vitals: /75 (BP Location: Right arm, Patient Position: Sitting, Cuff Size: Adult Regular)   Pulse 57   Ht 1.651 m (5' 5\")   Wt 58.1 kg (128 lb)   SpO2 99%   BMI 21.30 kg/m      Exam:   GENERAL APPEARANCE: Well developed, well nourished, alert, and in no apparent distress.  RESP: good air flow throughout.  Bibasilar inspiratory crackles. No rhonchi. No wheezes.  CV: Normal " S1, S2, regular rhythm, normal rate. No murmur.  No LE edema.   MS: extremities normal. No clubbing. No cyanosis.  SKIN: no rash on limited exam.  NEURO: Mentation intact, speech normal, normal gait and stance.  PSYCH: mentation appears normal. and affect normal/bright.       Results:  Recent Results (from the past 168 hour(s))   Lipid panel reflex to direct LDL Fasting    Collection Time: 02/23/24  7:53 AM   Result Value Ref Range    Cholesterol 215 (H) <200 mg/dL    Triglycerides 93 <150 mg/dL    Direct Measure HDL 63 >=50 mg/dL    LDL Cholesterol Calculated 133 (H) <=100 mg/dL    Non HDL Cholesterol 152 (H) <130 mg/dL    Patient Fasting > 8hrs? Yes    Hepatic function panel    Collection Time: 02/23/24  7:53 AM   Result Value Ref Range    Protein Total 6.1 (L) 6.4 - 8.3 g/dL    Albumin 4.0 3.5 - 5.2 g/dL    Bilirubin Total 1.3 (H) <=1.2 mg/dL    Alkaline Phosphatase 40 40 - 150 U/L    AST 24 0 - 45 U/L    ALT 13 0 - 50 U/L    Bilirubin Direct 0.27 0.00 - 0.30 mg/dL   CBC with platelets and differential    Collection Time: 02/23/24  7:53 AM   Result Value Ref Range    WBC Count 4.3 4.0 - 11.0 10e3/uL    RBC Count 4.07 3.80 - 5.20 10e6/uL    Hemoglobin 13.0 11.7 - 15.7 g/dL    Hematocrit 39.5 35.0 - 47.0 %    MCV 97 78 - 100 fL    MCH 31.9 26.5 - 33.0 pg    MCHC 32.9 31.5 - 36.5 g/dL    RDW 12.6 10.0 - 15.0 %    Platelet Count 208 150 - 450 10e3/uL    % Neutrophils 45 %    % Lymphocytes 31 %    % Monocytes 16 %    % Eosinophils 7 %    % Basophils 1 %    % Immature Granulocytes 0 %    NRBCs per 100 WBC 0 <1 /100    Absolute Neutrophils 1.9 1.6 - 8.3 10e3/uL    Absolute Lymphocytes 1.4 0.8 - 5.3 10e3/uL    Absolute Monocytes 0.7 0.0 - 1.3 10e3/uL    Absolute Eosinophils 0.3 0.0 - 0.7 10e3/uL    Absolute Basophils 0.1 0.0 - 0.2 10e3/uL    Absolute Immature Granulocytes 0.0 <=0.4 10e3/uL    Absolute NRBCs 0.0 10e3/uL   General PFT Lab (Please always keep checked)    Collection Time: 02/23/24  8:03 AM   Result Value Ref  Range    FVC-Pred 2.55 L    FVC-Pre 2.91 L    FVC-%Pred-Pre 114 %    FEV1-Pre 2.34 L    FEV1-%Pred-Pre 118 %    FEV1FVC-Pred 78 %    FEV1FVC-Pre 80 %    FEFMax-Pred 5.36 L/sec    FEFMax-Pre 7.31 L/sec    FEFMax-%Pred-Pre 136 %    FEF2575-Pred 1.64 L/sec    FEF2575-Pre 2.29 L/sec    XCO6328-%Pred-Pre 140 %    ExpTime-Pre 6.30 sec    FIFMax-Pre 3.90 L/sec    VC-Pred 3.13 L    VC-Pre 2.91 L    VC-%Pred-Pre 92 %    IC-Pred 1.93 L    IC-Pre 1.76 L    IC-%Pred-Pre 91 %    ERV-Pred 0.96 L    ERV-Pre 1.15 L    ERV-%Pred-Pre 119 %    FEV1FEV6-Pred 78 %    FEV1FEV6-Pre 80 %    FRCPleth-Pred 2.77 L    FRCPleth-Pre 2.90 L    FRCPleth-%Pred-Pre 104 %    RVPleth-Pred 2.20 L    RVPleth-Pre 1.75 L    RVPleth-%Pred-Pre 79 %    TLCPleth-Pred 5.11 L    TLCPleth-Pre 4.66 L    TLCPleth-%Pred-Pre 91 %    DLCOunc-Pred 19.08 ml/min/mmHg    DLCOunc-Pre 15.30 ml/min/mmHg    DLCOunc-%Pred-Pre 80 %    DLCOcor-Pre 15.49 ml/min/mmHg    DLCOcor-%Pred-Pre 81 %    VA-Pre 3.81 L    VA-%Pred-Pre 81 %    FEV1SVC-Pred 63 %    FEV1SVC-Pre 80 %       I reviewed PFT that was done today.  This shows normal spirometry, TLC and diffusion.  There has been a small drop in DLCO compared to last visit.  I also reviewed labs today; lipid panel shows elevated total cholesterol and LDL, although a little lower than 6 months ago.  HDL is normal.  LFTs are normal, as is the CBC with differential.    I reviewed results with the patient.        Assessment and plan:  Stephanie Bhatia is a 73 year old who is being seen for f/u of ILD related to scleroderma.    1.  Scleroderma ILD.  PFT shows that FVC has stabilized since starting tocilizumab.  She is tolerating tocilizumab well without side effects, and we will continue 162 mg subcu weekly.  There is no reason to switch to nintedanib or mycophenolate at this time.  I did encourage her to restart regular exercise, and she is in agreement.  She will return in 3 months with full PFT.  I did ask her to get rid of the portable  humidifier, which she has not been using.  2.  High risk medication immunosuppression monitoring.  Cholesterol and LDL cholesterol are a little high, although down slightly from 6 months ago.  Will continue to monitor about every 6 months because of the tocilizumab.  Other labs are normal, and I will recheck labs in 3 months for the tocilizumab.  She is not leukopenic today.  3.  GERD.  She switched to what sounds like a PPI, but this did not work very well, so she is back to famotidine again.  We discussed that she should be taking it twice a day, and the best is to take it half hour before meals.  She is in agreement.    4.  ILD studies.  She finished the 1 minute sit to stand study.  We discussed the ILD-PRO registry, and she is interested in participating.  I will have my  talk with her today.  I informed her that her participation is voluntary and does not affect her clinical care.    I spent 44 minutes reviewing chart, talking with and examining patient, reviewing test results, formulating plan and documentation on the day of the encounter (excluding PFT review).

## 2024-02-23 NOTE — LETTER
"    2/23/2024         RE: Stephanie Bhatia  [address]  [facility] [address]        Dear Colleague,    Thank you for referring your patient, Stephanie Bhatia, to the [facility] [place]. Please see a copy of my visit note below.    [facility]    Reason for Visit  Stephanie Bhatia is a 73 year old year old female who is being seen for Interstitial Lung Disease (ILD) (3 month follow up )    HPI  Stephanie Bhatia is a 73 year old who is being seen for f/u of ILD related to scleroderma.  She was initially diagnosed with scleroderma in about 2009 or 2010.  Scleroderma ILD was first diagnosed on chest CT scan in 2/2017.  She also had a few nodules that were small, 4 mm and did not require follow-up as she was low risk.  She started tocilizumab in approximately September 2022 for progressive drop in PFT and progression of ILD on chest CT.  She opted for tocilizumab instead of mycophenolate or nintedanib.     Today, Stephanie reports that she has not been exercising over the winter.  She thinks she has a little bit of seasonal affective disorder.  She also is working more than she used to.  She thinks she feels \"a little\" more short of breath with activity, for example when she walks up a flight of stairs.  She is attributing this to lack of exercise and deconditioning.  She has a morning cough that has been chronic.  She also continues to feel dizzy.  We got an echocardiogram in October that was normal with no suggestion of pulmonary hypertension.  She continues to take tocilizumab subcu 162 mg weekly.  She denies side effects.    She continues to have heartburn symptoms, or she says she may be is focusing on it more.  Dr. Gunn changed the famotidine to PPI, but Yara reports that did not help, so she went back to famotidine.  She is taking it twice a day and has questions about how to dose it.  She reports that it seems to work " better if she takes it before a meal.  In terms of her skin, she denies digital fingertip ulcers.  She received the flu vaccine, RSV vaccine and the updated COVID-19 vaccine.            Current Outpatient Medications   Medication    Cholecalciferol (VITAMIN D) 1000 UNITS capsule    famotidine (PEPCID) 20 MG tablet    ibuprofen (ADVIL/MOTRIN) 200 MG tablet    tocilizumab (ACTEMRA) 162 MG/0.9ML subcutaneous injection    acyclovir (ZOVIRAX) 400 MG tablet    cyanocobalamin (VITAMIN B-12) 100 MCG tablet    vitamin B complex with vitamin C (STRESS TAB) tablet    vitamin C (ASCORBIC ACID) 1000 MG TABS     No current facility-administered medications for this visit.     No Known Allergies  Past Medical History:   Diagnosis Date    Anxiety     Arthritis     scleroderma    Depressive disorder     Herpes simplex without mention of complication     Interstitial lung disease (H)     scleroderma ILD, dx by chest CT 2017    Lung nodules     4 mm LLL and RLL     Migraine     loss of speech and comprehension, has had w/u and is complex migraine, last one about 2017    Raynaud phenomenon     Systemic sclerosis with limited cutaneous involvement (H) 2014    Dx ~, Scl-70 and anti-centromere antibody neg, +RF       Past Surgical History:   Procedure Laterality Date    HYSTEROSCOPIC PLACEMENT CONTRACEPTIVE DEVICE         Social History     Socioeconomic History    Marital status: Single     Spouse name:     Number of children: 0    Years of education: Not on file    Highest education level: Master's degree (e.g., MA, MS, [place], MEd, MSW, DIANE)   Occupational History    Occupation:      Comment: [employer] - part time   Tobacco Use    Smoking status: Former     Packs/day: 1.00     Years: 20.00     Additional pack years: 0.00     Total pack years: 20.00     Types: Cigarettes     Quit date: 1986     Years since quittin.2    Smokeless tobacco: Never   Vaping Use    Vaping Use:  Never used   Substance and Sexual Activity    Alcohol use: Not Currently     Comment: none since 2001    Drug use: No    Sexual activity: Not Currently     Partners: Male   Other Topics Concern    Parent/sibling w/ CABG, MI or angioplasty before 65F 55M? No   Social History Narrative    .  No children. Sister lives in [place].  Part time at [employer].   in the past. Currently lives with family.    Possible asbestos exposure from basement pipes in childhood home.  Lived in  [facility] [id number].  Denies exposure to pet birds, hot tubs.  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     Social Determinants of Health     Financial Resource Strain: Low Risk  (12/3/2023)    Financial Resource Strain     Within the past 12 months, have you or your family members you live with been unable to get utilities (heat, electricity) when it was really needed?: No   Food Insecurity: Low Risk  (12/3/2023)    Food Insecurity     Within the past 12 months, did you worry that your food would run out before you got money to buy more?: No     Within the past 12 months, did the food you bought just not last and you didn t have money to get more?: No   Transportation Needs: Low Risk  (12/3/2023)    Transportation Needs     Within the past 12 months, has lack of transportation kept you from medical appointments, getting your medicines, non-medical meetings or appointments, work, or from getting things that you need?: No   Physical Activity: Sufficiently Active (11/22/2021)    Exercise Vital Sign     Days of Exercise per Week: 7 days     Minutes of Exercise per Session: 60 min   Stress: Stress Concern Present (11/22/2021)    [employer] [employer] - Occupational Stress Questionnaire     Feeling of Stress : To some extent   Social Connections: Moderately Isolated (11/22/2021)    Social Connection and Isolation Panel [NHANES]     Frequency of Communication with Friends and  Family: More than three times a week     Frequency of Social Gatherings with Friends and Family: More than three times a week     Attends Religion Services: More than 4 times per year     Active Member of Clubs or Organizations: No     Attends Club or Organization Meetings: Never     Marital Status:    Interpersonal Safety: Low Risk  (12/4/2023)    Interpersonal Safety     Do you feel physically and emotionally safe where you currently live?: Yes     Within the past 12 months, have you been hit, slapped, kicked or otherwise physically hurt by someone?: No     Within the past 12 months, have you been humiliated or emotionally abused in other ways by your partner or ex-partner?: No   Housing Stability: Low Risk  (12/3/2023)    Housing Stability     Do you have housing? : Yes     Are you worried about losing your housing?: No   Recent Concern: Housing Stability - High Risk (10/2/2023)    Housing Stability     Do you have housing? : Yes     Are you worried about losing your housing?: Yes       Family History   Problem Relation Age of Onset    C.A.D. Father         a efw small heart attacks    Neurologic Disorder Father         dementia 70s    Coronary Artery Disease Father     Neurologic Disorder Mother         dementia    Depression Mother     Cerebrovascular Disease Maternal Grandmother         ,    Other Cancer Paternal Grandmother     Rheumatoid Arthritis Sister     Hypertension Sister     Dementia Paternal Cousin     Dementia Paternal Uncle         60s    Depression Sister     Breast Cancer No family hx of     Cancer - colorectal No family hx of     Diabetes No family hx of     Cancer No family hx of         no skin cancer    Interstitial Lung Disease No family hx of     Melanoma No family hx of     Skin Cancer No family hx of     Glaucoma No family hx of     Macular Degeneration No family hx of            Vitals: /75 (BP Location: Right arm, Patient Position: Sitting, Cuff Size: Adult Regular)    "Pulse 57   Ht 1.651 m (5' 5\")   Wt 58.1 kg (128 lb)   SpO2 99%   BMI 21.30 kg/m      Exam:   GENERAL APPEARANCE: Well developed, well nourished, alert, and in no apparent distress.  RESP: good air flow throughout.  Bibasilar inspiratory crackles. No rhonchi. No wheezes.  CV: Normal S1, S2, regular rhythm, normal rate. No murmur.  No LE edema.   MS: extremities normal. No clubbing. No cyanosis.  SKIN: no rash on limited exam.  NEURO: Mentation intact, speech normal, normal gait and stance.  PSYCH: mentation appears normal. and affect normal/bright.       Results:  Recent Results (from the past 168 hour(s))   Lipid panel reflex to direct LDL Fasting    Collection Time: 02/23/24  7:53 AM   Result Value Ref Range    Cholesterol 215 (H) <200 mg/dL    Triglycerides 93 <150 mg/dL    Direct Measure HDL 63 >=50 mg/dL    LDL Cholesterol Calculated 133 (H) <=100 mg/dL    Non HDL Cholesterol 152 (H) <130 mg/dL    Patient Fasting > 8hrs? Yes    Hepatic function panel    Collection Time: 02/23/24  7:53 AM   Result Value Ref Range    Protein Total 6.1 (L) 6.4 - 8.3 g/dL    Albumin 4.0 3.5 - 5.2 g/dL    Bilirubin Total 1.3 (H) <=1.2 mg/dL    Alkaline Phosphatase 40 40 - 150 U/L    AST 24 0 - 45 U/L    ALT 13 0 - 50 U/L    Bilirubin Direct 0.27 0.00 - 0.30 mg/dL   CBC with platelets and differential    Collection Time: 02/23/24  7:53 AM   Result Value Ref Range    WBC Count 4.3 4.0 - 11.0 10e3/uL    RBC Count 4.07 3.80 - 5.20 10e6/uL    Hemoglobin 13.0 11.7 - 15.7 g/dL    Hematocrit 39.5 35.0 - 47.0 %    MCV 97 78 - 100 fL    MCH 31.9 26.5 - 33.0 pg    MCHC 32.9 31.5 - 36.5 g/dL    RDW 12.6 10.0 - 15.0 %    Platelet Count 208 150 - 450 10e3/uL    % Neutrophils 45 %    % Lymphocytes 31 %    % Monocytes 16 %    % Eosinophils 7 %    % Basophils 1 %    % Immature Granulocytes 0 %    NRBCs per 100 WBC 0 <1 /100    Absolute Neutrophils 1.9 1.6 - 8.3 10e3/uL    Absolute Lymphocytes 1.4 0.8 - 5.3 10e3/uL    Absolute Monocytes 0.7 0.0 - " 1.3 10e3/uL    Absolute Eosinophils 0.3 0.0 - 0.7 10e3/uL    Absolute Basophils 0.1 0.0 - 0.2 10e3/uL    Absolute Immature Granulocytes 0.0 <=0.4 10e3/uL    Absolute NRBCs 0.0 10e3/uL   General PFT Lab (Please always keep checked)    Collection Time: 02/23/24  8:03 AM   Result Value Ref Range    FVC-Pred 2.55 L    FVC-Pre 2.91 L    FVC-%Pred-Pre 114 %    FEV1-Pre 2.34 L    FEV1-%Pred-Pre 118 %    FEV1FVC-Pred 78 %    FEV1FVC-Pre 80 %    FEFMax-Pred 5.36 L/sec    FEFMax-Pre 7.31 L/sec    FEFMax-%Pred-Pre 136 %    FEF2575-Pred 1.64 L/sec    FEF2575-Pre 2.29 L/sec    ITA6440-%Pred-Pre 140 %    ExpTime-Pre 6.30 sec    FIFMax-Pre 3.90 L/sec    VC-Pred 3.13 L    VC-Pre 2.91 L    VC-%Pred-Pre 92 %    IC-Pred 1.93 L    IC-Pre 1.76 L    IC-%Pred-Pre 91 %    ERV-Pred 0.96 L    ERV-Pre 1.15 L    ERV-%Pred-Pre 119 %    FEV1FEV6-Pred 78 %    FEV1FEV6-Pre 80 %    FRCPleth-Pred 2.77 L    FRCPleth-Pre 2.90 L    FRCPleth-%Pred-Pre 104 %    RVPleth-Pred 2.20 L    RVPleth-Pre 1.75 L    RVPleth-%Pred-Pre 79 %    TLCPleth-Pred 5.11 L    TLCPleth-Pre 4.66 L    TLCPleth-%Pred-Pre 91 %    DLCOunc-Pred 19.08 ml/min/mmHg    DLCOunc-Pre 15.30 ml/min/mmHg    DLCOunc-%Pred-Pre 80 %    DLCOcor-Pre 15.49 ml/min/mmHg    DLCOcor-%Pred-Pre 81 %    VA-Pre 3.81 L    VA-%Pred-Pre 81 %    FEV1SVC-Pred 63 %    FEV1SVC-Pre 80 %     I reviewed PFT that was done today.  This shows normal spirometry, TLC and diffusion.  There has been a small drop in DLCO compared to last visit.  I also reviewed labs today; lipid panel shows elevated total cholesterol and LDL, although a little lower than 6 months ago.  HDL is normal.  LFTs are normal, as is the CBC with differential.    I reviewed results with the patient.    Assessment and plan:  Stephanie Bhatia is a 73 year old who is being seen for f/u of ILD related to scleroderma.    1.  Scleroderma ILD.  PFT shows that FVC has stabilized since starting tocilizumab.  She is tolerating tocilizumab well without side  effects, and we will continue 162 mg subcu weekly.  There is no reason to switch to nintedanib or mycophenolate at this time.  I did encourage her to restart regular exercise, and she is in agreement.  She will return in 3 months with full PFT.  I did ask her to get rid of the portable humidifier, which she has not been using.  2.  High risk medication immunosuppression monitoring.  Cholesterol and LDL cholesterol are a little high, although down slightly from 6 months ago.  Will continue to monitor about every 6 months because of the tocilizumab.  Other labs are normal, and I will recheck labs in 3 months for the tocilizumab.  She is not leukopenic today.  3.  GERD.  She switched to what sounds like a PPI, but this did not work very well, so she is back to famotidine again.  We discussed that she should be taking it twice a day, and the best is to take it half hour before meals.  She is in agreement.    4.  ILD studies.  She finished the 1 minute sit to stand study.  We discussed the ILD-PRO registry, and she is interested in participating.  I will have my  talk with her today.  I informed her that her participation is voluntary and does not affect her clinical care.    I spent 44 minutes reviewing chart, talking with and examining patient, reviewing test results, formulating plan and documentation on the day of the encounter (excluding PFT review).      Tiffanie Gomez MD

## 2024-02-23 NOTE — NURSING NOTE
Chief Complaint   Patient presents with    Interstitial Lung Disease (ILD)     3 month follow up      Vitals were taken and medications were reconciled.     Jael Colby RMA  8:51 AM

## 2024-03-05 LAB
DLCOCOR-%PRED-PRE: 81 %
DLCOCOR-PRE: 15.49 ML/MIN/MMHG
DLCOUNC-%PRED-PRE: 80 %
DLCOUNC-PRE: 15.3 ML/MIN/MMHG
DLCOUNC-PRED: 19.08 ML/MIN/MMHG
ERV-%PRED-PRE: 119 %
ERV-PRE: 1.15 L
ERV-PRED: 0.96 L
EXPTIME-PRE: 6.3 SEC
FEF2575-%PRED-PRE: 140 %
FEF2575-PRE: 2.29 L/SEC
FEF2575-PRED: 1.64 L/SEC
FEFMAX-%PRED-PRE: 136 %
FEFMAX-PRE: 7.31 L/SEC
FEFMAX-PRED: 5.36 L/SEC
FEV1-%PRED-PRE: 118 %
FEV1-PRE: 2.34 L
FEV1FEV6-PRE: 80 %
FEV1FEV6-PRED: 78 %
FEV1FVC-PRE: 80 %
FEV1FVC-PRED: 78 %
FEV1SVC-PRE: 80 %
FEV1SVC-PRED: 63 %
FIFMAX-PRE: 3.9 L/SEC
FRCPLETH-%PRED-PRE: 104 %
FRCPLETH-PRE: 2.9 L
FRCPLETH-PRED: 2.77 L
FVC-%PRED-PRE: 114 %
FVC-PRE: 2.91 L
FVC-PRED: 2.55 L
IC-%PRED-PRE: 91 %
IC-PRE: 1.76 L
IC-PRED: 1.93 L
RVPLETH-%PRED-PRE: 79 %
RVPLETH-PRE: 1.75 L
RVPLETH-PRED: 2.2 L
TLCPLETH-%PRED-PRE: 91 %
TLCPLETH-PRE: 4.66 L
TLCPLETH-PRED: 5.11 L
VA-%PRED-PRE: 81 %
VA-PRE: 3.81 L
VC-%PRED-PRE: 92 %
VC-PRE: 2.91 L
VC-PRED: 3.13 L

## 2024-03-13 ENCOUNTER — NURSE TRIAGE (OUTPATIENT)
Dept: NURSING | Facility: CLINIC | Age: 74
End: 2024-03-13

## 2024-03-13 ENCOUNTER — ANCILLARY PROCEDURE (OUTPATIENT)
Dept: GENERAL RADIOLOGY | Facility: CLINIC | Age: 74
End: 2024-03-13
Attending: PHYSICIAN ASSISTANT
Payer: MEDICARE

## 2024-03-13 ENCOUNTER — OFFICE VISIT (OUTPATIENT)
Dept: URGENT CARE | Facility: URGENT CARE | Age: 74
End: 2024-03-13
Payer: MEDICARE

## 2024-03-13 VITALS
WEIGHT: 136.5 LBS | OXYGEN SATURATION: 100 % | TEMPERATURE: 98 F | BODY MASS INDEX: 22.71 KG/M2 | DIASTOLIC BLOOD PRESSURE: 81 MMHG | SYSTOLIC BLOOD PRESSURE: 154 MMHG | RESPIRATION RATE: 16 BRPM | HEART RATE: 61 BPM

## 2024-03-13 DIAGNOSIS — R60.0 BILATERAL LEG EDEMA: Primary | ICD-10-CM

## 2024-03-13 DIAGNOSIS — R06.89 OTHER ABNORMALITIES OF BREATHING: ICD-10-CM

## 2024-03-13 LAB
ALBUMIN SERPL BCG-MCNC: 4.3 G/DL (ref 3.5–5.2)
ALP SERPL-CCNC: 44 U/L (ref 40–150)
ALT SERPL W P-5'-P-CCNC: 40 U/L (ref 0–50)
ANION GAP SERPL CALCULATED.3IONS-SCNC: 5 MMOL/L (ref 7–15)
ANION GAP SERPL CALCULATED.3IONS-SCNC: 5 MMOL/L (ref 7–15)
AST SERPL W P-5'-P-CCNC: 27 U/L (ref 0–45)
BASOPHILS # BLD AUTO: 0.1 10E3/UL (ref 0–0.2)
BASOPHILS NFR BLD AUTO: 1 %
BILIRUB SERPL-MCNC: 1 MG/DL
BUN SERPL-MCNC: 14.7 MG/DL (ref 8–23)
BUN SERPL-MCNC: 14.7 MG/DL (ref 8–23)
CALCIUM SERPL-MCNC: 9.2 MG/DL (ref 8.8–10.2)
CALCIUM SERPL-MCNC: 9.2 MG/DL (ref 8.8–10.2)
CHLORIDE SERPL-SCNC: 102 MMOL/L (ref 98–107)
CHLORIDE SERPL-SCNC: 102 MMOL/L (ref 98–107)
CREAT SERPL-MCNC: 0.69 MG/DL (ref 0.51–0.95)
CREAT SERPL-MCNC: 0.69 MG/DL (ref 0.51–0.95)
DEPRECATED HCO3 PLAS-SCNC: 31 MMOL/L (ref 22–29)
DEPRECATED HCO3 PLAS-SCNC: 31 MMOL/L (ref 22–29)
EGFRCR SERPLBLD CKD-EPI 2021: 90 ML/MIN/1.73M2
EGFRCR SERPLBLD CKD-EPI 2021: 90 ML/MIN/1.73M2
EOSINOPHIL # BLD AUTO: 0.1 10E3/UL (ref 0–0.7)
EOSINOPHIL NFR BLD AUTO: 2 %
ERYTHROCYTE [DISTWIDTH] IN BLOOD BY AUTOMATED COUNT: 12.9 % (ref 10–15)
GLUCOSE SERPL-MCNC: 83 MG/DL (ref 70–99)
GLUCOSE SERPL-MCNC: 83 MG/DL (ref 70–99)
HCT VFR BLD AUTO: 37.9 % (ref 35–47)
HGB BLD-MCNC: 12 G/DL (ref 11.7–15.7)
IMM GRANULOCYTES # BLD: 0 10E3/UL
IMM GRANULOCYTES NFR BLD: 0 %
LYMPHOCYTES # BLD AUTO: 1.2 10E3/UL (ref 0.8–5.3)
LYMPHOCYTES NFR BLD AUTO: 23 %
MCH RBC QN AUTO: 31.7 PG (ref 26.5–33)
MCHC RBC AUTO-ENTMCNC: 31.7 G/DL (ref 31.5–36.5)
MCV RBC AUTO: 100 FL (ref 78–100)
MONOCYTES # BLD AUTO: 0.7 10E3/UL (ref 0–1.3)
MONOCYTES NFR BLD AUTO: 13 %
NEUTROPHILS # BLD AUTO: 3.2 10E3/UL (ref 1.6–8.3)
NEUTROPHILS NFR BLD AUTO: 61 %
NT-PROBNP SERPL-MCNC: 314 PG/ML (ref 0–900)
PLATELET # BLD AUTO: 252 10E3/UL (ref 150–450)
POTASSIUM SERPL-SCNC: 3.9 MMOL/L (ref 3.4–5.3)
POTASSIUM SERPL-SCNC: 3.9 MMOL/L (ref 3.4–5.3)
PROT SERPL-MCNC: 6.1 G/DL (ref 6.4–8.3)
RBC # BLD AUTO: 3.79 10E6/UL (ref 3.8–5.2)
SODIUM SERPL-SCNC: 138 MMOL/L (ref 135–145)
SODIUM SERPL-SCNC: 138 MMOL/L (ref 135–145)
WBC # BLD AUTO: 5.3 10E3/UL (ref 4–11)

## 2024-03-13 PROCEDURE — 99214 OFFICE O/P EST MOD 30 MIN: CPT | Mod: 25 | Performed by: PHYSICIAN ASSISTANT

## 2024-03-13 PROCEDURE — 83880 ASSAY OF NATRIURETIC PEPTIDE: CPT | Mod: QW | Performed by: PHYSICIAN ASSISTANT

## 2024-03-13 PROCEDURE — 85025 COMPLETE CBC W/AUTO DIFF WBC: CPT | Performed by: PHYSICIAN ASSISTANT

## 2024-03-13 PROCEDURE — 93000 ELECTROCARDIOGRAM COMPLETE: CPT | Performed by: PHYSICIAN ASSISTANT

## 2024-03-13 PROCEDURE — 71046 X-RAY EXAM CHEST 2 VIEWS: CPT | Mod: TC | Performed by: INTERNAL MEDICINE

## 2024-03-13 PROCEDURE — 80053 COMPREHEN METABOLIC PANEL: CPT | Mod: QW | Performed by: PHYSICIAN ASSISTANT

## 2024-03-13 PROCEDURE — 84295 ASSAY OF SERUM SODIUM: CPT | Mod: 59 | Performed by: PHYSICIAN ASSISTANT

## 2024-03-13 PROCEDURE — 36415 COLL VENOUS BLD VENIPUNCTURE: CPT | Performed by: PHYSICIAN ASSISTANT

## 2024-03-13 NOTE — PROGRESS NOTES
Bilateral leg edema  - CBC with platelets and differential  - BNP-N terminal pro  - Basic metabolic panel  (Ca, Cl, CO2, Creat, Gluc, K, Na, BUN)  - EKG 12-lead complete w/read - Clinics  - XR Chest 2 Views  - Comprehensive metabolic panel (BMP + Alb, Alk Phos, ALT, AST, Total. Bili, TP)    Other abnormalities of breathing  - BNP-N terminal pro  - Comprehensive metabolic panel (BMP + Alb, Alk Phos, ALT, AST, Total. Bili, TP)    I have asked the patient to elevate her legs above heart level several times throughout the day and wear compression stockings when ambulating.  I have asked her to watch her water and sodium intake as well.  I would like her to ice on a regular basis while she is elevating.  Awaiting labs to rule out heart failure and electrolyte imbalances.  All other test today were within normal limits or consistent with previous results.  Would like the patient to follow-up with her primary care provider if no improvement after 10 days of conservative therapy.    We discussed red flag symptoms to monitor for in the next 48 hours and I have asked the patient to go to the ER if these present.    Patrice Blount PA-C  [facility]    Subjective   73 year old who presents to clinic today for the following health issues:    Swelling, Dizziness, and Weight Problem       HPI   Both feet x4days, tingling in both legs, no headache. No chest pain, no trouble breathing, or heart palpitations. Weight gain 10 lbs x2 wks, patient states she has trouble putting on shoes and her pants her are tight. Patient denies any pain in the calves or the feet. Denies any changes in diet or hydration level.  Patient denies any history of heart disease, kidney disease, or liver disease. Denies any history of diabetes. No recent URI symptoms. Denies any recent injury to the legs. Blood pressure is elevated today with no known history of high blood pressure. Denies any UTI symptoms or GI symptoms.      Review of  Systems   Review of Systems   See HPI    Objective    Temp: 98  F (36.7  C) Temp src: Oral BP: (!) 154/81 Pulse: 61   Resp: 16 SpO2: 100 %       Physical Exam   Physical Exam  Constitutional:       General: She is not in acute distress.     Appearance: Normal appearance. She is normal weight. She is not ill-appearing, toxic-appearing or diaphoretic.   HENT:      Head: Normocephalic and atraumatic.   Cardiovascular:      Rate and Rhythm: Normal rate and regular rhythm.      Pulses: Normal pulses.      Heart sounds: Normal heart sounds. No murmur heard.     No friction rub. No gallop.      Comments: There is no tenderness or erythema of the legs but there is a +1 pitting edema bilaterally.  Pulmonary:      Effort: Pulmonary effort is normal. No respiratory distress.      Breath sounds: No stridor. No wheezing, rhonchi or rales.   Chest:      Chest wall: No tenderness.   Musculoskeletal:      Right lower le+ Edema present.      Left lower le+ Edema present.   Neurological:      General: No focal deficit present.      Mental Status: She is alert and oriented to person, place, and time. Mental status is at baseline.      Gait: Gait normal.   Psychiatric:         Mood and Affect: Mood normal.         Behavior: Behavior normal.         Thought Content: Thought content normal.         Judgment: Judgment normal.          Xray - Reviewed and interpreted by me.  No acute cardiopulmonary disease noted.  EKG - Reviewed and interpreted by me appears normal, NSR, normal axis, normal intervals, no acute ST/T changes c/w ischemia, no LVH by voltage criteria  Results for orders placed or performed in visit on 24 (from the past 24 hour(s))   CBC with platelets and differential    Narrative    The following orders were created for panel order CBC with platelets and differential.  Procedure                               Abnormality         Status                     ---------                               -----------          ------                     CBC with platelets and d...[[id number]]                                                   Please view results for these tests on the individual orders.   XR Chest 2 Views    Narrative    CHEST TWO VIEWS 3/13/2024 4:05 PM     HISTORY: Bilateral leg edema    COMPARISON: CT chest 5/12/2022.       Impression    IMPRESSION: Cardiac silhouette is within normal limits. Bilateral  interstitial opacities compatible with known fibrotic interstitial  lung disease. Difficult to exclude superimposed infection in the  correct clinical context. No pleural effusion or pneumothorax. Remote  right lower rib fracture.    EDYTA JIMENEZ MD         SYSTEM ID:  [id number]

## 2024-03-13 NOTE — TELEPHONE ENCOUNTER
Triage call  Patient calling to report  that for the last couple days she has had foot and ankle swelling.  Her  feet are red especially at the toes.  She noticed limited mobility no pain.  She had trouble finding a pair of shoes that she could wear.    Per protocol see in office today. Care advice given.  Verbalizes understanding and agrees with plan. She will call for a appointment when scheduling opens or go to the urgent care after work.    Beatriz Suazo RN   [facility] Nurse Advisor  6:49 AM 3/13/2024    Reason for Disposition   Swelling of both ankles (i.e., pedal edema)   Patient wants to be seen    Additional Information   Negative: Chest pain   Negative: Followed an ankle injury   Negative: Followed a bee sting and has localized swelling (e.g., small area of puffy or swollen skin)   Negative: Followed an insect bite and has localized swelling (e.g., small area of puffy or swollen skin)   Negative: Ankle pain is main symptom   Negative: Sounds like a life-threatening emergency to the triager   Negative: Chest pain   Negative: Followed an insect bite and has localized swelling (e.g., small area of puffy or swollen skin)   Negative: Followed a knee injury   Negative: Ankle or foot injury   Negative: Pregnant with leg swelling or edema   Negative: Difficulty breathing at rest   Negative: Entire foot is cool or blue in comparison to other side   Negative: SEVERE swelling (e.g., swelling extends above knee, entire leg is swollen, weeping fluid)   Negative: Cast on leg or ankle and has increasing pain   Negative: Can't walk or can barely stand (new-onset)   Negative: Fever and red area (or area very tender to touch)   Negative: Patient sounds very sick or weak to the triager   Negative: Swelling of face, arm or hands  (Exception: Slight puffiness of fingers during hot weather.)   Negative: Pregnant 20 or more weeks and sudden weight gain (i.e., > 2 lbs, 1 kg in one week)   Negative: Thigh or calf pain  and only 1 side and present > 1 hour   Negative: Thigh, calf, or ankle swelling in only one leg   Negative: Thigh, calf, or ankle swelling in both legs, but one side is definitely more swollen (Exception: Longstanding difference between legs.)   Negative: MODERATE swelling of both ankles (e.g., swelling extends up to the knees) AND new-onset or worsening   Negative: Difficulty breathing with exertion AND worsening or new-onset   Negative: Looks like a boil, infected sore, deep ulcer, or other infected rash (spreading redness, pus)    Protocols used: Ankle Swelling-A-OH, Leg Swelling and Edema-A-OH

## 2024-05-17 ENCOUNTER — OFFICE VISIT (OUTPATIENT)
Dept: PULMONOLOGY | Facility: CLINIC | Age: 74
End: 2024-05-17
Attending: INTERNAL MEDICINE
Payer: MEDICARE

## 2024-05-17 ENCOUNTER — LAB (OUTPATIENT)
Dept: LAB | Facility: CLINIC | Age: 74
End: 2024-05-17
Attending: INTERNAL MEDICINE
Payer: MEDICARE

## 2024-05-17 VITALS
SYSTOLIC BLOOD PRESSURE: 112 MMHG | HEART RATE: 70 BPM | WEIGHT: 127.9 LBS | BODY MASS INDEX: 21.31 KG/M2 | DIASTOLIC BLOOD PRESSURE: 69 MMHG | OXYGEN SATURATION: 100 % | HEIGHT: 65 IN | RESPIRATION RATE: 17 BRPM

## 2024-05-17 DIAGNOSIS — J84.9 ILD (INTERSTITIAL LUNG DISEASE) (H): Primary | ICD-10-CM

## 2024-05-17 DIAGNOSIS — J84.9 ILD (INTERSTITIAL LUNG DISEASE) (H): ICD-10-CM

## 2024-05-17 LAB
ALBUMIN SERPL BCG-MCNC: 4.1 G/DL (ref 3.5–5.2)
ALP SERPL-CCNC: 43 U/L (ref 40–150)
ALT SERPL W P-5'-P-CCNC: 15 U/L (ref 0–50)
AST SERPL W P-5'-P-CCNC: 27 U/L (ref 0–45)
BASOPHILS # BLD AUTO: 0.1 10E3/UL (ref 0–0.2)
BASOPHILS NFR BLD AUTO: 1 %
BILIRUB DIRECT SERPL-MCNC: 0.28 MG/DL (ref 0–0.3)
BILIRUB SERPL-MCNC: 1.3 MG/DL
DLCOUNC-%PRED-PRE: 91 %
DLCOUNC-PRE: 17.44 ML/MIN/MMHG
DLCOUNC-PRED: 19.06 ML/MIN/MMHG
EOSINOPHIL # BLD AUTO: 0.4 10E3/UL (ref 0–0.7)
EOSINOPHIL NFR BLD AUTO: 9 %
ERV-%PRED-PRE: 120 %
ERV-PRE: 1.16 L
ERV-PRED: 0.96 L
ERYTHROCYTE [DISTWIDTH] IN BLOOD BY AUTOMATED COUNT: 12.3 % (ref 10–15)
EXPTIME-PRE: 4.88 SEC
FEF2575-%PRED-PRE: 163 %
FEF2575-PRE: 2.67 L/SEC
FEF2575-PRED: 1.63 L/SEC
FEFMAX-%PRED-PRE: 125 %
FEFMAX-PRE: 6.69 L/SEC
FEFMAX-PRED: 5.34 L/SEC
FEV1-%PRED-PRE: 123 %
FEV1-PRE: 2.43 L
FEV1FEV6-PRE: 84 %
FEV1FEV6-PRED: 78 %
FEV1FVC-PRE: 84 %
FEV1FVC-PRED: 78 %
FEV1SVC-PRE: 88 %
FEV1SVC-PRED: 63 %
FIFMAX-PRE: 4.35 L/SEC
FRCPLETH-%PRED-PRE: 96 %
FRCPLETH-PRE: 2.67 L
FRCPLETH-PRED: 2.77 L
FVC-%PRED-PRE: 113 %
FVC-PRE: 2.89 L
FVC-PRED: 2.54 L
HCT VFR BLD AUTO: 39.9 % (ref 35–47)
HGB BLD-MCNC: 13.2 G/DL (ref 11.7–15.7)
IC-%PRED-PRE: 83 %
IC-PRE: 1.61 L
IC-PRED: 1.92 L
IMM GRANULOCYTES # BLD: 0 10E3/UL
IMM GRANULOCYTES NFR BLD: 0 %
LYMPHOCYTES # BLD AUTO: 1.6 10E3/UL (ref 0.8–5.3)
LYMPHOCYTES NFR BLD AUTO: 34 %
MCH RBC QN AUTO: 31.8 PG (ref 26.5–33)
MCHC RBC AUTO-ENTMCNC: 33.1 G/DL (ref 31.5–36.5)
MCV RBC AUTO: 96 FL (ref 78–100)
MONOCYTES # BLD AUTO: 0.7 10E3/UL (ref 0–1.3)
MONOCYTES NFR BLD AUTO: 14 %
NEUTROPHILS # BLD AUTO: 1.9 10E3/UL (ref 1.6–8.3)
NEUTROPHILS NFR BLD AUTO: 42 %
NRBC # BLD AUTO: 0 10E3/UL
NRBC BLD AUTO-RTO: 0 /100
PLATELET # BLD AUTO: 220 10E3/UL (ref 150–450)
PROT SERPL-MCNC: 6.2 G/DL (ref 6.4–8.3)
RBC # BLD AUTO: 4.15 10E6/UL (ref 3.8–5.2)
RVPLETH-%PRED-PRE: 68 %
RVPLETH-PRE: 1.51 L
RVPLETH-PRED: 2.2 L
TLCPLETH-%PRED-PRE: 83 %
TLCPLETH-PRE: 4.28 L
TLCPLETH-PRED: 5.11 L
VA-%PRED-PRE: 89 %
VA-PRE: 4.16 L
VC-%PRED-PRE: 88 %
VC-PRE: 2.77 L
VC-PRED: 3.13 L
WBC # BLD AUTO: 4.6 10E3/UL (ref 4–11)

## 2024-05-17 PROCEDURE — 80076 HEPATIC FUNCTION PANEL: CPT | Performed by: PATHOLOGY

## 2024-05-17 PROCEDURE — 94726 PLETHYSMOGRAPHY LUNG VOLUMES: CPT | Performed by: INTERNAL MEDICINE

## 2024-05-17 PROCEDURE — G0463 HOSPITAL OUTPT CLINIC VISIT: HCPCS | Performed by: INTERNAL MEDICINE

## 2024-05-17 PROCEDURE — 85025 COMPLETE CBC W/AUTO DIFF WBC: CPT | Performed by: PATHOLOGY

## 2024-05-17 PROCEDURE — 94375 RESPIRATORY FLOW VOLUME LOOP: CPT | Performed by: INTERNAL MEDICINE

## 2024-05-17 PROCEDURE — 36415 COLL VENOUS BLD VENIPUNCTURE: CPT | Performed by: PATHOLOGY

## 2024-05-17 PROCEDURE — 94729 DIFFUSING CAPACITY: CPT | Performed by: INTERNAL MEDICINE

## 2024-05-17 PROCEDURE — 99215 OFFICE O/P EST HI 40 MIN: CPT | Mod: 25 | Performed by: INTERNAL MEDICINE

## 2024-05-17 ASSESSMENT — PAIN SCALES - GENERAL: PAINLEVEL: NO PAIN (0)

## 2024-05-17 NOTE — PATIENT INSTRUCTIONS
Continue actemra  Talk with primary care provider about depression   Talk with Dr. Gunn about possible endoscopy

## 2024-05-17 NOTE — LETTER
"    5/17/2024         RE: Stephanie Bhatia  [address]  [facility] [address]        Dear Colleague,    Thank you for referring your patient, Stephanie Bhatia, to the [facility] [place]. Please see a copy of my visit note below.    [facility]    Reason for Visit  Stephanie Bhatia is a 73 year old year old female who is being seen for RECHECK (Return Interstitial Lung )    HPI  Stephanie Bhatia is a 73 year old who is being seen for f/u of scleroderma ILD.  She was initially diagnosed with scleroderma in about 2009 or 2010.  Scleroderma ILD was first diagnosed on chest CT scan in 2/2017.  She also had a few nodules that were small, 4 mm and did not require follow-up as she was low risk.  She started tocilizumab in approximately September 2022 for progressive drop in PFT and progression of ILD on chest CT.  She opted for tocilizumab instead of mycophenolate or nintedanib.     Today, he reports that her dyspnea on exertion continues to be relatively stable.  For example, she does feel short of breath when she walks up a flight of stairs, which she says is \"maybe a little worse.\"  She denies cough, paroxysmal nocturnal dyspnea, wheezing, fever fevers or syncope.  She is not exercising because she is not motivated, and she thinks she also might be depressed.  She has taken medication for depression in the past, but she did not like how it made her feel.  She has had some dizziness.  Most recent echo 7 months ago showed no evidence no evidence of pulmonary hypertension.  She reports no side effects from tocilizumab subcu.  She continues to take 162 mg weekly.    She takes famotidine twice a day and denies heartburn symptoms unless she eats a large meal or eats close to bedtime.            Current Outpatient Medications   Medication Sig Dispense Refill     acyclovir (ZOVIRAX) 400 MG tablet Take 1 tablet (400 mg) by mouth every 8 " hours For 5 days 15 tablet 1     Cholecalciferol (VITAMIN D) 1000 UNITS capsule Take 1 capsule by mouth daily       famotidine (PEPCID) 20 MG tablet Take 1 tablet (20 mg) by mouth daily 90 tablet 4     ibuprofen (ADVIL/MOTRIN) 200 MG tablet Take 200 mg by mouth every 4 hours as needed for mild pain       tocilizumab (ACTEMRA) 162 MG/0.9ML subcutaneous injection Inject 0.9 mLs (162 mg) Subcutaneous every 7 days 3.6 mL 11     No current facility-administered medications for this visit.     No Known Allergies  Past Medical History:   Diagnosis Date     Anxiety      Arthritis     scleroderma     Depressive disorder      Herpes simplex without mention of complication      Interstitial lung disease (H)     scleroderma ILD, dx by chest CT 2017     Lung nodules     4 mm LLL and RLL      Migraine     loss of speech and comprehension, has had w/u and is complex migraine, last one about 2017     Raynaud phenomenon      Systemic sclerosis with limited cutaneous involvement (H) 2014    Dx ~, Scl-70 and anti-centromere antibody neg, +RF       Past Surgical History:   Procedure Laterality Date     HYSTEROSCOPIC PLACEMENT CONTRACEPTIVE DEVICE         Social History     Socioeconomic History     Marital status: Single     Spouse name:      Number of children: 0     Years of education: Not on file     Highest education level: Master's degree (e.g., MA, MS, [place], MEd, MSW, DIANE)   Occupational History     Occupation:      Comment: [employer] - part time   Tobacco Use     Smoking status: Former     Current packs/day: 0.00     Average packs/day: 1 pack/day for 20.0 years (20.0 ttl pk-yrs)     Types: Cigarettes     Start date: 1966     Quit date: 1986     Years since quittin.4     Smokeless tobacco: Never   Vaping Use     Vaping status: Never Used   Substance and Sexual Activity     Alcohol use: Not Currently     Comment: none since      Drug use: No     Sexual  activity: Not Currently     Partners: Male   Other Topics Concern     Parent/sibling w/ CABG, MI or angioplasty before 65F 55M? No   Social History Narrative    .  No children. Sister lives in [place].  Part time at [email or web address].   in the past. Currently lives with family.    Possible asbestos exposure from basement pipes in childhood home.  [place] [place]  [facility] [address].  Denies exposure to pet birds, hot tubs.  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     Social Determinants of Health     Financial Resource Strain: Low Risk  (12/3/2023)    Financial Resource Strain      Within the past 12 months, have you or your family members you live with been unable to get utilities (heat, electricity) when it was really needed?: No   Food Insecurity: Low Risk  (12/3/2023)    Food Insecurity      Within the past 12 months, did you worry that your food would run out before you got money to buy more?: No      Within the past 12 months, did the food you bought just not last and you didn t have money to get more?: No   Transportation Needs: Low Risk  (12/3/2023)    Transportation Needs      Within the past 12 months, has lack of transportation kept you from medical appointments, getting your medicines, non-medical meetings or appointments, work, or from getting things that you need?: No   Physical Activity: Sufficiently Active (11/22/2021)    Exercise Vital Sign      Days of Exercise per Week: 7 days      Minutes of Exercise per Session: 60 min   Stress: Stress Concern Present (11/22/2021)    [employer] [place] [employer] - Occupational Stress Questionnaire      Feeling of Stress : To some extent   Social Connections: Moderately Isolated (11/22/2021)    Social Connection and Isolation Panel [NHANES]      Frequency of Communication with Friends and Family: More than three times a week      Frequency of Social Gatherings with Friends and Family: More than three times a  "week      Attends Buddhist Services: More than 4 times per year      Active Member of Clubs or Organizations: No      Attends Club or Organization Meetings: Never      Marital Status:    Interpersonal Safety: Low Risk  (12/4/2023)    Interpersonal Safety      Do you feel physically and emotionally safe where you currently live?: Yes      Within the past 12 months, have you been hit, slapped, kicked or otherwise physically hurt by someone?: No      Within the past 12 months, have you been humiliated or emotionally abused in other ways by your partner or ex-partner?: No   Housing Stability: Low Risk  (12/3/2023)    Housing Stability      Do you have housing? : Yes      Are you worried about losing your housing?: No   Recent Concern: Housing Stability - High Risk (10/2/2023)    Housing Stability      Do you have housing? : Yes      Are you worried about losing your housing?: Yes       Family History   Problem Relation Age of Onset     C.A.D. Father         a efw small heart attacks     Neurologic Disorder Father         dementia 70s     Coronary Artery Disease Father      Neurologic Disorder Mother         dementia     Depression Mother      Cerebrovascular Disease Maternal Grandmother         ,     Other Cancer Paternal Grandmother      Rheumatoid Arthritis Sister      Hypertension Sister      Dementia Paternal Cousin      Dementia Paternal Uncle         60s     Depression Sister      Breast Cancer No family hx of      Cancer - colorectal No family hx of      Diabetes No family hx of      Cancer No family hx of         no skin cancer     Interstitial Lung Disease No family hx of      Melanoma No family hx of      Skin Cancer No family hx of      Glaucoma No family hx of      Macular Degeneration No family hx of            Vitals: /69   Pulse 70   Resp 17   Ht 1.651 m (5' 5\")   Wt 58 kg (127 lb 14.4 oz)   SpO2 100%   BMI 21.28 kg/m      Exam:   GENERAL APPEARANCE: Well developed, well nourished, " alert, and in no apparent distress.  RESP: good air flow throughout.  Bibasilar inspiratory crackles. No rhonchi. No wheezes.  CV: Normal S1, S2, regular rhythm, normal rate. No murmur.  No LE edema.   MS: extremities normal. No clubbing. No cyanosis.  SKIN: no rash on limited exam.  NEURO: Mentation intact, speech normal, normal gait and stance.  PSYCH: mentation appears normal. and affect normal/bright.       Results:  Recent Results (from the past 168 hour(s))   General PFT Lab (Please always keep checked)    Collection Time: 05/17/24  5:54 AM   Result Value Ref Range    FVC-Pred 2.54 L    FVC-Pre 2.89 L    FVC-%Pred-Pre 113 %    FEV1-Pre 2.43 L    FEV1-%Pred-Pre 123 %    FEV1FVC-Pred 78 %    FEV1FVC-Pre 84 %    FEFMax-Pred 5.34 L/sec    FEFMax-Pre 6.69 L/sec    FEFMax-%Pred-Pre 125 %    FEF2575-Pred 1.63 L/sec    FEF2575-Pre 2.67 L/sec    MVM1766-%Pred-Pre 163 %    ExpTime-Pre 4.88 sec    FIFMax-Pre 4.35 L/sec    VC-Pred 3.13 L    VC-Pre 2.77 L    VC-%Pred-Pre 88 %    IC-Pred 1.92 L    IC-Pre 1.61 L    IC-%Pred-Pre 83 %    ERV-Pred 0.96 L    ERV-Pre 1.16 L    ERV-%Pred-Pre 120 %    FEV1FEV6-Pred 78 %    FEV1FEV6-Pre 84 %    DLCOunc-Pred 19.06 ml/min/mmHg    DLCOunc-Pre 17.44 ml/min/mmHg    DLCOunc-%Pred-Pre 91 %    VA-Pre 4.16 L    VA-%Pred-Pre 89 %    FEV1SVC-Pred 63 %    FEV1SVC-Pre 88 %    FRCPleth-Pred 2.77 L    FRCPleth-Pre 2.67 L    FRCPleth-%Pred-Pre 96 %    RVPleth-Pred 2.20 L    RVPleth-Pre 1.51 L    RVPleth-%Pred-Pre 68 %    TLCPleth-Pred 5.11 L    TLCPleth-Pre 4.28 L    TLCPleth-%Pred-Pre 83 %   Hepatic function panel    Collection Time: 05/17/24  6:22 AM   Result Value Ref Range    Protein Total 6.2 (L) 6.4 - 8.3 g/dL    Albumin 4.1 3.5 - 5.2 g/dL    Bilirubin Total 1.3 (H) <=1.2 mg/dL    Alkaline Phosphatase 43 40 - 150 U/L    AST 27 0 - 45 U/L    ALT 15 0 - 50 U/L    Bilirubin Direct 0.28 0.00 - 0.30 mg/dL   CBC with platelets and differential    Collection Time: 05/17/24  6:22 AM   Result Value  Ref Range    WBC Count 4.6 4.0 - 11.0 10e3/uL    RBC Count 4.15 3.80 - 5.20 10e6/uL    Hemoglobin 13.2 11.7 - 15.7 g/dL    Hematocrit 39.9 35.0 - 47.0 %    MCV 96 78 - 100 fL    MCH 31.8 26.5 - 33.0 pg    MCHC 33.1 31.5 - 36.5 g/dL    RDW 12.3 10.0 - 15.0 %    Platelet Count 220 150 - 450 10e3/uL    % Neutrophils 42 %    % Lymphocytes 34 %    % Monocytes 14 %    % Eosinophils 9 %    % Basophils 1 %    % Immature Granulocytes 0 %    NRBCs per 100 WBC 0 <1 /100    Absolute Neutrophils 1.9 1.6 - 8.3 10e3/uL    Absolute Lymphocytes 1.6 0.8 - 5.3 10e3/uL    Absolute Monocytes 0.7 0.0 - 1.3 10e3/uL    Absolute Eosinophils 0.4 0.0 - 0.7 10e3/uL    Absolute Basophils 0.1 0.0 - 0.2 10e3/uL    Absolute Immature Granulocytes 0.0 <=0.4 10e3/uL    Absolute NRBCs 0.0 10e3/uL       I reviewed pulm function test that was performed today.  This shows normal spirometry, lung volumes and diffusing capacity.  Lung function is stable.    I also reviewed labs today, and they are within normal limits.    I reviewed results with the patient.        Assessment and plan:   Stephanie Bhatia is a 73 year old who is being seen for f/u of scleroderma ILD.   1.  Scleroderma ILD.  She has mild dyspnea on exertion that is stable, and PFT remains normal and stable.  She is tolerating tocilizumab well, andwe will continue 162 mcg subcu weekly.  There is no need to change to nintedanib or mycophenolate as she is doing well.  I encouraged her to start some regular exercise such as walking.  She is in agreement.  She will return in 3 to 4 months with full PFT.  2.  High-risk medication immunosuppression monitoring.  Labs are normal, and I will repeat recheck labs at her next visit, including lipid panel, to monitor tocilizumab.  3.  GERD.  I suggested that she try a wedge pillow.  She will continue famotidine twice daily.  She also will discuss her symptoms with Dr. Gunn.  She is wondering if she should have an upper endoscopy, and I recommended that  she discuss with Dr. Gunn.  4.  ILD-PRO study.  She is currently enrolled.  5.  Depression.  I suggested that she discuss treatment with her primary care provider.  The longitudinal plan of care for the diagnosis(es)/condition(s) as documented were addressed during this visit. Due to the added complexity in care, I will continue to support Stephanie in the subsequent management and with ongoing continuity of care.  I spent 42 minutes reviewing chart, reviewing test results, talking with and examining patient, formulating plan, and documentation on the day of the encounter.                        Again, thank you for allowing me to participate in the care of your patient.        Sincerely,        Tiffanie Gomez MD

## 2024-05-17 NOTE — PROGRESS NOTES
"[facility]    Reason for Visit  Stephanie Bhatia is a 73 year old year old female who is being seen for RECHECK (Return Interstitial Lung )    HPI  Stephanie Bhatia is a 73 year old who is being seen for f/u of scleroderma ILD.  She was initially diagnosed with scleroderma in about 2009 or 2010.  Scleroderma ILD was first diagnosed on chest CT scan in 2/2017.  She also had a few nodules that were small, 4 mm and did not require follow-up as she was low risk.  She started tocilizumab in approximately September 2022 for progressive drop in PFT and progression of ILD on chest CT.  She opted for tocilizumab instead of mycophenolate or nintedanib.     Today, he reports that her dyspnea on exertion continues to be relatively stable.  For example, she does feel short of breath when she walks up a flight of stairs, which she says is \"maybe a little worse.\"  She denies cough, paroxysmal nocturnal dyspnea, wheezing, fever fevers or syncope.  She is not exercising because she is not motivated, and she thinks she also might be depressed.  She has taken medication for depression in the past, but she did not like how it made her feel.  She has had some dizziness.  Most recent echo 7 months ago showed no evidence no evidence of pulmonary hypertension.  She reports no side effects from tocilizumab subcu.  She continues to take 162 mg weekly.    She takes famotidine twice a day and denies heartburn symptoms unless she eats a large meal or eats close to bedtime.            Current Outpatient Medications   Medication Sig Dispense Refill    acyclovir (ZOVIRAX) 400 MG tablet Take 1 tablet (400 mg) by mouth every 8 hours For 5 days 15 tablet 1    Cholecalciferol (VITAMIN D) 1000 UNITS capsule Take 1 capsule by mouth daily      famotidine (PEPCID) 20 MG tablet Take 1 tablet (20 mg) by mouth daily 90 tablet 4    ibuprofen (ADVIL/MOTRIN) 200 MG tablet Take 200 mg by mouth every 4 hours as needed for " mild pain      tocilizumab (ACTEMRA) 162 MG/0.9ML subcutaneous injection Inject 0.9 mLs (162 mg) Subcutaneous every 7 days 3.6 mL 11     No current facility-administered medications for this visit.     No Known Allergies  Past Medical History:   Diagnosis Date    Anxiety     Arthritis     scleroderma    Depressive disorder     Herpes simplex without mention of complication     Interstitial lung disease (H)     scleroderma ILD, dx by chest CT 2017    Lung nodules     4 mm LLL and RLL     Migraine     loss of speech and comprehension, has had w/u and is complex migraine, last one about 2017    Raynaud phenomenon     Systemic sclerosis with limited cutaneous involvement (H) 2014    Dx ~, Scl-70 and anti-centromere antibody neg, +RF       Past Surgical History:   Procedure Laterality Date    HYSTEROSCOPIC PLACEMENT CONTRACEPTIVE DEVICE         Social History     Socioeconomic History    Marital status: Single     Spouse name:     Number of children: 0    Years of education: Not on file    Highest education level: Master's degree (e.g., MA, MS, [place], MEd, MSW, DIANE)   Occupational History    Occupation:      Comment: [employer] - part time   Tobacco Use    Smoking status: Former     Current packs/day: 0.00     Average packs/day: 1 pack/day for 20.0 years (20.0 ttl pk-yrs)     Types: Cigarettes     Start date: 1966     Quit date: 1986     Years since quittin.4    Smokeless tobacco: Never   Vaping Use    Vaping status: Never Used   Substance and Sexual Activity    Alcohol use: Not Currently     Comment: none since     Drug use: No    Sexual activity: Not Currently     Partners: Male   Other Topics Concern    Parent/sibling w/ CABG, MI or angioplasty before 65F 55M? No   Social History Narrative    .  No children. Sister lives in [place].  Part time at [employer].   in the past. Currently lives with family.    Possible asbestos  exposure from basement pipes in childhood home.  Lived in  [facility] [id number].  Denies exposure to pet birds, hot tubs.  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     Social Determinants of Health     Financial Resource Strain: Low Risk  (12/3/2023)    Financial Resource Strain     Within the past 12 months, have you or your family members you live with been unable to get utilities (heat, electricity) when it was really needed?: No   Food Insecurity: Low Risk  (12/3/2023)    Food Insecurity     Within the past 12 months, did you worry that your food would run out before you got money to buy more?: No     Within the past 12 months, did the food you bought just not last and you didn t have money to get more?: No   Transportation Needs: Low Risk  (12/3/2023)    Transportation Needs     Within the past 12 months, has lack of transportation kept you from medical appointments, getting your medicines, non-medical meetings or appointments, work, or from getting things that you need?: No   Physical Activity: Sufficiently Active (11/22/2021)    Exercise Vital Sign     Days of Exercise per Week: 7 days     Minutes of Exercise per Session: 60 min   Stress: Stress Concern Present (11/22/2021)    [employer] [place] [employer] - Occupational Stress Questionnaire     Feeling of Stress : To some extent   Social Connections: Moderately Isolated (11/22/2021)    Social Connection and Isolation Panel [NHANES]     Frequency of Communication with Friends and Family: More than three times a week     Frequency of Social Gatherings with Friends and Family: More than three times a week     Attends Hoahaoism Services: More than 4 times per year     Active Member of Clubs or Organizations: No     Attends Club or Organization Meetings: Never     Marital Status:    Interpersonal Safety: Low Risk  (12/4/2023)    Interpersonal Safety     Do you feel physically and emotionally safe where you currently  "live?: Yes     Within the past 12 months, have you been hit, slapped, kicked or otherwise physically hurt by someone?: No     Within the past 12 months, have you been humiliated or emotionally abused in other ways by your partner or ex-partner?: No   Housing Stability: Low Risk  (12/3/2023)    Housing Stability     Do you have housing? : Yes     Are you worried about losing your housing?: No   Recent Concern: Housing Stability - High Risk (10/2/2023)    Housing Stability     Do you have housing? : Yes     Are you worried about losing your housing?: Yes       Family History   Problem Relation Age of Onset    C.A.D. Father         a efw small heart attacks    Neurologic Disorder Father         dementia 70s    Coronary Artery Disease Father     Neurologic Disorder Mother         dementia    Depression Mother     Cerebrovascular Disease Maternal Grandmother         ,    Other Cancer Paternal Grandmother     Rheumatoid Arthritis Sister     Hypertension Sister     Dementia Paternal Cousin     Dementia Paternal Uncle         60s    Depression Sister     Breast Cancer No family hx of     Cancer - colorectal No family hx of     Diabetes No family hx of     Cancer No family hx of         no skin cancer    Interstitial Lung Disease No family hx of     Melanoma No family hx of     Skin Cancer No family hx of     Glaucoma No family hx of     Macular Degeneration No family hx of            Vitals: /69   Pulse 70   Resp 17   Ht 1.651 m (5' 5\")   Wt 58 kg (127 lb 14.4 oz)   SpO2 100%   BMI 21.28 kg/m      Exam:   GENERAL APPEARANCE: Well developed, well nourished, alert, and in no apparent distress.  RESP: good air flow throughout.  Bibasilar inspiratory crackles. No rhonchi. No wheezes.  CV: Normal S1, S2, regular rhythm, normal rate. No murmur.  No LE edema.   MS: extremities normal. No clubbing. No cyanosis.  SKIN: no rash on limited exam.  NEURO: Mentation intact, speech normal, normal gait and stance.  PSYCH: " mentation appears normal. and affect normal/bright.       Results:  Recent Results (from the past 168 hour(s))   General PFT Lab (Please always keep checked)    Collection Time: 05/17/24  5:54 AM   Result Value Ref Range    FVC-Pred 2.54 L    FVC-Pre 2.89 L    FVC-%Pred-Pre 113 %    FEV1-Pre 2.43 L    FEV1-%Pred-Pre 123 %    FEV1FVC-Pred 78 %    FEV1FVC-Pre 84 %    FEFMax-Pred 5.34 L/sec    FEFMax-Pre 6.69 L/sec    FEFMax-%Pred-Pre 125 %    FEF2575-Pred 1.63 L/sec    FEF2575-Pre 2.67 L/sec    CJH4476-%Pred-Pre 163 %    ExpTime-Pre 4.88 sec    FIFMax-Pre 4.35 L/sec    VC-Pred 3.13 L    VC-Pre 2.77 L    VC-%Pred-Pre 88 %    IC-Pred 1.92 L    IC-Pre 1.61 L    IC-%Pred-Pre 83 %    ERV-Pred 0.96 L    ERV-Pre 1.16 L    ERV-%Pred-Pre 120 %    FEV1FEV6-Pred 78 %    FEV1FEV6-Pre 84 %    DLCOunc-Pred 19.06 ml/min/mmHg    DLCOunc-Pre 17.44 ml/min/mmHg    DLCOunc-%Pred-Pre 91 %    VA-Pre 4.16 L    VA-%Pred-Pre 89 %    FEV1SVC-Pred 63 %    FEV1SVC-Pre 88 %    FRCPleth-Pred 2.77 L    FRCPleth-Pre 2.67 L    FRCPleth-%Pred-Pre 96 %    RVPleth-Pred 2.20 L    RVPleth-Pre 1.51 L    RVPleth-%Pred-Pre 68 %    TLCPleth-Pred 5.11 L    TLCPleth-Pre 4.28 L    TLCPleth-%Pred-Pre 83 %   Hepatic function panel    Collection Time: 05/17/24  6:22 AM   Result Value Ref Range    Protein Total 6.2 (L) 6.4 - 8.3 g/dL    Albumin 4.1 3.5 - 5.2 g/dL    Bilirubin Total 1.3 (H) <=1.2 mg/dL    Alkaline Phosphatase 43 40 - 150 U/L    AST 27 0 - 45 U/L    ALT 15 0 - 50 U/L    Bilirubin Direct 0.28 0.00 - 0.30 mg/dL   CBC with platelets and differential    Collection Time: 05/17/24  6:22 AM   Result Value Ref Range    WBC Count 4.6 4.0 - 11.0 10e3/uL    RBC Count 4.15 3.80 - 5.20 10e6/uL    Hemoglobin 13.2 11.7 - 15.7 g/dL    Hematocrit 39.9 35.0 - 47.0 %    MCV 96 78 - 100 fL    MCH 31.8 26.5 - 33.0 pg    MCHC 33.1 31.5 - 36.5 g/dL    RDW 12.3 10.0 - 15.0 %    Platelet Count 220 150 - 450 10e3/uL    % Neutrophils 42 %    % Lymphocytes 34 %    % Monocytes 14  %    % Eosinophils 9 %    % Basophils 1 %    % Immature Granulocytes 0 %    NRBCs per 100 WBC 0 <1 /100    Absolute Neutrophils 1.9 1.6 - 8.3 10e3/uL    Absolute Lymphocytes 1.6 0.8 - 5.3 10e3/uL    Absolute Monocytes 0.7 0.0 - 1.3 10e3/uL    Absolute Eosinophils 0.4 0.0 - 0.7 10e3/uL    Absolute Basophils 0.1 0.0 - 0.2 10e3/uL    Absolute Immature Granulocytes 0.0 <=0.4 10e3/uL    Absolute NRBCs 0.0 10e3/uL       I reviewed pulm function test that was performed today.  This shows normal spirometry, lung volumes and diffusing capacity.  Lung function is stable.    I also reviewed labs today, and they are within normal limits.    I reviewed results with the patient.        Assessment and plan:   Stephanie Bhatia is a 73 year old who is being seen for f/u of scleroderma ILD.   1.  Scleroderma ILD.  She has mild dyspnea on exertion that is stable, and PFT remains normal and stable.  She is tolerating tocilizumab well, andwe will continue 162 mcg subcu weekly.  There is no need to change to nintedanib or mycophenolate as she is doing well.  I encouraged her to start some regular exercise such as walking.  She is in agreement.  She will return in 3 to 4 months with full PFT.  2.  High-risk medication immunosuppression monitoring.  Labs are normal, and I will repeat recheck labs at her next visit, including lipid panel, to monitor tocilizumab.  3.  GERD.  I suggested that she try a wedge pillow.  She will continue famotidine twice daily.  She also will discuss her symptoms with Dr. Gunn.  She is wondering if she should have an upper endoscopy, and I recommended that she discuss with Dr. Gunn.  4.  ILD-PRO study.  She is currently enrolled.  5.  Depression.  I suggested that she discuss treatment with her primary care provider.  The longitudinal plan of care for the diagnosis(es)/condition(s) as documented were addressed during this visit. Due to the added complexity in care, I will continue to support Stephanie in the  subsequent management and with ongoing continuity of care.  I spent 42 minutes reviewing chart, reviewing test results, talking with and examining patient, formulating plan, and documentation on the day of the encounter.

## 2024-05-23 DIAGNOSIS — M34.9 SCLERODERMA WITH PULMONARY INVOLVEMENT (H): ICD-10-CM

## 2024-05-23 DIAGNOSIS — J84.9 ILD (INTERSTITIAL LUNG DISEASE) (H): ICD-10-CM

## 2024-05-23 RX ORDER — TOCILIZUMAB 180 MG/ML
162 INJECTION, SOLUTION SUBCUTANEOUS
Qty: 3.6 ML | Refills: 11 | Status: SHIPPED | OUTPATIENT
Start: 2024-05-23

## 2024-06-06 DIAGNOSIS — M17.0 BILATERAL PRIMARY OSTEOARTHRITIS OF KNEE: Primary | ICD-10-CM

## 2024-06-07 NOTE — TELEPHONE ENCOUNTER
DIAGNOSIS: Right knee pain    APPOINTMENT DATE: 6/10/24   NOTES STATUS DETAILS   OFFICE NOTE from referring provider Internal Jorge Leger DO   OFFICE NOTE from other specialist Internal 9/16/22 WANG Grant  9/20/22 PT JENNIFER Olvera     MEDICATION LIST Internal    XRAYS  & INJECTIONS (IMAGES & REPORTS) Internal 9/16/22 xr knee bilateral

## 2024-06-10 ENCOUNTER — PRE VISIT (OUTPATIENT)
Dept: ORTHOPEDICS | Facility: CLINIC | Age: 74
End: 2024-06-10

## 2024-06-10 ENCOUNTER — MYC MEDICAL ADVICE (OUTPATIENT)
Dept: FAMILY MEDICINE | Facility: CLINIC | Age: 74
End: 2024-06-10

## 2024-06-10 ENCOUNTER — TELEPHONE (OUTPATIENT)
Dept: FAMILY MEDICINE | Facility: CLINIC | Age: 74
End: 2024-06-10

## 2024-06-10 NOTE — TELEPHONE ENCOUNTER
[facility] Call Center    Phone Message    May a detailed message be left on voicemail: yes     Reason for Call: Other: Pt tested positive for covid on 6/9 and is wondering what her options are for medications. Please review and call pt to discuss.     Action Taken: Other: PCC    Travel Screening: Not Applicable

## 2024-06-10 NOTE — TELEPHONE ENCOUNTER
Left Voicemail (1st Attempt) for the patient to call back and schedule the following:    Appointment type: VIDEO  Provider: ANY PCC  Return date: Next available  Specialty phone number: [phone]  Additional appointment(s) needed: -  Additonal Notes: 6/9 pt covid+ informed visit required for treatment, video is okay- requested pt call [phone] to schedule with any provider with video openings

## 2024-06-11 ENCOUNTER — MYC MEDICAL ADVICE (OUTPATIENT)
Dept: INTERNAL MEDICINE | Facility: CLINIC | Age: 74
End: 2024-06-11

## 2024-06-11 ENCOUNTER — VIRTUAL VISIT (OUTPATIENT)
Dept: INTERNAL MEDICINE | Facility: CLINIC | Age: 74
End: 2024-06-11
Payer: MEDICARE

## 2024-06-11 DIAGNOSIS — U07.1 INFECTION DUE TO 2019 NOVEL CORONAVIRUS: ICD-10-CM

## 2024-06-11 DIAGNOSIS — U07.1 INFECTION DUE TO 2019 NOVEL CORONAVIRUS: Primary | ICD-10-CM

## 2024-06-11 PROCEDURE — 99213 OFFICE O/P EST LOW 20 MIN: CPT | Mod: 95 | Performed by: INTERNAL MEDICINE

## 2024-06-11 NOTE — PATIENT INSTRUCTIONS
Thank you for visiting the Primary Care Center today at the [facility]! The following is some information about our clinic:     Primary Care Center Frequently-Asked Questions    (1) How do I schedule appointments at the [facility]?     Primary Care--to schedule or make changes to an existing appointment, please call our primary care line at [phone].    Labs--to schedule a lab appointment at the [facility] you can use [employer] or call [phone]. If you have a [place] location that is closer to home, you can reach out to that location for scheduling options.     Imaging--if you need to schedule a CT, X-ray, MRI, ultrasound, or other imaging study you can call [phone] to schedule at the [facility] or any other [facility] imaging location.     Referrals--if a referral to another specialty was ordered you can expect a phone call from their scheduling team. If you have not heard from them in a week, please call us or send us a [employer] message to check the status or get a scheduling number. Please note that this only applies to internal [facility] referrals. If the referral is external you would need to contact their office for scheduling.     (2) I have a question about my visit, who do I contact?     You can call us at the primary care line at [phone] to ask questions about your visit. You can also send a secure message through [employer], which is reviewed by clinic staff. Please note that [employer] messages have a twenty-four to forty-eight business hour turnaround time and should not be used for urgent concerns.    (3) How will I get the results of my tests?    If you are signed up for [employer] all tests will be released to you within twenty-four hours of resulting. Please allow three to five days for your doctor to review your results and place a note interpreting the results. If you do not have [employer] you will receive your  results through mail seven to ten business days following the return of the tests. Please note that if there should be any urgent or concerning results that your doctor or their registered nurse will reach out to you the same day as the tests come back. If you have follow up questions about your results or would like to discuss the results in detail please schedule a follow up with your provider either in person or virtually.     (4) How do I get refills of my prescriptions?     You should always first contact your pharmacy for refills of your medications. If submitting a refill request on [employer], please be sure to submit the request only once--repeat requests can cause delays in refill. If you are requesting a NEW medication or a medication related to new symptoms you will need to schedule an appointment with a provider prior to approval. Please note: Routine medication refills have up to one to three business day turnaround whereas controlled substances refills have up to five to seven business day turnaround.    (5) I have new symptoms, what do I do?     If you are having an immediate medical emergency, you should dial 911 for assistance.   For anything urgent that needs to be seen within a few hours to one day you should visit a local urgent care for assistance.  For non-urgent symptoms that need to be seen within a few days to a week you can schedule with an available provider in primary care by going to [employer] or calling [phone].   If you are not sure how serious your symptoms are or you would like to receive medical advice you can always call [phone] to speak with a triage nurse.

## 2024-06-11 NOTE — PROGRESS NOTES
Stephanie is a 73 year old who is being evaluated via a billable video visit.    How would you like to obtain your AVS? MyChart  If the video visit is dropped, the invitation should be resent by: Send to e-mail at: [email or web address]  Will anyone else be joining your video visit? No      Assessment & Plan     Infection due to 2019 novel coronavirus  Discussed management of COVID-19.  Recommend starting Paxlovid at full dose.  Patient was advised on medication related side effects.  Recommend urgent evaluation with any signs or symptoms concerning for progression to severe disease.  Given immunosuppressed state, patient is at increased risk of not clearing the virus as rapidly as immunocompetent person.  Therefore, precautions for breaking isolation were also discussed with patient.  - nirmatrelvir and ritonavir (PAXLOVID) 300 mg/100 mg therapy pack; Take 3 tablets by mouth 2 times daily for 5 days      No follow-ups on file.    Subjective   Stephanie is a 73 year old, presenting for the following health issues:  RECHECK and Covid Concern      I spent a total of 25 minutes on the day of the visit.   Time spent by me doing chart review, history and exam, documentation and further activities per the note    Video Start Time: 8:34 AM    History of Present Illness       Reason for visit:  Covid 19  Symptom onset:  1-3 days ago  Symptoms include:  Sore throat cough stuffy head  Symptom intensity:  Moderate  Symptom progression:  Staying the same  Had these symptoms before:  Yes  Has tried/received treatment for these symptoms:  Yes  Previous treatment was successful:  Yes  Prior treatment description:  Cold and flu over the counter meds  What makes it worse:  No  What makes it better:  Resting    She eats 2-3 servings of fruits and vegetables daily.She consumes 0 sweetened beverage(s) daily. She exercises with enough effort to increase her heart rate 3 or less days per week.   She is taking medications regularly.          COVID-19 Symptom Review  How many days ago did these symptoms start? 2 days ago    Are any of the following symptoms significant for you?  New or worsening difficulty breathing? No  Worsening cough? Yes, it's a dry cough.   Fever or chills? No  Headache: No  Sore throat: YES  Chest pain: No  Diarrhea: No  Body aches? YES    What treatments has patient tried? Nonsteroidals and Antihistamines    Does patient live in a nursing home, group home, or shelter? No  Does patient have a way to get food/medications during quarantined? Yes, I have a friend or family member who can help me.                    Objective    Vitals - Patient Reported  Pain Score: No Pain (0)      Vitals:  No vitals were obtained today due to virtual visit.    Physical Exam   GENERAL: alert and no distress  EYES: Eyes grossly normal to inspection.  No discharge or erythema, or obvious scleral/conjunctival abnormalities.  RESP: No audible wheeze, cough, or visible cyanosis.    SKIN: Visible skin clear. No significant rash, abnormal pigmentation or lesions.  NEURO: Cranial nerves grossly intact.  Mentation and speech appropriate for age.  PSYCH: Appropriate affect, tone, and pace of words          Video-Visit Details    Type of service:  Video Visit   Video End Time: 8:55 AM  Originating Location (pt. Location): Home    Distant Location (provider location):  Off-site  Platform used for Video Visit: Nancy  Signed Electronically by: Cecilia Alonso MD

## 2024-06-19 DIAGNOSIS — M25.561 RIGHT KNEE PAIN, UNSPECIFIED CHRONICITY: Primary | ICD-10-CM

## 2024-06-25 ENCOUNTER — OFFICE VISIT (OUTPATIENT)
Dept: ORTHOPEDICS | Facility: CLINIC | Age: 74
End: 2024-06-25
Payer: MEDICARE

## 2024-06-25 ENCOUNTER — ANCILLARY PROCEDURE (OUTPATIENT)
Dept: GENERAL RADIOLOGY | Facility: CLINIC | Age: 74
End: 2024-06-25
Attending: FAMILY MEDICINE
Payer: MEDICARE

## 2024-06-25 DIAGNOSIS — M25.561 RIGHT KNEE PAIN, UNSPECIFIED CHRONICITY: ICD-10-CM

## 2024-06-25 DIAGNOSIS — M17.0 BILATERAL PRIMARY OSTEOARTHRITIS OF KNEE: Primary | ICD-10-CM

## 2024-06-25 PROCEDURE — 73562 X-RAY EXAM OF KNEE 3: CPT | Mod: RT | Performed by: RADIOLOGY

## 2024-06-25 PROCEDURE — 99214 OFFICE O/P EST MOD 30 MIN: CPT | Performed by: FAMILY MEDICINE

## 2024-06-25 NOTE — PROGRESS NOTES
CHIEF COMPLAINT:  Pain of the Right Knee       HISTORY OF PRESENT ILLNESS  Ms. Bhatia is a 73 year old female who presents to clinic today with right knee pain.  Stephanie has had a couple of falls dating back to 2022.  She had a fall over the spring while walking and a subsequent fall while gardening, resulting in some anterior right knee pain.  Feels unstable at times.  No locking or catching.        Additional history: as documented    MEDICAL HISTORY  Patient Active Problem List   Diagnosis    Raynaud phenomenon    Anxiety and depression    Lumbar radiculopathy    Encounter for monitoring tocilizumab therapy    CARDIOVASCULAR SCREENING; LDL GOAL LESS THAN 160    Cluster headache syndrome    Systemic sclerosis with limited cutaneous involvement (H)    AK (actinic keratosis)    Osteoporosis    ILD (interstitial lung disease) (H)    Rheumatoid arthritis involving both hands with positive rheumatoid factor (H)    Senile osteoporosis    Scleroderma with pulmonary involvement (H)    Gastroesophageal reflux disease, unspecified whether esophagitis present       Current Outpatient Medications   Medication Sig Dispense Refill    acyclovir (ZOVIRAX) 400 MG tablet Take 1 tablet (400 mg) by mouth every 8 hours For 5 days 15 tablet 1    Cholecalciferol (VITAMIN D) 1000 UNITS capsule Take 1 capsule by mouth daily      famotidine (PEPCID) 20 MG tablet Take 1 tablet (20 mg) by mouth daily 90 tablet 4    ibuprofen (ADVIL/MOTRIN) 200 MG tablet Take 200 mg by mouth every 4 hours as needed for mild pain      nirmatrelvir and ritonavir (PAXLOVID) 300 mg/100 mg therapy pack Take 3 tablets by mouth 2 times daily 30 tablet 0    tocilizumab (ACTEMRA) 162 MG/0.9ML subcutaneous injection Inject 0.9 mLs (162 mg) Subcutaneous every 7 days 3.6 mL 11       No Known Allergies    Family History   Problem Relation Age of Onset    C.A.D. Father         a efw small heart attacks    Neurologic Disorder Father         dementia 70s    Coronary Artery  Disease Father     Neurologic Disorder Mother         dementia    Depression Mother     Cerebrovascular Disease Maternal Grandmother         ,    Other Cancer Paternal Grandmother     Rheumatoid Arthritis Sister     Hypertension Sister     Dementia Paternal Cousin     Dementia Paternal Uncle         60s    Depression Sister     Breast Cancer No family hx of     Cancer - colorectal No family hx of     Diabetes No family hx of     Cancer No family hx of         no skin cancer    Interstitial Lung Disease No family hx of     Melanoma No family hx of     Skin Cancer No family hx of     Glaucoma No family hx of     Macular Degeneration No family hx of        Additional medical/Social/Surgical histories reviewed in [facility] and updated as appropriate.        PHYSICAL EXAM  General  - normal appearance, in no obvious distress  Musculoskeletal - right knee  - inspection: trace effusion  - palpation: medial joint line tenderness  - ROM: 120 degrees flexion, 0 degrees extension  - strength: 5/5 in flexion, 5/5 in extension  - special tests:  (-) Pricila  (-) varus at 0 and 30 degrees flexion  (-) valgus at 0 and 30 degrees flexion  Neuro  - no sensory or motor deficit, grossly normal coordination, normal muscle tone              ASSESSMENT & PLAN  Ms. Bhatia is a 73 year old female who presents to clinic today with right knee pain.    I ordered and independently reviewed an xray of her right knee, this reveals severe medial compartment osteoarthritis.    Stephanie and I had a good discussion centering around the spectrum of treatment options for osteoarthritis that preclude surgery.  We discussed nonoperative treatment with pain relievers, icing, low impact exercise, and injection based treatment.    I'm referring her to PT, we did discuss the possibility of performing a knee injection if not improving.    It was a pleasure seeing Stephanie today.    Jogre Torres DO, [facility]  Primary Care Sports Medicine      This note was constructed  using Dragon dictation software, please excuse any minor errors in spelling, grammar, or syntax.

## 2024-06-25 NOTE — LETTER
6/25/2024      RE: Stephanie Bhatia  [address]  [facility] [address]     Dear Colleague,    Thank you for referring your patient, Stephanie Bhatai, to the [facility] [place]. Please see a copy of my visit note below.    CHIEF COMPLAINT:  Pain of the Right Knee       HISTORY OF PRESENT ILLNESS  Ms. Bhatia is a 73 year old female who presents to clinic today with right knee pain.  Stephanie has had a couple of falls dating back to 2022.  She had a fall over the spring while walking and a subsequent fall while gardening, resulting in some anterior right knee pain.  Feels unstable at times.  No locking or catching.        Additional history: as documented    MEDICAL HISTORY  Patient Active Problem List   Diagnosis     Raynaud phenomenon     Anxiety and depression     Lumbar radiculopathy     Encounter for monitoring tocilizumab therapy     CARDIOVASCULAR SCREENING; LDL GOAL LESS THAN 160     Cluster headache syndrome     Systemic sclerosis with limited cutaneous involvement (H)     AK (actinic keratosis)     Osteoporosis     ILD (interstitial lung disease) (H)     Rheumatoid arthritis involving both hands with positive rheumatoid factor (H)     Senile osteoporosis     Scleroderma with pulmonary involvement (H)     Gastroesophageal reflux disease, unspecified whether esophagitis present       Current Outpatient Medications   Medication Sig Dispense Refill     acyclovir (ZOVIRAX) 400 MG tablet Take 1 tablet (400 mg) by mouth every 8 hours For 5 days 15 tablet 1     Cholecalciferol (VITAMIN D) 1000 UNITS capsule Take 1 capsule by mouth daily       famotidine (PEPCID) 20 MG tablet Take 1 tablet (20 mg) by mouth daily 90 tablet 4     ibuprofen (ADVIL/MOTRIN) 200 MG tablet Take 200 mg by mouth every 4 hours as needed for mild pain       nirmatrelvir and ritonavir (PAXLOVID) 300 mg/100 mg therapy pack Take 3 tablets by mouth 2 times daily 30 tablet 0     tocilizumab (ACTEMRA) 162 MG/0.9ML  subcutaneous injection Inject 0.9 mLs (162 mg) Subcutaneous every 7 days 3.6 mL 11       No Known Allergies    Family History   Problem Relation Age of Onset     C.A.D. Father         a efw small heart attacks     Neurologic Disorder Father         dementia 70s     Coronary Artery Disease Father      Neurologic Disorder Mother         dementia     Depression Mother      Cerebrovascular Disease Maternal Grandmother         ,     Other Cancer Paternal Grandmother      Rheumatoid Arthritis Sister      Hypertension Sister      Dementia Paternal Cousin      Dementia Paternal Uncle         60s     Depression Sister      Breast Cancer No family hx of      Cancer - colorectal No family hx of      Diabetes No family hx of      Cancer No family hx of         no skin cancer     Interstitial Lung Disease No family hx of      Melanoma No family hx of      Skin Cancer No family hx of      Glaucoma No family hx of      Macular Degeneration No family hx of        Additional medical/Social/Surgical histories reviewed in [facility] and updated as appropriate.        PHYSICAL EXAM  General  - normal appearance, in no obvious distress  Musculoskeletal - right knee  - inspection: trace effusion  - palpation: medial joint line tenderness  - ROM: 120 degrees flexion, 0 degrees extension  - strength: 5/5 in flexion, 5/5 in extension  - special tests:  (-) Pricila  (-) varus at 0 and 30 degrees flexion  (-) valgus at 0 and 30 degrees flexion  Neuro  - no sensory or motor deficit, grossly normal coordination, normal muscle tone              ASSESSMENT & PLAN  Ms. Bhatia is a 73 year old female who presents to clinic today with right knee pain.    I ordered and independently reviewed an xray of her right knee, this reveals severe medial compartment osteoarthritis.    Stephanie and I had a good discussion centering around the spectrum of treatment options for osteoarthritis that preclude surgery.  We discussed nonoperative treatment with pain  relievers, icing, low impact exercise, and injection based treatment.    I'm referring her to PT, we did discuss the possibility of performing a knee injection if not improving.    It was a pleasure seeing Stephanie today.    Jorge Torres DO, [facility]  Primary Care Sports Medicine      This note was constructed using Dragon dictation software, please excuse any minor errors in spelling, grammar, or syntax.

## 2024-06-27 ENCOUNTER — MYC MEDICAL ADVICE (OUTPATIENT)
Dept: ORTHOPEDICS | Facility: CLINIC | Age: 74
End: 2024-06-27
Payer: MEDICARE

## 2024-06-28 ENCOUNTER — MYC MEDICAL ADVICE (OUTPATIENT)
Dept: FAMILY MEDICINE | Facility: CLINIC | Age: 74
End: 2024-06-28
Payer: MEDICARE

## 2024-07-04 ASSESSMENT — ANXIETY QUESTIONNAIRES
1. FEELING NERVOUS, ANXIOUS, OR ON EDGE: SEVERAL DAYS
8. IF YOU CHECKED OFF ANY PROBLEMS, HOW DIFFICULT HAVE THESE MADE IT FOR YOU TO DO YOUR WORK, TAKE CARE OF THINGS AT HOME, OR GET ALONG WITH OTHER PEOPLE?: SOMEWHAT DIFFICULT
GAD7 TOTAL SCORE: 7
IF YOU CHECKED OFF ANY PROBLEMS ON THIS QUESTIONNAIRE, HOW DIFFICULT HAVE THESE PROBLEMS MADE IT FOR YOU TO DO YOUR WORK, TAKE CARE OF THINGS AT HOME, OR GET ALONG WITH OTHER PEOPLE: SOMEWHAT DIFFICULT
7. FEELING AFRAID AS IF SOMETHING AWFUL MIGHT HAPPEN: SEVERAL DAYS
5. BEING SO RESTLESS THAT IT IS HARD TO SIT STILL: SEVERAL DAYS
3. WORRYING TOO MUCH ABOUT DIFFERENT THINGS: SEVERAL DAYS
GAD7 TOTAL SCORE: 7
7. FEELING AFRAID AS IF SOMETHING AWFUL MIGHT HAPPEN: SEVERAL DAYS
2. NOT BEING ABLE TO STOP OR CONTROL WORRYING: SEVERAL DAYS
4. TROUBLE RELAXING: SEVERAL DAYS
6. BECOMING EASILY ANNOYED OR IRRITABLE: SEVERAL DAYS

## 2024-07-08 ENCOUNTER — VIRTUAL VISIT (OUTPATIENT)
Dept: PSYCHOLOGY | Facility: CLINIC | Age: 74
End: 2024-07-08
Payer: MEDICARE

## 2024-07-08 ENCOUNTER — VIRTUAL VISIT (OUTPATIENT)
Dept: PSYCHIATRY | Facility: CLINIC | Age: 74
End: 2024-07-08
Payer: MEDICARE

## 2024-07-08 ENCOUNTER — OFFICE VISIT (OUTPATIENT)
Dept: FAMILY MEDICINE | Facility: CLINIC | Age: 74
End: 2024-07-08
Payer: MEDICARE

## 2024-07-08 VITALS
WEIGHT: 127.7 LBS | BODY MASS INDEX: 21.25 KG/M2 | DIASTOLIC BLOOD PRESSURE: 65 MMHG | SYSTOLIC BLOOD PRESSURE: 101 MMHG | OXYGEN SATURATION: 99 % | TEMPERATURE: 97.3 F | HEART RATE: 73 BPM

## 2024-07-08 DIAGNOSIS — F33.1 MAJOR DEPRESSIVE DISORDER, RECURRENT EPISODE, MODERATE (H): Primary | ICD-10-CM

## 2024-07-08 DIAGNOSIS — F32.A ANXIETY AND DEPRESSION: Primary | ICD-10-CM

## 2024-07-08 DIAGNOSIS — F43.21 ADJUSTMENT DISORDER WITH DEPRESSED MOOD: Primary | ICD-10-CM

## 2024-07-08 DIAGNOSIS — F10.21 ALCOHOL USE DISORDER, MODERATE, IN SUSTAINED REMISSION (H): ICD-10-CM

## 2024-07-08 DIAGNOSIS — F32.A ANXIETY AND DEPRESSION: ICD-10-CM

## 2024-07-08 DIAGNOSIS — F41.9 ANXIETY AND DEPRESSION: ICD-10-CM

## 2024-07-08 DIAGNOSIS — F41.9 ANXIETY AND DEPRESSION: Primary | ICD-10-CM

## 2024-07-08 PROCEDURE — 99204 OFFICE O/P NEW MOD 45 MIN: CPT | Mod: 95 | Performed by: NURSE PRACTITIONER

## 2024-07-08 PROCEDURE — 90834 PSYTX W PT 45 MINUTES: CPT | Mod: 95 | Performed by: SOCIAL WORKER

## 2024-07-08 PROCEDURE — G2211 COMPLEX E/M VISIT ADD ON: HCPCS | Performed by: FAMILY MEDICINE

## 2024-07-08 PROCEDURE — 99214 OFFICE O/P EST MOD 30 MIN: CPT | Performed by: FAMILY MEDICINE

## 2024-07-08 RX ORDER — BUPROPION HYDROCHLORIDE 150 MG/1
150 TABLET ORAL EVERY MORNING
Qty: 30 TABLET | Refills: 1 | Status: SHIPPED | OUTPATIENT
Start: 2024-07-08 | End: 2024-09-10

## 2024-07-08 ASSESSMENT — PATIENT HEALTH QUESTIONNAIRE - PHQ9
SUM OF ALL RESPONSES TO PHQ QUESTIONS 1-9: 13
SUM OF ALL RESPONSES TO PHQ QUESTIONS 1-9: 13
10. IF YOU CHECKED OFF ANY PROBLEMS, HOW DIFFICULT HAVE THESE PROBLEMS MADE IT FOR YOU TO DO YOUR WORK, TAKE CARE OF THINGS AT HOME, OR GET ALONG WITH OTHER PEOPLE: VERY DIFFICULT
SUM OF ALL RESPONSES TO PHQ QUESTIONS 1-9: 13
SUM OF ALL RESPONSES TO PHQ QUESTIONS 1-9: 12
SUM OF ALL RESPONSES TO PHQ QUESTIONS 1-9: 13
10. IF YOU CHECKED OFF ANY PROBLEMS, HOW DIFFICULT HAVE THESE PROBLEMS MADE IT FOR YOU TO DO YOUR WORK, TAKE CARE OF THINGS AT HOME, OR GET ALONG WITH OTHER PEOPLE: VERY DIFFICULT

## 2024-07-08 ASSESSMENT — COLUMBIA-SUICIDE SEVERITY RATING SCALE - C-SSRS
TOTAL  NUMBER OF ABORTED OR SELF INTERRUPTED ATTEMPTS LIFETIME: NO
TOTAL  NUMBER OF INTERRUPTED ATTEMPTS LIFETIME: NO
ATTEMPT LIFETIME: NO
6. HAVE YOU EVER DONE ANYTHING, STARTED TO DO ANYTHING, OR PREPARED TO DO ANYTHING TO END YOUR LIFE?: NO
1. HAVE YOU WISHED YOU WERE DEAD OR WISHED YOU COULD GO TO SLEEP AND NOT WAKE UP?: NO
2. HAVE YOU ACTUALLY HAD ANY THOUGHTS OF KILLING YOURSELF?: NO

## 2024-07-08 ASSESSMENT — PAIN SCALES - GENERAL: PAINLEVEL: NO PAIN (0)

## 2024-07-08 NOTE — PATIENT INSTRUCTIONS
[facility] will call you to coordinate your care as prescribed by your provider. If you don't hear from a representative within 2 business days, please call [phone].    We placed a referral for psychiatry for medication management and  therapy for counseling.      We started Wellbutrin  mg daily today.    COMMUNITY RESOURCES:    [facility] for mental health service emergency  [place] Crisis Text Line: Text [place] to [phone]    New resource : Mental health or substance use online search  [facility] [place]: [email or web address]    [facility] psychology

## 2024-07-08 NOTE — PROGRESS NOTES
Virtual Visit Details    Type of service:  Video Visit   Video Start Time:  1:03 PM  Video End Time: 1:53 PM    Originating Location (pt. Location): Home    Distant Location (provider location):  On-site  Platform used for Video Visit: Nancy

## 2024-07-08 NOTE — Clinical Note
Thank you for the Psychiatry referral to the [facility] ([facility]). Our psychiatry providers act as a specialty service for Primary Care Providers in the [facility] who seek to optimize medications for unstable patients.  Once medications have been optimized, our providers discharge the patient back to the referring Primary Care Provider for ongoing medication management.  This type of system allows our providers to serve a high volume of patients.   Please see my Impression and Plan. I will continue to work with them in stabilizing their mood.  If you have any questions or concerns, please let me know. Thank you again!  GRACIELA Mckeon, [id number]-[id number] Collaborative Care Psychiatry Service ([facility]) [facility]

## 2024-07-08 NOTE — NURSING NOTE
Current patient location: [address] [place] [id number]    Is the patient currently in the state of [place]? YES    Visit mode:VIDEO    If the visit is dropped, the patient can be reconnected by: VIDEO VISIT: Send to e-mail at: [email or web address]    Will anyone else be joining the visit? NO  (If patient encounters technical issues they should call [phone]    How would you like to obtain your AVS? MyChart    Are changes needed to the allergy or medication list? No    Are refills needed on medications prescribed by this physician? YES    Reason for visit: Consult    Fredis FRANCIS

## 2024-07-08 NOTE — PROGRESS NOTES
"   PSYCHIATRIC  DIAGNOSTIC  EVALUATION                                                                    Name:  Stephanie Bahtia  : 1950     Telemedicine Visit: The patient's condition can be safely assessed and treated via synchronous audio and visual telemedicine encounter.      Consent:  The patient/guardian has verbally consented to: the potential risks and benefits of telemedicine (video visit or phone) versus in person care; bill my insurance or make self-payment for services provided; and responsibility for payment of non-covered services.    As the provider I attest to compliance with applicable laws and regulations related to telemedicine.    Source of Referral:  Primary Care Provider: Rangel Garcia MD   Current Psychotherapist: Regina Teixeira LICSW  (started today)    PCP Clinical Question for referral: \"depression and anxiety\"    The [facility] psychiatry providers act as a specialty service for Primary Care Providers in the [facility] who seek to optimize medications for unstable patients. Once medications have been optimized, [facility] providers discharge the patient back to the referring Primary Care Provider for ongoing medication management. This type of system allows [facility] to serve a high volume of patients.     Identifying Data:  Patient is a 73 year old,  White Not  or  female  who presents for initial visit with me.    Patient prefers to be called: \"Stephanie\".  Patient is currently employed part time.     Patient attended the session alone.    HPI  Patient presents for initial psychiatric evaluation with the [facility] ([facility]) for evaluation of depression, anxiety .      RECORDS AVAILABLE FOR REVIEW: EHR records through [facility]  and previous psychiatric progress note.       Chief Complaint:  \"depression, motivation\"    Stephanie Bhatia is a 73 year old White, female who presents for initial visit with [facility] Psychiatry " "Service ([facility]) for medication management. Carries past diagnoses: anxiety, depression. Additional medical comorbidities include: lumbar radiculopathy, cluster HA syndrome, interstitial lung disease, scleroderma, GERD, raynaud phenomenin, osteoporosis, rheumatoid arthritis. She was Previously seen by DAMARIS Sweet with [facility] clinic 2018, 2020 - no medications started and referred to therapy. Denies history of past psychiatric hospitalizations, no past suicide attempts. She has minimal MH medication history, did trial low dose zoloft ~2000 but did not like (some limited recall of trial). PCP just today recommended trial of bupropion - patient has not started yet.    DEPRESSION: patient reports a mod-high level of depression (self rates as 7/10, PHQ9: 13), associated with low mood, anhedonia, amotivation, feelings of guilt, difficulty concentrating. She reports CC: \"lack of motivation to do the things I need to for health, \"Down in the dumps, just happy to sit at home\", \"checking out a lot.\" Reports sleeps easily for 8 hours straight through but tired in AM and energy remains low. Feels unable to enjoy things she typically loves (going for a walk, swimming). She reports feeling this way since ~09/2023 without any clear precipitant but exercise routine disrupted by knee injury. She attributes some of her low mood and energy to aging.  Denies any SI/SIB/HI, no history.     ANXIETY: Patient reports low anxiety (\"not having anxiety like had before... Not a twinge of anxiety for quite a while\") and GAD2:2. Reports previous history of anxiety, especially around ~20 years ago in context of divorce. Did have again more recently related to past boss and going into work. Reports frequently SOB in the past with anxiety, unsure if actual panic attacks. Does have some \"worries\", that infrequently can occur in the middle of the night. Can be aggravated by sugar or poor sleep schedule per patient.    ALCIDES: patient reports  History of alcohol " "use she notes as binge drinking from age 11 - to ~51 (last drink 2001). When caring for father found herself \"all day drinking\" and stopped abruptly in 2001. No history of CD treatment or detox. Previously attended AA. Denies history of withdrawals, no seizures.  Denies any other substances.    No history of wilner, psychosis, OCD. Does note some excessive and compulsive behaviors around diet and exercise as part of past study but denies recent behaviors and weight very stable. Trauma history deferred at this time.        Current Medications:    Current Outpatient Medications:     buPROPion (WELLBUTRIN XL) 150 MG 24 hr tablet, Take 1 tablet (150 mg) by mouth every morning, Disp: 30 tablet, Rfl: 1    ibuprofen (ADVIL/MOTRIN) 200 MG tablet, Take 200 mg by mouth every 4 hours as needed for mild pain, Disp: , Rfl:     tocilizumab (ACTEMRA) 162 MG/0.9ML subcutaneous injection, Inject 0.9 mLs (162 mg) Subcutaneous every 7 days, Disp: 3.6 mL, Rfl: 11    Medication side effects:  NA    The [place] Prescription Monitoring Program has been reviewed and there are no concerns about diversionary activity for controlled substances at this time.  No data for controlled substances over the last one year.    Psychiatric Review of Symptoms:  Depression: depressed mood, little interest / pleasure, appetite change or significant weight loss / gain, sleep changes (insomnia or hypersomnia), fatigue of loss of energy, worthlessness or excessive guilt, and difficulty concentrating or indecisiveness Self rates as 7/10, where 0 is none at all and 10 being severe depression.  SI/SIB/HI: denies all  Anxiety: excessive worry, difficult to control,  easily fatigued, and  difficulty concentrating or mind going blank Self rates as \"not having anxiety like had before\" / 10, where 0 is none at all and 10 being severe anxiety.  Sleep / changes: 8 hours straight through. Quick onset. Wakes pretty tired  Energy: \"feel tired all the way into getting to " "work\", then will be ok for a bit. typically only working 1/2 days.  Appetite or weight changes: lost smell and taste so difficult to eat, \"very boring\", not very motivated to eat because of that.   Irritability / mood swings: denies  Annie / impulsivity / racing thoughts / speech: denies  Panic (description): historically, not diagnosed but had a lot of SOB  OCD: none reproted  Psychosis/AH/VH/delusions: denies  Paranoia: denies  Eating DO: reports history during perimenopause was in a medical study where she became too excessive on diet and could become compulsive about it. Weight very stable now.  Trauma sx: (Deferred)    All other ROS negative.     PHQ-9 scores:       10/9/2023     8:39 AM 7/8/2024     8:19 AM 7/8/2024     9:23 AM   PHQ-9 SCORE   PHQ-9 Total Score MyChart   13 (Moderate depression)   PHQ-9 Total Score 10 12 13    13       NATALI-7 scores:        11/28/2022     8:22 AM 10/9/2023     8:39 AM 7/4/2024     1:18 PM   NATALI-7 SCORE   Total Score   7 (mild anxiety)   Total Score 4 4 7       Promis-10   [id number] [facility] [facility] [employer] Version 1.1    Question 7/8/2024  9:43 AM CDT - Filed by Patient   In general, would you say your health is: Good   In general, would you say your quality of life is: Good   In general, how would you rate your physical health? Fair   In general, how would you rate your mental health, including your mood and your ability to think? Fair   In general, how would you rate your satisfaction with your social activities and relationships? Fair   In general, please rate how well you carry out your usual social activities and roles. (This includes activities at home, at work and in your community, and responsibilities as a parent, child, spouse, employee, friend, etc.) Good   To what extent are you able to carry out your everyday physical activities such as walking, climbing stairs, carrying groceries, or moving a chair? Mostly   In the past 7 days    How " often have you been bothered by emotional problems such as feeling anxious, depressed or irritable? Sometimes   How would you rate your fatigue on average? Severe   How would you rate your pain on average?   0 = No Pain  to  10 = Worst Imaginable Pain 5       Medical Review of Systems:  10 systems (general, cardiovascular, respiratory, eyes, ENT, endocrine, GI, , M/S, neurological) were reviewed. Most pertinent finding(s) is/are:  fatigue. No acute distress; no wheezing / short of breath / increased work of breathing; denies chest pain / tightness / palpitations; reduced appetite and recent weight loss; no nausea / vomiting / abdominal pain; no tics / tremors / abnormal muscle mvmts; no visible skin changes / rashes. The remaining systems are all unremarkable.      Vitals:   There were no vitals taken for this visit.    Pulse Readings from Last 5 Encounters:   07/08/24 73   05/17/24 70   03/13/24 61   02/23/24 57   01/31/24 74     Wt Readings from Last 5 Encounters:   07/08/24 57.9 kg (127 lb 11.2 oz)   05/17/24 58 kg (127 lb 14.4 oz)   03/13/24 61.9 kg (136 lb 8 oz)   02/23/24 58.1 kg (128 lb)   01/31/24 58.1 kg (128 lb)     BP Readings from Last 5 Encounters:   07/08/24 101/65   05/17/24 112/69   03/13/24 (!) 154/81   02/23/24 123/75   01/31/24 105/61       Labs:    Recent Labs   Lab Test 05/17/24  0622 05/26/22  0621 09/30/20  0958   WBC 4.6   < > 6.4   HGB 13.2   < > 13.5   HCT 39.9   < > 41.9   MCV 96   < > 98      < > 266   ANEU  --   --  4.3    < > = values in this interval not displayed.     Recent Labs   Lab Test 05/17/24  0622 03/13/24  1527 02/23/24  0753 11/17/23  0732   NA  --  138  138  --   --    POTASSIUM  --  3.9  3.9  --   --    CHLORIDE  --  102  102  --   --    CO2  --  31*  31*  --   --    GLC  --  83  83  --   --    DONTE  --  9.2  9.2  --   --    MAG  --   --   --  2.0   BUN  --  14.7  14.7  --   --    CR  --  0.69  0.69  --   --    GFRESTIMATED  --  90  90  --   --    ALBUMIN  "4.1 4.3   < > 4.3   PROTTOTAL 6.2* 6.1*   < > 6.6   AST 27 27   < > 26   ALT 15 40   < > 13   ALKPHOS 43 44   < > 43   BILITOTAL 1.3* 1.0   < > 1.1    < > = values in this interval not displayed.     Recent Labs   Lab Test 02/23/24  0753   CHOL 215*   *   HDL 63   TRIG 93     Recent Labs   Lab Test 11/17/23  0732   TSH 2.51     25 OH Vit D2   Date Value Ref Range Status   11/18/2011 <5 ug/L Final     25 OH Vit D3   Date Value Ref Range Status   11/18/2011 41 ug/L Final     25 OH Vit D total   Date Value Ref Range Status   11/18/2011  30 - 75 ug/L Final    <46  Season, race, dietary intake, and treatment affect the concentration of   25-hydroxy-Vitamin D. Values may decrease during winter months and increase   during summer months. Values less than 30 ug/L may indicate Vitamin D   deficiency.         Psychiatric History:   Hospitalizations: None  History of Commitment? No   Past Treatment: counseling and psychiatry  History of Suicidal Ideation: yes, passive  Suicide Attempts: No   History of Violence/Aggression: No   Self-injurious Behavior: Denies  Electroconvulsive Therapy (ECT) or Transcranial Magnetic Stimulation (TMS): No   GeneSight Genetic Testing: No     Past psychotropic medication trials include but are not limited to:   Zoloft 25 mg only: \"did not like\" Made whole body \"buzzing for a couple weeks\" : around ~2000. Reports was on such a low dose and at the same time as stopping ETOH.   Lorazepam (2 tablets) - 2020    Substance Use History:  Current Use of Drugs/Alcohol: Denies   Past Use of Drugs/Alcohol: alcohol - from age 11 to last use 2001. Cannabis.  Patient reports no problems as a result of their drinking / drug use.   Patient has not received chemical dependency treatment in the past  Recovery Programming Involvement: Alcoholics Anonymous      Tobacco use: History quit in 1986  Caffeine: No    Based on the clinical interview, there  are indications of drug or alcohol abuse. historically . " "Continue to monitor.   Discussed effect of substance use on overall health.   CAGE-AID Score today was: 0     Family History:   Patient reported family history includes:   Family History   Problem Relation Age of Onset    C.A.D. Father         a efw small heart attacks    Neurologic Disorder Father         dementia 70s    Coronary Artery Disease Father     Neurologic Disorder Mother         dementia    Depression Mother     Cerebrovascular Disease Maternal Grandmother         ,    Other Cancer Paternal Grandmother     Rheumatoid Arthritis Sister     Hypertension Sister     Dementia Paternal Cousin     Dementia Paternal Uncle         60s    Depression Sister     Breast Cancer No family hx of     Cancer - colorectal No family hx of     Diabetes No family hx of     Cancer No family hx of         no skin cancer    Interstitial Lung Disease No family hx of     Melanoma No family hx of     Skin Cancer No family hx of     Glaucoma No family hx of     Macular Degeneration No family hx of         Mental Illness History: Yes: Per EPIC Electronic Medical Record  Substance Abuse History: Yes: alcohol - uncle  Suicide History: Denies  Medications that helped: Yes: sister fluoxetine (\"putting on a rubber, couldn't feel anything\"). Mother: wellbutrin, niece: wellbutrin (ADHD)      Social History:   Birth place: [place], [place]  Childhood: No: Parents   and How old was patient at time of divorce/separation: before birth years and Reported as raised by biological mother, grandmother, aunt  Siblings:  1 sister  Highest education level was graduate school (in dance per chart review)  Employment Status: part time ([employer])  Relationship status: single  Current Living situation:  lives alone. Feels safe at home.  Children: zero   Firearms/Weapons Access: No: Patient denies   Service: No  Support: sister, close friends    Legal History:  No: Patient denies any legal history    Significant Losses / Trauma / Abuse / Neglect " Issues:  There are no indications or report of: significant losses, trauma, abuse or neglect - deferred. Requires further evaluation at patient's comfort.  Issues of possible neglect are not present.   Recommended that patient call 911 or go to the local ED should there be a change in any of these risk factors.    Past Medical History:  Reviewed per Electronic Medical Record Today    Past Medical History:   Diagnosis Date    Anxiety     Arthritis 1997    scleroderma    Depressive disorder 1997    Herpes simplex without mention of complication     Interstitial lung disease (H)     scleroderma ILD, dx by chest CT 5/2017    Lung nodules     4 mm LLL and RLL 2017    Migraine     loss of speech and comprehension, has had w/u and is complex migraine, last one about 2/2017    Raynaud phenomenon     Systemic sclerosis with limited cutaneous involvement (H) 12/02/2014    Dx ~2010, Scl-70 and anti-centromere antibody neg, +RF      Surgery:   Past Surgical History:   Procedure Laterality Date    HYSTEROSCOPIC PLACEMENT CONTRACEPTIVE DEVICE       Food and Medicine Allergies:   No Known Allergies  Seizures or Head Injury: denies   Diet: No Restrictions  Exercise: No regular exercise program  Supplements: no  Pregnant: no      Mental Status Exam:  Alertness: alert  and oriented   Appearance: well groomed  Behavior/Demeanor: cooperative and pleasant, with good eye contact   Speech: normal and regular rate and rhythm  Language: intact and no problems  Psychomotor: normal or unremarkable  Mood: depressed  Affect: full range and appropriate; was congruent to mood; was congruent to content  Thought Process/Associations: unremarkable  Thought Content:  Reports none;  Denies suicidal ideation, violent ideation, and delusions  Perception:  Reports none;  Denies auditory hallucinations and visual hallucinations  Insight: intact  Judgment: intact  Cognition: does  appear grossly intact; formal cognitive testing was not done  Recent and  "Remote Memory: Intact to interview. Not formally assessed. No amnesia.  Attention Span and Concentration: normal  Fund of Knowledge: appropriate  Gait and Station: unremarkable via seated video    Strengths and Opportunities:   Stephanie Bhatia identified the following strengths or resources that may help she succeed in counseling: friends / good social support, family support, sober support group / recovery support , and strong social skills.     Things that may interfere with the patient's success include:  financial hardship.    Protective Factors: future oriented, restricted access to means, resilience, social support, access to care as needed, motivation and readiness for change, reasons for living, and displaying help seeking behavior    Static Risk Factors: history of MH diagnoses and/or treatment    Dynamic Risk Factors: withdrawing from friends/family    There are no language or communication issues or need for modification in treatment.   There are ethnic, cultural or Oriental orthodox factors that may be relevant for therapy: \"urban\"  Client identified their preferred language to be English.  Client does not need the assistance of an  or other support involved in therapy.    Suicide Risk Assessment:  Based on review of above risk and protective factors and today's exam, pt is at low elevated risk of harm to self or others. She does not meet criteria for a 72 hr hold and remains appropriate for ongoing outpatient care. The patient convincingly denies suicidality today. There was no deceit detected, and the patient presented in a manner that was believable. Local community safety resources reviewed and sent to patient to use if needed.    Recommended that patient call 911 or go to the local ED should there be a change in any of these risk factors.    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    Medical Comorbidities Include:   Patient Active Problem List    Diagnosis Date Noted    Scleroderma with pulmonary involvement (H) 10/31/2023     Priority: Medium    Gastroesophageal reflux disease, unspecified whether esophagitis present 10/31/2023     Priority: Medium    Rheumatoid arthritis involving both hands with positive rheumatoid factor (H) 06/10/2019     Priority: Medium    Senile osteoporosis 06/10/2019     Priority: Medium    ILD (interstitial lung disease) (H) 05/25/2017     Priority: Medium    Osteoporosis 03/08/2017     Priority: Medium    AK (actinic keratosis) 05/19/2015     Priority: Medium    Systemic sclerosis with limited cutaneous involvement (H) 12/02/2014     Priority: Medium    Cluster headache syndrome 10/21/2013     Priority: Medium    CARDIOVASCULAR SCREENING; LDL GOAL LESS THAN 160 10/02/2013     Priority: Medium    Encounter for monitoring tocilizumab therapy 09/27/2013     Priority: Medium     Advance Care Planning:   Receipt of ACP document:  Received: Health Care Directive which was witnessed or notarized on 09/15/2011.  Document not previously scanned.  Validation form completed and sent with document to be scanned.      Confirmed/documented designated decision maker(s). See permanent comments section of demographics in clinical tab. View document(s) and details by clicking on code status.   Added by Elizabeth Hurley on 10/11/2013.            Lumbar radiculopathy 11/18/2011     Priority: Medium    Raynaud phenomenon      Priority: Medium    Anxiety and depression      Priority: Medium       DIFFERENTIAL DIAGNOSIS: R/O anxiety disorder, depression due to other medical condition     Medical comorbidities impacting or contributing to clinical picture: lumbar radiculopathy, cluster HA syndrome, interstitial lung disease, scleroderma, GERD, raynaud phenomenin, osteoporosis, rheumatoid arthritis.   Known issue that I take into account for  their medical decisions, no current exacerbations or new concerns.    Impression:  Stephanie Bhatia is a 73 year old White, female who presents for initial visit with Collaborative Care Psychiatry Service ([facility]) for medication management. Carries past diagnoses: anxiety, depression. Denies history of past psychiatric hospitalizations, no past suicide attempts. She was Previously seen by [facility] clinic 2018, 2020 - no medications started and referred to therapy. She has minimal MH medication history.  PCP just today recommended trial of bupropion - patient has not started yet. She reports a moderately high level of depression, particularly bothered by anhedonia and amotivation. Denies significant anxiety recently. Denies any SI/SIB/HI, psychosis, wilner. Reports AUD in remission for 20 years, no current substance use. Discussed risks / benefits of medication options including recommendation for bupropion from PCP which patient is in agreement with starting today. She also started with new therapist today and encouraged her to continue. Follow-up with this provider in one month. Patient agreeable to plan.     Medication side effects and alternatives reviewed. Health promotion activities recommended and reviewed today. All questions addressed. Education and counseling completed regarding risks and benefits of medications and psychotherapy options. Recommend therapy for additional support.     Treatment Plan:  START bupropion  mg every day per PCP. No script needed.  Continue  all other medications per primary care provider.   Continue therapy with established provider  Safety plan reviewed. To the Emergency Department as needed or call after hours crisis line at [phone] or [phone]. [place] Crisis Text Line: Text [place] to 983642 or  Suicide LifeLine Chat: suicidepreventionlifeline.org/chat  Schedule an appointment with me in 4 weeks or sooner as needed.  Call [facility] at [phone] to  schedule.  Follow up with primary care provider as planned or sooner if needed for acute medical concerns.  Call the psychiatric nurse line with medication questions or concerns at [phone].  MyChart may be used to communicate with your provider, but this is not intended to be used for emergencies.    Patient Education:  Medication side effects and alternatives reviewed. Health promotion activities recommended and reviewed today. All questions addressed. Education and counseling completed regarding risks and benefits of medications and psychotherapy options.  Consent provided by patient/guardian  Call the psychiatric nurse line with medication questions or concerns at [phone].  MyChart may be used to communicate with your provider, but this is not intended to be used for emergencies.  [employer].gov is information for patients.  It is run by the [employer] [employer] and it contains information about all disorders, diseases and all medications.      Discussed side effects of bupropion to include agitation and other activation issues, ^ BP or HR, insomnia, stomach upset, appetite loss, weight loss, risk for precipitation of wilner, and increased risk for seizures.  Patient agreed to take Bupropion to treat low mood, lack of motivation and poor concentration. Patient verbalized understanding of medication schedule, of side effects, avoidance of alcohol and marijuana due to increase risk for seizures.    Community Resources:    National Suicide Prevention Lifeline: 644.425.7746 (TTY: 173.955.1262). Call anytime for help.  (www.suicidepreventionlifeline.org)  National [place] on Mental Illness (www.ga.org): 386.448.2698 or 070-593-6125.   Mental Health Association (www.mentalhealth.org): 496.936.3563 or 536-229-8877.  [place] Crisis Text Line: Text [place] to 736795  Suicide LifeLine Chat: suicidepreConnectEduline.org/chat    Administrative Billing:       Level of Medical Decision Making:   - At least 1  chronic problem that is not stable  - Engaged in prescription drug management during visit (discussed any medication benefits, side effects, alternatives, etc.)         Patient Status:  [facility] MD/DO/NP/PA providers offer care a specialty service for Primary Care Providers in the [place] that seek to optimize psychotropic medications for unstable patients.  Once medications have been optimized, our providers discharge the patient back to the referring Primary Care Provider for ongoing medication management.  This type of system allows our providers to serve a high volume of patients.   Patient will continue to be seen for ongoing consultation and stabilization.    Signed:   Yesi Henderson, MSN, APRN, PMHNP-BC  [facility] ([facility])  [facility]    Chart documentation done in part with Dragon Voice Recognition software.  Although reviewed after completion, some word and grammatical errors may remain.

## 2024-07-08 NOTE — PROGRESS NOTES
Assessment & Plan     Anxiety and depression  Today reviewed medication bupropion start first thing in the morning.  She will follow-up in 1 to 2 months.  She also would like to have psychiatry as well as therapy referrals which were placed also provided her information after visit summary about additional resources if needed.  She indicates she feels stable and happy that she is starting a new medication.  She has other good nonmedication strategies such as walking, enjoys working, understands the importance of healthy eating.  She has family in the area.  - buPROPion (WELLBUTRIN XL) 150 MG 24 hr tablet  Dispense: 30 tablet; Refill: 1  - Adult Mental Health  Referral  - Adult Mental Health  Referral            Depression Screening Follow Up        7/8/2024     8:19 AM   PHQ   PHQ-9 Total Score 12   Q9: Thoughts of better off dead/self-harm past 2 weeks Several days                     Follow Up Actions Taken  Crisis resource information provided in the After Visit Summary    Discussed the following ways the patient can remain in a safe environment:  be around others    See Patient Instructions  The longitudinal plan of care for the diagnosis(es)/condition(s) as documented were addressed during this visit. Due to the added complexity in care, I will continue to support Stephanie in the subsequent management and with ongoing continuity of care.  37 minutes spent on the date of the encounter doing chart review, history, exam, diagnostics review, documentation, evaluation & management, counseling and coordination of cares as noted.   Return in about 7 weeks (around 8/26/2024).  Rangel Garcia MD   Subjective   Stephanie is a 73 year old, presenting for the following health issues:  Follow Up (Pt has questions regarding antidepressants )        7/8/2024     8:12 AM   Additional Questions   Roomed by MR     History of Present Illness       Mental Health Follow-up:  Patient presents to follow-up on  Depression & Anxiety.Patient's depression since last visit has been:  Worse  The patient is having other symptoms associated with depression.  Patient's anxiety since last visit has been:  Medium  The patient is not having other symptoms associated with anxiety.  Any significant life events: job concerns, housing concerns and health concerns  Patient is not feeling anxious or having panic attacks.  Patient has no concerns about alcohol or drug use.    She eats 4 or more servings of fruits and vegetables daily.She consumes 0 sweetened beverage(s) daily.She exercises with enough effort to increase her heart rate 9 or less minutes per day.  She exercises with enough effort to increase her heart rate 3 or less days per week.   She is taking medications regularly.     Anxiety/ Depression  Her anxiety and depression increased during pandemic and recent notes increase in depression feel numb and unable to motivate herself. She moved in with family 2019 to help reduce daily stressors then moved back in her own home feels more comfortable in her own home.  Can' t taste or smell ( Onset many years ago) therefore cooking is a challenge. She has occasional suicidal ideation ( no intent) thinking other people would benefit from her home and belongings.  She did not like Zoloft. Would like a counselor and psychiatrist. Feels tired. 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  There are family members that have done well on Wellbutrin.  History of Alcohol Abuse/Nicotine  Stephanie has past history of nicotine and alcohol addiction ( Binge drinking) has not had alcohol since 2001 to improve sobriety, she attended brief AA meetings which she found helpful. [facility]. She sometimes eats sugar excessively.  Social History     Social History Narrative    .  No children. Sister lives in [place].  Part time at [employer].   in the past.  Currently lives with family.    Possible asbestos exposure from basement pipes in childhood home.  Lived in  [facility] [id number].  Denies exposure to pet birds, hot tubs.  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        BP Readings from Last 3 Encounters:   24 101/65   24 112/69   24 (!) 154/81    Wt Readings from Last 3 Encounters:   24 57.9 kg (127 lb 11.2 oz)   24 58 kg (127 lb 14.4 oz)   24 61.9 kg (136 lb 8 oz)                  Patient Active Problem List   Diagnosis    Raynaud phenomenon    Anxiety and depression    Lumbar radiculopathy    Encounter for monitoring tocilizumab therapy    CARDIOVASCULAR SCREENING; LDL GOAL LESS THAN 160    Cluster headache syndrome    Systemic sclerosis with limited cutaneous involvement (H)    AK (actinic keratosis)    Osteoporosis    ILD (interstitial lung disease) (H)    Rheumatoid arthritis involving both hands with positive rheumatoid factor (H)    Senile osteoporosis    Scleroderma with pulmonary involvement (H)    Gastroesophageal reflux disease, unspecified whether esophagitis present     Past Surgical History:   Procedure Laterality Date    HYSTEROSCOPIC PLACEMENT CONTRACEPTIVE DEVICE         Social History     Tobacco Use    Smoking status: Former     Current packs/day: 0.00     Average packs/day: 1 pack/day for 20.0 years (20.0 ttl pk-yrs)     Types: Cigarettes     Start date: 1966     Quit date: 1986     Years since quittin.6    Smokeless tobacco: Never   Substance Use Topics    Alcohol use: Not Currently     Comment: none since      Family History   Problem Relation Age of Onset    C.A.D. Father         a efw small heart attacks    Neurologic Disorder Father         dementia 70s    Coronary Artery Disease Father     Neurologic Disorder Mother         dementia    Depression Mother     Cerebrovascular Disease Maternal Grandmother         ,    Other Cancer Paternal Grandmother      Rheumatoid Arthritis Sister     Hypertension Sister     Dementia Paternal Cousin     Dementia Paternal Uncle         60s    Depression Sister     Breast Cancer No family hx of     Cancer - colorectal No family hx of     Diabetes No family hx of     Cancer No family hx of         no skin cancer    Interstitial Lung Disease No family hx of     Melanoma No family hx of     Skin Cancer No family hx of     Glaucoma No family hx of     Macular Degeneration No family hx of          Current Outpatient Medications   Medication Sig Dispense Refill    buPROPion (WELLBUTRIN XL) 150 MG 24 hr tablet Take 1 tablet (150 mg) by mouth every morning 30 tablet 1    ibuprofen (ADVIL/MOTRIN) 200 MG tablet Take 200 mg by mouth every 4 hours as needed for mild pain      tocilizumab (ACTEMRA) 162 MG/0.9ML subcutaneous injection Inject 0.9 mLs (162 mg) Subcutaneous every 7 days 3.6 mL 11     No Known Allergies  Recent Labs   Lab Test 05/17/24  0622 03/13/24  1527 02/23/24  0753 11/17/23  0732 08/24/23  0717 01/17/23  0747 06/09/22  0622 05/26/22  0621 09/30/20  0958 08/06/18  0938 02/14/18  1417   LDL  --   --  133*  --  146*  --  119*   < > 111*  --   --    HDL  --   --  63  --  61  --  52   < > 55  --   --    TRIG  --   --  93  --  76  --  72   < > 68  --   --    ALT 15 40 13 13 17   < > 18   < > 19  --   --    CR  --  0.69  0.69  --   --  0.76   < > 0.69   < > 0.72  --  0.90   GFRESTIMATED  --  90  90  --   --  82   < > >90   < > 84  --  62   GFRESTBLACK  --   --   --   --   --   --   --   --  >90  --  75   POTASSIUM  --  3.9  3.9  --   --  4.1  --  4.4   < > 4.2  --  4.9   TSH  --   --   --  2.51  --   --   --   --  1.30   < >  --     < > = values in this interval not displayed.                   Review of Systems  Problem list, PMH, Surgical HX, FH, SH, allergies, medications,immunizations reviewed and updated in Epic.  ROS noted in HPI and ROS,staff / patient questionnaires reviewed by me.         Objective    /65 (BP  Location: Right arm, Patient Position: Sitting, Cuff Size: Adult Regular)   Pulse 73   Temp 97.3  F (36.3  C)   Wt 57.9 kg (127 lb 11.2 oz)   SpO2 99%   BMI 21.25 kg/m    Body mass index is 21.25 kg/m .  Physical Exam   GENERAL: alert and no distress  EYES: Eyes grossly normal to inspection, PERRL and conjunctivae and sclerae normal  NECK: no adenopathy, no asymmetry, masses, or scars  RESP: lungs clear to auscultation - no rales, rhonchi or wheezes  CV: regular rate and rhythm, normal S1 S2, no S3 or S4, no murmur, click or rub, no peripheral edema  NEURO: Normal strength and tone, mentation intact and speech normal  PSYCH: mentation appears normal, affect normal, good insight, good eye contact, appropriate grooming, thoughts congruent.  Admits to difficulty motivating herself and feeling numb.  Denies suicidal intent.              11/28/2022     8:22 AM 10/9/2023     8:39 AM 7/8/2024     8:19 AM   PHQ   PHQ-9 Total Score 16 10 12   Q9: Thoughts of better off dead/self-harm past 2 weeks Several days Not at all Several days          11/28/2022     8:22 AM 10/9/2023     8:39 AM 7/4/2024     1:18 PM   NATALI-7 SCORE   Total Score   7 (mild anxiety)   Total Score 4 4 7        Signed Electronically by: Rangel Garcia MD

## 2024-07-10 PROBLEM — F10.21 ALCOHOL USE DISORDER, MODERATE, IN SUSTAINED REMISSION (H): Status: ACTIVE | Noted: 2024-07-10

## 2024-07-10 PROBLEM — F33.1 MAJOR DEPRESSIVE DISORDER, RECURRENT EPISODE, MODERATE (H): Status: ACTIVE | Noted: 2024-07-10

## 2024-07-16 ENCOUNTER — TELEPHONE (OUTPATIENT)
Dept: PULMONOLOGY | Facility: CLINIC | Age: 74
End: 2024-07-16
Payer: MEDICARE

## 2024-07-16 NOTE — TELEPHONE ENCOUNTER
FREE DRUG APPLICATION INITIATED    Medication: ACTEMRA ACTPEN 162 MG/0.9ML SC SOAJ  Free Drug Program Name:  [facility]  Date Submitted: 7/16/2024  9:31 AM  Phone #: [phone]  Fax #: [phone]  Additional Information:     Date: 7/16/24    Company: [facility]    Phone Number: [phone]    Representative: Wayne    Details: Patient due for reverification, [facility] has not reached out. Sent MyC to patient to reach out to [employer].

## 2024-07-17 NOTE — TELEPHONE ENCOUNTER
FREE DRUG APPLICATION APPROVED    Medication: ACTEMRA ACTPEN 162 MG/0.9ML SC SOAJ  Program Name:  [facility]  Effective Date: 9/2/2022  Expiration Date: 7/17/2024  Pharmacy Filling the Rx: [facility], [place] - [address]  Patient Notified: Yes  Additional Information:

## 2024-07-30 ENCOUNTER — VIRTUAL VISIT (OUTPATIENT)
Dept: PSYCHOLOGY | Facility: CLINIC | Age: 74
End: 2024-07-30
Payer: MEDICARE

## 2024-07-30 DIAGNOSIS — F33.41 RECURRENT MAJOR DEPRESSIVE DISORDER, IN PARTIAL REMISSION (H): Primary | ICD-10-CM

## 2024-07-30 DIAGNOSIS — F32.A ANXIETY AND DEPRESSION: ICD-10-CM

## 2024-07-30 DIAGNOSIS — F41.1 GAD (GENERALIZED ANXIETY DISORDER): ICD-10-CM

## 2024-07-30 DIAGNOSIS — F41.9 ANXIETY AND DEPRESSION: ICD-10-CM

## 2024-07-30 PROCEDURE — 90791 PSYCH DIAGNOSTIC EVALUATION: CPT | Mod: 95 | Performed by: SOCIAL WORKER

## 2024-07-30 NOTE — PROGRESS NOTES
"    [facility]         PATIENT'S NAME: Stephanie Bhatia  PREFERRED NAME: Stephanie  PRONOUNS:   she/her    MRN: [id number]  : 1950  ADDRESS: [address] [place] [id number]  [facility]  [id number]  DATE OF SERVICE: 24  START TIME: 11:03 AM  END TIME: 11:56 AM  PREFERRED PHONE: [phone]  May we leave a program related message: Yes  EMERGENCY CONTACT: was not obtained  .  SERVICE MODALITY:  Video Visit:      Provider verified identity through the following two step process.  Patient provided:  Patient     Telemedicine Visit: The patient's condition can be safely assessed and treated via synchronous audio and visual telemedicine encounter.      Reason for Telemedicine Visit: Patient convenience (e.g. access to timely appointments / distance to available provider)    Originating Site (Patient Location): Patient's home    Distant Site (Provider Location): Provider Remote Setting- Home Office    Consent:  The patient/guardian has verbally consented to: the potential risks and benefits of telemedicine (video visit) versus in person care; bill my insurance or make self-payment for services provided; and responsibility for payment of non-covered services.     Patient would like the video invitation sent by:  Send to e-mail at: [email or web address]    Mode of Communication:  Video Conference via Amwell    Distant Location (Provider):  Off-site    As the provider I attest to compliance with applicable laws and regulations related to telemedicine.    UNIVERSAL ADULT Mental Health DIAGNOSTIC ASSESSMENT    Identifying Information:  Patient is a 73 year old,   individual.  [facility]  Patient attended the session alone.    Chief Complaint:   The reason for seeking services at this time is: \"depression, motivation\".  The problem(s) began 21.    Patient has attempted to resolve these concerns " in the past through Therapy and medication .    Social/Family History:  Patient reported they grew up in [facility]  .  They were raised by biological mother; grandmother; aunt  .  Parents  / .  Patient reported that their childhood was challenging at times.  Patient shared that her parents  before she was conceived and that when she was 2 or 3 years old's her dad moved to [place] and was there until she was in fifth grade.  Patient shared that he was very supportive but could not support the family.  Patient also notes that she felt abandoned by him at times.  Patient has 1 older sister who is 2 years older than she has.  Patient shared she felt so loved by everybody.  Patient described their current relationships with family of origin as being close with her sister.  She notes that her parents passed away in their mid 80s and both struggled with dementia.     The patient describes their cultural background as .  Cultural influences and impact on patient's life structure, values, norms, and healthcare: urban.  Contextual influences on patient's health include: Contextual Factors: Individual Factors patient has a history of alcoholism and she reports she began drinking at age 11 and quit using alcohol 22 years ago.  Patient also has a history of depression as well as relationship challenges and Family Factors patient reports growing up in an all female home and that both of her parents have struggled with dementia .    These factors will be addressed in the Preliminary Treatment plan. Patient identified their preferred language to be English. Patient reported they does not need the assistance of an  or other support involved in therapy.     Patient reported had no significant delays in developmental tasks.   Patient's highest education level was grade school  .  Patient identified the following learning problems: none reported.  Modifications will not be used to  assist communication in therapy.  Patient reports they are  able to understand written materials.    Patient reported the following relationship history 2 marriages and several other relationships.  Patient shared that for a chunk of her life she found herself wanting to have a man and prioritizing that over other aspects such as career.  Patient shared that she chose good people who were not available.  Patient's current relationship status is single for for several years.   Patient identified their sexual orientation as heterosexual.  Patient reported having   no child(bibiana). Patient identified siblings; friends as part of their support system.  Patient identified the quality of these relationships as stable and meaningful,  .      Patient's current living/housing situation involves staying in own home/apartment.  The immediate members of family and household include Lilia Cabral, 78,Sister  and they report that housing is stable.    Patient is currently employed part time.  Patient reports their finances are obtained through employment; other. Patient does identify finances as a current stressor.      Patient reported that they have not been involved with the legal system.    . Patient does not report being under probation/ parole/ jurisdiction. They are not under any current court jurisdiction. .    Patient's Strengths and Limitations:  Patient identified the following strengths or resources that will help them succeed in treatment: commitment to health and well being, friends / good social support, family support, insight, intelligence, sober support group / recovery support , and work ethic. Things that may interfere with the patient's success in treatment include: financial hardship.     Assessments:  The following assessments were completed by patient for this visit:  PHQ9:       2/20/2018     8:30 AM 8/12/2019     8:02 AM 9/23/2020     8:41 AM 11/28/2022     8:22 AM 10/9/2023     8:39 AM 7/8/2024     8:19 AM  7/8/2024     9:23 AM   PHQ-9 SCORE   PHQ-9 Total Score MyChart       13 (Moderate depression)   PHQ-9 Total Score 7 10 9 16 10 12 13    13     GAD7:       7/23/2018     8:12 AM 8/12/2019     8:02 AM 11/28/2022     8:22 AM 10/9/2023     8:39 AM 7/4/2024     1:18 PM   NATALI-7 SCORE   Total Score 6 (mild anxiety)    7 (mild anxiety)   Total Score 6 4 4 4 7     CAGE-AID:       7/8/2024     9:44 AM   CAGE-AID Total Score   Total Score 0    0   Total Score MyChart 0 (A total score of 2 or greater is considered clinically significant)     PROMIS 10-Global Health (only subscores and total score):       7/8/2024     9:43 AM   PROMIS-10 Scores Only   Global Mental Health Score 10    10   Global Physical Health Score 11    11   PROMIS TOTAL - SUBSCORES 21    21     Gage Suicide Severity Rating Scale (Lifetime/Recent)      7/8/2024    12:21 PM   Gage Suicide Severity Rating (Lifetime/Recent)   Q1 Wish to be Dead (Lifetime) N   Q2 Non-Specific Active Suicidal Thoughts (Lifetime) N   Actual Attempt (Lifetime) N   Has subject engaged in non-suicidal self-injurious behavior? (Lifetime) N   Interrupted Attempts (Lifetime) N   Aborted or Self-Interrupted Attempt (Lifetime) N   Preparatory Acts or Behavior (Lifetime) N   Calculated C-SSRS Risk Score (Lifetime/Recent) No Risk Indicated       Personal and Family Medical History:  Patient does report a family history of mental health concerns.  Patient reports family history includes C.A.D. in her father; Cerebrovascular Disease in her maternal grandmother; Coronary Artery Disease in her father; Dementia in her paternal cousin and paternal uncle; Depression in her mother and sister; Hypertension in her sister; Neurologic Disorder in her father and mother; Other Cancer in her paternal grandmother; Rheumatoid Arthritis in her sister..     Patient does report Mental Health Diagnosis and/or Treatment.  Patient reported the following previous diagnoses which include(s): an anxiety  disorder; depression .  Patient reported symptoms began during her 20's.  Patient has received mental health services in the past:  therapy; psychiatry  .  Psychiatric Hospitalizations: none when   ,  ,  ,  ,  ,  ,  ,  ,  ,  ,  .    Patient denies a history of civil commitment.      Currently, patient none  is receiving other mental health services.  These include none.       Patient has had a physical exam to rule out medical causes for current symptoms.  Date of last physical exam was within the past year. Client was encouraged to follow up with PCP if symptoms were to develop. The patient has a [facility] Primary Care Provider, who is named Rangel Garcia.  Patient reports the following current medical concerns: RA, perimenopause, and fibrous lung disease .  Patient denies any issues with pain..   There are not significant appetite / nutritional concerns / weight changes.   Patient does not report a history of head injury / trauma / cognitive impairment.      Patient reports not taking any current medications    Medication Adherence:  Patient reports not taking.  Patient Allergies:  No Known Allergies    Medical History:    Past Medical History:   Diagnosis Date    Anxiety     Arthritis 1997    scleroderma    Depressive disorder 1997    Herpes simplex without mention of complication     Interstitial lung disease (H)     scleroderma ILD, dx by chest CT 5/2017    Lung nodules     4 mm LLL and RLL 2017    Migraine     loss of speech and comprehension, has had w/u and is complex migraine, last one about 2/2017    Raynaud phenomenon     Systemic sclerosis with limited cutaneous involvement (H) 12/02/2014    Dx ~2010, Scl-70 and anti-centromere antibody neg, +RF         Current Mental Status Exam:   Appearance:  Appropriate    Eye Contact:  Good   Psychomotor:  Normal       Gait / station:  no problem  Attitude / Demeanor: Interested  Speech      Rate / Production: Talkative      Volume:  Normal  volume       Language:  intact and no obvious problem  Mood:   Anxious  Depressed  Normal  Affect:   Appropriate    Thought Content: Clear   Thought Process: Coherent  Logical  Tangential       Associations: No loosening of associations  Insight:   Good   Judgment:  Intact   Orientation:  All  Attention/concentration: Good    Substance Use:   Patient did not report a family history of substance use concerns; see medical history section for details.  Patient has not received chemical dependency treatment in the past.  Patient has not ever been to detox.      Patient is not currently receiving any chemical dependency treatment.           Substance History of use Age of first use Date of last use     Pattern and duration of use (include amounts and frequency)   Alcohol used in the past   11 07/02/02 REPORTS SUBSTANCE USE: N/A   Cannabis   used in the past 16 01/01/86 REPORTS SUBSTANCE USE: N/A     Amphetamines   never used     REPORTS SUBSTANCE USE: N/A   Cocaine/crack    never used       REPORTS SUBSTANCE USE: N/A   Hallucinogens never used         REPORTS SUBSTANCE USE: N/A   Inhalants never used         REPORTS SUBSTANCE USE: N/A   Heroin never used         REPORTS SUBSTANCE USE: N/A   Other Opiates never used     REPORTS SUBSTANCE USE: N/A   Benzodiazepine   never used     REPORTS SUBSTANCE USE: N/A   Barbiturates never used     REPORTS SUBSTANCE USE: N/A   Over the counter meds never used     REPORTS SUBSTANCE USE: N/A   Caffeine currently use 18   REPORTS SUBSTANCE USE: N/A   Nicotine  used in the past 11 09/01/87 REPORTS SUBSTANCE USE: N/A   Other substances not listed above:  Identify:  never used     REPORTS SUBSTANCE USE: N/A     Patient reported the following problems as a result of their substance use: no problems, not applicable.    Substance Use: No symptoms    Based on the negative CAGE score and clinical interview there  are not indications of drug or alcohol abuse.Patient reports being sober for 22  years    Significant Losses / Trauma / Abuse / Neglect Issues:   Patient did not  serve in the .  There are indications or report of significant loss, trauma, abuse or neglect issues related to: death of parents, friends/acquaintances, and dogs and abilities due to age and feelings of abandonment during childhood .  Concerns for possible neglect are not present.     Safety Assessment:   Patient denies current homicidal ideation and behaviors.  Patient denies current self-injurious ideation and behaviors.    Patient denied risk behaviors associated with substance use.   Patient denies any high risk behaviors associated with mental health symptoms.  Patient reports the following current concerns for their personal safety: None.  Patient reports there are not firearms in the house.       There are no firearms in the home..    History of Safety Concerns:  Patient denied a history of homicidal ideation.     Patient denied a history of personal safety concerns.    Patient denied a history of assaultive behaviors.    Patient denied a history of sexual assault behaviors.     Patient denied a history of risk behaviors associated with substance use.  Patient reported a history of impulsive decision making associated with mental health symptoms.  Patient reports the following protective factors: forward or future oriented thinking; dedication to family or friends; safe and stable environment; help seeking behaviors when distressed; abstinence from substances; daily obligations; positive social skills; access to a variety of clinical interventions and pets    Risk Plan:  See Recommendations for Safety and Risk Management Plan    Review of Symptoms per patient report:   Depression: Lack of interest, Change in energy level, Difficulties concentrating, Change in appetite, Feelings of hopelessness, Feelings of helplessness, Low self-worth, Ruminations, Irritability, Feeling sad, down, or depressed, and  Withdrawn  Annie:  Restlessness and Distractibility  Psychosis: No Symptoms  Anxiety: Excessive worry, Nervousness, Social anxiety, Fears/phobias work related, Sleep disturbance, Ruminations, Poor concentration, and Irritability  Panic:  Palpitations, Shortness of breath, and Sense of impending doom  Post Traumatic Stress Disorder:  Experienced traumatic event circumstances related to what patient describes as a mental breakdown in her 20's, Avoids traumatic stimuli, Impaired functioning, Nightmares, and Dissociation   Eating Disorder: No Symptoms  ADD / ADHD:  Inattentive, Difficulties listening, Interrupts, Intrudes, and Hyperverbal  Conduct Disorder: No symptoms  Autism Spectrum Disorder: No symptoms  Obsessive Compulsive Disorder: No Symptoms    Patient reports the following compulsive behaviors and treatment history:  None but will binge watch on computer, feel like to much time .      Diagnostic Criteria:   Generalized Anxiety Disorder  A. Excessive anxiety and worry about a number of events or activities (such as work or school performance).   B. The person finds it difficult to control the worry.   - Being easily fatigued.    - Difficulty concentrating or mind going blank.    - Irritability.    - Sleep disturbance (difficulty falling or staying asleep, or restless unsatisfying sleep).   D. The focus of the anxiety and worry is not confined to features of an Axis I disorder.  E. The anxiety, worry, or physical symptoms cause clinically significant distress or impairment in social, occupational, or other important areas of functioning.   F. The disturbance is not due to the direct physiological effects of a substance (e.g., a drug of abuse, a medication) or a general medical condition (e.g., hyperthyroidism) and does not occur exclusively during a Mood Disorder, a Psychotic Disorder, or a Pervasive Developmental Disorder.    - The aformentioned symptoms began over 5  year(s) ago and occurs 4 days per week and  is experienced as moderate. Major Depressive Disorder  A) Recurrent episode(s) - symptoms have been present during the same 2-week period and represent a change from previous functioning 5 or more symptoms (required for diagnosis)   - Depressed mood. Note: In children and adolescents, can be irritable mood.     - Diminished interest or pleasure in all, or almost all, activities.    - Fatigue or loss of energy.    - Feelings of worthlessness or inappropriate and excessive guilt.    - Diminished ability to think or concentrate, or indecisiveness.   B) The symptoms cause clinically significant distress or impairment in social, occupational, or other important areas of functioning  C) The episode is not attributable to the physiological effects of a substance or to another medical condition  D) The occurence of major depressive episode is not better explained by other thought / psychotic disorders  E) There has never been a manic episode or hypomanic episode    Functional Status:  Patient reports the following functional impairments:  relationship(s) and self-care.     Nonprogrammatic care:  Patient is requesting basic services to address current mental health concerns.    Clinical Summary:  1. Psychosocial, Cultural and Contextual Factors: Aging, relationship challenges  .  2. Principal DSM5 Diagnoses  (Sustained by DSM5 Criteria Listed Above):   296.35 (F33.41)  Major Depressive Disorder, Recurrent Episode, In partial remission _.  3. Other Diagnoses that is relevant to services:   300.02 (F41.1) Generalized Anxiety Disorder.  4. Provisional Diagnosis:  296.35 (F33.41)  Major Depressive Disorder, Recurrent Episode, In partial remission _  300.02 (F41.1) Generalized Anxiety Disorder as evidenced by the symptoms endorsed and The length of time that she has experienced symptoms and the impact those symptoms have had on her relationships and everyday functioning .  5. Prognosis: Relieve Acute Symptoms.  6. Likely  consequences of symptoms if not treated: Patient would likely see an increase in symptoms that could impact her work, relationships, and overall functioning.  7. Client strengths include:  caring, committed to sobriety, empathetic, employed, goal-focused, good listener, has a previous history of therapy, insightful, intelligent, motivated, open to learning, support of family, friends and providers, supportive, wants to learn, and work history .     Recommendations:     1. Plan for Safety and Risk Management:   Safety and Risk: Recommended that patient call 911 or go to the local ED should there be a change in any of these risk factors..          Report to child / adult protection services was NA.     2. Patient's identified mental health concerns with a cultural influence will be addressed by ongoing conversations in individual appointments .     3. Initial Treatment will focus on:    Depressed Mood - understanding and managing symptoms  Anxiety - awareness around triggers and developing and utilizing coping skills and strategies  Relational Problems related to: her relationship istory .     4. Resources/Service Plan:    services are not indicated.   Modifications to assist communication are not indicated.   Additional disability accommodations are not indicated.      5. Collaboration:   Collaboration / coordination of treatment will be initiated with the following  support professionals:  None at this time .      6.  Referrals:   The following referral(s) will be initiated:  None at this time .       A Release of Information has been obtained for the following:  None at this time .     Clinical Substantiation/medical necessity for the above recommendations:  NA.    7. ALCIDES:    ALCIDES:  Discussed the general effects of drugs and alcohol on health and well-being. Provider gave patient printed information about the  effects of chemical use on their health and well being. Recommendations:  Patient should remain  chemically free .     8. Records:   These were reviewed at time of assessment.   Information in this assessment was obtained from the medical record and  provided by patient who is a good historian.    Patient will have open access to their mental health medical record.    9.   Interactive Complexity: No    10. Safety Plan:       Provider Name/ Credentials:  RONIT Haley  July 30, 2024

## 2024-08-02 RX ORDER — BUPROPION HYDROCHLORIDE 150 MG/1
150 TABLET ORAL EVERY MORNING
Qty: 90 TABLET | Refills: 1 | OUTPATIENT
Start: 2024-08-02

## 2024-08-04 ASSESSMENT — ANXIETY QUESTIONNAIRES
GAD7 TOTAL SCORE: 8
7. FEELING AFRAID AS IF SOMETHING AWFUL MIGHT HAPPEN: SEVERAL DAYS
7. FEELING AFRAID AS IF SOMETHING AWFUL MIGHT HAPPEN: SEVERAL DAYS
GAD7 TOTAL SCORE: 8
4. TROUBLE RELAXING: SEVERAL DAYS
IF YOU CHECKED OFF ANY PROBLEMS ON THIS QUESTIONNAIRE, HOW DIFFICULT HAVE THESE PROBLEMS MADE IT FOR YOU TO DO YOUR WORK, TAKE CARE OF THINGS AT HOME, OR GET ALONG WITH OTHER PEOPLE: SOMEWHAT DIFFICULT
1. FEELING NERVOUS, ANXIOUS, OR ON EDGE: SEVERAL DAYS
5. BEING SO RESTLESS THAT IT IS HARD TO SIT STILL: SEVERAL DAYS
8. IF YOU CHECKED OFF ANY PROBLEMS, HOW DIFFICULT HAVE THESE MADE IT FOR YOU TO DO YOUR WORK, TAKE CARE OF THINGS AT HOME, OR GET ALONG WITH OTHER PEOPLE?: SOMEWHAT DIFFICULT
2. NOT BEING ABLE TO STOP OR CONTROL WORRYING: SEVERAL DAYS
3. WORRYING TOO MUCH ABOUT DIFFERENT THINGS: MORE THAN HALF THE DAYS
6. BECOMING EASILY ANNOYED OR IRRITABLE: SEVERAL DAYS
GAD7 TOTAL SCORE: 8

## 2024-08-07 NOTE — PROGRESS NOTES
"Virtual Visit Details    Type of service:  Video Visit   Video Start Time: 9:05 AM  Video End Time: 9:28 AM    Originating Location (pt. Location): Home    Distant Location (provider location):  Off-site  Platform used for Video Visit: Interneer       PSYCHIATRIC MEDICATION FOLLOW UP APPT     Name: Stephanie Bhatia   : 1950               Telemedicine Visit: The patient's condition can be safely assessed and treated via synchronous audio and visual telemedicine encounter.      Consent:  The patient/guardian has verbally consented to: the potential risks and benefits of telemedicine (video visit or phone) versus in person care; bill my insurance or make self-payment for services provided; and responsibility for payment of non-covered services.     As the provider I attest to compliance with applicable laws and regulations related to telemedicine.         Source of Referral / Care Team:  Primary Care Provider: Rangel Garcia MD   Current Psychotherapist: Regina Teixeira, [facility]     The [facility] psychiatry providers act as a specialty service for Primary Care Providers in the [facility] who seek to optimize medications for unstable patients. Once medications have been optimized, [facility] providers discharge the patient back to the referring Primary Care Provider for ongoing medication management. This type of system allows [facility] to serve a high volume of patients.     Patient Identification:  Patient is a 73 year old,   White Not  or  female  who presents for return visit with me.   Patient prefers to be called: \"Stephanie\".  Patient is currently employed part time.     Patient attended the session alone.    RECORDS AVAILABLE FOR REVIEW: EHR records through [email or web address] .       Interim History:  I last saw Stephanie Bhatia for outpatient psychiatry Consultation 4 weeks ago on 24. During that appointment, we started bupropion  mg QD. Patient reports ADHERING to prescribed medications. " "She does report some side effects that are ongoing with bupropion: increased anxiety, sleep changes, constipation - denies any intolerable at this time but have not improve much over last 4 weeks with medication. Anxiety is again related to occupational stressors / specifically with boss which has been an issue previously but had been somewhat better until starting the bupropion. She reports increase in wakeups overnight as well, and waking up tired which was an issue before bupropion as well. She denies any benefit to energy, motivation since starting bupropion. Does report signing up for acquatic therapy so will have 1x a week exercise which she is feeling good about, but still low motivation or enjoyment of other activities she used to like (gardening, baking, seeing a show with a friend). She does think her depression has been a little worse this month, definitely not better since starting bupropion anyway. She denies any active suicidal ideation, no plan / intent / actions taken, but does report  today passive thoughts of \"everything is so hard, not wanting to be around anymore - but not thinking of killing myself\", and feels associated with being around a lot of death right now per patient. Denies any SIB, HI, psychosis, problematic substance use.     Initial Impression:   7/8/24: Stephanie Bhatia is a 73 year old White, female who presents for initial visit with [facility] ([facility]) for medication management. Carries past diagnoses: anxiety, depression. Denies history of past psychiatric hospitalizations, no past suicide attempts. She was Previously seen by [facility] 2018, 2020 - no medications started and referred to therapy. She has minimal  medication history.  PCP just today recommended trial of bupropion - patient has not started yet. She reports a moderately high level of depression, particularly bothered by anhedonia and amotivation. Denies significant anxiety recently. Denies " "any SI/SIB/HI, psychosis, wilner. Reports AUD in remission for 20 years, no current substance use. Discussed risks / benefits of medication options including recommendation for bupropion from PCP which patient is in agreement with starting today. She also started with new therapist today and encouraged her to continue. Follow-up with this provider in one month. Patient agreeable to plan.       Current medications include:   Current Outpatient Medications   Medication Sig Dispense Refill    buPROPion (WELLBUTRIN XL) 150 MG 24 hr tablet Take 1 tablet (150 mg) by mouth every morning 30 tablet 1    ibuprofen (ADVIL/MOTRIN) 200 MG tablet Take 200 mg by mouth every 4 hours as needed for mild pain      tocilizumab (ACTEMRA) 162 MG/0.9ML subcutaneous injection Inject 0.9 mLs (162 mg) Subcutaneous every 7 days 3.6 mL 11     No current facility-administered medications for this visit.         Side effects:  anxiety, sleep changes, constipation    The [place] Prescription Monitoring Program : not reviewed today    Psychiatric ROS:  Stephanie Bhatia reports mood has been: \"Ok, I guess.\"  Depression has been: depressed mood, little interest / pleasure, sleep changes (insomnia or hypersomnia), fatigue of loss of energy, worthlessness or excessive guilt, difficulty concentrating or indecisiveness, and recurrent thoughts of death or SI self rates as ~7/ 10, where 0 is none at all and 10 being severe depression. Was 7/10 at last appt.  SI/SIB/HI: denies any current suicidal ideation, does report some passive thoughts of \"everything is so hard, not wanting to be around anymore - but not thinking of killing myself.\" Denies any plan / intent / action taken. No NSSIB, HI.  Anxiety has been: excessive worry, difficult to control, restlessness / feeling keyed up,  easily fatigued,  difficulty concentrating or mind going blank, irritability, and sleep disturbances (difficulty falling / staying asleep, or restless / unsatisfying)  Sleep has " "been: \"I think even before the medication, sleep pattern is becoming an old person pattern where wake up at 3AM, but dreams have been more vibrant.\" Still tired once woken up. Takes a couple hours before ok in AM.   Energy has been: continues to be very low, denies any change with bupropion.  Appetite has been: denies issue or changes, on low side since losing taste / smell previuosly.   Annie sxs: denies  Psychosis sxs: denies  ADHD/ADD sxs: none  Eating DO: denies. (history during perimenopause was in a medical study where she became too excessive on diet and could become compulsive about it. Weight very stable now.  Trauma sx: (Deferred)        GAD7 scores were reviewed today.   PHQ-9 scores:       10/9/2023     8:39 AM 7/8/2024     8:19 AM 7/8/2024     9:23 AM   PHQ-9 SCORE   PHQ-9 Total Score MyChart   13 (Moderate depression)   PHQ-9 Total Score 10 12 13    13       NATALI-7 scores:        10/9/2023     8:39 AM 7/4/2024     1:18 PM 8/4/2024    10:35 AM   NATALI-7 SCORE   Total Score  7 (mild anxiety) 8 (mild anxiety)   Total Score 4 7 8         Current stressors include: Symptoms, Grief/Loss, and Occupational Difficulties  Coping mechanisms and supports include: Exercise, Therapy, Family, and Friends    Vital Signs:   Ht 1.651 m (5' 5\")   Wt 58.1 kg (128 lb)   BMI 21.30 kg/m      Review of Systems:  10 systems (general, cardiovascular, respiratory, eyes, ENT, endocrine, GI, , M/S, neurological) were reviewed. Most pertinent finding(s) is/are: Fatigue. + dyspepsia. + constipation (well managed with prunes) + recent insect bites > rash per patient (resolved now, not visualized on camera). + cough. No acute distress; denies fever; no wheezing / short of breath / increased work of breathing; denies palpitations; reduced appetite and recent weight loss; no nausea / vomiting / abdominal pain; no tics / tremors / abnormal muscle mvmts; . The remaining systems are all unremarkable.    Labs:  Most recent laboratory " results reviewed and the pertinent results include:     Recent Labs   Lab Test 05/17/24  0622 05/26/22  0621 09/30/20  0958   WBC 4.6   < > 6.4   HGB 13.2   < > 13.5   HCT 39.9   < > 41.9   MCV 96   < > 98      < > 266   ANEU  --   --  4.3    < > = values in this interval not displayed.     Recent Labs   Lab Test 05/17/24  0622 03/13/24  1527 02/23/24  0753 11/17/23  0732   NA  --  138  138  --   --    POTASSIUM  --  3.9  3.9  --   --    CHLORIDE  --  102  102  --   --    CO2  --  31*  31*  --   --    GLC  --  83  83  --   --    DONTE  --  9.2  9.2  --   --    MAG  --   --   --  2.0   BUN  --  14.7  14.7  --   --    CR  --  0.69  0.69  --   --    GFRESTIMATED  --  90  90  --   --    ALBUMIN 4.1 4.3   < > 4.3   PROTTOTAL 6.2* 6.1*   < > 6.6   AST 27 27   < > 26   ALT 15 40   < > 13   ALKPHOS 43 44   < > 43   BILITOTAL 1.3* 1.0   < > 1.1    < > = values in this interval not displayed.     Recent Labs   Lab Test 02/23/24  0753   CHOL 215*   *   HDL 63   TRIG 93     Recent Labs   Lab Test 11/17/23  0732   TSH 2.51     25 OH Vit D2   Date Value Ref Range Status   11/18/2011 <5 ug/L Final     25 OH Vit D3   Date Value Ref Range Status   11/18/2011 41 ug/L Final     25 OH Vit D total   Date Value Ref Range Status   11/18/2011  30 - 75 ug/L Final    <46  Season, race, dietary intake, and treatment affect the concentration of   25-hydroxy-Vitamin D. Values may decrease during winter months and increase   during summer months. Values less than 30 ug/L may indicate Vitamin D   deficiency.        Past Medical/Surgical History:  Past Medical History:   Diagnosis Date    Anxiety     Arthritis 1997    scleroderma    Depressive disorder 1997    Herpes simplex without mention of complication     Interstitial lung disease (H)     scleroderma ILD, dx by chest CT 5/2017    Lung nodules     4 mm LLL and RLL 2017    Migraine     loss of speech and comprehension, has had w/u and is complex migraine, last one about  2/2017    Raynaud phenomenon     Systemic sclerosis with limited cutaneous involvement (H) 12/02/2014    Dx ~2010, Scl-70 and anti-centromere antibody neg, +RF      has a past medical history of Anxiety, Arthritis (1997), Depressive disorder (1997), Herpes simplex without mention of complication, Interstitial lung disease (H), Lung nodules, Migraine, Raynaud phenomenon, and Systemic sclerosis with limited cutaneous involvement (H) (12/02/2014).    She has no past medical history of Cancer (H), Cerebral infarction (H), Congestive heart failure (H), COPD (chronic obstructive pulmonary disease) (H), Diabetes (H), Heart disease, History of blood transfusion, Hypertension, Thyroid disease, or Uncomplicated asthma.    Medication allergies:  No Known Allergies    Social History:  Birth place: [place], [place]  Childhood: No: Parents   and How old was patient at time of divorce/separation: before birth years and Reported as raised by biological mother, grandmother, aunt  Siblings:  1 sister  Highest education level was graduate school (in dance per chart review)  Employment Status: part time ([employer])  Relationship status: single  Current Living situation:  lives alone. Feels safe at home.  Children: zero   Firearms/Weapons Access: No: Patient denies   Service: No  Support: sister, close friends    Current Use of Drugs/Alcohol: Denies   Past Use of Drugs/Alcohol: alcohol - from age 11 to last use 2001. Cannabis.  Tobacco use: History quit in 1986     Mental Status Exam:  Alertness: alert  and oriented   Appearance: adequately groomed  Behavior/Demeanor: cooperative, with good eye contact   Speech: normal and regular rate and rhythm  Language: intact and no problems  Psychomotor: normal or unremarkable  Mood: depressed and anxious  Affect: full range and appropriate; was congruent to mood; was congruent to content  Thought Process/Associations: unremarkable  Thought Content:  Reports none;  Denies suicidal  ideation with plan; with intent [details in Interim History], violent ideation, and delusions  Perception:  Reports none;  Denies auditory hallucinations and visual hallucinations  Insight: intact  Judgment: intact  Cognition: does  appear grossly intact; formal cognitive testing was not done  Recent and Remote Memory: grossly intact to interview. Not formally assessed. No amnesia.  Attention Span and Concentration:  normal  Fund of Knowledge:  appropriate  Gait and Station: unremarkable via seated video     Suicide Risk Assessment:  Today Stephanie Bhatia reports no current suicidal ideation but intermittent passive suicidal ideation. In addition, there are notable risk factors for self-harm, including single status, anxiety, recent loss of friends, suicidal ideation, purposelessness/no reason for living, and withdrawing. However, risk is mitigated by sobriety, absence of past attempts, history of seeking help when needed, future oriented, no access to firearms or weapons, denies suicidal intent or plan, and denies homicidal ideation, intent, or plan. Therefore, based on all available evidence including the factors cited above, Stephanie Bhatia does not appear to be at imminent risk for self-harm, does not meet criteria for a 72-hr hold, and therefore remains appropriate for ongoing outpatient level of care.  A thorough assessment of risk factors related to suicide and self-harm have been reviewed and are noted above. The patient convincingly denies suicidality on several occasions. Local community safety resources printed and reviewed for patient to use if needed. There was no deceit detected, and the patient presented in a manner that was believable.     Recommended that patient call 911 or go to the local ED should there be a change in any of these risk factors.    DSM5 Diagnosis:  296.32 (F33.1) Major Depressive Disorder, Recurrent Episode, Moderate _  Substance-Related & Addictive Disorders Alcohol Use Disorder    303.90 (F10.20) Moderate In sustained remission    Medical comorbidities include:   Patient Active Problem List    Diagnosis Date Noted    Major depressive disorder, recurrent episode, moderate (H) 07/10/2024     Priority: Medium    Alcohol use disorder, moderate, in sustained remission (H) 07/10/2024     Priority: Medium    Scleroderma with pulmonary involvement (H) 10/31/2023     Priority: Medium    Gastroesophageal reflux disease, unspecified whether esophagitis present 10/31/2023     Priority: Medium    Rheumatoid arthritis involving both hands with positive rheumatoid factor (H) 06/10/2019     Priority: Medium    Senile osteoporosis 06/10/2019     Priority: Medium    ILD (interstitial lung disease) (H) 05/25/2017     Priority: Medium    Osteoporosis 03/08/2017     Priority: Medium    AK (actinic keratosis) 05/19/2015     Priority: Medium    Systemic sclerosis with limited cutaneous involvement (H) 12/02/2014     Priority: Medium    Cluster headache syndrome 10/21/2013     Priority: Medium    CARDIOVASCULAR SCREENING; LDL GOAL LESS THAN 160 10/02/2013     Priority: Medium    Encounter for monitoring tocilizumab therapy 09/27/2013     Priority: Medium     Advance Care Planning:   Receipt of ACP document:  Received: Health Care Directive which was witnessed or notarized on 09/15/2011.  Document not previously scanned.  Validation form completed and sent with document to be scanned.      Confirmed/documented designated decision maker(s). See permanent comments section of demographics in clinical tab. View document(s) and details by clicking on code status.   Added by Elizabeth Hurley on 10/11/2013.            Lumbar radiculopathy 11/18/2011     Priority: Medium    Raynaud phenomenon      Priority: Medium    Anxiety and depression      Priority: Medium       Psychosocial & Contextual Factors:  Occupational Difficulties, Phase of Life Difficulties, and Medical Comorbidites    DIFFERENTIAL DIAGNOSIS: R/O anxiety  disorder, depression due to other medical condition     Medical comorbidities impacting or contributing to clinical picture: lumbar radiculopathy, cluster HA syndrome, interstitial lung disease, scleroderma, GERD, raynaud phenomenin, osteoporosis, rheumatoid arthritis.   Known issue that I take into account for their medical decisions, no current exacerbations or new concerns.    Impression:  Stephanie Bhatia is a 73 year old White, female who presents for return visit with  [facility] ([facility]) for medication management. At initial appointment 4 weeks ago, we started bupropion  mg QD. Patient does report some side effects that are ongoing with bupropion including increased anxiety, sleep changes, constipation - denies any intolerable at this time but have not improve much over last 4 weeks with medication. She denies any benefit to energy, motivation since starting bupropion. Does report signing up for acquatic therapy so will have 1x a week exercise which she is feeling good about, but still low motivation or enjoyment of other activities she used to like and depression has been a little worse this month, definitely not better since starting bupropion anyway. She denies any active suicidal ideation, no plan / intent / actions taken, but does report  today passive thoughts at times. Denies any current safety concerns, no hx SIB, HI, psychosis, problematic substance use. Discussed risks / benefits of medication changes and patient is most interested in giving current bupropion a few more weeks. Recommended patient discontinue medication if ongoing worsening of anxiety, depression but we will plan to follow-up in 3-4 weeks to reassess as well. Continue in therapy. Patient agreeable to plan.     Medication side effects and alternatives were reviewed. Health promotion activities recommended and reviewed today. All questions addressed. Education and counseling completed regarding risks and  benefits of medications and psychotherapy options. Recommend therapy for additional support.       Treatment Plan:  CONTINUE bupropion  mg every day per PCP. No script needed.  Continue  all other medications per primary care provider.   Continue therapy with established provider  Safety plan reviewed. To the Emergency Department as needed or call after hours crisis line at [phone] or [phone]. [place] Crisis Text Line. Text [place] to 883602 or Suicide LifeLine Chat: suicidepreventionBuildCircleline.org/chat  Schedule an appointment with me in 3-4 weeks or sooner as needed. Call [facility] at [phone] to schedule.  Follow up with primary care provider as planned or for acute medical concerns.  Call the psychiatric nurse line with medication questions or concerns at [phone].  [employer] may be used to communicate with your provider, but this is not intended to be used for emergencies.    Patient Education:  Medication side effects and alternatives reviewed. Health promotion activities recommended and reviewed today. All questions addressed. Education and counseling completed regarding risks and benefits of medications and psychotherapy options.  Consent provided by patient/guardian  Call the psychiatric nurse line with medication questions or concerns at [phone].  [employer] may be used to communicate with your provider, but this is not intended to be used for emergencies.  Medlineplus.gov is information for patients.  It is run by the National Library of Medicine and it contains information about all disorders, diseases and all medications.      Discussed side effects of bupropion to include agitation and other activation issues, ^ BP or HR, insomnia, stomach upset, appetite loss, weight loss, risk for precipitation of wilner, and increased risk for seizures. Patient agreed to take Bupropion to treat low mood, lack of motivation and poor concentration. Patient verbalized understanding  of medication schedule, of side effects, avoidance of alcohol and marijuana due to increase risk for seizures.     Community Resources:    National Suicide Prevention Lifeline: 848.293.1472 (TTY: 697.397.1207). Call anytime for help.  (www.suicidepreventionlifeline.org)  National [place] on Mental Illness (www.ga.org): 696.549.8005 or 127-048-7212.   Mental Health Association (www.mentalhealth.org): 769.864.9511 or 445-353-6587.  [place] Crisis Text Line: Text [place] to 504253  Suicide LifeLine Chat: [facility].org/chat    Administrative Billing:       Level of Medical Decision Making:   - At least 1 chronic problem that is not stable  - Engaged in prescription drug management during visit (discussed any medication benefits, side effects, alternatives, etc.)             Patient Status:  [facility] MD/DO/NP/PA providers offer care a specialty service for Primary Care Providers in the [facility] that seek to optimize psychotropic medications for unstable patients.  Once medications have been optimized, our providers discharge the patient back to the referring Primary Care Provider for ongoing medication management.  This type of system allows our providers to serve a high volume of patients.   Patient will continue to be seen for ongoing consultation and stabilization.    Signed:   Yesi Henderson, MSN, APRN, PMHNP-BC  [facility] ([facility])  [facility]    Chart documentation done in part with Dragon Voice Recognition software.  Although reviewed after completion, some word and grammatical errors may remain.

## 2024-08-08 ENCOUNTER — VIRTUAL VISIT (OUTPATIENT)
Dept: PSYCHIATRY | Facility: CLINIC | Age: 74
End: 2024-08-08
Payer: MEDICARE

## 2024-08-08 VITALS — WEIGHT: 128 LBS | BODY MASS INDEX: 21.33 KG/M2 | HEIGHT: 65 IN

## 2024-08-08 DIAGNOSIS — F10.21 ALCOHOL USE DISORDER, MODERATE, IN SUSTAINED REMISSION (H): ICD-10-CM

## 2024-08-08 DIAGNOSIS — F33.1 MAJOR DEPRESSIVE DISORDER, RECURRENT EPISODE, MODERATE (H): Primary | ICD-10-CM

## 2024-08-08 PROCEDURE — 99214 OFFICE O/P EST MOD 30 MIN: CPT | Mod: 95 | Performed by: NURSE PRACTITIONER

## 2024-08-08 ASSESSMENT — PAIN SCALES - GENERAL: PAINLEVEL: NO PAIN (0)

## 2024-08-08 NOTE — PATIENT INSTRUCTIONS
**For crisis resources, please see the information at the end of this document**     Thank you for coming to the [facility] [place] [facility].    TREATMENT PLAN:    Medications:   CONTINUE bupropion  mg every day per PCP. No script needed.  Continue  all other medications per primary care provider.     Consults / Referrals:   Continue therapy with established provider    Follow-up:  Schedule an appointment with me in 3-4 weeks or sooner as needed.  Call [facility] at [phone] to schedule.  Follow up with primary care provider as planned or sooner if needed for acute medical concerns.  Call the psychiatric nurse line with medication questions or concerns at [phone].  MyChart may be used to communicate with your provider, but this is not intended to be used for emergencies.    Psychoeducation:  Discussed side effects of bupropion to include agitation and other activation issues, ^ BP or HR, insomnia, stomach upset, appetite loss, weight loss, risk for precipitation of wilner, and increased risk for seizures.  Patient agreed to take Bupropion to treat low mood, lack of motivation and poor concentration. Patient verbalized understanding of medication schedule, of side effects, avoidance of alcohol and marijuana due to increase risk for seizures.      Financial Assistance [phone]  [facility] Billing [phone]  [facility]: [phone]  [facility] [phone]  Medical Records [phone]  [facility] Patient Bill of Rights [email or web address]       MENTAL HEALTH CRISIS RESOURCES:  For a emergency help, please call 911 or go to the nearest Emergency Department.     Emergency Walk-In Options:   EmPATH Unit @ [facility] ([place]): [phone] - Specialized mental health emergency area designed to be calming  [facility] ([place]):  [phone]  [facility] ([place]): [phone]  [facility] ([place]): [phone]    [facility] Crisis Information:   Augie: [phone]  Yuri: [phone]  Luis (GWYN) - Adult: [phone]     Child: [phone]  [place] - Adult: [phone]     Child: [phone]  [place]: [phone]  List of all [facility] resources:   https://[place].gov/dhs/people-we-serve/adults/health-care/mental-health/resources/crisis-contacts.jsp    National Crisis Information:   National Suicide & Crisis Lifeline: Call 988        For online chat options, visit https://suicidepreventionlifeline.org/chat/  Poison Control Center: 5-648-158-1684  Poison Control Center: 2-660-909-7912  Trans Lifeline: 6-176-288-1393 - Hotline for transgender people of all ages  The [employer]: 9-647-257-4621 - Hotline for LGBT youth     For Non-Emergency Support:   Fast Tracker: Mental Health & Substance Use Disorder Resources -   https://www.fasttrackYeexoon.org/       Again thank you for choosing [facility] [place] [facility] and please let us know how we can best partner with you to improve you and your family's health.    You may be receiving a survey regarding this appointment. We would love to have your feedback, both positive and negative. The survey is done by an external company, so your answers are anonymous.        Patient Education   Collaborative Care Psychiatry Service  What to Expect  Here's what to expect from your Collaborative Care Psychiatry Service (CCPS).   About CCPS  CCPS means 2 people work together to help you get better. You'll meet with a behavioral health clinician and a psychiatric doctor. A behavioral health clinician helps people with mental health problems by talking with them. A psychiatric doctor helps people by giving them medicine.  How it works  At every visit, you'll see the behavioral health clinician (BHC) first. They'll talk with you about how  "you're doing and teach you how to feel better.   Then you'll see the psychiatric doctor. This doctor can help you deal with troubling thoughts and feelings by giving you medicine. They'll make sure you know the plan for your care.   CCPS usually takes 3 to 6 visits. If you need more visits, we may have you start seeing a different psychiatric doctor for ongoing care.  If you have any questions or concerns, we'll be glad to talk with you.  About visits  Be open  At your visits, please talk openly about your problems. It may feel hard, but it's the best way for us to help you.  Cancelling visits  If you can't come to your visit, please call us right away at [phone]. If you don't cancel at least 24 hours (1 full day) before your visit, that's \"late cancellation.\"  Being late to visits  Being very late is the same as not showing up. You will be a \"no show\" if:  Your appointment starts with a [facility], and you're more than 15 minutes late for a 30-minute (half hour) visit. This will also cancel your appointment with the psychiatric doctor.  Your appointment is with a psychiatric doctor only, and you're more than 15 minutes late for a 30-minute (half hour) visit.  Your appointment is with a psychiatric doctor only, and you're more than 30 minutes late for a 60-minute (full hour) visit.  If you cancel late or don't show up 2 times within 6 months, we may end your care.   Getting help between visits  If you need help between visits, you can call us Monday to Friday from 8 a.m. to 4:30 p.m. at [phone].  Emergency care  Call 911 or go to the nearest emergency department if your life or someone else's life is in danger.  Call 198 anytime to reach the national Suicide and Crisis hotline.  Medicine refills  To refill your medicine, call your pharmacy. You can also call [facility]'s Behavioral Access at [phone], Monday to Friday, 8 a.m. to 4:30 p.m. It can take 1 to 3 business days to get a refill.   Forms, " letters, and tests  You may have papers to fill out, like FMLA, short-term disability, and workability. We can help you with these forms at your visits, but you must have an appointment. You may need more than 1 visit for this, to be in an intensive therapy program, or both.  Before we can give you medicine for ADHD, we may refer you to get tested for it or confirm it another way.  We may not be able to give you an emotional support animal letter.  We don't do mental health checks ordered by the court.   We don't do mental health testing, but we can refer you to get tested.   Thank you for choosing us for your care.  For informational purposes only. Not to replace the advice of your health care provider. Copyright   2022 [facility]. All rights reserved. [employer] [id number] - 12/22.

## 2024-08-08 NOTE — NURSING NOTE
[facility] [facility] [address]    Is the patient currently in the state of [place]? YES    Visit mode:VIDEO    If the visit is dropped, the patient can be reconnected by: VIDEO VISIT: Send to e-mail at: [email or web address]    Will anyone else be joining the visit? NO  (If patient encounters technical issues they should call [phone])    How would you like to obtain your AVS? MyChart    Are changes needed to the allergy or medication list? Yes OMEPRAZOLE DR 20MG 1 time per day    Might be allergic to bug bites    Are refills needed on medications prescribed by this physician? NO    Rooming Documentation:  Assigned questionnaire(s) completed      Reason for visit: RECHECK    Kathryn FRANCIS

## 2024-08-12 ASSESSMENT — PATIENT HEALTH QUESTIONNAIRE - PHQ9
SUM OF ALL RESPONSES TO PHQ QUESTIONS 1-9: 10
10. IF YOU CHECKED OFF ANY PROBLEMS, HOW DIFFICULT HAVE THESE PROBLEMS MADE IT FOR YOU TO DO YOUR WORK, TAKE CARE OF THINGS AT HOME, OR GET ALONG WITH OTHER PEOPLE: SOMEWHAT DIFFICULT
SUM OF ALL RESPONSES TO PHQ QUESTIONS 1-9: 10

## 2024-08-13 ENCOUNTER — VIRTUAL VISIT (OUTPATIENT)
Dept: PSYCHOLOGY | Facility: CLINIC | Age: 74
End: 2024-08-13
Payer: MEDICARE

## 2024-08-13 DIAGNOSIS — F33.41 RECURRENT MAJOR DEPRESSIVE DISORDER, IN PARTIAL REMISSION (H): Primary | ICD-10-CM

## 2024-08-13 DIAGNOSIS — F41.1 GAD (GENERALIZED ANXIETY DISORDER): ICD-10-CM

## 2024-08-13 PROCEDURE — 90837 PSYTX W PT 60 MINUTES: CPT | Mod: 95 | Performed by: SOCIAL WORKER

## 2024-08-13 NOTE — PROGRESS NOTES
[facility]                                     Progress Note    Patient Name: Stephanie Bhatia  Date: 24           Service Type: Individual      Session Start Time: 11:01 AM  session End Time: 11:59 AM     Session Length: 50 minutes    Session #: 3    Attendees: Client attended alone    Service Modality:  Video Visit:      Provider verified identity through the following two step process.  Patient provided:  Patient  and Patient is known previously to provider    Telemedicine Visit: The patient's condition can be safely assessed and treated via synchronous audio and visual telemedicine encounter.      Reason for Telemedicine Visit: Patient convenience (e.g. access to timely appointments / distance to available provider)    Originating Site (Patient Location): Patient's home    Distant Site (Provider Location): Provider Remote Setting- Home Office    Consent:  The patient/guardian has verbally consented to: the potential risks and benefits of telemedicine (video visit) versus in person care; bill my insurance or make self-payment for services provided; and responsibility for payment of non-covered services.     Patient would like the video invitation sent by:  Send to e-mail at: [email or web address]    Mode of Communication:  Video Conference via [facility]    Distant Location (Provider):  Off-site    As the provider I attest to compliance with applicable laws and regulations related to telemedicine.    DATA  Extended Session (53+ minutes): PROLONGED SERVICE IN THE OUTPATIENT SETTING REQUIRING DIRECT (FACE-TO-FACE) PATIENT CONTACT BEYOND THE USUAL SERVICE:    - Patient's presenting concerns require more intensive intervention than could be completed within the usual service  Interactive Complexity: No  Crisis: No          Progress Since Last Session (Related to Symptoms / Goals / Homework):   Symptoms: Worsening patient is noticing an increase in struggles with motivation as well as an  increase in anxious symptoms    Homework: Completed in session      Episode of Care Goals: Satisfactory progress - PREPARATION (Decided to change - considering how); Intervened by negotiating a change plan and determining options / strategies for behavior change, identifying triggers, exploring social supports, and working towards setting a date to begin behavior change     Current / Ongoing Stressors and Concerns:   Patient shared that she has been struggling with her levels of motivation recently.  Patient noted that today it took her a couple of hours to get up and out of bed which is not typical for her.  Patient also shared she has started a variety of different household chores but has not been able to move forward with them.  Patient and therapist explored ways she could begin to take small steps into completing these activities.  Patient also shared that she does not believe her current medication is helping her to manage symptoms of depression and could be increasing her anxiety.  Patient shared that she is spoken with her medication provider and does has permission to discontinue her Wellbutrin if that would be beneficial for her.  Patient shared that she wonders what the world would be like if she was no longer here however she does not have any active suicidal thoughts, is not engaging in any behaviors, and has not created any plans.     Treatment Objective(s) Addressed in This Session:   practice deep breathing at least 3-5 a day  Increase interest, engagement, and pleasure in doing things  Feel less tired and more energy during the day   Treatment plan review     Intervention:   CBT: Behavioral activation  Psychodynamic: Processed internal experiences    Assessments completed prior to visit:  The following assessments were completed by patient for this visit:  PHQ9:       8/12/2019     8:02 AM 9/23/2020     8:41 AM 11/28/2022     8:22 AM 10/9/2023     8:39 AM 7/8/2024     8:19 AM 7/8/2024     9:23 AM  8/12/2024    10:22 PM   PHQ-9 SCORE   PHQ-9 Total Score MyChart      13 (Moderate depression) 10 (Moderate depression)   PHQ-9 Total Score 10 9 16 10 12 13    13 10     GAD7:       7/23/2018     8:12 AM 8/12/2019     8:02 AM 11/28/2022     8:22 AM 10/9/2023     8:39 AM 7/4/2024     1:18 PM 8/4/2024    10:35 AM   NATALI-7 SCORE   Total Score 6 (mild anxiety)    7 (mild anxiety) 8 (mild anxiety)   Total Score 6 4 4 4 7 8     PROMIS 10-Global Health (only subscores and total score):       7/8/2024     9:43 AM   PROMIS-10 Scores Only   Global Mental Health Score 10    10   Global Physical Health Score 11    11   PROMIS TOTAL - SUBSCORES 21    21         ASSESSMENT: Current Emotional / Mental Status (status of significant symptoms):   Risk status (Self / Other harm or suicidal ideation)   Patient denies current fears or concerns for personal safety.   Patient denies current or recent suicidal ideation or behaviors.   Patient denies current or recent homicidal ideation or behaviors.   Patient denies current or recent self injurious behavior or ideation.   Patient denies other safety concerns.   Patient reports there has been no change in risk factors since their last session.     Patient reports there has been no change in protective factors since their last session.     Recommended that patient call 911 or go to the local ED should there be a change in any of these risk factors.     Appearance:   Appropriate    Eye Contact:   Good    Psychomotor Behavior: Normal    Attitude:   Cooperative    Orientation:   All   Speech    Rate / Production: Normal     Volume:  Normal    Mood:    Anxious  Depressed    Affect:    Appropriate    Thought Content:  Clear    Thought Form:  Coherent  Logical    Insight:    Good      Medication Review:   No changes to current psychiatric medication(s)     Medication Compliance:   Yes patient is unsure if her medication is being helpful for her symptoms.  Patient believes her Wellbutrin is  contributing to an increase in anxious symptoms which directly impacts her anxiety     Changes in Health Issues:   None reported     Chemical Use Review:   Substance Use: Chemical use reviewed, no active concerns identified      Tobacco Use: No current tobacco use.      Diagnosis:  1. Recurrent major depressive disorder, in partial remission (H24)    2. NATALI (generalized anxiety disorder)        Collateral Reports Completed:   Not Applicable    PLAN: (Patient Tasks / Therapist Tasks / Other)  Patient will focus on taking small steps towards completing larger tasks.  Around her home she will initially begin to focus on cleaning her bathroom floor and then allowing herself to move onto other floors.        Regina Teixeira, [facility]                                                         ______________________________________________________________________    Individual Treatment Plan    Patient's Name: Stephanie Bhatia  YOB: 1950    Date of Creation: August 13, 2024  Date Treatment Plan Last Reviewed/Revised: August 13, 2024    DSM5 Diagnoses: 296.35 (F33.41)  Major Depressive Disorder, Recurrent Episode, In partial remission _ or 300.02 (F41.1) Generalized Anxiety Disorder  Psychosocial / Contextual Factors:  Aging, relationship challenges  .   PROMIS (reviewed every 90 days): PROMIS 10-Global Health (only subscores and total score):        7/8/2024     9:43 AM   PROMIS-10 Scores Only   Global Mental Health Score 10    10   Global Physical Health Score 11    11   PROMIS TOTAL - SUBSCORES 21    21       Referral / Collaboration:  Referral to another professional/service is not indicated at this time..    Anticipated number of session for this episode of care: 6-9 sessions  Anticipation frequency of session: Every other week  Anticipated Duration of each session: 38-52 minutes  Treatment plan will be reviewed in 90 days or when goals have been changed.       MeasurableTreatment Goal(s) related to diagnosis  / functional impairment(s)  Goal 1: Patient will focus on recognizing and managing symptoms of depression as evidenced by a PHQ-9 score of 10 or less.    I will know I've met my goal when I am able to let go of my depression and not let it consume me.      Objective #A (Patient Action)    Patient will Increase interest, engagement, and pleasure in doing things  Decrease frequency and intensity of feeling down, depressed, hopeless  Improve quantity and quality of night time sleep / decrease daytime naps  Feel less tired and more energy during the day   Improve diet, appetite, mindful eating, and / or meal planning  Identify negative self-talk and behaviors: challenge core beliefs, myths, and actions  Improve concentration, focus, and mindfulness in daily activities   Feel less fidgety, restless or slow in daily activities / interpersonal interactions  Decrease thoughts that you'd be better off dead or of suicide / self-harm.  Status: New - Date: August 13, 2024      Intervention(s)  Therapist will teach CBT skills to challenge cognitive distortions and core beliefs.  Therapist will teach and model positive self-talk behaviors.  Therapist will use psychodynamic approaches to explore early attachments and schemas.  Therapist will teach DBT mindfulness and emotion regulation skills.    Therapist will teach ACT skills to engage in value-based living.      Goal 2: Patient will recognize triggers, identify cues within her body, and utilize coping skills to manage symptoms of anxiety as recognized by a NATALI-7 score of 5 or less    I will know I've met my goal when I don't think about all the things that could go wrong before I get there.      Objective #A (Patient Action)    Patient will practice deep breathing at least 3-5 a day.  Status: August 13, 2024    Intervention(s)  Therapist will teach emotional regulation skills.  Such as box breathing, alternate nostril breathing, and belly breathing .    Objective #B  Patient  will use at least 3 coping skills for anxiety management in the next 2 weeks.    Status: New - Date: August 13, 2024      Intervention(s)  Therapist will teach emotional regulation skills.  Such as behavioral activation strategies, challenging anxious thoughts, and recognizing cognitive distortions .    Objective #C  Patient will exercise for 30 minutes 2 times per week for 10 minutes.  Status: New - Date: August 13, 2024      Intervention(s)  Therapist will assign homework focused on increasing movements with in her day-to-day life .      Patient has reviewed and agreed to the above plan.      Regina Teixeira, [facility]  August 13, 2024    Answers submitted by the patient for this visit:  Patient Health Questionnaire (Submitted on 8/12/2024)  If you checked off any problems, how difficult have these problems made it for you to do your work, take care of things at home, or get along with other people?: Somewhat difficult  PHQ9 TOTAL SCORE: 10

## 2024-08-16 ENCOUNTER — MYC REFILL (OUTPATIENT)
Dept: FAMILY MEDICINE | Facility: CLINIC | Age: 74
End: 2024-08-16

## 2024-08-16 ENCOUNTER — OFFICE VISIT (OUTPATIENT)
Dept: PULMONOLOGY | Facility: CLINIC | Age: 74
End: 2024-08-16
Attending: INTERNAL MEDICINE
Payer: MEDICARE

## 2024-08-16 ENCOUNTER — LAB (OUTPATIENT)
Dept: LAB | Facility: CLINIC | Age: 74
End: 2024-08-16
Attending: INTERNAL MEDICINE
Payer: MEDICARE

## 2024-08-16 VITALS
WEIGHT: 125 LBS | SYSTOLIC BLOOD PRESSURE: 111 MMHG | DIASTOLIC BLOOD PRESSURE: 66 MMHG | HEIGHT: 65 IN | BODY MASS INDEX: 20.83 KG/M2 | HEART RATE: 70 BPM | OXYGEN SATURATION: 99 %

## 2024-08-16 DIAGNOSIS — J84.9 ILD (INTERSTITIAL LUNG DISEASE) (H): ICD-10-CM

## 2024-08-16 DIAGNOSIS — J84.9 ILD (INTERSTITIAL LUNG DISEASE) (H): Primary | ICD-10-CM

## 2024-08-16 LAB
ALBUMIN SERPL BCG-MCNC: 4 G/DL (ref 3.5–5.2)
ALP SERPL-CCNC: 63 U/L (ref 40–150)
ALT SERPL W P-5'-P-CCNC: 47 U/L (ref 0–50)
ANION GAP SERPL CALCULATED.3IONS-SCNC: 10 MMOL/L (ref 7–15)
AST SERPL W P-5'-P-CCNC: 28 U/L (ref 0–45)
BASOPHILS # BLD AUTO: 0.1 10E3/UL (ref 0–0.2)
BASOPHILS NFR BLD AUTO: 2 %
BILIRUB SERPL-MCNC: 1 MG/DL
BUN SERPL-MCNC: 13.9 MG/DL (ref 8–23)
CALCIUM SERPL-MCNC: 8.8 MG/DL (ref 8.8–10.4)
CHLORIDE SERPL-SCNC: 105 MMOL/L (ref 98–107)
CHOLEST SERPL-MCNC: 219 MG/DL
CREAT SERPL-MCNC: 0.81 MG/DL (ref 0.51–0.95)
EGFRCR SERPLBLD CKD-EPI 2021: 75 ML/MIN/1.73M2
EOSINOPHIL # BLD AUTO: 1.7 10E3/UL (ref 0–0.7)
EOSINOPHIL NFR BLD AUTO: 27 %
ERYTHROCYTE [DISTWIDTH] IN BLOOD BY AUTOMATED COUNT: 12.8 % (ref 10–15)
FASTING STATUS PATIENT QL REPORTED: YES
FASTING STATUS PATIENT QL REPORTED: YES
GLUCOSE SERPL-MCNC: 85 MG/DL (ref 70–99)
HCO3 SERPL-SCNC: 26 MMOL/L (ref 22–29)
HCT VFR BLD AUTO: 39.4 % (ref 35–47)
HDLC SERPL-MCNC: 51 MG/DL
HGB BLD-MCNC: 13.2 G/DL (ref 11.7–15.7)
IMM GRANULOCYTES # BLD: 0 10E3/UL
IMM GRANULOCYTES NFR BLD: 0 %
LDLC SERPL CALC-MCNC: 150 MG/DL
LYMPHOCYTES # BLD AUTO: 1.4 10E3/UL (ref 0.8–5.3)
LYMPHOCYTES NFR BLD AUTO: 23 %
MCH RBC QN AUTO: 32.3 PG (ref 26.5–33)
MCHC RBC AUTO-ENTMCNC: 33.5 G/DL (ref 31.5–36.5)
MCV RBC AUTO: 96 FL (ref 78–100)
MONOCYTES # BLD AUTO: 0.7 10E3/UL (ref 0–1.3)
MONOCYTES NFR BLD AUTO: 12 %
NEUTROPHILS # BLD AUTO: 2.2 10E3/UL (ref 1.6–8.3)
NEUTROPHILS NFR BLD AUTO: 36 %
NONHDLC SERPL-MCNC: 168 MG/DL
NRBC # BLD AUTO: 0 10E3/UL
NRBC BLD AUTO-RTO: 0 /100
PLATELET # BLD AUTO: 293 10E3/UL (ref 150–450)
POTASSIUM SERPL-SCNC: 4 MMOL/L (ref 3.4–5.3)
PROT SERPL-MCNC: 6.3 G/DL (ref 6.4–8.3)
RBC # BLD AUTO: 4.09 10E6/UL (ref 3.8–5.2)
SODIUM SERPL-SCNC: 141 MMOL/L (ref 135–145)
TRIGL SERPL-MCNC: 91 MG/DL
WBC # BLD AUTO: 6.1 10E3/UL (ref 4–11)

## 2024-08-16 PROCEDURE — 85025 COMPLETE CBC W/AUTO DIFF WBC: CPT | Performed by: PATHOLOGY

## 2024-08-16 PROCEDURE — 94375 RESPIRATORY FLOW VOLUME LOOP: CPT | Performed by: INTERNAL MEDICINE

## 2024-08-16 PROCEDURE — 80053 COMPREHEN METABOLIC PANEL: CPT | Performed by: PATHOLOGY

## 2024-08-16 PROCEDURE — 99214 OFFICE O/P EST MOD 30 MIN: CPT | Mod: 25 | Performed by: INTERNAL MEDICINE

## 2024-08-16 PROCEDURE — 94729 DIFFUSING CAPACITY: CPT | Performed by: INTERNAL MEDICINE

## 2024-08-16 PROCEDURE — 94726 PLETHYSMOGRAPHY LUNG VOLUMES: CPT | Performed by: INTERNAL MEDICINE

## 2024-08-16 PROCEDURE — G0463 HOSPITAL OUTPT CLINIC VISIT: HCPCS | Performed by: INTERNAL MEDICINE

## 2024-08-16 PROCEDURE — 80061 LIPID PANEL: CPT | Performed by: PATHOLOGY

## 2024-08-16 PROCEDURE — 36415 COLL VENOUS BLD VENIPUNCTURE: CPT | Performed by: PATHOLOGY

## 2024-08-16 ASSESSMENT — PAIN SCALES - GENERAL: PAINLEVEL: NO PAIN (0)

## 2024-08-16 NOTE — PROGRESS NOTES
"[facility]    Reason for Visit  Stephanie Bhatia is a 73 year old year old female who is being seen for Interstitial Lung Disease (ILD) (3 month follow up )    HPI  Stephanie Bhatia is a 73 year old who is being seen for f/u of scleroderma ILD.  She was initially diagnosed with scleroderma in about 2009 or 2010.  Scleroderma ILD was first diagnosed on chest CT scan in 2/2017.  She also had a few lung nodules that were small, 4 mm and did not require follow-up as she was low risk.  She started tocilizumab in approximately September 2022 for progressive drop in PFT and progression of ILD on chest CT.  She opted for tocilizumab instead of mycophenolate or nintedanib.     Today, Stephanie reports that she is tolerating tocilizumab without any significant side effects.  She does sometimes notice a little itching at the injection site.    She continues to have mild dyspnea on exertion, for example she feels \"a little\" short of breath if she walks up 1 flight of stairs.  This is stable.  She is not exercising, and she is working on getting herself motivated to start exercise.  She denies cough or new digital fingertip ulcers.  She is currently doing physical therapy in the pool for her knee.    She tried Wellbutrin for depression after seeing her primary care provider, but she stopped it because it made her feel more anxious.  She changed GERD treatment from famotidine to omeprazole, which she takes every morning.  She reports that this controls her GERD better than the famotidine.            Current Outpatient Medications   Medication Sig Dispense Refill    ibuprofen (ADVIL/MOTRIN) 200 MG tablet Take 200 mg by mouth every 4 hours as needed for mild pain      omeprazole (PRILOSEC) 20 MG DR capsule Take 20 mg by mouth daily      tocilizumab (ACTEMRA) 162 MG/0.9ML subcutaneous injection Inject 0.9 mLs (162 mg) Subcutaneous every 7 days 3.6 mL 11    buPROPion (WELLBUTRIN XL) 150 MG 24 hr " tablet Take 1 tablet (150 mg) by mouth every morning (Patient not taking: Reported on 2024) 30 tablet 1     No current facility-administered medications for this visit.     No Known Allergies  Past Medical History:   Diagnosis Date    Anxiety     Arthritis     scleroderma    Depressive disorder     Herpes simplex without mention of complication     Interstitial lung disease (H)     scleroderma ILD, dx by chest CT 2017    Lung nodules     4 mm LLL and RLL     Migraine     loss of speech and comprehension, has had w/u and is complex migraine, last one about 2017    Raynaud phenomenon     Systemic sclerosis with limited cutaneous involvement (H) 2014    Dx ~, Scl-70 and anti-centromere antibody neg, +RF       Past Surgical History:   Procedure Laterality Date    HYSTEROSCOPIC PLACEMENT CONTRACEPTIVE DEVICE         Social History     Socioeconomic History    Marital status: Single     Spouse name:     Number of children: 0    Years of education: Not on file    Highest education level: Master's degree (e.g., MA, MS, [place], MEd, MSW, DIANE)   Occupational History    Occupation:      Comment: [employer] - part time   Tobacco Use    Smoking status: Former     Current packs/day: 0.00     Average packs/day: 1 pack/day for 20.0 years (20.0 ttl pk-yrs)     Types: Cigarettes     Start date: 1966     Quit date: 1986     Years since quittin.7    Smokeless tobacco: Never   Vaping Use    Vaping status: Never Used   Substance and Sexual Activity    Alcohol use: Not Currently     Comment: none since     Drug use: No    Sexual activity: Not Currently     Partners: Male   Other Topics Concern    Parent/sibling w/ CABG, MI or angioplasty before 65F 55M? No   Social History Narrative    .  No children. Sister lives in [place].  Part time at [email or web address].   in the past. Currently lives with family.    Possible asbestos exposure from basement  pipes in childhood home.  Lived in  [facility] [id number].  Denies exposure to pet birds, hot tubs.  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     Social Determinants of Health     Financial Resource Strain: Low Risk  (6/11/2024)    Financial Resource Strain     Within the past 12 months, have you or your family members you live with been unable to get utilities (heat, electricity) when it was really needed?: No   Food Insecurity: Low Risk  (6/11/2024)    Food Insecurity     Within the past 12 months, did you worry that your food would run out before you got money to buy more?: No     Within the past 12 months, did the food you bought just not last and you didn t have money to get more?: No   Transportation Needs: Low Risk  (6/11/2024)    Transportation Needs     Within the past 12 months, has lack of transportation kept you from medical appointments, getting your medicines, non-medical meetings or appointments, work, or from getting things that you need?: No   Physical Activity: Sufficiently Active (11/22/2021)    Exercise Vital Sign     Days of Exercise per Week: 7 days     Minutes of Exercise per Session: 60 min   Stress: Stress Concern Present (11/22/2021)    [place] [employer] - Occupational Stress Questionnaire     Feeling of Stress : To some extent   Social Connections: Moderately Isolated (11/22/2021)    Social Connection and Isolation Panel [NHANES]     Frequency of Communication with Friends and Family: More than three times a week     Frequency of Social Gatherings with Friends and Family: More than three times a week     Attends Hindu Services: More than 4 times per year     Active Member of Clubs or Organizations: No     Attends Club or Organization Meetings: Never     Marital Status:    Interpersonal Safety: Low Risk  (7/8/2024)    Interpersonal Safety     Do you feel physically and emotionally safe where you currently live?: Yes     Within the  "past 12 months, have you been hit, slapped, kicked or otherwise physically hurt by someone?: No     Within the past 12 months, have you been humiliated or emotionally abused in other ways by your partner or ex-partner?: No   Housing Stability: Low Risk  (6/11/2024)    Housing Stability     Do you have housing? : Yes     Are you worried about losing your housing?: No       Family History   Problem Relation Age of Onset    C.A.D. Father         a efw small heart attacks    Neurologic Disorder Father         dementia 70s    Coronary Artery Disease Father     Neurologic Disorder Mother         dementia    Depression Mother     Cerebrovascular Disease Maternal Grandmother         ,    Other Cancer Paternal Grandmother     Rheumatoid Arthritis Sister     Hypertension Sister     Dementia Paternal Cousin     Dementia Paternal Uncle         60s    Depression Sister     Breast Cancer No family hx of     Cancer - colorectal No family hx of     Diabetes No family hx of     Cancer No family hx of         no skin cancer    Interstitial Lung Disease No family hx of     Melanoma No family hx of     Skin Cancer No family hx of     Glaucoma No family hx of     Macular Degeneration No family hx of            Vitals: /66 (BP Location: Right arm, Patient Position: Sitting, Cuff Size: Adult Regular)   Pulse 70   Ht 1.651 m (5' 5\")   Wt 56.7 kg (125 lb)   SpO2 99%   BMI 20.80 kg/m      Exam:   GENERAL APPEARANCE: Well developed, well nourished, alert, and in no apparent distress.  RESP: good air flow throughout.  + Bibasilar inspiratory crackles. No rhonchi. No wheezes.  CV: Normal S1, S2, regular rhythm, normal rate. No murmur.  No LE edema.   MS: extremities normal. No clubbing. No cyanosis.  SKIN: no rash on limited exam.  NEURO: Mentation intact, speech normal.  PSYCH: mentation appears normal. and affect normal/bright.      Results:  Recent Results (from the past 168 hour(s))   Comprehensive metabolic panel    Collection " Time: 08/16/24  9:03 AM   Result Value Ref Range    Sodium 141 135 - 145 mmol/L    Potassium 4.0 3.4 - 5.3 mmol/L    Carbon Dioxide (CO2) 26 22 - 29 mmol/L    Anion Gap 10 7 - 15 mmol/L    Urea Nitrogen 13.9 8.0 - 23.0 mg/dL    Creatinine 0.81 0.51 - 0.95 mg/dL    GFR Estimate 75 >60 mL/min/1.73m2    Calcium 8.8 8.8 - 10.4 mg/dL    Chloride 105 98 - 107 mmol/L    Glucose 85 70 - 99 mg/dL    Alkaline Phosphatase 63 40 - 150 U/L    AST 28 0 - 45 U/L    ALT 47 0 - 50 U/L    Protein Total 6.3 (L) 6.4 - 8.3 g/dL    Albumin 4.0 3.5 - 5.2 g/dL    Bilirubin Total 1.0 <=1.2 mg/dL    Patient Fasting > 8hrs? Yes    Lipid panel reflex to direct LDL Fasting    Collection Time: 08/16/24  9:03 AM   Result Value Ref Range    Cholesterol 219 (H) <200 mg/dL    Triglycerides 91 <150 mg/dL    Direct Measure HDL 51 >=50 mg/dL    LDL Cholesterol Calculated 150 (H) <=100 mg/dL    Non HDL Cholesterol 168 (H) <130 mg/dL    Patient Fasting > 8hrs? Yes    CBC with platelets and differential    Collection Time: 08/16/24  9:03 AM   Result Value Ref Range    WBC Count 6.1 4.0 - 11.0 10e3/uL    RBC Count 4.09 3.80 - 5.20 10e6/uL    Hemoglobin 13.2 11.7 - 15.7 g/dL    Hematocrit 39.4 35.0 - 47.0 %    MCV 96 78 - 100 fL    MCH 32.3 26.5 - 33.0 pg    MCHC 33.5 31.5 - 36.5 g/dL    RDW 12.8 10.0 - 15.0 %    Platelet Count 293 150 - 450 10e3/uL    % Neutrophils 36 %    % Lymphocytes 23 %    % Monocytes 12 %    % Eosinophils 27 %    % Basophils 2 %    % Immature Granulocytes 0 %    NRBCs per 100 WBC 0 <1 /100    Absolute Neutrophils 2.2 1.6 - 8.3 10e3/uL    Absolute Lymphocytes 1.4 0.8 - 5.3 10e3/uL    Absolute Monocytes 0.7 0.0 - 1.3 10e3/uL    Absolute Eosinophils 1.7 (H) 0.0 - 0.7 10e3/uL    Absolute Basophils 0.1 0.0 - 0.2 10e3/uL    Absolute Immature Granulocytes 0.0 <=0.4 10e3/uL    Absolute NRBCs 0.0 10e3/uL   General PFT Lab (Please always keep checked)    Collection Time: 08/16/24  9:10 AM   Result Value Ref Range    FVC-Pred 2.54 L    FVC-Pre  2.90 L    FVC-%Pred-Pre 114 %    FEV1-Pre 2.40 L    FEV1-%Pred-Pre 122 %    FEV1FVC-Pred 78 %    FEV1FVC-Pre 83 %    FEFMax-Pred 5.32 L/sec    FEFMax-Pre 7.22 L/sec    FEFMax-%Pred-Pre 135 %    FEF2575-Pred 1.62 L/sec    FEF2575-Pre 2.58 L/sec    OLR8832-%Pred-Pre 159 %    ExpTime-Pre 4.05 sec    FIFMax-Pre 4.22 L/sec    VC-Pred 3.12 L    VC-Pre 2.80 L    VC-%Pred-Pre 89 %    IC-Pred 1.91 L    IC-Pre 1.60 L    IC-%Pred-Pre 83 %    ERV-Pred 0.96 L    ERV-Pre 1.21 L    ERV-%Pred-Pre 126 %    FEV1FEV6-Pred 78 %    FEV1FEV6-Pre 83 %    FRCPleth-Pred 2.77 L    FRCPleth-Pre 2.77 L    FRCPleth-%Pred-Pre 99 %    RVPleth-Pred 2.20 L    RVPleth-Pre 1.57 L    RVPleth-%Pred-Pre 71 %    TLCPleth-Pred 5.11 L    TLCPleth-Pre 4.37 L    TLCPleth-%Pred-Pre 85 %    DLCOunc-Pred 19.04 ml/min/mmHg    DLCOunc-Pre 17.96 ml/min/mmHg    DLCOunc-%Pred-Pre 94 %    VA-Pre 4.27 L    VA-%Pred-Pre 91 %    FEV1SVC-Pred 63 %    FEV1SVC-Pre 85 %    DLCOcor-Pre 18.07 ml/min/mmHg    DLCOcor-%Pred-Pre 94 %       I reviewed pulmonary function test that was performed today.  This shows normal spirometry, lung volumes and diffusion.  Lung function has been stable since starting tocilizumab in approximately September 2022.    I also reviewed labs today.  Labs are normal except elevated total cholesterol and LDL cholesterol.    I reviewed results with the patient.        Assessment and plan:  Stephanie Bhatia is a 73 year old who is being seen for f/u of scleroderma ILD.   1.  Scleroderma ILD.  Her mild dyspnea on exertion is unchanged.  PFT has been stable since starting tocilizumab in approximately September 2022.  We will continue the same dose of 162 mg subcu weekly.  She is in agreement with this plan.  I encouraged her to start daily exercise, and she is working on getting motivated to do so.  If her ILD worsens in the future, then we could consider mycophenolate or nintedanib.  She will return in 4 months with full PFT and a 6-minute walk test.  2.   High risk medication immunosuppression monitoring.  Total cholesterol and LDL cholesterol remain elevated.  I asked her to talk with her primary care provider about whether she should start medication or modify her diet.  I will check labs at her next visit to monitor tocilizumab.  3.  GERD.  Symptoms are better controlled with omeprazole daily.    4.  ILD-PRO registry.  She is currently enrolled.  The longitudinal plan of care for the diagnosis(es)/condition(s) as documented were addressed during this visit. Due to the added complexity in care, I will continue to support Stephanie in the subsequent management and with ongoing continuity of care.  I spent 38 minutes reviewing chart, talking with and examining patient, reviewing test results, formulating plan and documentation on the day of the encounter (excluding PFT review).

## 2024-08-16 NOTE — NURSING NOTE
Chief Complaint   Patient presents with    Interstitial Lung Disease (ILD)     3 month follow up      Vitals were taken and medications were reconciled.    Jael Colby RMA  10:19 AM

## 2024-08-16 NOTE — PATIENT INSTRUCTIONS
1. Get the flu vaccine in October  2. Get the new updated COVID vaccine when it is available  3. Talk with Dr. Garcia about high cholesterol

## 2024-08-16 NOTE — LETTER
"8/16/2024      Stephanie Bhatia  [address]  [facility] [address]      Dear Colleague,    Thank you for referring your patient, Setphanie Bhatia, to the [facility] [place]. Please see a copy of my visit note below.    [facility]    Reason for Visit  Stephanie Bhatia is a 73 year old year old female who is being seen for Interstitial Lung Disease (ILD) (3 month follow up )    HPI  Stephanie Bhatia is a 73 year old who is being seen for f/u of scleroderma ILD.  She was initially diagnosed with scleroderma in about 2009 or 2010.  Scleroderma ILD was first diagnosed on chest CT scan in 2/2017.  She also had a few lung nodules that were small, 4 mm and did not require follow-up as she was low risk.  She started tocilizumab in approximately September 2022 for progressive drop in PFT and progression of ILD on chest CT.  She opted for tocilizumab instead of mycophenolate or nintedanib.     Today, Stephanie reports that she is tolerating tocilizumab without any significant side effects.  She does sometimes notice a little itching at the injection site.    She continues to have mild dyspnea on exertion, for example she feels \"a little\" short of breath if she walks up 1 flight of stairs.  This is stable.  She is not exercising, and she is working on getting herself motivated to start exercise.  She denies cough or new digital fingertip ulcers.  She is currently doing physical therapy in the pool for her knee.    She tried Wellbutrin for depression after seeing her primary care provider, but she stopped it because it made her feel more anxious.  She changed GERD treatment from famotidine to omeprazole, which she takes every morning.  She reports that this controls her GERD better than the famotidine.            Current Outpatient Medications   Medication Sig Dispense Refill     ibuprofen (ADVIL/MOTRIN) 200 MG tablet Take 200 mg by mouth " every 4 hours as needed for mild pain       omeprazole (PRILOSEC) 20 MG DR capsule Take 20 mg by mouth daily       tocilizumab (ACTEMRA) 162 MG/0.9ML subcutaneous injection Inject 0.9 mLs (162 mg) Subcutaneous every 7 days 3.6 mL 11     buPROPion (WELLBUTRIN XL) 150 MG 24 hr tablet Take 1 tablet (150 mg) by mouth every morning (Patient not taking: Reported on 2024) 30 tablet 1     No current facility-administered medications for this visit.     No Known Allergies  Past Medical History:   Diagnosis Date     Anxiety      Arthritis     scleroderma     Depressive disorder      Herpes simplex without mention of complication      Interstitial lung disease (H)     scleroderma ILD, dx by chest CT 2017     Lung nodules     4 mm LLL and RLL      Migraine     loss of speech and comprehension, has had w/u and is complex migraine, last one about 2017     Raynaud phenomenon      Systemic sclerosis with limited cutaneous involvement (H) 2014    Dx ~, Scl-70 and anti-centromere antibody neg, +RF       Past Surgical History:   Procedure Laterality Date     HYSTEROSCOPIC PLACEMENT CONTRACEPTIVE DEVICE         Social History     Socioeconomic History     Marital status: Single     Spouse name:      Number of children: 0     Years of education: Not on file     Highest education level: Master's degree (e.g., MA, MS, [place], MEd, MSW, DIANE)   Occupational History     Occupation:      Comment: [employer] - part time   Tobacco Use     Smoking status: Former     Current packs/day: 0.00     Average packs/day: 1 pack/day for 20.0 years (20.0 ttl pk-yrs)     Types: Cigarettes     Start date: 1966     Quit date: 1986     Years since quittin.7     Smokeless tobacco: Never   Vaping Use     Vaping status: Never Used   Substance and Sexual Activity     Alcohol use: Not Currently     Comment: none since      Drug use: No     Sexual activity: Not Currently     Partners: Male    Other Topics Concern     Parent/sibling w/ CABG, MI or angioplasty before 65F 55M? No   Social History Narrative    .  No children. Sister lives in [place].  Part time at [employer].   in the past. Currently lives with family.    Possible asbestos exposure from basement pipes in childhood home.  Lived in  [facility] [id number].  Denies exposure to pet birds, hot tubs.  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     Social Determinants of Health     Financial Resource Strain: Low Risk  (6/11/2024)    Financial Resource Strain      Within the past 12 months, have you or your family members you live with been unable to get utilities (heat, electricity) when it was really needed?: No   Food Insecurity: Low Risk  (6/11/2024)    Food Insecurity      Within the past 12 months, did you worry that your food would run out before you got money to buy more?: No      Within the past 12 months, did the food you bought just not last and you didn t have money to get more?: No   Transportation Needs: Low Risk  (6/11/2024)    Transportation Needs      Within the past 12 months, has lack of transportation kept you from medical appointments, getting your medicines, non-medical meetings or appointments, work, or from getting things that you need?: No   Physical Activity: Sufficiently Active (11/22/2021)    Exercise Vital Sign      Days of Exercise per Week: 7 days      Minutes of Exercise per Session: 60 min   Stress: Stress Concern Present (11/22/2021)    [employer] [place] [employer] - Occupational Stress Questionnaire      Feeling of Stress : To some extent   Social Connections: Moderately Isolated (11/22/2021)    Social Connection and Isolation Panel [NHANES]      Frequency of Communication with Friends and Family: More than three times a week      Frequency of Social Gatherings with Friends and Family: More than three times a week      Attends Muslim Services: More  "than 4 times per year      Active Member of Clubs or Organizations: No      Attends Club or Organization Meetings: Never      Marital Status:    Interpersonal Safety: Low Risk  (7/8/2024)    Interpersonal Safety      Do you feel physically and emotionally safe where you currently live?: Yes      Within the past 12 months, have you been hit, slapped, kicked or otherwise physically hurt by someone?: No      Within the past 12 months, have you been humiliated or emotionally abused in other ways by your partner or ex-partner?: No   Housing Stability: Low Risk  (6/11/2024)    Housing Stability      Do you have housing? : Yes      Are you worried about losing your housing?: No       Family History   Problem Relation Age of Onset     C.A.D. Father         a efw small heart attacks     Neurologic Disorder Father         dementia 70s     Coronary Artery Disease Father      Neurologic Disorder Mother         dementia     Depression Mother      Cerebrovascular Disease Maternal Grandmother         ,     Other Cancer Paternal Grandmother      Rheumatoid Arthritis Sister      Hypertension Sister      Dementia Paternal Cousin      Dementia Paternal Uncle         60s     Depression Sister      Breast Cancer No family hx of      Cancer - colorectal No family hx of      Diabetes No family hx of      Cancer No family hx of         no skin cancer     Interstitial Lung Disease No family hx of      Melanoma No family hx of      Skin Cancer No family hx of      Glaucoma No family hx of      Macular Degeneration No family hx of            Vitals: /66 (BP Location: Right arm, Patient Position: Sitting, Cuff Size: Adult Regular)   Pulse 70   Ht 1.651 m (5' 5\")   Wt 56.7 kg (125 lb)   SpO2 99%   BMI 20.80 kg/m      Exam:   GENERAL APPEARANCE: Well developed, well nourished, alert, and in no apparent distress.  RESP: good air flow throughout.  + Bibasilar inspiratory crackles. No rhonchi. No wheezes.  CV: Normal S1, S2, " regular rhythm, normal rate. No murmur.  No LE edema.   MS: extremities normal. No clubbing. No cyanosis.  SKIN: no rash on limited exam.  NEURO: Mentation intact, speech normal.  PSYCH: mentation appears normal. and affect normal/bright.      Results:  Recent Results (from the past 168 hour(s))   Comprehensive metabolic panel    Collection Time: 08/16/24  9:03 AM   Result Value Ref Range    Sodium 141 135 - 145 mmol/L    Potassium 4.0 3.4 - 5.3 mmol/L    Carbon Dioxide (CO2) 26 22 - 29 mmol/L    Anion Gap 10 7 - 15 mmol/L    Urea Nitrogen 13.9 8.0 - 23.0 mg/dL    Creatinine 0.81 0.51 - 0.95 mg/dL    GFR Estimate 75 >60 mL/min/1.73m2    Calcium 8.8 8.8 - 10.4 mg/dL    Chloride 105 98 - 107 mmol/L    Glucose 85 70 - 99 mg/dL    Alkaline Phosphatase 63 40 - 150 U/L    AST 28 0 - 45 U/L    ALT 47 0 - 50 U/L    Protein Total 6.3 (L) 6.4 - 8.3 g/dL    Albumin 4.0 3.5 - 5.2 g/dL    Bilirubin Total 1.0 <=1.2 mg/dL    Patient Fasting > 8hrs? Yes    Lipid panel reflex to direct LDL Fasting    Collection Time: 08/16/24  9:03 AM   Result Value Ref Range    Cholesterol 219 (H) <200 mg/dL    Triglycerides 91 <150 mg/dL    Direct Measure HDL 51 >=50 mg/dL    LDL Cholesterol Calculated 150 (H) <=100 mg/dL    Non HDL Cholesterol 168 (H) <130 mg/dL    Patient Fasting > 8hrs? Yes    CBC with platelets and differential    Collection Time: 08/16/24  9:03 AM   Result Value Ref Range    WBC Count 6.1 4.0 - 11.0 10e3/uL    RBC Count 4.09 3.80 - 5.20 10e6/uL    Hemoglobin 13.2 11.7 - 15.7 g/dL    Hematocrit 39.4 35.0 - 47.0 %    MCV 96 78 - 100 fL    MCH 32.3 26.5 - 33.0 pg    MCHC 33.5 31.5 - 36.5 g/dL    RDW 12.8 10.0 - 15.0 %    Platelet Count 293 150 - 450 10e3/uL    % Neutrophils 36 %    % Lymphocytes 23 %    % Monocytes 12 %    % Eosinophils 27 %    % Basophils 2 %    % Immature Granulocytes 0 %    NRBCs per 100 WBC 0 <1 /100    Absolute Neutrophils 2.2 1.6 - 8.3 10e3/uL    Absolute Lymphocytes 1.4 0.8 - 5.3 10e3/uL    Absolute  Monocytes 0.7 0.0 - 1.3 10e3/uL    Absolute Eosinophils 1.7 (H) 0.0 - 0.7 10e3/uL    Absolute Basophils 0.1 0.0 - 0.2 10e3/uL    Absolute Immature Granulocytes 0.0 <=0.4 10e3/uL    Absolute NRBCs 0.0 10e3/uL   General PFT Lab (Please always keep checked)    Collection Time: 08/16/24  9:10 AM   Result Value Ref Range    FVC-Pred 2.54 L    FVC-Pre 2.90 L    FVC-%Pred-Pre 114 %    FEV1-Pre 2.40 L    FEV1-%Pred-Pre 122 %    FEV1FVC-Pred 78 %    FEV1FVC-Pre 83 %    FEFMax-Pred 5.32 L/sec    FEFMax-Pre 7.22 L/sec    FEFMax-%Pred-Pre 135 %    FEF2575-Pred 1.62 L/sec    FEF2575-Pre 2.58 L/sec    WVN5042-%Pred-Pre 159 %    ExpTime-Pre 4.05 sec    FIFMax-Pre 4.22 L/sec    VC-Pred 3.12 L    VC-Pre 2.80 L    VC-%Pred-Pre 89 %    IC-Pred 1.91 L    IC-Pre 1.60 L    IC-%Pred-Pre 83 %    ERV-Pred 0.96 L    ERV-Pre 1.21 L    ERV-%Pred-Pre 126 %    FEV1FEV6-Pred 78 %    FEV1FEV6-Pre 83 %    FRCPleth-Pred 2.77 L    FRCPleth-Pre 2.77 L    FRCPleth-%Pred-Pre 99 %    RVPleth-Pred 2.20 L    RVPleth-Pre 1.57 L    RVPleth-%Pred-Pre 71 %    TLCPleth-Pred 5.11 L    TLCPleth-Pre 4.37 L    TLCPleth-%Pred-Pre 85 %    DLCOunc-Pred 19.04 ml/min/mmHg    DLCOunc-Pre 17.96 ml/min/mmHg    DLCOunc-%Pred-Pre 94 %    VA-Pre 4.27 L    VA-%Pred-Pre 91 %    FEV1SVC-Pred 63 %    FEV1SVC-Pre 85 %    DLCOcor-Pre 18.07 ml/min/mmHg    DLCOcor-%Pred-Pre 94 %       I reviewed pulmonary function test that was performed today.  This shows normal spirometry, lung volumes and diffusion.  Lung function has been stable since starting tocilizumab in approximately September 2022.    I also reviewed labs today.  Labs are normal except elevated total cholesterol and LDL cholesterol.    I reviewed results with the patient.        Assessment and plan:  Stephanie Bhatia is a 73 year old who is being seen for f/u of scleroderma ILD.   1.  Scleroderma ILD.  Her mild dyspnea on exertion is unchanged.  PFT has been stable since starting tocilizumab in approximately September 2022.   We will continue the same dose of 162 mg subcu weekly.  She is in agreement with this plan.  I encouraged her to start daily exercise, and she is working on getting motivated to do so.  If her ILD worsens in the future, then we could consider mycophenolate or nintedanib.  She will return in 4 months with full PFT and a 6-minute walk test.  2.  High risk medication immunosuppression monitoring.  Total cholesterol and LDL cholesterol remain elevated.  I asked her to talk with her primary care provider about whether she should start medication or modify her diet.  I will check labs at her next visit to monitor tocilizumab.  3.  GERD.  Symptoms are better controlled with omeprazole daily.    4.  ILD-PRO registry.  She is currently enrolled.  The longitudinal plan of care for the diagnosis(es)/condition(s) as documented were addressed during this visit. Due to the added complexity in care, I will continue to support Stehpanie in the subsequent management and with ongoing continuity of care.  I spent 38 minutes reviewing chart, talking with and examining patient, reviewing test results, formulating plan and documentation on the day of the encounter (excluding PFT review).               Again, thank you for allowing me to participate in the care of your patient.        Sincerely,        Tiffanie Gomez MD

## 2024-08-20 ENCOUNTER — MYC MEDICAL ADVICE (OUTPATIENT)
Dept: PULMONOLOGY | Facility: CLINIC | Age: 74
End: 2024-08-20
Payer: MEDICARE

## 2024-08-20 DIAGNOSIS — K22.4 ESOPHAGEAL DYSMOTILITY: ICD-10-CM

## 2024-08-20 DIAGNOSIS — M34.9 SCLERODERMA WITH PULMONARY INVOLVEMENT (H): Primary | ICD-10-CM

## 2024-08-20 DIAGNOSIS — I73.00 RAYNAUD'S PHENOMENON WITHOUT GANGRENE: ICD-10-CM

## 2024-08-20 LAB
DLCOCOR-%PRED-PRE: 94 %
DLCOCOR-PRE: 18.07 ML/MIN/MMHG
DLCOUNC-%PRED-PRE: 94 %
DLCOUNC-PRE: 17.96 ML/MIN/MMHG
DLCOUNC-PRED: 19.04 ML/MIN/MMHG
ERV-%PRED-PRE: 126 %
ERV-PRE: 1.21 L
ERV-PRED: 0.96 L
EXPTIME-PRE: 4.05 SEC
FEF2575-%PRED-PRE: 159 %
FEF2575-PRE: 2.58 L/SEC
FEF2575-PRED: 1.62 L/SEC
FEFMAX-%PRED-PRE: 135 %
FEFMAX-PRE: 7.22 L/SEC
FEFMAX-PRED: 5.32 L/SEC
FEV1-%PRED-PRE: 122 %
FEV1-PRE: 2.4 L
FEV1FEV6-PRE: 83 %
FEV1FEV6-PRED: 78 %
FEV1FVC-PRE: 83 %
FEV1FVC-PRED: 78 %
FEV1SVC-PRE: 85 %
FEV1SVC-PRED: 63 %
FIFMAX-PRE: 4.22 L/SEC
FRCPLETH-%PRED-PRE: 99 %
FRCPLETH-PRE: 2.77 L
FRCPLETH-PRED: 2.77 L
FVC-%PRED-PRE: 114 %
FVC-PRE: 2.9 L
FVC-PRED: 2.54 L
IC-%PRED-PRE: 83 %
IC-PRE: 1.6 L
IC-PRED: 1.91 L
RVPLETH-%PRED-PRE: 71 %
RVPLETH-PRE: 1.57 L
RVPLETH-PRED: 2.2 L
TLCPLETH-%PRED-PRE: 85 %
TLCPLETH-PRE: 4.37 L
TLCPLETH-PRED: 5.11 L
VA-%PRED-PRE: 91 %
VA-PRE: 4.27 L
VC-%PRED-PRE: 89 %
VC-PRE: 2.8 L
VC-PRED: 3.12 L

## 2024-08-20 NOTE — TELEPHONE ENCOUNTER
Medication Requested:   Disp Refills Start End SWATI   omeprazole (PRILOSEC) 20 MG DR capsule -- -- 7/20/2024 -- --   Sig - Route: Take 20 mg by mouth daily - Oral     ----------------------  Last Office Visit : 7/8/2024  [facility]      Future Office visit:     9/9/2024 3:00 PM (30 min)  Maliha   Arrive by:  2:45 PM   [facility] RETURN   [facility] ([facility])   Rangel Garcia MD     ----------------------      Refill decision: Refill pended and routed to the provider for review/determination due to the following criteria not met:     Medication unable to be refilled by RN due to criteria not met as indicated.                 []Supervision - Pt not seen within past 12 months, no future appointment              []Compliance - lapse in therapy/gap in refills              []Verification - order discrepancy, clarification needed              []Controlled medication              []Medication not on [facility], [facility], [facility] refill protocol   [] Abnormal labs/test:  [] Overdue labs/test:    []Alice refill given x1, Pt did not follow-up, pended to care team for decision  [] Medication not active on Pt's med list  [] Drug interaction Warning  [x] Medication is Reported/Historical              [x]Other: Medication not indicated for associated diagnosis    Pass/Fail Protocol Criteria:    PPI Protocol [id number]/20/2024 02:35 PM   Protocol Details Medication indicated for associated diagnosis    Medication is active on med list    Recent (12 mo) or future (90 days) visit within the authorizing provider's specialty    Patient is age 18 or older    No active pregnacy on record    No positive pregnancy test in past 12 months

## 2024-08-20 NOTE — TELEPHONE ENCOUNTER
Patient requesting new RX for omeprazole 20mg daily.  RX is pended.  Please review and sign.     Iris Caraballo RN

## 2024-09-06 DIAGNOSIS — F41.9 ANXIETY AND DEPRESSION: ICD-10-CM

## 2024-09-06 DIAGNOSIS — F32.A ANXIETY AND DEPRESSION: ICD-10-CM

## 2024-09-10 RX ORDER — BUPROPION HYDROCHLORIDE 150 MG/1
150 TABLET ORAL EVERY MORNING
Qty: 30 TABLET | Refills: 0 | Status: SHIPPED | OUTPATIENT
Start: 2024-09-10 | End: 2024-09-16

## 2024-09-10 NOTE — TELEPHONE ENCOUNTER
buPROPion (WELLBUTRIN XL) 150 MG 24 hr tablet       Last Written Prescription Date:  7/8/24  Last Fill Quantity: 30,   # refills: 1  Last Office Visit : 7/8/24  Future Office visit:  10/7/24    Routing refill request to provider for review/approval because:  Rx Protocol Bupropion [id number]/06/2024 12:49 AM   Protocol Details PHQ-9 score of less than 5 in past 6 months    NATALI-7 score of less than 5 in past 6 months.   PHQ9  10  8/12/24   GAD7   8  8/4/24  Judith FINK RN  P Central Nursing/Red Flag Triage & Med Refill Team

## 2024-09-12 NOTE — TELEPHONE ENCOUNTER
Katarina Barahona RN,  Please call Stephanie Bhatia  as I noted a [employer] message 8/17/2024 to Yesi Santiago OWENS indicating she stopped the Bupropion. Please clarify is she initialed the refill or if the pharmacy did and if she is not taking it please discontinue the refill.  Rangel Garcia MD

## 2024-09-16 NOTE — TELEPHONE ENCOUNTER
The RN called and spoke with the patient. The patient verified that she did not initiate a refill request for buPROPion (WELLBUTRIN XL) 150 MG 24 hr tablet and that this refill request must have come from the patient's pharmacy. The patient stated that she is not taking the Bupropion and did not  refills of Bupropion sent on 9/10/2024. The patient was agreeable with the RN discontinuing the Wellbutrin from the patient's medication list.

## 2024-10-07 ENCOUNTER — OFFICE VISIT (OUTPATIENT)
Dept: FAMILY MEDICINE | Facility: CLINIC | Age: 74
End: 2024-10-07
Payer: MEDICARE

## 2024-10-07 VITALS
SYSTOLIC BLOOD PRESSURE: 94 MMHG | BODY MASS INDEX: 21.37 KG/M2 | WEIGHT: 128.4 LBS | HEART RATE: 76 BPM | OXYGEN SATURATION: 100 % | DIASTOLIC BLOOD PRESSURE: 61 MMHG

## 2024-10-07 DIAGNOSIS — M05.742 RHEUMATOID ARTHRITIS INVOLVING BOTH HANDS WITH POSITIVE RHEUMATOID FACTOR (H): ICD-10-CM

## 2024-10-07 DIAGNOSIS — L65.9 HAIR LOSS: ICD-10-CM

## 2024-10-07 DIAGNOSIS — M05.741 RHEUMATOID ARTHRITIS INVOLVING BOTH HANDS WITH POSITIVE RHEUMATOID FACTOR (H): ICD-10-CM

## 2024-10-07 DIAGNOSIS — L98.9 SKIN LESION: ICD-10-CM

## 2024-10-07 DIAGNOSIS — J84.9 ILD (INTERSTITIAL LUNG DISEASE) (H): ICD-10-CM

## 2024-10-07 DIAGNOSIS — E78.5 HYPERLIPIDEMIA LDL GOAL <100: Primary | ICD-10-CM

## 2024-10-07 PROCEDURE — 99214 OFFICE O/P EST MOD 30 MIN: CPT | Mod: 25 | Performed by: FAMILY MEDICINE

## 2024-10-07 PROCEDURE — 90662 IIV NO PRSV INCREASED AG IM: CPT | Performed by: FAMILY MEDICINE

## 2024-10-07 PROCEDURE — G0008 ADMIN INFLUENZA VIRUS VAC: HCPCS | Performed by: FAMILY MEDICINE

## 2024-10-07 ASSESSMENT — ANXIETY QUESTIONNAIRES
3. WORRYING TOO MUCH ABOUT DIFFERENT THINGS: SEVERAL DAYS
6. BECOMING EASILY ANNOYED OR IRRITABLE: NOT AT ALL
1. FEELING NERVOUS, ANXIOUS, OR ON EDGE: SEVERAL DAYS
2. NOT BEING ABLE TO STOP OR CONTROL WORRYING: NOT AT ALL
GAD7 TOTAL SCORE: 2
GAD7 TOTAL SCORE: 2
7. FEELING AFRAID AS IF SOMETHING AWFUL MIGHT HAPPEN: NOT AT ALL
5. BEING SO RESTLESS THAT IT IS HARD TO SIT STILL: NOT AT ALL

## 2024-10-07 ASSESSMENT — PATIENT HEALTH QUESTIONNAIRE - PHQ9
5. POOR APPETITE OR OVEREATING: NOT AT ALL
SUM OF ALL RESPONSES TO PHQ QUESTIONS 1-9: 8

## 2024-10-07 NOTE — PROGRESS NOTES
Assessment & Plan     Hyperlipidemia LDL goal <100  Today reviewed her lipids goal risk score calculation less than 7.5% I would recommend lifestyle modifications at this time increasing physical activity to help improve her HDL and lowering cholesterol in her diet recheck in 6 to 12 months.    Hair loss  Skin lesion  She has noted gradual thinning of her hair and also has a skin lesion on the bridge of her nose sun exposed area therefore referral to dermatology.  I encouraged physical activity sun protection  - Adult Dermatology  Referral    ILD (interstitial lung disease) (H)  Rheumatoid arthritis involving both hands with positive rheumatoid factor (H)  She follows with pulmonary      She has discontinued antidepressant and therapy feels she is in a better place at this time and indeed her mood is calm and interactive.  32 minutes spent on the date of the encounter doing chart review, history, exam, diagnostics review, documentation, evaluation & management, counseling and coordination of cares as noted.   I reviewed I will leaving the clinic she will reestablish primary care with new provider.      Rangel Garcia MD   Return for Routine Visit.    Leeann Brown is a 74 year old, presenting for the following health issues:  Follow Up (High cholesterol per pt )        10/7/2024     8:14 AM   Additional Questions   Roomed by MR     History of Present Illness       Reason for visit:  Nutrition She is missing 1 dose(s) of medications per week.  She is not taking prescribed medications regularly due to other.     Stephanie Bhatia 74 HX of depression/anxiety, pulmonary fibrosis, hiatal hernia presents for primary care visit.   Discontinued Welbutrin as she felt anxious at work. She discontinued therapy. She has friends she talks to at this time. She is trying to self motivate and did pool therapy.   Feels her attitude has improved.  She has been less physically active.  Will try to walk with  friends.  Noted hair loss with age.  She also notes skin lesion on her nose that has been changing.  She is interested in seeing a dermatologist.  She was concerned about her lipids has not been as cognizant of eating habits but plans to improve her eating habits and increased physical activity.  The 10-year ASCVD risk score (Joe ANDERSON, et al., 2019) is: 10.2%    Values used to calculate the score:      Age: 74 years      Sex: Female      Is Non- : No      Diabetic: No      Tobacco smoker: No      Systolic Blood Pressure: 94 mmHg      Is BP treated: No      HDL Cholesterol: 51 mg/dL      Total Cholesterol: 219 mg/dL   HCM  Influenza vaccine  Social History     Social History Narrative    .  No children. Sister lives in [place].  Part time at [employer].   in the past.     Possible asbestos exposure from basement pipes in childhood home.  Lived in  [facility] [id number].  Denies exposure to pet birds, hot tubs.  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      Labs reviewed in EPIC  BP Readings from Last 3 Encounters:   10/07/24 94/61   08/16/24 111/66   07/08/24 101/65    Wt Readings from Last 3 Encounters:   10/07/24 58.2 kg (128 lb 6.4 oz)   08/16/24 56.7 kg (125 lb)   08/08/24 58.1 kg (128 lb)                  Patient Active Problem List   Diagnosis    Raynaud phenomenon    Anxiety and depression    Lumbar radiculopathy    Encounter for monitoring tocilizumab therapy    CARDIOVASCULAR SCREENING; LDL GOAL LESS THAN 160    Cluster headache syndrome    Systemic sclerosis with limited cutaneous involvement (H)    AK (actinic keratosis)    Osteoporosis    ILD (interstitial lung disease) (H)    Rheumatoid arthritis involving both hands with positive rheumatoid factor (H)    Senile osteoporosis    Scleroderma with pulmonary involvement (H)    Gastroesophageal reflux disease, unspecified whether esophagitis present    Major depressive disorder,  recurrent episode, moderate (H)    Alcohol use disorder, moderate, in sustained remission (H)     Past Surgical History:   Procedure Laterality Date    HYSTEROSCOPIC PLACEMENT CONTRACEPTIVE DEVICE         Social History     Tobacco Use    Smoking status: Former     Current packs/day: 0.00     Average packs/day: 1 pack/day for 20.0 years (20.0 ttl pk-yrs)     Types: Cigarettes     Start date: 1966     Quit date: 1986     Years since quittin.8    Smokeless tobacco: Never   Substance Use Topics    Alcohol use: Not Currently     Comment: none since      Family History   Problem Relation Age of Onset    C.A.D. Father         a efw small heart attacks    Neurologic Disorder Father         dementia 70s    Coronary Artery Disease Father     Neurologic Disorder Mother         dementia    Depression Mother     Cerebrovascular Disease Maternal Grandmother         ,    Other Cancer Paternal Grandmother     Rheumatoid Arthritis Sister     Hypertension Sister     Dementia Paternal Cousin     Dementia Paternal Uncle         60s    Depression Sister     Breast Cancer No family hx of     Cancer - colorectal No family hx of     Diabetes No family hx of     Cancer No family hx of         no skin cancer    Interstitial Lung Disease No family hx of     Melanoma No family hx of     Skin Cancer No family hx of     Glaucoma No family hx of     Macular Degeneration No family hx of          Current Outpatient Medications   Medication Sig Dispense Refill    ibuprofen (ADVIL/MOTRIN) 200 MG tablet Take 200 mg by mouth every 4 hours as needed for mild pain      omeprazole (PRILOSEC) 20 MG DR capsule Take 20 mg by mouth daily      tocilizumab (ACTEMRA) 162 MG/0.9ML subcutaneous injection Inject 0.9 mLs (162 mg) Subcutaneous every 7 days 3.6 mL 11     No Known Allergies  Recent Labs   Lab Test 24  0903 24  0622 24  1527 24  0753 23  0732 23  0717 22  0621 20  0958 18  0938  02/14/18  1417   *  --   --  133*  --  146*   < > 111*  --   --    HDL 51  --   --  63  --  61   < > 55  --   --    TRIG 91  --   --  93  --  76   < > 68  --   --    ALT 47 15 40 13 13 17   < > 19  --   --    CR 0.81  --  0.69  0.69  --   --  0.76   < > 0.72  --  0.90   GFRESTIMATED 75  --  90  90  --   --  82   < > 84  --  62   GFRESTBLACK  --   --   --   --   --   --   --  >90  --  75   POTASSIUM 4.0  --  3.9  3.9  --   --  4.1   < > 4.2  --  4.9   TSH  --   --   --   --  2.51  --   --  1.30   < >  --     < > = values in this interval not displayed.                 Review of Systems  Problem list, PMH, Surgical HX, FH, SH, allergies, medications,immunizations reviewed and updated in Epic.  ROS noted in HPI and ROS,staff / patient questionnaires reviewed by me.         Objective    BP 94/61 (BP Location: Right arm, Patient Position: Sitting, Cuff Size: Adult Regular)   Pulse 76   Wt 58.2 kg (128 lb 6.4 oz)   SpO2 100%   BMI 21.37 kg/m    Body mass index is 21.37 kg/m .  Physical Exam   GENERAL: alert and no distress  EYES: Eyes grossly normal to inspection, PERRL and conjunctivae and sclerae normal  NECK: no adenopathy, no asymmetry, masses, or scars  RESP: lungs clear to auscultation - no rales, rhonchi or wheezes  CV: regular rate and rhythm, normal S1 S2, no S3 or S4, no murmur, click or rub, no peripheral edema  NEURO: Normal strength and tone, mentation intact and speech normal  Skin bridge of her nose irregular shaped erythematous patch sun exposed skin, hair appears to be fine, no evidence of alopecia patches no flaking or scale.  PSYCH: mentation appears normal, affect normal, good insight, good eye contact, appropriate grooming, thoughts congruent.                       7/8/2024     9:23 AM 8/12/2024    10:22 PM 10/7/2024     8:31 AM   PHQ   PHQ-9 Total Score 13    13 10 8   Q9: Thoughts of better off dead/self-harm past 2 weeks Not at all Several days Not at all   F/U: Thoughts of suicide or  self-harm  No    F/U: Safety concerns  No           7/4/2024     1:18 PM 8/4/2024    10:35 AM 10/7/2024     8:31 AM   NATALI-7 SCORE   Total Score 7 (mild anxiety) 8 (mild anxiety)    Total Score 7 8 2          Signed Electronically by: Rangel Garcia MD

## 2024-10-07 NOTE — PATIENT INSTRUCTIONS
Schedule  with new provider to establish primary care wellness visit due 12/4/2024  [email or web address]   [place] [facility]

## 2024-10-10 ENCOUNTER — OFFICE VISIT (OUTPATIENT)
Dept: PULMONOLOGY | Facility: CLINIC | Age: 74
End: 2024-10-10
Attending: INTERNAL MEDICINE
Payer: MEDICARE

## 2024-10-10 VITALS — OXYGEN SATURATION: 99 % | DIASTOLIC BLOOD PRESSURE: 70 MMHG | HEART RATE: 77 BPM | SYSTOLIC BLOOD PRESSURE: 113 MMHG

## 2024-10-10 DIAGNOSIS — Z51.81 ENCOUNTER FOR MONITORING TOCILIZUMAB THERAPY: ICD-10-CM

## 2024-10-10 DIAGNOSIS — Z79.899 ENCOUNTER FOR MONITORING TOCILIZUMAB THERAPY: ICD-10-CM

## 2024-10-10 DIAGNOSIS — I73.00 RAYNAUD'S PHENOMENON WITHOUT GANGRENE: ICD-10-CM

## 2024-10-10 DIAGNOSIS — K22.4 ESOPHAGEAL DYSMOTILITY: ICD-10-CM

## 2024-10-10 DIAGNOSIS — M34.9 SCLERODERMA WITH PULMONARY INVOLVEMENT (H): Primary | ICD-10-CM

## 2024-10-10 DIAGNOSIS — J84.9 ILD (INTERSTITIAL LUNG DISEASE) (H): ICD-10-CM

## 2024-10-10 PROCEDURE — 99214 OFFICE O/P EST MOD 30 MIN: CPT | Mod: GC | Performed by: INTERNAL MEDICINE

## 2024-10-10 PROCEDURE — G2211 COMPLEX E/M VISIT ADD ON: HCPCS | Performed by: INTERNAL MEDICINE

## 2024-10-10 PROCEDURE — G0463 HOSPITAL OUTPT CLINIC VISIT: HCPCS | Performed by: INTERNAL MEDICINE

## 2024-10-10 ASSESSMENT — PAIN SCALES - GENERAL: PAINLEVEL: NO PAIN (0)

## 2024-10-10 NOTE — LETTER
10/10/2024      Stephanie Bhatia  [address]  [facility] [address]      Dear Colleague,    Thank you for referring your patient, Stephanie Bhatia, to the [facility] [place]. Please see a copy of my visit note below.    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 She has skin thickening in her wrists and hands, but no raynauds or arthralgia.    She follows with Dr. Gomez with her ILD and is on Tocilizumab every 1 week. She has stable PFT, last on 8/2024 showing TLC 85%, DLCO 94%.     She probably has worsening reflux with mild difficulty swallowing. CT chest in 5/2022 showed no esophagus dilatation. She is a former smoker and she quitted 30+years ago. She is on PPI once a day with some improvement. Will recommend to increase PPI to twice a day 30 min before breakfast and dinner. If her symptom does not improve at 3 months, I will suggest EGD or motility study to further evaluate her possible dysphagia. I will monitor her symptom closely because it can be a risk for ILD with microaspiration.     Her last echo in 10/2023 was normal.    She can follow up with me in 1 year but I am happy to see her sooner for any problems. Recommend patient to  contact us for any worsening reflux or dysphagia.         Tiago Reyes-Castro, MD  PGY4 - Rheumatology Fellow   [facility]      Patient was seen and plan discussed with Dr. Gunn.     I saw the patient with the fellow.  My exam and recomendations are as described.      The longitudinal plan of care for the diagnosis(es)/condition(s) as documented were addressed during this visit. Due to the added complexity in care, I will continue to support Stephanie in the subsequent management and with ongoing continuity of care.     WANG Gunn MD, PhD  Professor of Medicine   Rheumatology      Current Outpatient Medications   Medication Sig Dispense Refill    ibuprofen (ADVIL/MOTRIN) 200 MG tablet Take 200 mg by mouth every 4 hours as needed for mild pain      omeprazole (PRILOSEC) 20 MG DR capsule Take 20 mg by mouth daily      tocilizumab (ACTEMRA) 162 MG/0.9ML subcutaneous injection Inject 0.9 mLs (162 mg) Subcutaneous every 7 days 3.6 mL 11                   Active Problem List:     Patient Active Problem List    Diagnosis Date Noted    Major depressive disorder, recurrent episode, moderate (H) 07/10/2024     Priority: Medium    Alcohol use disorder, moderate, in sustained remission (H) 07/10/2024     Priority: Medium    Scleroderma with pulmonary involvement (H) 10/31/2023     Priority: Medium    Gastroesophageal reflux disease, unspecified whether esophagitis present 10/31/2023     Priority: Medium    Rheumatoid arthritis involving both hands with positive rheumatoid factor (H) 06/10/2019     Priority: Medium    Senile osteoporosis 06/10/2019     Priority: Medium    ILD (interstitial lung disease) (H) 05/25/2017     Priority: Medium    Osteoporosis 03/08/2017     Priority: Medium    AK (actinic keratosis) 05/19/2015     Priority: Medium    Systemic sclerosis with limited cutaneous involvement (H) 12/02/2014     Priority: Medium    Cluster headache syndrome 10/21/2013     Priority: Medium    CARDIOVASCULAR  "SCREENING; LDL GOAL LESS THAN 160 10/02/2013     Priority: Medium    Encounter for monitoring tocilizumab therapy 09/27/2013     Priority: Medium     Advance Care Planning:   Receipt of ACP document:  Received: Health Care Directive which was witnessed or notarized on 09/15/2011.  Document not previously scanned.  Validation form completed and sent with document to be scanned.      Confirmed/documented designated decision maker(s). See permanent comments section of demographics in clinical tab. View document(s) and details by clicking on code status.   Added by Elizabeth Hurley on 10/11/2013.            Lumbar radiculopathy 11/18/2011     Priority: Medium    Raynaud phenomenon      Priority: Medium    Anxiety and depression      Priority: Medium          History of Present Illness:     Chief Complaint   Patient presents with    Interstitial Lung Disease (ILD)     Return scleroderma      Original presentation:  Ms. Stephanie Bhatia is a very pleasant 63 year old lady who is not on any prescription meds.   She works in [employer] and uses her hands a lot. She says she is here because her PCP asked her to. Ms. Bhatia thinks she can improve her fatigue on diet and exercise.   1995- c/o fatigue, migraine headache, raynauds phenomenon. Saw a rheumatologist in [place]. 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   In 2000 she moved to [facility] and was there until 2009. All those years, her raynauds and migraine went into remission on its own. But had fatigue.  In 2005 saw Dr. Kristen Agee for fatigue in [facility]. They checked her serologies. COLLEEN screen, CCP, C3, C4, neg. dsDNA neg. KRYSTLE panel was negative. PFT was \"borderline\" as per patient. Echo was \"alright\" at that time.   She thinks she has \"sciatica\".  In 2009 moved to [place].   Raynaud's phenomenon:  Onset: 1995  Digits: all fingers except thumb  Digital ulcers: none  Color changes: white and purple in past; past few " "years purple only  Duration of episode: not >10 mins  Pain during episode: none  Family history of Raynauds: none    GERD: occasionally.  Has had \"thick fingers\" \"always\"  Never been on any DMARDs.    Fatigue - worsening over the past few years  Migraine - 2- 3 episodes in the last one year  No muscle weakness.   Arthralgia: hands, knees, hips, wrists  Rash in forehead this winter on and off  Antiviral on and off for genital herpes (lab tested in past). She does get episodes of perianal lesions which go away in about a week's time. She thinks these lesions are looking different than herpes nowadays.   With exercise, she does feels that \"she cant get enough air\". Chronic cough+  She thinks she might have had couple \"spells\". One episode was 8 months ago and the other was about 4 months ago. Few seconds of blanking out and was having brain fog of about 20 mins. First episode was many years ago when she was in [place].     CXR neg.   Echo no evidence of Pulmonary HTN  MRA/MRI brain:  1. No evidence of acute infarction or intracranial hemorrhage.  2. Small amount of fluid in the left mastoid air cells .  3. Head MRA demonstrates no definite aneurysm or stenosis of the major  intracranial arteries.  4. Neck MRA demonstrates patent major cervical arteries.    Neurology visit with Olesya Jesus NP: no etiology found for the spell.   6MW test: 1700 feet  PFT within normal limits     No h/o myalgia, weight loss, loss of appetite, fevers, chills, night sweats, swollen glands  Patient denies any, malar rash, photosensitivity, recurrent mouth ulcers, sicca symptoms, recurrent sinusitis/rhinitis,swallowing difficulty, hearing or visual changes recently.   No h/o arterial/venous thrombosis in the past  No h/o persistent cough, chest pain  No h/o persistent nausea, vomiting, constipation, diarrhea, abdominal pain  No h/o hematochezia, hematuria, hemoptysis, hematemesis  No persistent headache, tingling, numbness, weakness. No " h/o seizures    PULMONARY STRESS TEST, SIMPLE  MRA and MRI of brain and neck  X-ray Chest 2 views  KRYSTLE antibody panel  DNA double stranded antibodies  CBC with platelets differential  Comprehensive metabolic panel  Antinuclear antibody screen by EIA  Centromere Antibody IgG  Andreia 1 Antibody IgG  Complement C4  Complement C3  UA with Microscopic reflex to Culture  NEUROLOGY ADULT REFERRAL  EKG 12-lead complete w/read - Clinics  Echocardiogram  PFT Lab Testing    COLLEEN 1.4  Anti-centromere and anti-topoisomerase negative.     March 1, 2017  No new changes in the skin.   Raynaud's - about the same.   No acid reflux symptoms.   No muscle weakness on daily basis.  No daily joint pains except chronic on and off neck pain.   Morning stiffness X 30 mins.  Chronic shortness of breath +ve. Not climbing stairs as much nowadays.      Chronic fatigue +ve 6-8/10    March 8, 2018, INTERVAL HISTORY:  I am seeing the patient today 1 year roughly after she was seen by Dr. Case.  Over that timeframe, she has really been quite well and she believes completely stable.      She continues to get Raynaud's phenomena episodes.  She can almost always break these within 15 minutes.  Indeed, they are not so bad that she cannot swim, which she really likes to do, although occasionally she gets attacks while in the pool and she fixes that simply by getting out and getting in the hot tub.  She has not developed any open digital ulcers, although she does note a little bit slower wound healing over the dorsum of her hands, but has not really had any problems in the fingertips.      From a gastrointestinal standpoint, she denies any significant GERD, any significant dysphagia, and has no features suggestive of bacterial small bowel overgrowth.      There has been some discussion with her PCP about using an oral bisphosphonate and she is a little bit concerned about that.  She has never had a barium esophagram.  I have reviewed her most recent  chest x-ray and CT scans and they really do show features of a patulous esophagus, so it is unlikely she would have problems, but we did discuss the possible use of a barium esophagram to further reassure before using an oral bisphosphonate.      She continues to see Dr. Gomez for her pulmonary status and is doing every 6 month visits with pulmonary function tests which have been stable.  She believes her exercise tolerance is likewise stable.      She believes her skin is stable and she has not had significant joint pain with morning stiffness lasting more than 10 minutes.  This includes MCP joints which do have some slight swelling, but these just do not seem to ever hurt her.      She denies any keratoconjunctivitis sicca features.      Finally, she denies any heart palpitations and really has no other new features on a complete 12-point review of systems.      Her only other symptom is just a sense of fatigue.  She is frustrated that she cannot get outside to exercise more and does not have the compelling need to do so that she had a year ago because her dog  and she decided because of an intermittently heavy work schedule not to get another dog that would have to be at home all alone often.     Internal History 2023  ILD was first diagnosed on chest CT scan in 2017.  She also had a few lung nodules that were small, 4 mm and did not require follow-up as she was low risk.  She started tocilizumab in approximately 2022 for progressive drop in PFT and progression of ILD on chest CT.  She opted for tocilizumab instead of mycophenolate or nintedanib.     She has developed difficulty swallowing a pill and feels like a pill got struck in the throat for around 1 month. She also has some burping every night but no heartburn or reflex symptoms. Her last meal is 2 hours before she lays down. She has no problem with chewing food. Noted that she has hiatal hernia since  without symptoms. Her  weight has been stable.     She almost never has Raynaud recently.   She has no joint pain and no morning stiffness. She fell during walking a dog that make her knee sore. She has cramping in hands and legs that happens during daytime and at night. She denies fever or sicca symptom. She has chronic fatigue that still bothers her.    Her ILD is stable and she is having Tocilizumab q 1 week. She follows with Dr. Gomez every 3 months.  She can walk 2 miles without shortness of breath. She walks 20 minutes everyday. She has SOB with walking up a stair, which has been stable.    Interval   10/10/2024  On pulmonology visit on 8/2024 Her mild dyspnea on exertion is unchanged. PFT has been stable since starting tocilizumab .    Last CT chest from 5/2022  1. Pulmonary fibrosis increased compared to 3/28/2017 with fibrotic  NSIP pattern consistent with Scleroderma associated ILD.  2. Stable pulmonary nodules. No new or enlarging nodules.    PFT 8/2024  The FVC, FEV1, FEV1/FVC ratio, and FEV1/FVC6 ratio are within normal limits. The inspiratory flow rates are within normal limits. TLC and FRC are within normal limits. RV is reduced. The diffusing capacity is normal.     Currently, Patient reports acid and food reflux and a sensation of narrowing esophagus for about a year but denies new or worsening dysphagia or odynophagia. Has episodes almost every day, mostly in the morning. However, states that her reflux is kind of stable since she started omeprazole, but reports some hair loss.    Has not had raynaud episodes recently. Denies any new or worsening dry eyes or dry mouth.     has limited physical activity due to sob mostly when try to walk quickly or taking stairs or going up in a hill. But states that her SOB has been stable. Denies any new or worsening RILEY.     Has onychomycosis in his finger nails treated with topical antifungals with improvement .Denies any joint pain, morning stiffness, fever, chills or any other recent  infections.          Review of Systems:     Complete ROS negative except for symptoms mentioned in the HPI          Past Medical History:     Past Medical History:   Diagnosis Date    Anxiety     Arthritis     scleroderma    Depressive disorder     Herpes simplex without mention of complication     Interstitial lung disease (H)     scleroderma ILD, dx by chest CT 2017    Lung nodules     4 mm LLL and RLL     Migraine     loss of speech and comprehension, has had w/u and is complex migraine, last one about 2017    Raynaud phenomenon     Systemic sclerosis with limited cutaneous involvement (H) 2014    Dx ~, Scl-70 and anti-centromere antibody neg, +RF     Past Surgical History:   Procedure Laterality Date    HYSTEROSCOPIC PLACEMENT CONTRACEPTIVE DEVICE            Social History:     Social History     Occupational History    Occupation:      Comment: [employer] - part time   Tobacco Use    Smoking status: Former     Current packs/day: 0.00     Average packs/day: 1 pack/day for 20.0 years (20.0 ttl pk-yrs)     Types: Cigarettes     Start date: 1966     Quit date: 1986     Years since quittin.8    Smokeless tobacco: Never   Vaping Use    Vaping status: Never Used   Substance and Sexual Activity    Alcohol use: Not Currently     Comment: none since     Drug use: No    Sexual activity: Not Currently     Partners: Male          Family History:     Family History   Problem Relation Age of Onset    C.A.D. Father         a efw small heart attacks    Neurologic Disorder Father         dementia 70s    Coronary Artery Disease Father     Neurologic Disorder Mother         dementia    Depression Mother     Cerebrovascular Disease Maternal Grandmother         ,    Other Cancer Paternal Grandmother     Rheumatoid Arthritis Sister     Hypertension Sister     Dementia Paternal Cousin     Dementia Paternal Uncle         60s    Depression Sister     Breast Cancer No  family hx of     Cancer - colorectal No family hx of     Diabetes No family hx of     Cancer No family hx of         no skin cancer    Interstitial Lung Disease No family hx of     Melanoma No family hx of     Skin Cancer No family hx of     Glaucoma No family hx of     Macular Degeneration No family hx of           Allergies:   No Known Allergies         Medications:     Current Outpatient Medications   Medication Sig Dispense Refill    ibuprofen (ADVIL/MOTRIN) 200 MG tablet Take 200 mg by mouth every 4 hours as needed for mild pain      omeprazole (PRILOSEC) 20 MG DR capsule Take 20 mg by mouth daily      tocilizumab (ACTEMRA) 162 MG/0.9ML subcutaneous injection Inject 0.9 mLs (162 mg) Subcutaneous every 7 days 3.6 mL 11          Physical Exam:   Blood pressure 113/70, pulse 77, SpO2 99%, not currently breastfeeding.  Wt Readings from Last 4 Encounters:   10/07/24 58.2 kg (128 lb 6.4 oz)   08/16/24 56.7 kg (125 lb)   08/08/24 58.1 kg (128 lb)   07/08/24 57.9 kg (127 lb 11.2 oz)     Constitutional: well-developed, appearing stated age; cooperative  Eyes: nl EOM, PERRLA, vision, conjunctiva, sclera  ENT: nl external ears, nose, hearing, lips, teeth, gums, throat  No mucous membrane lesions, normal saliva pool  Neck: no mass or thyroid enlargement  Resp: fine crepitation at bilateral lower lung 1/4  CV: RRR, no murmurs, rubs or gallops, no edema, no split S2  GI: no ABD mass or tenderness, no HSM  : not tested  Lymph: no cervical, supraclavicular or epitrochlear nodes  MS: All shoulder, elbow, wrist, MCP/PIP/DIP, spine, hip, knee, ankle, and foot MTP/IP joints were examined and  found normal. No active synovitis or deformity. Full ROM.  Normal  strength. Fist 100%.  No dactylitis, enthesopathy.  Swelling of her wrists, all MCPs without redness or tenderness, ulnar drift both hands (R>L) - stable   Skin: skin thickening and digit swelling, no digital ulcers,   Psych: nl judgement, orientation, memory,  affect.         Data:         Latest Ref Rng & Units 3/13/2024     3:27 PM 5/17/2024     6:22 AM 8/16/2024     9:03 AM   RHEUM RESULTS   Albumin 3.5 - 5.2 g/dL 4.3  4.1  4.0    ALT 0 - 50 U/L 40  15  47    AST 0 - 45 U/L 27  27  28    Creatinine 0.51 - 0.95 mg/dL 0.69     0.69   0.81    GFR Estimate >60 mL/min/1.73m2 90     90   75    Hematocrit 35.0 - 47.0 %  39.9  39.4    Hemoglobin 11.7 - 15.7 g/dL  13.2  13.2    WBC 4.0 - 11.0 10e3/uL  4.6  6.1    RBC Count 3.80 - 5.20 10e6/uL  4.15  4.09    RDW 10.0 - 15.0 %  12.3  12.8    MCHC 31.5 - 36.5 g/dL  33.1  33.5    MCV 78 - 100 fL  96  96    Platelet Count 150 - 450 10e3/uL  220  293      Rheumatoid Factor   Date Value Ref Range Status   10/21/2013 243 (H) 0 - 14 IU/mL Final   ,  ,  ,  ,   Scleroderma Antibody Scl-70 KRYSTLE IgG   Date Value Ref Range Status   03/21/2014 <0.2  Negative 0.0 - 0.9 AI Final     SSA (Ro) (KRYSTLE) Antibody, IgG   Date Value Ref Range Status   03/21/2014 <0.2  Negative 0.0 - 0.9 AI Final     SSB (La) (KRYSTLE) Antibody, IgG   Date Value Ref Range Status   03/21/2014 <0.2  Negative 0.0 - 0.9 AI Final   ,  ,  ,   COLLEEN Screen by EIA   Date Value Ref Range Status   03/21/2014 1.4 (H) <1.0 Final     Comment:     Interpretation:  Positive   ,   DNA-ds   Date Value Ref Range Status   03/21/2014 <15  Interpretation:  Negative 0 - 29 IU/mL Final   ,  ,  ,  ,  ,  ,   Hepatitis B Core Antibody Total   Date Value Ref Range Status   05/26/2022 Nonreactive Nonreactive Final   ,   Hepatitis B Surface Antigen   Date Value Ref Range Status   05/26/2022 Nonreactive Nonreactive Final   ,  ,  ,  ,   Quantiferon-TB Gold Plus   Date Value Ref Range Status   06/09/2022 Negative Negative Final     Comment:     No interferon gamma response to M.tuberculosis antigens was detected. Infection with M.tuberculosis is unlikely, however a single negative result does not exclude infection. In patients at high risk for infection, a second test should be considered in accordance with  the 2017 ATS/IDSA/CDC Clinical Pract  ice Guidelines for Diagnosis of Tuberculosis in Adults and Children      TB1 Ag minus Nil Value   Date Value Ref Range Status   06/09/2022 0.03 IU/mL Final     TB2 Ag minus Nil Value   Date Value Ref Range Status   06/09/2022 0.04 IU/mL Final     Mitogen minus Nil Result   Date Value Ref Range Status   06/09/2022 10.00 IU/mL Final     Nil Result   Date Value Ref Range Status   06/09/2022 0.00 IU/mL Final   ,  ,  ,  ,  ,  ,  ,  ,  ,  ,  ,  ,  ,  ,  ,  ,  ,  ,  ,  ,  ,  ,   Smith KRYSTLE Antibody IgG   Date Value Ref Range Status   03/21/2014 <0.2  Negative 0.0 - 0.9 AI Final   ,   Scleroderma Antibody Scl-70 KRYSTLE IgG   Date Value Ref Range Status   03/21/2014 <0.2  Negative 0.0 - 0.9 AI Final     Division of Rheumatology  [facility]  Phone (Patients line): [phone]  Pager (healthcare professionals only): [phone]  Phone (healthcare professionals only line): [phone]      Again, thank you for allowing me to participate in the care of your patient.      Sincerely,    Petros Gunn MD

## 2024-10-10 NOTE — NURSING NOTE
Chief Complaint   Patient presents with    Interstitial Lung Disease (ILD)     Return scleroderma      Vitals were taken and medications were reconciled.    Jael Colby RMA  8:22 AM

## 2024-10-10 NOTE — PROGRESS NOTES
[facility] - Rheumatology Clinic Visit     Stephanie Bhatia MRN# [id number]   YOB: 1950      Primary care provider: Alysa Gutierrez          Assessment and Plan:     #1 Limited cutaneous systemic sclerosis with secondary raynaud's phenomenon and abnormal capillaries in nailfolds; polyarthralgia;Scl-70 neg; anti-centromere neg  #2 ILD  #3 Strongly positive rheumatoid factor  #4 Chronic fatigue; cough; episodes of shortness of breath  #5 Spells of speech arrest X on and off since 2013 - neurology eval negative  #6 Chronic on and off migraines  #7 Episodes of perianal rash     Limited cutaneous systemic sclerosis is largely stable with GERD almost every day, mostly in the morning, but without any new or worsening dysphagia or odynophagia. She has skin thickening in her wrists and hands, but no raynauds or arthralgia.    She follows with Dr. Gomez with her ILD and is on Tocilizumab every 1 week. She has stable PFT, last on 8/2024 showing TLC 85%, DLCO 94%.     She probably has worsening reflux with mild difficulty swallowing. CT chest in 5/2022 showed no esophagus dilatation. She is a former smoker and she quitted 30+years ago. She is on PPI once a day with some improvement. Will recommend to increase PPI to twice a day 30 min before breakfast and dinner. If her symptom does not improve at 3 months, I will suggest EGD or motility study to further evaluate her possible dysphagia. I will monitor her symptom closely because it can be a risk for ILD with microaspiration.     Her last echo in 10/2023 was normal.    She can follow up with me in 1 year but I am happy to see her sooner for any problems. Recommend patient to contact us for any worsening reflux or dysphagia.         Tiago Reyes-Castro, MD  PGY4 - Rheumatology Fellow   [facility]      Patient was seen and plan discussed with Dr. Gunn.     I saw the patient with the fellow.  My exam and recomendations are as  described.      The longitudinal plan of care for the diagnosis(es)/condition(s) as documented were addressed during this visit. Due to the added complexity in care, I will continue to support Stephanie in the subsequent management and with ongoing continuity of care.     WANG Gunn MD, PhD  Professor of Medicine   Rheumatology      Current Outpatient Medications   Medication Sig Dispense Refill    ibuprofen (ADVIL/MOTRIN) 200 MG tablet Take 200 mg by mouth every 4 hours as needed for mild pain      omeprazole (PRILOSEC) 20 MG DR capsule Take 20 mg by mouth daily      tocilizumab (ACTEMRA) 162 MG/0.9ML subcutaneous injection Inject 0.9 mLs (162 mg) Subcutaneous every 7 days 3.6 mL 11                   Active Problem List:     Patient Active Problem List    Diagnosis Date Noted    Major depressive disorder, recurrent episode, moderate (H) 07/10/2024     Priority: Medium    Alcohol use disorder, moderate, in sustained remission (H) 07/10/2024     Priority: Medium    Scleroderma with pulmonary involvement (H) 10/31/2023     Priority: Medium    Gastroesophageal reflux disease, unspecified whether esophagitis present 10/31/2023     Priority: Medium    Rheumatoid arthritis involving both hands with positive rheumatoid factor (H) 06/10/2019     Priority: Medium    Senile osteoporosis 06/10/2019     Priority: Medium    ILD (interstitial lung disease) (H) 05/25/2017     Priority: Medium    Osteoporosis 03/08/2017     Priority: Medium    AK (actinic keratosis) 05/19/2015     Priority: Medium    Systemic sclerosis with limited cutaneous involvement (H) 12/02/2014     Priority: Medium    Cluster headache syndrome 10/21/2013     Priority: Medium    CARDIOVASCULAR SCREENING; LDL GOAL LESS THAN 160 10/02/2013     Priority: Medium    Encounter for monitoring tocilizumab therapy 09/27/2013     Priority: Medium     Advance Care Planning:   Receipt of ACP document:  Received: Health Care Directive which was witnessed or notarized on  "09/15/2011.  Document not previously scanned.  Validation form completed and sent with document to be scanned.      Confirmed/documented designated decision maker(s). See permanent comments section of demographics in clinical tab. View document(s) and details by clicking on code status.   Added by Elizabeth Hurley on 10/11/2013.            Lumbar radiculopathy 11/18/2011     Priority: Medium    Raynaud phenomenon      Priority: Medium    Anxiety and depression      Priority: Medium          History of Present Illness:     Chief Complaint   Patient presents with    Interstitial Lung Disease (ILD)     Return scleroderma      Original presentation:  Ms. Stephanie Bhatia is a very pleasant 63 year old lady who is not on any prescription meds.   She works in [employer] and uses her hands a lot. She says she is here because her PCP asked her to. Ms. Bhatia thinks she can improve her fatigue on diet and exercise.   1995- c/o fatigue, migraine headache, raynauds phenomenon. Saw a rheumatologist in [place]. 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   In 2000 she moved to [facility] and was there until 2009. All those years, her raynauds and migraine went into remission on its own. But had fatigue.  In 2005 saw Dr. Kristen Agee for fatigue in [facility] They checked her serologies. COLLEEN screen, CCP, C3, C4, neg. dsDNA neg. KRYSTLE panel was negative. PFT was \"borderline\" as per patient. Echo was \"alright\" at that time.   She thinks she has \"sciatica\".  In 2009 moved to [place].   Raynaud's phenomenon:  Onset: 1995  Digits: all fingers except thumb  Digital ulcers: none  Color changes: white and purple in past; past few years purple only  Duration of episode: not >10 mins  Pain during episode: none  Family history of Raynauds: none    GERD: occasionally.  Has had \"thick fingers\" \"always\"  Never been on any DMARDs.    Fatigue - worsening over the past few years  Migraine - 2- 3 episodes " "in the last one year  No muscle weakness.   Arthralgia: hands, knees, hips, wrists  Rash in forehead this winter on and off  Antiviral on and off for genital herpes (lab tested in past). She does get episodes of perianal lesions which go away in about a week's time. She thinks these lesions are looking different than herpes nowadays.   With exercise, she does feels that \"she cant get enough air\". Chronic cough+  She thinks she might have had couple \"spells\". One episode was 8 months ago and the other was about 4 months ago. Few seconds of blanking out and was having brain fog of about 20 mins. First episode was many years ago when she was in [place].     CXR neg.   Echo no evidence of Pulmonary HTN  MRA/MRI brain:  1. No evidence of acute infarction or intracranial hemorrhage.  2. Small amount of fluid in the left mastoid air cells .  3. Head MRA demonstrates no definite aneurysm or stenosis of the major  intracranial arteries.  4. Neck MRA demonstrates patent major cervical arteries.    Neurology visit with Olesya Jesus NP: no etiology found for the spell.   6MW test: 1700 feet  PFT within normal limits     No h/o myalgia, weight loss, loss of appetite, fevers, chills, night sweats, swollen glands  Patient denies any, malar rash, photosensitivity, recurrent mouth ulcers, sicca symptoms, recurrent sinusitis/rhinitis,swallowing difficulty, hearing or visual changes recently.   No h/o arterial/venous thrombosis in the past  No h/o persistent cough, chest pain  No h/o persistent nausea, vomiting, constipation, diarrhea, abdominal pain  No h/o hematochezia, hematuria, hemoptysis, hematemesis  No persistent headache, tingling, numbness, weakness. No h/o seizures    PULMONARY STRESS TEST, SIMPLE  MRA and MRI of brain and neck  X-ray Chest 2 views  KRYSTLE antibody panel  DNA double stranded antibodies  CBC with platelets differential  Comprehensive metabolic panel  Antinuclear antibody screen by EIA  Centromere Antibody " IgG  Andreia 1 Antibody IgG  Complement C4  Complement C3  UA with Microscopic reflex to Culture  NEUROLOGY ADULT REFERRAL  EKG 12-lead complete w/read - Clinics  Echocardiogram  PFT Lab Testing    COLLEEN 1.4  Anti-centromere and anti-topoisomerase negative.     March 1, 2017  No new changes in the skin.   Raynaud's - about the same.   No acid reflux symptoms.   No muscle weakness on daily basis.  No daily joint pains except chronic on and off neck pain.   Morning stiffness X 30 mins.  Chronic shortness of breath +ve. Not climbing stairs as much nowadays.      Chronic fatigue +ve 6-8/10    March 8, 2018, INTERVAL HISTORY:  I am seeing the patient today 1 year roughly after she was seen by Dr. Case.  Over that timeframe, she has really been quite well and she believes completely stable.      She continues to get Raynaud's phenomena episodes.  She can almost always break these within 15 minutes.  Indeed, they are not so bad that she cannot swim, which she really likes to do, although occasionally she gets attacks while in the pool and she fixes that simply by getting out and getting in the hot tub.  She has not developed any open digital ulcers, although she does note a little bit slower wound healing over the dorsum of her hands, but has not really had any problems in the fingertips.      From a gastrointestinal standpoint, she denies any significant GERD, any significant dysphagia, and has no features suggestive of bacterial small bowel overgrowth.      There has been some discussion with her PCP about using an oral bisphosphonate and she is a little bit concerned about that.  She has never had a barium esophagram.  I have reviewed her most recent chest x-ray and CT scans and they really do show features of a patulous esophagus, so it is unlikely she would have problems, but we did discuss the possible use of a barium esophagram to further reassure before using an oral bisphosphonate.      She continues to see Dr. Gomez  for her pulmonary status and is doing every 6 month visits with pulmonary function tests which have been stable.  She believes her exercise tolerance is likewise stable.      She believes her skin is stable and she has not had significant joint pain with morning stiffness lasting more than 10 minutes.  This includes MCP joints which do have some slight swelling, but these just do not seem to ever hurt her.      She denies any keratoconjunctivitis sicca features.      Finally, she denies any heart palpitations and really has no other new features on a complete 12-point review of systems.      Her only other symptom is just a sense of fatigue.  She is frustrated that she cannot get outside to exercise more and does not have the compelling need to do so that she had a year ago because her dog  and she decided because of an intermittently heavy work schedule not to get another dog that would have to be at home all alone often.     Internal History 2023  ILD was first diagnosed on chest CT scan in 2017.  She also had a few lung nodules that were small, 4 mm and did not require follow-up as she was low risk.  She started tocilizumab in approximately 2022 for progressive drop in PFT and progression of ILD on chest CT.  She opted for tocilizumab instead of mycophenolate or nintedanib.     She has developed difficulty swallowing a pill and feels like a pill got struck in the throat for around 1 month. She also has some burping every night but no heartburn or reflex symptoms. Her last meal is 2 hours before she lays down. She has no problem with chewing food. Noted that she has hiatal hernia since  without symptoms. Her weight has been stable.     She almost never has Raynaud recently.   She has no joint pain and no morning stiffness. She fell during walking a dog that make her knee sore. She has cramping in hands and legs that happens during daytime and at night. She denies fever or sicca  symptom. She has chronic fatigue that still bothers her.    Her ILD is stable and she is having Tocilizumab q 1 week. She follows with Dr. Gomez every 3 months.  She can walk 2 miles without shortness of breath. She walks 20 minutes everyday. She has SOB with walking up a stair, which has been stable.    Interval   10/10/2024  On pulmonology visit on 8/2024 Her mild dyspnea on exertion is unchanged. PFT has been stable since starting tocilizumab .    Last CT chest from 5/2022  1. Pulmonary fibrosis increased compared to 3/28/2017 with fibrotic  NSIP pattern consistent with Scleroderma associated ILD.  2. Stable pulmonary nodules. No new or enlarging nodules.    PFT 8/2024  The FVC, FEV1, FEV1/FVC ratio, and FEV1/FVC6 ratio are within normal limits. The inspiratory flow rates are within normal limits. TLC and FRC are within normal limits. RV is reduced. The diffusing capacity is normal.     Currently, Patient reports acid and food reflux and a sensation of narrowing esophagus for about a year but denies new or worsening dysphagia or odynophagia. Has episodes almost every day, mostly in the morning. However, states that her reflux is kind of stable since she started omeprazole, but reports some hair loss.    Has not had raynaud episodes recently. Denies any new or worsening dry eyes or dry mouth.     has limited physical activity due to sob mostly when try to walk quickly or taking stairs or going up in a hill. But states that her SOB has been stable. Denies any new or worsening RILEY.     Has onychomycosis in his finger nails treated with topical antifungals with improvement .Denies any joint pain, morning stiffness, fever, chills or any other recent infections.          Review of Systems:     Complete ROS negative except for symptoms mentioned in the HPI          Past Medical History:     Past Medical History:   Diagnosis Date    Anxiety     Arthritis 1997    scleroderma    Depressive disorder 1997    Herpes simplex  without mention of complication     Interstitial lung disease (H)     scleroderma ILD, dx by chest CT 2017    Lung nodules     4 mm LLL and RLL     Migraine     loss of speech and comprehension, has had w/u and is complex migraine, last one about 2017    Raynaud phenomenon     Systemic sclerosis with limited cutaneous involvement (H) 2014    Dx ~, Scl-70 and anti-centromere antibody neg, +RF     Past Surgical History:   Procedure Laterality Date    HYSTEROSCOPIC PLACEMENT CONTRACEPTIVE DEVICE            Social History:     Social History     Occupational History    Occupation:      Comment: [email or web address] - part time   Tobacco Use    Smoking status: Former     Current packs/day: 0.00     Average packs/day: 1 pack/day for 20.0 years (20.0 ttl pk-yrs)     Types: Cigarettes     Start date: 1966     Quit date: 1986     Years since quittin.8    Smokeless tobacco: Never   Vaping Use    Vaping status: Never Used   Substance and Sexual Activity    Alcohol use: Not Currently     Comment: none since     Drug use: No    Sexual activity: Not Currently     Partners: Male          Family History:     Family History   Problem Relation Age of Onset    C.A.D. Father         a efw small heart attacks    Neurologic Disorder Father         dementia 70s    Coronary Artery Disease Father     Neurologic Disorder Mother         dementia    Depression Mother     Cerebrovascular Disease Maternal Grandmother         ,    Other Cancer Paternal Grandmother     Rheumatoid Arthritis Sister     Hypertension Sister     Dementia Paternal Cousin     Dementia Paternal Uncle         60s    Depression Sister     Breast Cancer No family hx of     Cancer - colorectal No family hx of     Diabetes No family hx of     Cancer No family hx of         no skin cancer    Interstitial Lung Disease No family hx of     Melanoma No family hx of     Skin Cancer No family hx of     Glaucoma No family hx of      Macular Degeneration No family hx of           Allergies:   No Known Allergies         Medications:     Current Outpatient Medications   Medication Sig Dispense Refill    ibuprofen (ADVIL/MOTRIN) 200 MG tablet Take 200 mg by mouth every 4 hours as needed for mild pain      omeprazole (PRILOSEC) 20 MG DR capsule Take 20 mg by mouth daily      tocilizumab (ACTEMRA) 162 MG/0.9ML subcutaneous injection Inject 0.9 mLs (162 mg) Subcutaneous every 7 days 3.6 mL 11          Physical Exam:   Blood pressure 113/70, pulse 77, SpO2 99%, not currently breastfeeding.  Wt Readings from Last 4 Encounters:   10/07/24 58.2 kg (128 lb 6.4 oz)   08/16/24 56.7 kg (125 lb)   08/08/24 58.1 kg (128 lb)   07/08/24 57.9 kg (127 lb 11.2 oz)     Constitutional: well-developed, appearing stated age; cooperative  Eyes: nl EOM, PERRLA, vision, conjunctiva, sclera  ENT: nl external ears, nose, hearing, lips, teeth, gums, throat  No mucous membrane lesions, normal saliva pool  Neck: no mass or thyroid enlargement  Resp: fine crepitation at bilateral lower lung 1/4  CV: RRR, no murmurs, rubs or gallops, no edema, no split S2  GI: no ABD mass or tenderness, no HSM  : not tested  Lymph: no cervical, supraclavicular or epitrochlear nodes  MS: All shoulder, elbow, wrist, MCP/PIP/DIP, spine, hip, knee, ankle, and foot MTP/IP joints were examined and  found normal. No active synovitis or deformity. Full ROM.  Normal  strength. Fist 100%.  No dactylitis, enthesopathy.  Swelling of her wrists, all MCPs without redness or tenderness, ulnar drift both hands (R>L) - stable   Skin: skin thickening and digit swelling, no digital ulcers,   Psych: nl judgement, orientation, memory, affect.         Data:         Latest Ref Rng & Units 3/13/2024     3:27 PM 5/17/2024     6:22 AM 8/16/2024     9:03 AM   RHEUM RESULTS   Albumin 3.5 - 5.2 g/dL 4.3  4.1  4.0    ALT 0 - 50 U/L 40  15  47    AST 0 - 45 U/L 27  27  28    Creatinine 0.51 - 0.95 mg/dL 0.69     0.69    0.81    GFR Estimate >60 mL/min/1.73m2 90     90   75    Hematocrit 35.0 - 47.0 %  39.9  39.4    Hemoglobin 11.7 - 15.7 g/dL  13.2  13.2    WBC 4.0 - 11.0 10e3/uL  4.6  6.1    RBC Count 3.80 - 5.20 10e6/uL  4.15  4.09    RDW 10.0 - 15.0 %  12.3  12.8    MCHC 31.5 - 36.5 g/dL  33.1  33.5    MCV 78 - 100 fL  96  96    Platelet Count 150 - 450 10e3/uL  220  293      Rheumatoid Factor   Date Value Ref Range Status   10/21/2013 243 (H) 0 - 14 IU/mL Final   ,  ,  ,  ,   Scleroderma Antibody Scl-70 KRYSTLE IgG   Date Value Ref Range Status   03/21/2014 <0.2  Negative 0.0 - 0.9 AI Final     SSA (Ro) (KRYSTLE) Antibody, IgG   Date Value Ref Range Status   03/21/2014 <0.2  Negative 0.0 - 0.9 AI Final     SSB (La) (KRYSTLE) Antibody, IgG   Date Value Ref Range Status   03/21/2014 <0.2  Negative 0.0 - 0.9 AI Final   ,  ,  ,   COLLEEN Screen by EIA   Date Value Ref Range Status   03/21/2014 1.4 (H) <1.0 Final     Comment:     Interpretation:  Positive   ,   DNA-ds   Date Value Ref Range Status   03/21/2014 <15  Interpretation:  Negative 0 - 29 IU/mL Final   ,  ,  ,  ,  ,  ,   Hepatitis B Core Antibody Total   Date Value Ref Range Status   05/26/2022 Nonreactive Nonreactive Final   ,   Hepatitis B Surface Antigen   Date Value Ref Range Status   05/26/2022 Nonreactive Nonreactive Final   ,  ,  ,  ,   Quantiferon-TB Gold Plus   Date Value Ref Range Status   06/09/2022 Negative Negative Final     Comment:     No interferon gamma response to M.tuberculosis antigens was detected. Infection with M.tuberculosis is unlikely, however a single negative result does not exclude infection. In patients at high risk for infection, a second test should be considered in accordance with the 2017 ATS/IDSA/CDC Clinical Pract  ice Guidelines for Diagnosis of Tuberculosis in Adults and Children      TB1 Ag minus Nil Value   Date Value Ref Range Status   06/09/2022 0.03 IU/mL Final     TB2 Ag minus Nil Value   Date Value Ref Range Status   06/09/2022 0.04 IU/mL Final      Mitogen minus Nil Result   Date Value Ref Range Status   06/09/2022 10.00 IU/mL Final     Nil Result   Date Value Ref Range Status   06/09/2022 0.00 IU/mL Final   ,  ,  ,  ,  ,  ,  ,  ,  ,  ,  ,  ,  ,  ,  ,  ,  ,  ,  ,  ,  ,  ,   Smith KRYSTLE Antibody IgG   Date Value Ref Range Status   03/21/2014 <0.2  Negative 0.0 - 0.9 AI Final   ,   Scleroderma Antibody Scl-70 KRYSTLE IgG   Date Value Ref Range Status   03/21/2014 <0.2  Negative 0.0 - 0.9 AI Final   ,  ,       Division of Rheumatology  [facility]  Phone (Patients line): [phone]  Pager (healthcare professionals only): [phone]  Phone (healthcare professionals only line): [phone]

## 2024-10-10 NOTE — PATIENT INSTRUCTIONS
- Increase Omeprazole to 20 mg twice a day, 30 min before breakfast and dinner   - Continue tocilizumab  - Follow up in 1 year or early if needed

## 2024-11-07 ENCOUNTER — TELEPHONE (OUTPATIENT)
Dept: DERMATOLOGY | Facility: CLINIC | Age: 74
End: 2024-11-07
Payer: MEDICARE

## 2024-11-07 NOTE — TELEPHONE ENCOUNTER
11/7 Patient confirmed scheduled appointment:  Date: 4/8/2025  Time: 8:20 am  Visit type: New Hair Loss  Provider: Faustina  Location: [facility]  Testing/imaging: n/a  Additional notes: n/a

## 2024-11-07 NOTE — TELEPHONE ENCOUNTER
11/7 Patient confirmed scheduled appointment:  Date: 6/16/2025  Time: 8:40 am  Visit type: New Hairloss  Provider: Faustina  Location: [facility]  Testing/imaging: n/a  Additional notes: n/a

## 2024-11-19 ENCOUNTER — OFFICE VISIT (OUTPATIENT)
Dept: OPHTHALMOLOGY | Facility: CLINIC | Age: 74
End: 2024-11-19
Attending: OPHTHALMOLOGY
Payer: MEDICARE

## 2024-11-19 DIAGNOSIS — H26.8 PSEUDOEXFOLIATION OF LENS CAPSULE: ICD-10-CM

## 2024-11-19 DIAGNOSIS — H25.13 NUCLEAR SCLEROTIC CATARACT OF BOTH EYES: Primary | ICD-10-CM

## 2024-11-19 DIAGNOSIS — H52.7 REFRACTIVE ERROR: ICD-10-CM

## 2024-11-19 PROCEDURE — 92133 CPTRZD OPH DX IMG PST SGM ON: CPT | Performed by: OPHTHALMOLOGY

## 2024-11-19 PROCEDURE — G0463 HOSPITAL OUTPT CLINIC VISIT: HCPCS | Performed by: OPHTHALMOLOGY

## 2024-11-19 PROCEDURE — 92015 DETERMINE REFRACTIVE STATE: CPT | Mod: GY

## 2024-11-19 ASSESSMENT — VISUAL ACUITY
OD_PH_CC: 20/30
CORRECTION_TYPE: GLASSES
METHOD: SNELLEN - LINEAR
OS_CC: 20/20
OS_CC+: -3
OD_PH_CC+: +2
OD_CC: 20/30

## 2024-11-19 ASSESSMENT — REFRACTION_WEARINGRX
OS_CYLINDER: +1.25
OS_SPHERE: +1.50
OS_ADD: +2.75
OD_AXIS: 005
OS_AXIS: 179
OD_ADD: +2.75
SPECS_TYPE: TRIFOCAL
OD_CYLINDER: +1.00
OD_SPHERE: +1.50

## 2024-11-19 ASSESSMENT — CONF VISUAL FIELD
METHOD: COUNTING FINGERS
OD_SUPERIOR_TEMPORAL_RESTRICTION: 0
OS_SUPERIOR_TEMPORAL_RESTRICTION: 0
OS_NORMAL: 1
OS_SUPERIOR_NASAL_RESTRICTION: 0
OD_INFERIOR_NASAL_RESTRICTION: 0
OS_INFERIOR_NASAL_RESTRICTION: 0
OD_INFERIOR_TEMPORAL_RESTRICTION: 0
OD_SUPERIOR_NASAL_RESTRICTION: 0
OS_INFERIOR_TEMPORAL_RESTRICTION: 0
OD_NORMAL: 1

## 2024-11-19 ASSESSMENT — REFRACTION_MANIFEST
OD_SPHERE: +2.25
OS_ADD: +2.50
OS_SPHERE: +1.75
OD_ADD: +2.50
OS_AXIS: 005
OS_CYLINDER: +1.00
OD_AXIS: 005
OD_CYLINDER: +0.75

## 2024-11-19 ASSESSMENT — TONOMETRY
OD_IOP_MMHG: 23
OS_IOP_MMHG: 23
IOP_METHOD: TONOPEN

## 2024-11-19 ASSESSMENT — EXTERNAL EXAM - RIGHT EYE: OD_EXAM: NORMAL

## 2024-11-19 ASSESSMENT — SLIT LAMP EXAM - LIDS
COMMENTS: MGD
COMMENTS: MGD

## 2024-11-19 ASSESSMENT — EXTERNAL EXAM - LEFT EYE: OS_EXAM: NORMAL

## 2024-11-19 ASSESSMENT — CUP TO DISC RATIO
OS_RATIO: 0.2
OD_RATIO: 0.2

## 2024-11-19 NOTE — PROGRESS NOTES
HPI       Follow Up    In both eyes.  Charactertized as  blurred vision.  Context:  night driving.  Associated symptoms include haloes.  Negative for flashes and floaters.  Treatments tried include no treatments.  Pain was noted as 0/10. Additional comments: Yearly exam.             Comments    The patient noticed her night driving vision is difficult.  She doesn't feel as colors are dull or that there is a film over her eye.  Patient would like physician to evaluate cataracts.  BLAYNE Gandara, COA 9:27 AM 11/19/2024              Last edited by Colette Reza COA on 11/19/2024  9:28 AM.          Review of systems for the eyes was negative other than the pertinent positives/negatives listed in the HPI.      Assessment & Plan      Stephanie Bhatia is a 74 year old female with the following diagnoses:   1. 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 Refractive error         Here for annual dilated fundus exam   Still having difficulty with night driving  Current glasses > 3 years old       Intraocular pressure borderline both eyes today   Healthy ON with no sign of glaucoma  OCT Nerve fiber layer within normal limits both eyes      Progressing cataracts, early visually significant   Risks, benefits and alternatives to cataract extraction/IOL implantation discussed  Trial new glasses for now.  Return for calcs/surgery discussion if dissatisfied  Refractive options reviewed  Refraction given        Patient disposition:   Return in about 1 year (around 11/19/2025) for DFE, IOL calcs, OCT NFL.           Attending Physician Attestation:  Complete documentation of historical and exam elements from today's encounter can be found in the full encounter summary report (not reduplicated in this progress note).  I personally obtained the chief complaint(s) and history of present illness.  I confirmed and edited as necessary the review of systems, past  medical/surgical history, family history, social history, and examination findings as documented by others; and I examined the patient myself.  I personally reviewed the relevant tests, images, and reports as documented above.  I formulated and edited as necessary the assessment and plan and discussed the findings and management plan with the patient and family. . - Serge Parham MD

## 2024-11-19 NOTE — NURSING NOTE
Chief Complaints and History of Present Illnesses   Patient presents with    Follow Up     Yearly exam.     Chief Complaint(s) and History of Present Illness(es)       Follow Up              Laterality: both eyes    Quality: blurred vision    Context: night driving    Associated symptoms: haloes.  Negative for flashes and floaters    Treatments tried: no treatments    Pain scale: 0/10    Comments: Yearly exam.              Comments    The patient noticed her night driving vision is difficult.  She doesn't feel as colors are dull or that there is a film over her eye.  Patient would like physician to evaluate cataracts.  Colette Reza, COA, COA 9:27 AM 11/19/2024

## 2024-12-13 ENCOUNTER — LAB (OUTPATIENT)
Dept: LAB | Facility: CLINIC | Age: 74
End: 2024-12-13
Attending: INTERNAL MEDICINE
Payer: MEDICARE

## 2024-12-13 DIAGNOSIS — J84.9 ILD (INTERSTITIAL LUNG DISEASE) (H): ICD-10-CM

## 2024-12-13 LAB
ALBUMIN SERPL BCG-MCNC: 4 G/DL (ref 3.5–5.2)
ALP SERPL-CCNC: 47 U/L (ref 40–150)
ALT SERPL W P-5'-P-CCNC: 18 U/L (ref 0–50)
AST SERPL W P-5'-P-CCNC: 27 U/L (ref 0–45)
BASOPHILS # BLD AUTO: 0.1 10E3/UL (ref 0–0.2)
BASOPHILS NFR BLD AUTO: 1 %
BILIRUB DIRECT SERPL-MCNC: <0.2 MG/DL (ref 0–0.3)
BILIRUB SERPL-MCNC: 0.8 MG/DL
EOSINOPHIL # BLD AUTO: 0.6 10E3/UL (ref 0–0.7)
EOSINOPHIL NFR BLD AUTO: 9 %
ERYTHROCYTE [DISTWIDTH] IN BLOOD BY AUTOMATED COUNT: 12.5 % (ref 10–15)
HCT VFR BLD AUTO: 41.6 % (ref 35–47)
HGB BLD-MCNC: 13.6 G/DL (ref 11.7–15.7)
IMM GRANULOCYTES # BLD: 0 10E3/UL
IMM GRANULOCYTES NFR BLD: 0 %
LYMPHOCYTES # BLD AUTO: 1.2 10E3/UL (ref 0.8–5.3)
LYMPHOCYTES NFR BLD AUTO: 18 %
MCH RBC QN AUTO: 31.4 PG (ref 26.5–33)
MCHC RBC AUTO-ENTMCNC: 32.7 G/DL (ref 31.5–36.5)
MCV RBC AUTO: 96 FL (ref 78–100)
MONOCYTES # BLD AUTO: 0.8 10E3/UL (ref 0–1.3)
MONOCYTES NFR BLD AUTO: 12 %
NEUTROPHILS # BLD AUTO: 3.9 10E3/UL (ref 1.6–8.3)
NEUTROPHILS NFR BLD AUTO: 60 %
NRBC # BLD AUTO: 0 10E3/UL
NRBC BLD AUTO-RTO: 0 /100
PLATELET # BLD AUTO: 238 10E3/UL (ref 150–450)
PROT SERPL-MCNC: 6.4 G/DL (ref 6.4–8.3)
RBC # BLD AUTO: 4.33 10E6/UL (ref 3.8–5.2)
WBC # BLD AUTO: 6.5 10E3/UL (ref 4–11)

## 2024-12-13 PROCEDURE — 85025 COMPLETE CBC W/AUTO DIFF WBC: CPT | Performed by: PATHOLOGY

## 2024-12-13 PROCEDURE — 36415 COLL VENOUS BLD VENIPUNCTURE: CPT | Performed by: PATHOLOGY

## 2024-12-13 PROCEDURE — 80076 HEPATIC FUNCTION PANEL: CPT | Performed by: PATHOLOGY

## 2024-12-16 ENCOUNTER — MYC MEDICAL ADVICE (OUTPATIENT)
Dept: FAMILY MEDICINE | Facility: CLINIC | Age: 74
End: 2024-12-16
Payer: MEDICARE

## 2025-01-20 ENCOUNTER — OFFICE VISIT (OUTPATIENT)
Dept: DERMATOLOGY | Facility: CLINIC | Age: 75
End: 2025-01-20
Payer: COMMERCIAL

## 2025-01-20 ENCOUNTER — MYC MEDICAL ADVICE (OUTPATIENT)
Dept: DERMATOLOGY | Facility: CLINIC | Age: 75
End: 2025-01-20

## 2025-01-20 ENCOUNTER — LAB (OUTPATIENT)
Dept: LAB | Facility: CLINIC | Age: 75
End: 2025-01-20
Payer: COMMERCIAL

## 2025-01-20 DIAGNOSIS — L65.9 LOSS OF HAIR: ICD-10-CM

## 2025-01-20 DIAGNOSIS — J84.9 ILD (INTERSTITIAL LUNG DISEASE) (H): ICD-10-CM

## 2025-01-20 DIAGNOSIS — E83.50 UNSPECIFIED DISORDER OF CALCIUM METABOLISM: ICD-10-CM

## 2025-01-20 DIAGNOSIS — L30.9 DERMATITIS: ICD-10-CM

## 2025-01-20 DIAGNOSIS — L65.9 LOSS OF HAIR: Primary | ICD-10-CM

## 2025-01-20 LAB
ALBUMIN SERPL BCG-MCNC: 4.2 G/DL (ref 3.5–5.2)
ALP SERPL-CCNC: 43 U/L (ref 40–150)
ALT SERPL W P-5'-P-CCNC: 18 U/L (ref 0–50)
ANION GAP SERPL CALCULATED.3IONS-SCNC: 8 MMOL/L (ref 7–15)
AST SERPL W P-5'-P-CCNC: 25 U/L (ref 0–45)
BASOPHILS # BLD AUTO: 0.1 10E3/UL (ref 0–0.2)
BASOPHILS NFR BLD AUTO: 1 %
BILIRUB DIRECT SERPL-MCNC: <0.2 MG/DL (ref 0–0.3)
BILIRUB SERPL-MCNC: 0.6 MG/DL
BUN SERPL-MCNC: 20.9 MG/DL (ref 8–23)
CALCIUM SERPL-MCNC: 9.3 MG/DL (ref 8.8–10.4)
CHLORIDE SERPL-SCNC: 105 MMOL/L (ref 98–107)
CREAT SERPL-MCNC: 0.72 MG/DL (ref 0.51–0.95)
EGFRCR SERPLBLD CKD-EPI 2021: 86 ML/MIN/1.73M2
EOSINOPHIL # BLD AUTO: 0.3 10E3/UL (ref 0–0.7)
EOSINOPHIL NFR BLD AUTO: 6 %
ERYTHROCYTE [DISTWIDTH] IN BLOOD BY AUTOMATED COUNT: 12.2 % (ref 10–15)
FERRITIN SERPL-MCNC: 65 NG/ML (ref 11–328)
GLUCOSE SERPL-MCNC: 90 MG/DL (ref 70–99)
HCO3 SERPL-SCNC: 27 MMOL/L (ref 22–29)
HCT VFR BLD AUTO: 39.7 % (ref 35–47)
HGB BLD-MCNC: 13 G/DL (ref 11.7–15.7)
IMM GRANULOCYTES # BLD: 0 10E3/UL
IMM GRANULOCYTES NFR BLD: 0 %
IRON SERPL-MCNC: 105 UG/DL (ref 37–145)
LYMPHOCYTES # BLD AUTO: 1.1 10E3/UL (ref 0.8–5.3)
LYMPHOCYTES NFR BLD AUTO: 22 %
MCH RBC QN AUTO: 31.7 PG (ref 26.5–33)
MCHC RBC AUTO-ENTMCNC: 32.7 G/DL (ref 31.5–36.5)
MCV RBC AUTO: 97 FL (ref 78–100)
MONOCYTES # BLD AUTO: 0.7 10E3/UL (ref 0–1.3)
MONOCYTES NFR BLD AUTO: 14 %
NEUTROPHILS # BLD AUTO: 3 10E3/UL (ref 1.6–8.3)
NEUTROPHILS NFR BLD AUTO: 57 %
NRBC # BLD AUTO: 0 10E3/UL
NRBC BLD AUTO-RTO: 0 /100
PLATELET # BLD AUTO: 235 10E3/UL (ref 150–450)
POTASSIUM SERPL-SCNC: 4.9 MMOL/L (ref 3.4–5.3)
PROT SERPL-MCNC: 6.7 G/DL (ref 6.4–8.3)
RBC # BLD AUTO: 4.1 10E6/UL (ref 3.8–5.2)
SODIUM SERPL-SCNC: 140 MMOL/L (ref 135–145)
TSH SERPL DL<=0.005 MIU/L-ACNC: 2.69 UIU/ML (ref 0.3–4.2)
VIT D+METAB SERPL-MCNC: 43 NG/ML (ref 20–50)
WBC # BLD AUTO: 5.2 10E3/UL (ref 4–11)

## 2025-01-20 PROCEDURE — 82248 BILIRUBIN DIRECT: CPT | Performed by: PATHOLOGY

## 2025-01-20 PROCEDURE — 99000 SPECIMEN HANDLING OFFICE-LAB: CPT | Performed by: PATHOLOGY

## 2025-01-20 PROCEDURE — 84443 ASSAY THYROID STIM HORMONE: CPT | Performed by: PATHOLOGY

## 2025-01-20 PROCEDURE — 85025 COMPLETE CBC W/AUTO DIFF WBC: CPT | Performed by: PATHOLOGY

## 2025-01-20 PROCEDURE — 84403 ASSAY OF TOTAL TESTOSTERONE: CPT | Performed by: DERMATOLOGY

## 2025-01-20 PROCEDURE — 99204 OFFICE O/P NEW MOD 45 MIN: CPT | Mod: GC | Performed by: DERMATOLOGY

## 2025-01-20 PROCEDURE — 83540 ASSAY OF IRON: CPT | Performed by: PATHOLOGY

## 2025-01-20 PROCEDURE — 82627 DEHYDROEPIANDROSTERONE: CPT | Performed by: DERMATOLOGY

## 2025-01-20 PROCEDURE — 82728 ASSAY OF FERRITIN: CPT | Performed by: PATHOLOGY

## 2025-01-20 PROCEDURE — 36415 COLL VENOUS BLD VENIPUNCTURE: CPT | Performed by: PATHOLOGY

## 2025-01-20 PROCEDURE — 84270 ASSAY OF SEX HORMONE GLOBUL: CPT | Performed by: DERMATOLOGY

## 2025-01-20 PROCEDURE — 80053 COMPREHEN METABOLIC PANEL: CPT | Performed by: PATHOLOGY

## 2025-01-20 PROCEDURE — 82306 VITAMIN D 25 HYDROXY: CPT | Performed by: DERMATOLOGY

## 2025-01-20 RX ORDER — KETOCONAZOLE 20 MG/ML
SHAMPOO, SUSPENSION TOPICAL
Qty: 120 ML | Refills: 11 | Status: SHIPPED | OUTPATIENT
Start: 2025-01-20

## 2025-01-20 RX ORDER — MULTIVITAMIN WITH IRON
500 TABLET ORAL DAILY
COMMUNITY

## 2025-01-20 RX ORDER — FLUOCINOLONE ACETONIDE 0.11 MG/ML
OIL TOPICAL
Qty: 118 ML | Refills: 11 | Status: SHIPPED | OUTPATIENT
Start: 2025-01-20

## 2025-01-20 RX ORDER — FAMOTIDINE 20 MG
TABLET ORAL
COMMUNITY

## 2025-01-20 SDOH — HEALTH STABILITY: PHYSICAL HEALTH: ON AVERAGE, HOW MANY MINUTES DO YOU ENGAGE IN EXERCISE AT THIS LEVEL?: 0 MIN

## 2025-01-20 SDOH — HEALTH STABILITY: PHYSICAL HEALTH: ON AVERAGE, HOW MANY DAYS PER WEEK DO YOU ENGAGE IN MODERATE TO STRENUOUS EXERCISE (LIKE A BRISK WALK)?: 0 DAYS

## 2025-01-20 ASSESSMENT — PAIN SCALES - GENERAL: PAINLEVEL_OUTOF10: NO PAIN (0)

## 2025-01-20 ASSESSMENT — PATIENT HEALTH QUESTIONNAIRE - PHQ9
SUM OF ALL RESPONSES TO PHQ QUESTIONS 1-9: 7
10. IF YOU CHECKED OFF ANY PROBLEMS, HOW DIFFICULT HAVE THESE PROBLEMS MADE IT FOR YOU TO DO YOUR WORK, TAKE CARE OF THINGS AT HOME, OR GET ALONG WITH OTHER PEOPLE: SOMEWHAT DIFFICULT
SUM OF ALL RESPONSES TO PHQ QUESTIONS 1-9: 7

## 2025-01-20 ASSESSMENT — SOCIAL DETERMINANTS OF HEALTH (SDOH): HOW OFTEN DO YOU GET TOGETHER WITH FRIENDS OR RELATIVES?: ONCE A WEEK

## 2025-01-20 NOTE — LETTER
1/20/2025       RE: Stephanie Bhatia  [address]  [facility] [address]     Dear Colleague,    Thank you for referring your patient, Stephanie Bhatia, to the [facility]. Please see a copy of my visit note below.    No notes on file    Again, thank you for allowing me to participate in the care of your patient.      Sincerely,    Tenisha Weems MD     assessments and plan.  Tenisha Weems MD  Professor   Department of Dermatology  [facility]   ____________________________________________    CC: No chief complaint on file.      HPI:  Ms. Stephanie Bhatia is a 74 year old female who presents as a new patient for hair loss. Patient has a medical history of scleroderma and ILD on actemra.   - Onset of hair loss: 1.5 years  - Affected areas: vertex  - Percentage of hair loss: ~ 50  - Other body hair loss: no loss of body hair, eyebrows or eyelashes  - Scalp pain: No    - Scalp burning: No  - Scalp itching: Mild  - Overall course: Noticed low hair density after about a year of shedding  - Current treatments: Aveda shampoo, Aveda conditioner, and hair serum  - Prior treatments: None  - Prior biopsies: None  - Prior labs: Cmp, CBC 8/2024 wnl   - Hair care practices: Washes hair a few times a week   - Menses:  post-menopausal   - Autoimmune disease: scleroderma and ILD  - Nail changes: vertical nail ridges without onycholysis  No other concerns today and in general state of health.     Labs:  Reviewed.    Physical Exam:  GEN: Well developed, well-nourished, in no acute distress, in a pleasant mood.    SKIN: Focused examination of the scalp, face, hands showed  - Daniel part width of 1.2  - The layers of hair regrowth layers were noted to be  - 1-2 cm for the first  - 3-4 cm at the second  - 7-8 cm at the third  - very mild perifollicular erythema  - perifollicular scaling and loose scale of the scalp increased in density from the frontal scalp to the vertex    - Hair pull test negative  - Eyelashes and eyebrows with lateral decreased density   - Nails no pitting or dystrophy    Medications:  Current Outpatient Medications   Medication Sig Dispense Refill    ibuprofen (ADVIL/MOTRIN) 200 MG tablet Take 200 mg by mouth every 4 hours as needed for mild pain      niacin 50 MG tablet Take 2,000 mg by mouth daily (with breakfast).      omeprazole (PRILOSEC) 20 MG   capsule Take 20 mg by mouth 2 times daily.      tocilizumab (ACTEMRA) 162 MG/0.9ML subcutaneous injection Inject 0.9 mLs (162 mg) Subcutaneous every 7 days 3.6 mL 11     No current facility-administered medications for this visit.      Past Medical/Surgical History:   Patient Active Problem List   Diagnosis    Raynaud phenomenon    Anxiety and depression    Lumbar radiculopathy    Encounter for monitoring tocilizumab therapy    CARDIOVASCULAR SCREENING; LDL GOAL LESS THAN 160    Cluster headache syndrome    Systemic sclerosis with limited cutaneous involvement (H)    AK (actinic keratosis)    Osteoporosis    ILD (interstitial lung disease) (H)    Rheumatoid arthritis involving both hands with positive rheumatoid factor (H)    Senile osteoporosis    Scleroderma with pulmonary involvement (H)    Gastroesophageal reflux disease, unspecified whether esophagitis present    Major depressive disorder, recurrent episode, moderate (H)    Alcohol use disorder, moderate, in sustained remission (H)     Past Medical History:   Diagnosis Date    Anxiety     Arthritis 1997    scleroderma    Depressive disorder 1997    Herpes simplex without mention of complication     Interstitial lung disease (H)     scleroderma ILD, dx by chest CT 5/2017    Lung nodules     4 mm LLL and RLL 2017    Migraine     loss of speech and comprehension, has had w/u and is complex migraine, last one about 2/2017    Raynaud phenomenon     Systemic sclerosis with limited cutaneous involvement (H) 12/02/2014    Dx ~2010, Scl-70 and anti-centromere antibody neg, +RF     Past Surgical History:   Procedure Laterality Date    HYSTEROSCOPIC PLACEMENT CONTRACEPTIVE DEVICE           Again, thank you for allowing me to participate in the care of your patient.      Sincerely,    Tenisha Weems MD

## 2025-01-20 NOTE — NURSING NOTE
"Dermatology Rooming Note    Stephanie Bhaita's goals for this visit include:   Chief Complaint   Patient presents with    Hair Loss     Thinning per patient, she would like to know why. \"Dramatically thinned over the last year\"     Alexandra Singer LPN    "

## 2025-01-20 NOTE — PROGRESS NOTES
[facility] Dermatology Note  Encounter Date: Jan 20, 2025  Office Visit     Dermatology Problem List:  # Chronic telogen effluvium   - Current tx: Dermasmoothe 2x/week, Ketoconazole 3x/week  - Labs: Pending CMP, CBC w Diff, TSH, Vit D, Ferritin, Iron, Dheas, Testosterone total and free  # Tinea pedis, previously treated with ketoconazole cream  # AKs   # Angular cheilitis  # Benign nevi   # Cherry hemangiomas  # CREST  # ILD; Actemra    ____________________________________________    Assessment & Plan:  # Chronic Telogen Effluvium  - Labs: CBC, CMP, Vit D, TSH, Iron studies (ferritin, iron), androgen studies (free and total testosterone, DHEAS)   - Start ketoconazole 2% shampoo in the shower 3x/week. If irritating, may alternate with Head and Shoulders.   - Start applying fluocinolone (derma-smooth) oil on the scalp twice weekly.     Procedures Performed:   [employer]: 1/20/2025  - Frontal anterior scalp: 248  - Mid scalp: 240  - Vertex scalp: 251  - Occipital scalp: 197  - Right temple: 176  - Left temple: 191    Hair[employer]rix Summary (1/20/2025)    Frontal anterior (8 cm)    Mid scalp (12 cm)    Vertex (24 cm)    Occipital (30 cm)    Right temporal (10, 9.5 cm)    Left temporal (9.5, 9 cm)    Summary          [employer] Summary (1/20/2025)    Frontal    Superior    Vertex    Right temporal    Left temporal           Follow-up: 4-5 month(s) in-person, or earlier for new or changing lesions  Staff (Faustina)/Resident (Erlin)  ____________________________________________    CC: No chief complaint on file.      HPI:  Ms. Stephanie Bhatia is a 74 year old female who presents as a new patient for hair loss. Patient has a medical history of scleroderma and ILD on actemra.   - Onset of hair loss: 1.5 years  - Affected areas: vertex  - Percentage of hair loss: ~ 50  - Other body hair loss: no loss of body hair, eyebrows or eyelashes  - Scalp pain: No    - Scalp burning: No  - Scalp itching: Mild  -  Overall course: Noticed low hair density after about a year of shedding  - Current treatments: Aveda shampoo, Aveda conditioner, and hair serum  - Prior treatments: None  - Prior biopsies: None  - Prior labs: Cmp, CBC 8/2024 wnl   - Hair care practices: Washes hair a few times a week   - Menses:  without heavy flow post-menopausal   - Autoimmune disease: scleroderma and ILD  - Nail changes: vertical nail ridges without onycholysis  No other concerns today and in general state of health.     Labs:  Reviewed.    Physical Exam:  Vitals: There were no vitals taken for this visit.  GEN: Well developed, well-nourished, in no acute distress, in a pleasant mood.    SKIN: Focused examination of the scalp, face, hands showed  - Daniel part width of 1.2  - The layers of hair regrowth layers were noted to be  - 1-2 cm for the first  - 3-4 cm at the second  - 7-8 cm at the third  - very mild perifollicular erythema  - perifollicular scaling and loose scale of the scalp increased in density from the frontal scalp to the vertex    - Hair pull test negative  - Eyelashes and eyebrows with lateral decreased density   - Nails no pitting or dystrophy    Medications:  Current Outpatient Medications   Medication Sig Dispense Refill    ibuprofen (ADVIL/MOTRIN) 200 MG tablet Take 200 mg by mouth every 4 hours as needed for mild pain      niacin 50 MG tablet Take 2,000 mg by mouth daily (with breakfast).      omeprazole (PRILOSEC) 20 MG DR capsule Take 20 mg by mouth 2 times daily.      tocilizumab (ACTEMRA) 162 MG/0.9ML subcutaneous injection Inject 0.9 mLs (162 mg) Subcutaneous every 7 days 3.6 mL 11     No current facility-administered medications for this visit.      Past Medical/Surgical History:   Patient Active Problem List   Diagnosis    Raynaud phenomenon    Anxiety and depression    Lumbar radiculopathy    Encounter for monitoring tocilizumab therapy    CARDIOVASCULAR SCREENING; LDL GOAL LESS THAN 160    Cluster headache syndrome     Systemic sclerosis with limited cutaneous involvement (H)    AK (actinic keratosis)    Osteoporosis    ILD (interstitial lung disease) (H)    Rheumatoid arthritis involving both hands with positive rheumatoid factor (H)    Senile osteoporosis    Scleroderma with pulmonary involvement (H)    Gastroesophageal reflux disease, unspecified whether esophagitis present    Major depressive disorder, recurrent episode, moderate (H)    Alcohol use disorder, moderate, in sustained remission (H)     Past Medical History:   Diagnosis Date    Anxiety     Arthritis 1997    scleroderma    Depressive disorder 1997    Herpes simplex without mention of complication     Interstitial lung disease (H)     scleroderma ILD, dx by chest CT 5/2017    Lung nodules     4 mm LLL and RLL 2017    Migraine     loss of speech and comprehension, has had w/u and is complex migraine, last one about 2/2017    Raynaud phenomenon     Systemic sclerosis with limited cutaneous involvement (H) 12/02/2014    Dx ~2010, Scl-70 and anti-centromere antibody neg, +RF     Past Surgical History:   Procedure Laterality Date    HYSTEROSCOPIC PLACEMENT CONTRACEPTIVE DEVICE

## 2025-01-21 ENCOUNTER — OFFICE VISIT (OUTPATIENT)
Dept: FAMILY MEDICINE | Facility: CLINIC | Age: 75
End: 2025-01-21
Payer: COMMERCIAL

## 2025-01-21 ENCOUNTER — ORDERS ONLY (AUTO-RELEASED) (OUTPATIENT)
Dept: FAMILY MEDICINE | Facility: CLINIC | Age: 75
End: 2025-01-21

## 2025-01-21 VITALS
BODY MASS INDEX: 21.66 KG/M2 | HEART RATE: 93 BPM | DIASTOLIC BLOOD PRESSURE: 71 MMHG | TEMPERATURE: 97.4 F | RESPIRATION RATE: 16 BRPM | SYSTOLIC BLOOD PRESSURE: 107 MMHG | OXYGEN SATURATION: 99 % | HEIGHT: 65 IN | WEIGHT: 130 LBS

## 2025-01-21 DIAGNOSIS — M81.0 AGE RELATED OSTEOPOROSIS, UNSPECIFIED PATHOLOGICAL FRACTURE PRESENCE: ICD-10-CM

## 2025-01-21 DIAGNOSIS — Z13.820 ENCOUNTER FOR OSTEOPOROSIS SCREENING IN ASYMPTOMATIC POSTMENOPAUSAL PATIENT: ICD-10-CM

## 2025-01-21 DIAGNOSIS — F10.21 ALCOHOL USE DISORDER, MODERATE, IN SUSTAINED REMISSION (H): ICD-10-CM

## 2025-01-21 DIAGNOSIS — Z00.00 ENCOUNTER FOR MEDICARE ANNUAL WELLNESS EXAM: ICD-10-CM

## 2025-01-21 DIAGNOSIS — Z78.0 ENCOUNTER FOR OSTEOPOROSIS SCREENING IN ASYMPTOMATIC POSTMENOPAUSAL PATIENT: ICD-10-CM

## 2025-01-21 DIAGNOSIS — Z12.11 SCREEN FOR COLON CANCER: ICD-10-CM

## 2025-01-21 DIAGNOSIS — F33.1 MAJOR DEPRESSIVE DISORDER, RECURRENT EPISODE, MODERATE (H): ICD-10-CM

## 2025-01-21 DIAGNOSIS — M05.742 RHEUMATOID ARTHRITIS INVOLVING BOTH HANDS WITH POSITIVE RHEUMATOID FACTOR (H): ICD-10-CM

## 2025-01-21 DIAGNOSIS — J84.9 ILD (INTERSTITIAL LUNG DISEASE) (H): ICD-10-CM

## 2025-01-21 DIAGNOSIS — M34.81 SYSTEMIC SCLEROSIS WITH LUNG INVOLVEMENT (H): ICD-10-CM

## 2025-01-21 DIAGNOSIS — M05.741 RHEUMATOID ARTHRITIS INVOLVING BOTH HANDS WITH POSITIVE RHEUMATOID FACTOR (H): ICD-10-CM

## 2025-01-21 LAB — DHEA-S SERPL-MCNC: 20 UG/DL (ref 35–430)

## 2025-01-21 PROCEDURE — G0439 PPPS, SUBSEQ VISIT: HCPCS | Performed by: INTERNAL MEDICINE

## 2025-01-21 PROCEDURE — G0009 ADMIN PNEUMOCOCCAL VACCINE: HCPCS | Performed by: INTERNAL MEDICINE

## 2025-01-21 PROCEDURE — 90677 PCV20 VACCINE IM: CPT | Performed by: INTERNAL MEDICINE

## 2025-01-21 ASSESSMENT — PAIN SCALES - GENERAL: PAINLEVEL_OUTOF10: NO PAIN (0)

## 2025-01-21 NOTE — NURSING NOTE
Prior to immunization administration, verified patients identity using patient s name and date of birth. Please see Immunization Activity for additional information.     Screening Questionnaire for Adult Immunization    Are you sick today?   No   Do you have allergies to medications, food, a vaccine component or latex?   No   Have you ever had a serious reaction after receiving a vaccination?   No   Do you have a long-term health problem with heart, lung, kidney, or metabolic disease (e.g., diabetes), asthma, a blood disorder, no spleen, complement component deficiency, a cochlear implant, or a spinal fluid leak?  Are you on long-term aspirin therapy?   Yes   Do you have cancer, leukemia, HIV/AIDS, or any other immune system problem?   No   Do you have a parent, brother, or sister with an immune system problem?   No   In the past 3 months, have you taken medications that affect  your immune system, such as prednisone, other steroids, or anticancer drugs; drugs for the treatment of rheumatoid arthritis, Crohn s disease, or psoriasis; or have you had radiation treatments?   No   Have you had a seizure, or a brain or other nervous system problem?   No   During the past year, have you received a transfusion of blood or blood    products, or been given immune (gamma) globulin or antiviral drug?   No   For women: Are you pregnant or is there a chance you could become       pregnant during the next month?   No   Have you received any vaccinations in the past 4 weeks?   No     Immunization questionnaire was positive for at least one answer.  Notified cami.      Patient instructed to remain in clinic for 15 minutes afterwards, and to report any adverse reactions.     Screening performed by Norma Simons MA on 1/21/2025 at 9:31 AM.

## 2025-01-21 NOTE — PATIENT INSTRUCTIONS
Get your next COVID vaccine after [employer]!    I recommend getting the Novavax COVID vaccine.  You can get this at most retail pharmacies- BUT you need to specify Novavax.  You can also get it at the [facility] pharmacy.      Patient Education   Preventive Care Advice   This is general advice given by our system to help you stay healthy. However, your care team may have specific advice just for you. Please talk to your care team about your preventive care needs.  Nutrition  Eat 5 or more servings of fruits and vegetables each day.  Try wheat bread, brown rice and whole grain pasta (instead of white bread, rice, and pasta).  Get enough calcium and vitamin D. Check the label on foods and aim for 100% of the RDA (recommended daily allowance).  Lifestyle  Exercise at least 150 minutes each week  (30 minutes a day, 5 days a week).  Do muscle strengthening activities 2 days a week. These help control your weight and prevent disease.  No smoking.  Wear sunscreen to prevent skin cancer.  Have a dental exam and cleaning every 6 months.  Yearly exams  See your health care team every year to talk about:  Any changes in your health.  Any medicines your care team has prescribed.  Preventive care, family planning, and ways to prevent chronic diseases.  Shots (vaccines)   HPV shots (up to age 26), if you've never had them before.  Hepatitis B shots (up to age 59), if you've never had them before.  COVID-19 shot: Get this shot when it's due.  Flu shot: Get a flu shot every year.  Tetanus shot: Get a tetanus shot every 10 years.  Pneumococcal, hepatitis A, and RSV shots: Ask your care team if you need these based on your risk.  Shingles shot (for age 50 and up)  General health tests  Diabetes screening:  Starting at age 35, Get screened for diabetes at least every 3 years.  If you are younger than age 35, ask your care team if you should be screened for diabetes.  Cholesterol test: At age 39, start having a  cholesterol test every 5 years, or more often if advised.  Bone density scan (DEXA): At age 50, ask your care team if you should have this scan for osteoporosis (brittle bones).  Hepatitis C: Get tested at least once in your life.  STIs (sexually transmitted infections)  Before age 24: Ask your care team if you should be screened for STIs.  After age 24: Get screened for STIs if you're at risk. You are at risk for STIs (including HIV) if:  You are sexually active with more than one person.  You don't use condoms every time.  You or a partner was diagnosed with a sexually transmitted infection.  If you are at risk for HIV, ask about PrEP medicine to prevent HIV.  Get tested for HIV at least once in your life, whether you are at risk for HIV or not.  Cancer screening tests  Cervical cancer screening: If you have a cervix, begin getting regular cervical cancer screening tests starting at age 21.  Breast cancer scan (mammogram): If you've ever had breasts, begin having regular mammograms starting at age 40. This is a scan to check for breast cancer.  Colon cancer screening: It is important to start screening for colon cancer at age 45.  Have a colonoscopy test every 10 years (or more often if you're at risk) Or, ask your provider about stool tests like a FIT test every year or Cologuard test every 3 years.  To learn more about your testing options, visit:   .  For help making a decision, visit:   https://bit.ly/kx91362.  Prostate cancer screening test: If you have a prostate, ask your care team if a prostate cancer screening test (PSA) at age 55 is right for you.  Lung cancer screening: If you are a current or former smoker ages 50 to 80, ask your care team if ongoing lung cancer screenings are right for you.  For informational purposes only. Not to replace the advice of your health care provider. Copyright   2023 [employer]. All rights reserved. Clinically reviewed by the [facility]  [facility]. FilesX [id number] - REV 01/24.  Learning About Activities of Daily Living  What are activities of daily living?     Activities of daily living (ADLs) are the basic self-care tasks you do every day. These include eating, bathing, dressing, and moving around.  As you age, and if you have health problems, you may find that it's harder to do some of these tasks. If so, your doctor can suggest ideas that may help.  To measure what kind of help you may need, your doctor will ask how well you are able to do ADLs. Let your doctor know if there are any tasks that you are having trouble doing. This is an important first step to getting help. And when you have the help you need, you can stay as independent as possible.  How will a doctor assess your ADLs?  Asking about ADLs is part of a routine health checkup your doctor will likely do as you age. Your health check might be done in a doctor's office, in your home, or at a hospital. The goal is to find out if you are having any problems that could make it hard to care for yourself or that make it unsafe for you to be on your own.  To measure your ADLs, your doctor will ask how hard it is for you to do routine tasks. Your doctor may also want to know if you have changed the way you do a task because of a health problem. Your doctor may watch how you:  Walk back and forth.  Keep your balance while you stand or walk.  Move from sitting to standing or from a bed to a chair.  Button or unbutton a shirt or sweater.  Remove and put on your shoes.  It's common to feel a little worried or anxious if you find you can't do all the things you used to be able to do. Talking with your doctor about ADLs is a way to make sure you're as safe as possible and able to care for yourself as well as you can. You may want to bring a caregiver, friend, or family member to your checkup. They can help you talk to your doctor.  Follow-up care is a key part of your treatment and safety. Be sure to  make and go to all appointments, and call your doctor if you are having problems. It's also a good idea to know your test results and keep a list of the medicines you take.  Current as of: October 24, 2023  Content Version: 14.3    2024 [employer].   Care instructions adapted under license by your healthcare professional. If you have questions about a medical condition or this instruction, always ask your healthcare professional. [employer] disclaims any warranty or liability for your use of this information.    Preventing Falls: Care Instructions  Injuries and health problems such as trouble walking or poor eyesight can increase your risk of falling. So can some medicines. But there are things you can do to help prevent falls. You can exercise to get stronger. You can also arrange your home to make it safer.    Talk to your doctor about the medicines you take. Ask if any of them increase the risk of falls and whether they can be changed or stopped.   Try to exercise regularly. It can help improve your strength and balance. This can help lower your risk of falling.         Practice fall safety and prevention.   Wear low-heeled shoes that fit well and give your feet good support. Talk to your doctor if you have foot problems that make this hard.  Carry a cellphone or wear a medical alert device that you can use to call for help.  Use stepladders instead of chairs to reach high objects. Don't climb if you're at risk for falls. Ask for help, if needed.  Wear the correct eyeglasses, if you need them.        Make your home safer.   Remove rugs, cords, clutter, and furniture from walkways.  Keep your house well lit. Use night-lights in hallways and bathrooms.  Install and use sturdy handrails on stairways.  Wear nonskid footwear, even inside. Don't walk barefoot or in socks without shoes.        Be safe outside.   Use handrails, curb cuts, and ramps whenever possible.  Keep your hands free by using  "a shoulder bag or backpack.  Try to walk in well-lit areas. Watch out for uneven ground, changes in pavement, and debris.  Be careful in the winter. Walk on the grass or gravel when sidewalks are slippery. Use de-icer on steps and walkways. Add non-slip devices to shoes.    Put grab bars and nonskid mats in your shower or tub and near the toilet. Try to use a shower chair or bath bench when bathing.   Get into a tub or shower by putting in your weaker leg first. Get out with your strong side first. Have a phone or medical alert device in the bathroom with you.   Where can you learn more?  Go to https://www.[employer]/patiented  Enter G117 in the search box to learn more about \"Preventing Falls: Care Instructions.\"  Current as of: July 31, 2024  Content Version: 14.3    2024 [employer].   Care instructions adapted under license by your healthcare professional. If you have questions about a medical condition or this instruction, always ask your healthcare professional. [employer] disclaims any warranty or liability for your use of this information.    Hearing Loss: Care Instructions  Overview     Hearing loss is a sudden or slow decrease in how well you hear. It can range from slight to profound. Permanent hearing loss can occur with aging. It also can happen when you are exposed long-term to loud noise. Examples include listening to loud music, riding motorcycles, or being around other loud machines.  Hearing loss can affect your work and home life. It can make you feel lonely or depressed. You may feel that you have lost your independence. But hearing aids and other devices can help you hear better and feel connected to others.  Follow-up care is a key part of your treatment and safety. Be sure to make and go to all appointments, and call your doctor if you are having problems. It's also a good idea to know your test results and keep a list of the medicines you take.  How can you care for " yourself at home?  Avoid loud noises whenever possible. This helps keep your hearing from getting worse.  Always wear hearing protection around loud noises.  Wear a hearing aid as directed.  A professional can help you pick a hearing aid that will work best for you.  You can also get hearing aids over the counter for mild to moderate hearing loss.  Have hearing tests as your doctor suggests. They can show whether your hearing has changed. Your hearing aid may need to be adjusted.  Use other devices as needed. These may include:  Telephone amplifiers and hearing aids that can connect to a television, stereo, radio, or microphone.  Devices that use lights or vibrations. These alert you to the doorbell, a ringing telephone, or a baby monitor.  Television closed-captioning. This shows the words at the bottom of the screen. Most new TVs can do this.  TTY (text telephone). This lets you type messages back and forth on the telephone instead of talking or listening. These devices are also called TDD. When messages are typed on the keyboard, they are sent over the phone line to a receiving TTY. The message is shown on a monitor.  Use text messaging, social media, and email if it is hard for you to communicate by telephone.  Try to learn a listening technique called speechreading. It is not lipreading. You pay attention to people's gestures, expressions, posture, and tone of voice. These clues can help you understand what a person is saying. Face the person you are talking to, and have them face you. Make sure the lighting is good. You need to see the other person's face clearly.  Think about counseling if you need help to adjust to your hearing loss.  When should you call for help?  Watch closely for changes in your health, and be sure to contact your doctor if:    You think your hearing is getting worse.     You have new symptoms, such as dizziness or nausea.   Where can you learn more?  Go to  "https://www.[employer].net/patiented  Enter R798 in the search box to learn more about \"Hearing Loss: Care Instructions.\"  Current as of: September 27, 2023  Content Version: 14.3    2024 [employer].   Care instructions adapted under license by your healthcare professional. If you have questions about a medical condition or this instruction, always ask your healthcare professional. [employer] disclaims any warranty or liability for your use of this information.    Learning About Depression Screening  What is depression screening?  Depression screening is a way to see if you have depression symptoms. It may be done by a doctor or counselor. It's often part of a routine checkup. That's because your mental health is just as important as your physical health.  Depression is a mental health condition that affects how you feel, think, and act. You may:  Have less energy.  Lose interest in your daily activities.  Feel sad and grouchy for a long time.  Depression is very common. It affects people of all ages.  Many things can lead to depression. Some people become depressed after they have a stroke or find out they have a major illness like cancer or heart disease. The death of a loved one or a breakup may lead to depression. It can run in families. Most experts believe that a combination of inherited genes and stressful life events can cause it.  What happens during screening?  You may be asked to fill out a form about your depression symptoms. You and the doctor will discuss your answers. The doctor may ask you more questions to learn more about how you think, act, and feel.  What happens after screening?  If you have symptoms of depression, your doctor will talk to you about your options.  Doctors usually treat depression with medicines or counseling. Often, combining the two works best. Many people don't get help because they think that they'll get over the depression on their own. But people " "with depression may not get better unless they get treatment.  The cause of depression is not well understood. There may be many factors involved. But if you have depression, it's not your fault.  A serious symptom of depression is thinking about death or suicide. If you or someone you care about talks about this or about feeling hopeless, get help right away.  It's important to know that depression can be treated. Medicine, counseling, and self-care may help.  Where can you learn more?  Go to https://www.[employer].net/patiented  Enter T185 in the search box to learn more about \"Learning About Depression Screening.\"  Current as of: July 31, 2024  Content Version: 14.3    2024 [employer].   Care instructions adapted under license by your healthcare professional. If you have questions about a medical condition or this instruction, always ask your healthcare professional. [employer] disclaims any warranty or liability for your use of this information.       "

## 2025-01-21 NOTE — PROGRESS NOTES
Preventive Care Visit  [facility]  Radha Ventura DO, Internal Medicine  Jan 21, 2025      Assessment & Plan     (Z00.00) Encounter for Medicare annual wellness exam  Comment:   Mammo: 3/28/17- last time had one was going to have dye injection and worried about this- at this point, just would not want to know  Pap: done screening  Colon (45-75): FIT 12/19/23; switch to Cologuard- at this point would just not want to know  DEXA: 6/14/19- osteoporosis at left femoral neck; repeat DEXA due  Immunizations: UTD, PCV20 today due to ILD  Labs: Lipids, diabetes screen- just done in August, LDL slightly high  Discussed healthy lifestyle and aging recommendations including regular exercise, adequate and regular sleep, 5+ fruits and veggies daily.    (Z13.820,  Z78.0) Encounter for osteoporosis screening in asymptomatic postmenopausal patient  (M81.0) Age related osteoporosis, unspecified pathological fracture   Comment:   Plan: DX Bone Density    (M34.81) Systemic sclerosis with lung involvement (H)  (J84.9) ILD (interstitial lung disease) (H)  (M05.741,  M05.742) Rheumatoid arthritis involving both hands with positive rheumatoid factor (H)  Comment: Managed by Mercedes Gomez and Velia- Dr. Gomez prescribing tocilizumab- tolerating well and has had stable lung function.    (F33.1) Major depressive disorder, recurrent episode, moderate (H)  Comment: well controlled, not on medications    (F10.21) Alcohol use disorder, moderate, in sustained remission (H)  Comment: Noted    (Z12.11) Screen for colon cancer  Comment: Noted  Plan: COLOGUARD([employer])       Patient has been advised of split billing requirements and indicates understanding: Yes        Counseling  Appropriate preventive services were addressed with this patient via screening, questionnaire, or discussion as appropriate for fall prevention, nutrition, physical activity, Tobacco-use cessation, social engagement, weight loss and  cognition.  Checklist reviewing preventive services available has been given to the patient.  Reviewed patient's diet, addressing concerns and/or questions.   Updated plan of care.  Patient reported difficulty with activities of daily living were addressed today.Patient reported safety concerns were addressed today.The patient was provided with written information regarding signs of hearing loss.   The patient's PHQ-9 score is consistent with mild depression. She was provided with information regarding depression.       Patient Instructions   Get your next COVID vaccine after [employer] [place] Day!    I recommend getting the Novavax COVID vaccine.  You can get this at most retail pharmacies- BUT you need to specify Novavax.  You can also get it at the [facility] pharmacy.      Patient Education  Preventive Care Advice   This is general advice given by our system to help you stay healthy. However, your care team may have specific advice just for you. Please talk to your care team about your preventive care needs.  Nutrition  Eat 5 or more servings of fruits and vegetables each day.  Try wheat bread, brown rice and whole grain pasta (instead of white bread, rice, and pasta).  Get enough calcium and vitamin D. Check the label on foods and aim for 100% of the RDA (recommended daily allowance).  Lifestyle  Exercise at least 150 minutes each week  (30 minutes a day, 5 days a week).  Do muscle strengthening activities 2 days a week. These help control your weight and prevent disease.  No smoking.  Wear sunscreen to prevent skin cancer.  Have a dental exam and cleaning every 6 months.  Yearly exams  See your health care team every year to talk about:  Any changes in your health.  Any medicines your care team has prescribed.  Preventive care, family planning, and ways to prevent chronic diseases.  Shots (vaccines)   HPV shots (up to age 26), if you've never had them before.  Hepatitis B shots (up to age 59), if you've  never had them before.  COVID-19 shot: Get this shot when it's due.  Flu shot: Get a flu shot every year.  Tetanus shot: Get a tetanus shot every 10 years.  Pneumococcal, hepatitis A, and RSV shots: Ask your care team if you need these based on your risk.  Shingles shot (for age 50 and up)  General health tests  Diabetes screening:  Starting at age 35, Get screened for diabetes at least every 3 years.  If you are younger than age 35, ask your care team if you should be screened for diabetes.  Cholesterol test: At age 39, start having a cholesterol test every 5 years, or more often if advised.  Bone density scan (DEXA): At age 50, ask your care team if you should have this scan for osteoporosis (brittle bones).  Hepatitis C: Get tested at least once in your life.  STIs (sexually transmitted infections)  Before age 24: Ask your care team if you should be screened for STIs.  After age 24: Get screened for STIs if you're at risk. You are at risk for STIs (including HIV) if:  You are sexually active with more than one person.  You don't use condoms every time.  You or a partner was diagnosed with a sexually transmitted infection.  If you are at risk for HIV, ask about PrEP medicine to prevent HIV.  Get tested for HIV at least once in your life, whether you are at risk for HIV or not.  Cancer screening tests  Cervical cancer screening: If you have a cervix, begin getting regular cervical cancer screening tests starting at age 21.  Breast cancer scan (mammogram): If you've ever had breasts, begin having regular mammograms starting at age 40. This is a scan to check for breast cancer.  Colon cancer screening: It is important to start screening for colon cancer at age 45.  Have a colonoscopy test every 10 years (or more often if you're at risk) Or, ask your provider about stool tests like a FIT test every year or Cologuard test every 3 years.  To learn more about your testing options, visit:   .  For help making a decision,  visit:   https://bit.ly/ma46808.  Prostate cancer screening test: If you have a prostate, ask your care team if a prostate cancer screening test (PSA) at age 55 is right for you.  Lung cancer screening: If you are a current or former smoker ages 50 to 80, ask your care team if ongoing lung cancer screenings are right for you.  For informational purposes only. Not to replace the advice of your health care provider. Copyright   2023 [employer]. All rights reserved. Clinically reviewed by the  [employer] [place] [facility]. [employer] 832401 - REV 01/24.  Learning About Activities of Daily Living  What are activities of daily living?     Activities of daily living (ADLs) are the basic self-care tasks you do every day. These include eating, bathing, dressing, and moving around.  As you age, and if you have health problems, you may find that it's harder to do some of these tasks. If so, your doctor can suggest ideas that may help.  To measure what kind of help you may need, your doctor will ask how well you are able to do ADLs. Let your doctor know if there are any tasks that you are having trouble doing. This is an important first step to getting help. And when you have the help you need, you can stay as independent as possible.  How will a doctor assess your ADLs?  Asking about ADLs is part of a routine health checkup your doctor will likely do as you age. Your health check might be done in a doctor's office, in your home, or at a hospital. The goal is to find out if you are having any problems that could make it hard to care for yourself or that make it unsafe for you to be on your own.  To measure your ADLs, your doctor will ask how hard it is for you to do routine tasks. Your doctor may also want to know if you have changed the way you do a task because of a health problem. Your doctor may watch how you:  Walk back and forth.  Keep your balance while you stand or walk.  Move from sitting to  standing or from a bed to a chair.  Button or unbutton a shirt or sweater.  Remove and put on your shoes.  It's common to feel a little worried or anxious if you find you can't do all the things you used to be able to do. Talking with your doctor about ADLs is a way to make sure you're as safe as possible and able to care for yourself as well as you can. You may want to bring a caregiver, friend, or family member to your checkup. They can help you talk to your doctor.  Follow-up care is a key part of your treatment and safety. Be sure to make and go to all appointments, and call your doctor if you are having problems. It's also a good idea to know your test results and keep a list of the medicines you take.  Current as of: October 24, 2023  Content Version: 14.3    2024 [employer].   Care instructions adapted under license by your healthcare professional. If you have questions about a medical condition or this instruction, always ask your healthcare professional. [employer] disclaims any warranty or liability for your use of this information.    Preventing Falls: Care Instructions  Injuries and health problems such as trouble walking or poor eyesight can increase your risk of falling. So can some medicines. But there are things you can do to help prevent falls. You can exercise to get stronger. You can also arrange your home to make it safer.    Talk to your doctor about the medicines you take. Ask if any of them increase the risk of falls and whether they can be changed or stopped.   Try to exercise regularly. It can help improve your strength and balance. This can help lower your risk of falling.         Practice fall safety and prevention.   Wear low-heeled shoes that fit well and give your feet good support. Talk to your doctor if you have foot problems that make this hard.  Carry a cellphone or wear a medical alert device that you can use to call for help.  Use stepladders instead of  "chairs to reach high objects. Don't climb if you're at risk for falls. Ask for help, if needed.  Wear the correct eyeglasses, if you need them.        Make your home safer.   Remove rugs, cords, clutter, and furniture from walkways.  Keep your house well lit. Use night-lights in hallways and bathrooms.  Install and use sturdy handrails on stairways.  Wear nonskid footwear, even inside. Don't walk barefoot or in socks without shoes.        Be safe outside.   Use handrails, curb cuts, and ramps whenever possible.  Keep your hands free by using a shoulder bag or backpack.  Try to walk in well-lit areas. Watch out for uneven ground, changes in pavement, and debris.  Be careful in the winter. Walk on the grass or gravel when sidewalks are slippery. Use de-icer on steps and walkways. Add non-slip devices to shoes.    Put grab bars and nonskid mats in your shower or tub and near the toilet. Try to use a shower chair or bath bench when bathing.   Get into a tub or shower by putting in your weaker leg first. Get out with your strong side first. Have a phone or medical alert device in the bathroom with you.   Where can you learn more?  Go to https://www.[employer].net/patiented  Enter G117 in the search box to learn more about \"Preventing Falls: Care Instructions.\"  Current as of: July 31, 2024  Content Version: 14.3    2024 [employer].   Care instructions adapted under license by your healthcare professional. If you have questions about a medical condition or this instruction, always ask your healthcare professional. [employer] disclaims any warranty or liability for your use of this information.    Hearing Loss: Care Instructions  Overview     Hearing loss is a sudden or slow decrease in how well you hear. It can range from slight to profound. Permanent hearing loss can occur with aging. It also can happen when you are exposed long-term to loud noise. Examples include listening to loud music, riding " motorcycles, or being around other loud machines.  Hearing loss can affect your work and home life. It can make you feel lonely or depressed. You may feel that you have lost your independence. But hearing aids and other devices can help you hear better and feel connected to others.  Follow-up care is a key part of your treatment and safety. Be sure to make and go to all appointments, and call your doctor if you are having problems. It's also a good idea to know your test results and keep a list of the medicines you take.  How can you care for yourself at home?  Avoid loud noises whenever possible. This helps keep your hearing from getting worse.  Always wear hearing protection around loud noises.  Wear a hearing aid as directed.  A professional can help you pick a hearing aid that will work best for you.  You can also get hearing aids over the counter for mild to moderate hearing loss.  Have hearing tests as your doctor suggests. They can show whether your hearing has changed. Your hearing aid may need to be adjusted.  Use other devices as needed. These may include:  Telephone amplifiers and hearing aids that can connect to a television, stereo, radio, or microphone.  Devices that use lights or vibrations. These alert you to the doorbell, a ringing telephone, or a baby monitor.  Television closed-captioning. This shows the words at the bottom of the screen. Most new TVs can do this.  TTY (text telephone). This lets you type messages back and forth on the telephone instead of talking or listening. These devices are also called TDD. When messages are typed on the keyboard, they are sent over the phone line to a receiving TTY. The message is shown on a monitor.  Use text messaging, social media, and email if it is hard for you to communicate by telephone.  Try to learn a listening technique called speechreading. It is not lipreading. You pay attention to people's gestures, expressions, posture, and tone of voice. These  "clues can help you understand what a person is saying. Face the person you are talking to, and have them face you. Make sure the lighting is good. You need to see the other person's face clearly.  Think about counseling if you need help to adjust to your hearing loss.  When should you call for help?  Watch closely for changes in your health, and be sure to contact your doctor if:    You think your hearing is getting worse.     You have new symptoms, such as dizziness or nausea.   Where can you learn more?  Go to https://www.[employer].net/patiented  Enter R798 in the search box to learn more about \"Hearing Loss: Care Instructions.\"  Current as of: September 27, 2023  Content Version: 14.3    2024 [employer].   Care instructions adapted under license by your healthcare professional. If you have questions about a medical condition or this instruction, always ask your healthcare professional. [employer] disclaims any warranty or liability for your use of this information.    Learning About Depression Screening  What is depression screening?  Depression screening is a way to see if you have depression symptoms. It may be done by a doctor or counselor. It's often part of a routine checkup. That's because your mental health is just as important as your physical health.  Depression is a mental health condition that affects how you feel, think, and act. You may:  Have less energy.  Lose interest in your daily activities.  Feel sad and grouchy for a long time.  Depression is very common. It affects people of all ages.  Many things can lead to depression. Some people become depressed after they have a stroke or find out they have a major illness like cancer or heart disease. The death of a loved one or a breakup may lead to depression. It can run in families. Most experts believe that a combination of inherited genes and stressful life events can cause it.  What happens during screening?  You may be " "asked to fill out a form about your depression symptoms. You and the doctor will discuss your answers. The doctor may ask you more questions to learn more about how you think, act, and feel.  What happens after screening?  If you have symptoms of depression, your doctor will talk to you about your options.  Doctors usually treat depression with medicines or counseling. Often, combining the two works best. Many people don't get help because they think that they'll get over the depression on their own. But people with depression may not get better unless they get treatment.  The cause of depression is not well understood. There may be many factors involved. But if you have depression, it's not your fault.  A serious symptom of depression is thinking about death or suicide. If you or someone you care about talks about this or about feeling hopeless, get help right away.  It's important to know that depression can be treated. Medicine, counseling, and self-care may help.  Where can you learn more?  Go to https://www.[employer].net/patiented  Enter T185 in the search box to learn more about \"Learning About Depression Screening.\"  Current as of: July 31, 2024  Content Version: 14.3    2024 [email or web address].   Care instructions adapted under license by your healthcare professional. If you have questions about a medical condition or this instruction, always ask your healthcare professional. agreement24 [email or web address] disclaims any warranty or liability for your use of this information.         Leeann Brown is a 74 year old, presenting for the following:  Annual Visit (AWV) and Results (FIT Testing )        1/21/2025     8:23 AM   Additional Questions   Roomed by Carla Hobbs         HPI    Here to establish care.    Has stopped regular exercise.  Did pool therapy at [facility]- trying to get friend to go with her.  Trying to get back into this.       Health Care Directive  Patient has a Health Care Directive on " file  Advance care planning document is on file but is outdated.  Patient encouraged to update.      1/20/2025   General Health   How would you rate your overall physical health? Good   Feel stress (tense, anxious, or unable to sleep) Only a little   (!) STRESS CONCERN      1/20/2025   Nutrition   Diet: Regular (no restrictions)         1/20/2025   Exercise   Days per week of moderate/strenous exercise 0 days   Average minutes spent exercising at this level 0 min   (!) EXERCISE CONCERN      1/20/2025   Social Factors   Frequency of gathering with friends or relatives Once a week   Worry food won't last until get money to buy more No   Food not last or not have enough money for food? No   Do you have housing? (Housing is defined as stable permanent housing and does not include staying ouside in a car, in a tent, in an abandoned building, in an overnight shelter, or couch-surfing.) Yes   Are you worried about losing your housing? No   Lack of transportation? No   Unable to get utilities (heat,electricity)? No         1/21/2025   Fall Risk   Gait Speed Test (Document in seconds) 3.66   Gait Speed Test Interpretation Less than or equal to 5.00 seconds - PASS          1/20/2025   Activities of Daily Living- Home Safety   Needs help with the following daily activites Money management   Safety concerns in the home Throw rugs in the hallway         1/20/2025   Dental   Dentist two times every year? Yes         1/20/2025   Hearing Screening   Hearing concerns? (!) I NEED TO ASK PEOPLE TO SPEAK UP OR REPEAT THEMSELVES.         1/20/2025   Driving Risk Screening   Patient/family members have concerns about driving No         1/20/2025   General Alertness/Fatigue Screening   Have you been more tired than usual lately? No         1/20/2025   Urinary Incontinence Screening   Bothered by leaking urine in past 6 months No          Today's PHQ-9 Score:       1/20/2025     9:26 AM   PHQ-9 SCORE   PHQ-9 Total Score MyChart 7 (Mild  depression)   PHQ-9 Total Score 7        Patient-reported         2025   Substance Use   Alcohol more than 3/day or more than 7/wk Not Applicable   Do you have a current opioid prescription? No   How severe/bad is pain from 1 to 10? 2/10   Do you use any other substances recreationally? No     Social History     Tobacco Use    Smoking status: Former     Current packs/day: 0.00     Average packs/day: 1 pack/day for 20.0 years (20.0 ttl pk-yrs)     Types: Cigarettes     Start date: 1966     Quit date: 1986     Years since quittin.1    Smokeless tobacco: Never   Vaping Use    Vaping status: Never Used   Substance Use Topics    Alcohol use: Not Currently     Comment: none since     Drug use: No          Mammogram Screening - After age 74- determine frequency with patient based on health status, life expectancy and patient goals    ASCVD Risk   The 10-year ASCVD risk score (Joe ANDERSON, et al., 2019) is: 12.9%    Values used to calculate the score:      Age: 74 years      Sex: Female      Is Non- : No      Diabetic: No      Tobacco smoker: No      Systolic Blood Pressure: 107 mmHg      Is BP treated: No      HDL Cholesterol: 51 mg/dL      Total Cholesterol: 219 mg/dL            Reviewed and updated as needed this visit by Provider       Med Hx              Past Medical History:   Diagnosis Date    Anxiety     Arthritis     scleroderma    Depressive disorder     Herpes simplex without mention of complication     Interstitial lung disease (H)     scleroderma ILD, dx by chest CT 2017    Lung nodules     4 mm LLL and RLL     Migraine     loss of speech and comprehension, has had w/u and is complex migraine, last one about 2017    Raynaud phenomenon     Systemic sclerosis with limited cutaneous involvement (H) 2014    Dx ~, Scl-70 and anti-centromere antibody neg, +RF     Past Surgical History:   Procedure Laterality Date    HYSTEROSCOPIC  PLACEMENT CONTRACEPTIVE DEVICE       Lab work is in process  Current providers sharing in care for this patient include:  Patient Care Team:  Radha Ventura DO as PCP - General (Internal Medicine)  Prachi Grant MD as MD (Family Medicine - Sports Medicine)  Doris Robertson MD as MD (Dermatology)  Petros Gunn MD as MD (Rheumatology)  Dorene Lieberman [facility] as Pharmacist (Pharmacist)  Rangel Garcia MD as Referring Physician (Family Practice)  Getachew Abdi MD as MD (Otolaryngology)  Getachew Abdi MD as MD (Otolaryngology)  Tiffanie Gomez MD as Assigned Pulmonology Provider  Rangel Garcia MD as Assigned PCP  Milla Jiménez APRN CNP as Nurse Practitioner (Internal Medicine)  Petros Gunn MD as Assigned Rheumatology Provider  Serge Parham MD as MD (Ophthalmology)  Serge Parham MD as Assigned Surgical Provider  Sae Acevedo MD as MD (Dermatology)  Cecilia Aolnso MD as MD (Internal Medicine)  Regina Teixeira [facility] as Assigned Behavioral Health Provider    The following health maintenance items are reviewed in Epic and correct as of today:  Health Maintenance   Topic Date Due    DEPRESSION ACTION PLAN  Never done    DEPRESSION 6 MO INDEX REPEAT PHQ-9  02/04/2025    PHQ-9  07/21/2025    MEDICARE ANNUAL WELLNESS VISIT  01/21/2026    ANNUAL REVIEW OF HM ORDERS  01/21/2026    FALL RISK ASSESSMENT  01/21/2026    GLUCOSE  01/20/2028    LIPID  08/16/2029    ADVANCE CARE PLANNING  01/21/2030    DTAP/TDAP/TD IMMUNIZATION (4 - Td or Tdap) 06/25/2031    DEXA  06/14/2034    HEPATITIS C SCREENING  Completed    INFLUENZA VACCINE  Completed    Pneumococcal Vaccine: 50+ Years  Completed    ZOSTER IMMUNIZATION  Completed    RSV VACCINE  Completed    HPV IMMUNIZATION  Aged Out    MENINGITIS IMMUNIZATION  Aged Out    RSV MONOCLONAL ANTIBODY  Aged Out    MAMMO SCREENING  Discontinued    COLORECTAL CANCER SCREENING  Discontinued     "COVID-19 Vaccine  Discontinued            Objective    Exam  /71 (BP Location: Right arm, Patient Position: Sitting, Cuff Size: Adult Regular)   Pulse 93   Temp 97.4  F (36.3  C) (Temporal)   Resp 16   Ht 1.66 m (5' 5.35\")   Wt 59 kg (130 lb)   SpO2 99%   BMI 21.40 kg/m     Estimated body mass index is 21.4 kg/m  as calculated from the following:    Height as of this encounter: 1.66 m (5' 5.35\").    Weight as of this encounter: 59 kg (130 lb).    Physical Exam  GENERAL: alert and no distress  EYES: Eyes grossly normal to inspection, PERRL and conjunctivae and sclerae normal  HENT: ear canals and TM's normal, nose and mouth without ulcers or lesions  NECK: no adenopathy, no asymmetry, masses, or scars  RESP: lungs clear to auscultation - no rales, rhonchi or wheezes  CV: regular rate and rhythm, normal S1 S2, no S3 or S4, no murmur, click or rub, no peripheral edema  MS: no gross musculoskeletal defects noted, no edema  SKIN: no suspicious lesions or rashes  NEURO: Normal strength and tone, mentation intact and speech normal  PSYCH: mentation appears normal, affect normal/bright        1/21/2025   Mini Cog   Clock Draw Score 2 Normal   3 Item Recall 3 objects recalled   Mini Cog Total Score 5              Signed Electronically by: Radha Ventura DO    Answers submitted by the patient for this visit:  Patient Health Questionnaire (Submitted on 1/20/2025)  If you checked off any problems, how difficult have these problems made it for you to do your work, take care of things at home, or get along with other people?: Somewhat difficult  PHQ9 TOTAL SCORE: 7    "

## 2025-01-22 ENCOUNTER — PATIENT OUTREACH (OUTPATIENT)
Dept: CARE COORDINATION | Facility: CLINIC | Age: 75
End: 2025-01-22
Payer: COMMERCIAL

## 2025-01-22 LAB
TESTOST FREE SERPL-MCNC: NORMAL PG/ML
TESTOST SERPL-MCNC: 13 NG/DL (ref 8–60)

## 2025-01-23 LAB — SHBG SERPL-SCNC: 77 NMOL/L (ref 30–135)

## 2025-02-01 LAB — NONINV COLON CA DNA+OCC BLD SCRN STL QL: NEGATIVE

## 2025-02-06 ENCOUNTER — MYC MEDICAL ADVICE (OUTPATIENT)
Dept: DERMATOLOGY | Facility: CLINIC | Age: 75
End: 2025-02-06
Payer: COMMERCIAL

## 2025-02-06 NOTE — TELEPHONE ENCOUNTER
RE:Can a patient continue using her over the counter hair styling products in addition to what you prescribed: ketoconazole shampoo and the scalp oil    Dr. Weems's response: Yes!  But have the patient tell us what products she is going to use     Maria K. Hordinsky MD [employer] Professor  Department of Dermatology  [address] [address]    Patient would like to continue use of: [employer] hair products: a cream rinse and styling products

## 2025-02-06 NOTE — TELEPHONE ENCOUNTER
Assessment & Plan: 1/20/2025  # Chronic Telogen Effluvium with scalp dermatitis  - Labs: CBC, CMP, Vit D, TSH, Iron studies (ferritin, iron), androgen studies (free and total testosterone, DHEAS)   - Start ketoconazole 2% shampoo in the shower 3x/week. If irritating, may alternate with Head and Shoulders.   - Start applying fluocinolone (derma-smooth) oil on the scalp twice weekly.

## 2025-02-11 ENCOUNTER — ANCILLARY PROCEDURE (OUTPATIENT)
Dept: BONE DENSITY | Facility: CLINIC | Age: 75
End: 2025-02-11
Attending: INTERNAL MEDICINE
Payer: COMMERCIAL

## 2025-02-11 DIAGNOSIS — Z78.0 ENCOUNTER FOR OSTEOPOROSIS SCREENING IN ASYMPTOMATIC POSTMENOPAUSAL PATIENT: ICD-10-CM

## 2025-02-11 DIAGNOSIS — Z13.820 ENCOUNTER FOR OSTEOPOROSIS SCREENING IN ASYMPTOMATIC POSTMENOPAUSAL PATIENT: ICD-10-CM

## 2025-02-11 PROCEDURE — 77080 DXA BONE DENSITY AXIAL: CPT | Performed by: INTERNAL MEDICINE

## 2025-02-14 NOTE — RESULT ENCOUNTER NOTE
Dear Stephanie,    Thank you for getting your DEXA scan done.  It unfortunately does still show osteoporosis (thinning of the bones) which increases your risk for a serious fracture- it is a little worse at your left femoral neck (hip) than it was in 2019.  I would recommend we talk about a medication for this- and I can't remember how in depth we got when I saw you last.  If this is something you would like to talk more about, could you please schedule an appointment with me- in person or virtual is fine with me based on your preference.    Kind regards,    Mel Ventura, DO  Internal Medicine - Pediatrics Physician  [facility]

## 2025-02-20 ENCOUNTER — MYC MEDICAL ADVICE (OUTPATIENT)
Dept: FAMILY MEDICINE | Facility: CLINIC | Age: 75
End: 2025-02-20
Payer: COMMERCIAL

## 2025-02-24 NOTE — TELEPHONE ENCOUNTER
Writer replied to patient via [employer]. E-visit advised.  FAISAL JosephN, PHN, AMB-BC (she/her)  [facility] RN

## 2025-03-03 ENCOUNTER — E-VISIT (OUTPATIENT)
Dept: FAMILY MEDICINE | Facility: CLINIC | Age: 75
End: 2025-03-03
Payer: COMMERCIAL

## 2025-03-03 DIAGNOSIS — Z71.84 ENCOUNTER FOR COUNSELING FOR TRAVEL: Primary | ICD-10-CM

## 2025-03-03 RX ORDER — ATOVAQUONE AND PROGUANIL HYDROCHLORIDE 250; 100 MG/1; MG/1
1 TABLET, FILM COATED ORAL DAILY
Qty: 38 TABLET | Refills: 0 | Status: SHIPPED | OUTPATIENT
Start: 2025-03-03

## 2025-03-03 RX ORDER — AZITHROMYCIN 500 MG/1
500 TABLET, FILM COATED ORAL DAILY
Qty: 3 TABLET | Refills: 0 | Status: SHIPPED | OUTPATIENT
Start: 2025-03-03 | End: 2025-03-06

## 2025-03-03 RX ORDER — ATOVAQUONE AND PROGUANIL HYDROCHLORIDE 250; 100 MG/1; MG/1
1 TABLET, FILM COATED ORAL DAILY
Qty: 35 TABLET | Refills: 0 | Status: SHIPPED | OUTPATIENT
Start: 2025-03-03 | End: 2025-03-03

## 2025-03-03 NOTE — PATIENT INSTRUCTIONS
I am glad you are doing well. I have refilled your medication- you are already up to date with your typhoid vaccine.  I wasn't sure how long your trip is- I prescribe enough Malarone for a 2 week trip (start 2 days before you leave, continue 7 days when you return)- but please let me know if I should adjust the prescription.  :  Orders Placed This Encounter   Medications     atovaquone-proguanil (MALARONE) 250-100 MG tablet     Sig: Take 1 tablet by mouth daily. Start 2 days before travel and continue 7 days after return.     Dispense:  35 tablet     Refill:  0     azithromycin (ZITHROMAX) 500 MG tablet     Sig: Take 1 tablet (500 mg) by mouth daily for 3 doses.     Dispense:  3 tablet     Refill:  0        View your full visit summary for details by clicking on the link below. Your pharmacist will be able to address any questions you may have about the medication.      Thank you for choosing us for your care.

## 2025-04-14 ENCOUNTER — DOCUMENTATION ONLY (OUTPATIENT)
Dept: PSYCHOLOGY | Facility: CLINIC | Age: 75
End: 2025-04-14
Payer: COMMERCIAL

## 2025-04-14 NOTE — PROGRESS NOTES
Discharge Summary  Multiple Sessions    Client Name: Stephanie Bhatia MRN#: [id number] YOB: 1950      Intake / Discharge Date: July 8, 2024-August 17, 2024      DSM5 Diagnoses: (Sustained by DSM5 Criteria Listed Above)  Diagnoses: 296.35 (F33.41)  Major Depressive Disorder, Recurrent Episode, In partial remission _  Psychosocial & Contextual Factors: Aging, relationship challenges  PROMIS-10 Scores        7/8/2024     9:43 AM   PROMIS-10 Total Score w/o Sub Scores   PROMIS TOTAL - SUBSCORES 21    21             Presenting Concern:  Patient was seeking out services to support her and navigating depression, specifically motivational challenges      Reason for Discharge:  Patient chose to discontinue services as she no longer felt that they were necessary      Disposition at Time of Last Encounter:   Comments:   Patient was struggling with her levels of motivation.  She had recently been given permission to discuss continue her medication of Wellbutrin     Risk Management:   Client has had a history of suicidal ideation: Passive, wondering what the world would be like if she was no longer here  Recommended that patient call 911 or go to the local ED should there be a change in any of these risk factors      Referred To:  Should patient want to resume services at any time she should reach out to behavioral access to schedule follow-up appointments        Regina Teixeira, [facility] HospitalSW   4/14/2025

## 2025-04-17 ENCOUNTER — OFFICE VISIT (OUTPATIENT)
Dept: PULMONOLOGY | Facility: CLINIC | Age: 75
End: 2025-04-17
Attending: INTERNAL MEDICINE
Payer: COMMERCIAL

## 2025-04-17 VITALS
SYSTOLIC BLOOD PRESSURE: 96 MMHG | HEART RATE: 82 BPM | DIASTOLIC BLOOD PRESSURE: 61 MMHG | OXYGEN SATURATION: 98 % | BODY MASS INDEX: 21.29 KG/M2 | HEIGHT: 65 IN | WEIGHT: 127.8 LBS

## 2025-04-17 DIAGNOSIS — J84.9 ILD (INTERSTITIAL LUNG DISEASE) (H): Primary | ICD-10-CM

## 2025-04-17 DIAGNOSIS — J84.9 ILD (INTERSTITIAL LUNG DISEASE) (H): ICD-10-CM

## 2025-04-17 LAB
DLCOUNC-%PRED-PRE: 100 %
DLCOUNC-PRE: 18.8 ML/MIN/MMHG
DLCOUNC-PRED: 18.76 ML/MIN/MMHG
ERV-%PRED-PRE: 90 %
ERV-PRE: 0.84 L
ERV-PRED: 0.93 L
EXPTIME-PRE: 5.32 SEC
FEF2575-%PRED-PRE: 181 %
FEF2575-PRE: 2.87 L/SEC
FEF2575-PRED: 1.58 L/SEC
FEFMAX-%PRED-PRE: 133 %
FEFMAX-PRE: 6.9 L/SEC
FEFMAX-PRED: 5.18 L/SEC
FEV1-%PRED-PRE: 123 %
FEV1-PRE: 2.37 L
FEV1FEV6-PRE: 85 %
FEV1FEV6-PRED: 78 %
FEV1FVC-PRE: 85 %
FEV1FVC-PRED: 78 %
FEV1SVC-PRE: 89 %
FEV1SVC-PRED: 63 %
FIFMAX-PRE: 3.51 L/SEC
FRCPLETH-%PRED-PRE: 100 %
FRCPLETH-PRE: 2.98 L
FRCPLETH-PRED: 2.95 L
FVC-%PRED-PRE: 113 %
FVC-PRE: 2.8 L
FVC-PRED: 2.47 L
IC-%PRED-PRE: 97 %
IC-PRE: 1.81 L
IC-PRED: 1.85 L
RVPLETH-%PRED-PRE: 97 %
RVPLETH-PRE: 2.14 L
RVPLETH-PRED: 2.19 L
TLCPLETH-%PRED-PRE: 95 %
TLCPLETH-PRE: 4.79 L
TLCPLETH-PRED: 5.02 L
VA-%PRED-PRE: 90 %
VA-PRE: 4.11 L
VC-%PRED-PRE: 86 %
VC-PRE: 2.65 L
VC-PRED: 3.06 L

## 2025-04-17 PROCEDURE — G0463 HOSPITAL OUTPT CLINIC VISIT: HCPCS | Performed by: INTERNAL MEDICINE

## 2025-04-17 ASSESSMENT — PAIN SCALES - GENERAL: PAINLEVEL_OUTOF10: NO PAIN (0)

## 2025-04-17 NOTE — LETTER
"4/17/2025      Stephanie Bhatia  [address]  [facility] [address]      Dear Colleague,    Thank you for referring your patient, Stephanie Bhatia, to the [facility] [place]. Please see a copy of my visit note below.    [facility]    Reason for Visit  Stephanie Bhatia is a 74 year old female who is being seen for RECHECK (Return Interstitial Lung )    HPI  Stephanie Bhatia is a 74 year old who is being seen for f/u of scleroderma ILD.  She was initially diagnosed with scleroderma in about 2009 or 2010.  Scleroderma ILD was first diagnosed on chest CT scan in 2/2017.  She also had a few lung nodules that were small, 4 mm and did not require follow-up as she was low risk.  She started tocilizumab in approximately September 2022 for progressive drop in PFT and progression of ILD on chest CT.  She opted for tocilizumab instead of mycophenolate or nintedanib.     Today, Stephanie reports feeling \"fine\" since her last appointment (Dec 2024).  Her dyspnea with exertion \"comes and goes,\" and she feels short of breath after 1-2 flights of stairs, depending on how fast she goes.  She rarely coughd.  She returned from [facility] 3-days ago, where she was walking >1-mile 5-days/week.  She plans to continue walking regularly at home and to start water therapy at [facility].  She has a sister and friend that go as well.    She continues taking Tocilizumab subQ 1x/week.  She took atovaquone 3-days before her trip, throughout, and is taking it for 5-days since returning for malaria prophylaxis.  She also takes omeprazole daily to manage GERD.    She endorses dizziness, which she says she has experienced for years.  She also said she had mild, intermittent diarrhea in [place].    She received a flu vaccine Fall 2024 and an updated COVID booster last month.    ROS was negative for headaches, vision changes, chest pain, heart palpitations, " constipation, rashes, and new joint pain/aches.          Current Outpatient Medications   Medication Sig Dispense Refill     atovaquone-proguanil (MALARONE) 250-100 MG tablet Take 1 tablet by mouth daily. Start 2 days before travel and continue 7 days after return. 38 tablet 0     Biotin 5000 MCG CAPS Take by mouth.       cyanocobalamin (VITAMIN B-12) 500 MCG tablet Take 500 mcg by mouth daily.       fluocinolone acetonide (DERMA SMOOTHE/FS BODY) 0.01 % external oil Apply to the scalp twice a week. Allow the medication to stay on the scalp for a minimum of 4 hours 118 mL 11     ibuprofen (ADVIL/MOTRIN) 200 MG tablet Take 200 mg by mouth every 4 hours as needed for mild pain       ketoconazole (NIZORAL) 2 % external shampoo Apply to the scalp three times a week. Lather and let sit for a minimum of 1 minute and then rinse. 120 mL 11     omeprazole (PRILOSEC) 20 MG DR capsule Take 20 mg by mouth daily.       tocilizumab (ACTEMRA) 162 MG/0.9ML subcutaneous injection Inject 0.9 mLs (162 mg) Subcutaneous every 7 days 3.6 mL 11     vitamin B-Complex Take 1 tablet by mouth daily.       Vitamin D, Cholecalciferol, 25 MCG (1000 UT) CAPS Take by mouth.       No current facility-administered medications for this visit.     No Known Allergies  Past Medical History:   Diagnosis Date     Anxiety      Arthritis 1997    scleroderma     Depressive disorder 1997     Herpes simplex without mention of complication      Interstitial lung disease (H)     scleroderma ILD, dx by chest CT 5/2017     Lung nodules     4 mm LLL and RLL 2017     Migraine     loss of speech and comprehension, has had w/u and is complex migraine, last one about 2/2017     Raynaud phenomenon      Systemic sclerosis with limited cutaneous involvement (H) 12/02/2014    Dx ~2010, Scl-70 and anti-centromere antibody neg, +RF       Past Surgical History:   Procedure Laterality Date     HYSTEROSCOPIC PLACEMENT CONTRACEPTIVE DEVICE         Social History     Socioeconomic  History     Marital status: Single     Spouse name:      Number of children: 0     Years of education: Not on file     Highest education level: Master's degree (e.g., MA, MS, [place], MEd, MSW, DIANE)   Occupational History     Occupation:      Comment: [email or web address] - part time   Tobacco Use     Smoking status: Former     Current packs/day: 0.00     Average packs/day: 1 pack/day for 20.0 years (20.0 ttl pk-yrs)     Types: Cigarettes     Start date: 1966     Quit date: 1986     Years since quittin.4     Smokeless tobacco: Never   Vaping Use     Vaping status: Never Used   Substance and Sexual Activity     Alcohol use: Not Currently     Comment: none since      Drug use: No     Sexual activity: Not Currently     Partners: Male   Other Topics Concern     Parent/sibling w/ CABG, MI or angioplasty before 65F 55M? No   Social History Narrative    .  No children. Sister lives in [place].  Part time at [employer].   in the past.     Possible asbestos exposure from basement pipes in childhood home.  Lived in  [place] [address].  Denies exposure to pet birds, hot tubs.   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     Social Drivers of Health     Financial Resource Strain: Low Risk  (2025)    Financial Resource Strain      Within the past 12 months, have you or your family members you live with been unable to get utilities (heat, electricity) when it was really needed?: No   Food Insecurity: Low Risk  (2025)    Food Insecurity      Within the past 12 months, did you worry that your food would run out before you got money to buy more?: No      Within the past 12 months, did the food you bought just not last and you didn t have money to get more?: No   Transportation Needs: Low Risk  (2025)    Transportation Needs      Within the past 12 months, has lack of transportation kept you from medical appointments, getting your  medicines, non-medical meetings or appointments, work, or from getting things that you need?: No   Physical Activity: Inactive (1/20/2025)    Exercise Vital Sign      Days of Exercise per Week: 0 days      Minutes of Exercise per Session: 0 min   Stress: No Stress Concern Present (1/20/2025)    [place] [employer] - Occupational Stress Questionnaire      Feeling of Stress : Only a little   Social Connections: Unknown (1/20/2025)    Social Connection and Isolation Panel [NHANES]      Frequency of Communication with Friends and Family: Not on file      Frequency of Social Gatherings with Friends and Family: Once a week      Attends Baptism Services: Not on file      Active Member of Clubs or Organizations: Not on file      Attends Club or Organization Meetings: Not on file      Marital Status: Not on file   Interpersonal Safety: Low Risk  (1/21/2025)    Interpersonal Safety      Do you feel physically and emotionally safe where you currently live?: Yes      Within the past 12 months, have you been hit, slapped, kicked or otherwise physically hurt by someone?: No      Within the past 12 months, have you been humiliated or emotionally abused in other ways by your partner or ex-partner?: No   Housing Stability: Low Risk  (1/20/2025)    Housing Stability      Do you have housing? : Yes      Are you worried about losing your housing?: No       Family History   Problem Relation Age of Onset     C.A.D. Father         a efw small heart attacks     Neurologic Disorder Father         dementia 70s     Coronary Artery Disease Father      Neurologic Disorder Mother         dementia     Depression Mother      Cerebrovascular Disease Maternal Grandmother         ,     Other Cancer Paternal Grandmother      Rheumatoid Arthritis Sister      Hypertension Sister      Dementia Paternal Cousin      Dementia Paternal Uncle         60s     Depression Sister      Breast Cancer No family hx of      Cancer - colorectal  "No family hx of      Diabetes No family hx of      Cancer No family hx of         no skin cancer     Interstitial Lung Disease No family hx of      Melanoma No family hx of      Skin Cancer No family hx of      Glaucoma No family hx of      Macular Degeneration No family hx of            Vitals: BP 96/61   Pulse 82   Ht 1.66 m (5' 5.35\")   Wt 58 kg (127 lb 12.8 oz)   SpO2 98%   BMI 21.04 kg/m      Exam:   GENERAL APPEARANCE: Well developed, well nourished, alert, and in no apparent distress.  RESP: Good air flow throughout.  +Bibasilar inspiratory crackles.  +1 inspiratory squeak in LLL.  No rhonchi.  No wheezes.  CV: Normal S1, S2, regular rhythm, normal rate.  No murmur.  No LE edema.   MS: Extremities normal.  No clubbing.  No cyanosis.  No digital ulcers.  SKIN: No rash on limited exam.  NEURO: Mentation intact, speech normal.  PSYCH: mentation appears normal and affect normal/bright.      Results:  Recent Results (from the past week)   General PFT Lab (Please always keep checked)    Collection Time: 04/17/25  7:02 AM   Result Value Ref Range    FVC-Pred 2.47 L    FVC-Pre 2.80 L    FVC-%Pred-Pre 113 %    FEV1-Pre 2.37 L    FEV1-%Pred-Pre 123 %    FEV1FVC-Pred 78 %    FEV1FVC-Pre 85 %    FEFMax-Pred 5.18 L/sec    FEFMax-Pre 6.90 L/sec    FEFMax-%Pred-Pre 133 %    FEF2575-Pred 1.58 L/sec    FEF2575-Pre 2.87 L/sec    ASN8823-%Pred-Pre 181 %    ExpTime-Pre 5.32 sec    FIFMax-Pre 3.51 L/sec    VC-Pred 3.06 L    VC-Pre 2.65 L    VC-%Pred-Pre 86 %    IC-Pred 1.85 L    IC-Pre 1.81 L    IC-%Pred-Pre 97 %    ERV-Pred 0.93 L    ERV-Pre 0.84 L    ERV-%Pred-Pre 90 %    FEV1FEV6-Pred 78 %    FEV1FEV6-Pre 85 %    FRCPleth-Pred 2.95 L    FRCPleth-Pre 2.98 L    FRCPleth-%Pred-Pre 100 %    RVPleth-Pred 2.19 L    RVPleth-Pre 2.14 L    RVPleth-%Pred-Pre 97 %    TLCPleth-Pred 5.02 L    TLCPleth-Pre 4.79 L    TLCPleth-%Pred-Pre 95 %    DLCOunc-Pred 18.76 ml/min/mmHg    DLCOunc-Pre 18.80 ml/min/mmHg    DLCOunc-%Pred-Pre 100 %    " VA-Pre 4.11 L    VA-%Pred-Pre 90 %    FEV1SVC-Pred 63 %    FEV1SVC-Pre 89 %       We reviewed the pulmonary function test that was performed today, which shows normal spirometry, lung volumes and diffusion.  Lung function has been stable since starting tocilizumab.    Labs were ordered but not drawn for this visit today.  We will review new labs as soon as they are taken.    We reviewed results with the patient.        Assessment and plan:  Stephanie Bhatia is a 74 year old who is being seen for f/u of scleroderma ILD.    1.  Scleroderma ILD.  She has mild dyspnea on exertion that is stable.  PFT has been stable since starting tocilizumab approximately 3 years ago.  We will continue the current dose of 162 mg subcu weekly.  We see no reason to change to or add on mycophenolate or nintedanib at this time.  We discussed ongoing trials of other antifibrotic medications that have shown benefit.  She does not need additional medications at this time, but these might be options in the future if her scleroderma ILD worsens.  Given her response to tocilizumab, we are hopeful that her ILD will remain stable.  She will return for follow-up in 4-months.  2.  High risk medication immunosuppression monitoring.  Labs were not taken for her visit today.  They will be taken within 1-week, and we will review them when they are available.  We will recheck labs at her next visit to monitor tocilizumab.  We will also recheck a cholesterol level.  3.  GERD.  She is on daily omeprazole.  4.  ILD-PRO registry.  She is enrolled.  5. Health care maintenance.  She should get the COVID vaccine every 6 months.  Santos Chino, Medical Student  [employer]    I saw and evaluated the patient with Santos Chino MD/PhD student, confirming key aspects of the history and exam. I personally reviewed the recent PFT and other labs. The above note reflects our detailed discussion of the findings, assessment and plan.   I reviewed today's PFT: normal and stable.  I reviewed today's labs: normal.   The longitudinal plan of care for the diagnosis(es)/condition(s) as documented were addressed during this visit. Due to the added complexity in care, I will continue to support Stephanie in the subsequent management and with ongoing continuity of care.  I spent 38 minutes reviewing chart, talking with and examining patient, reviewing test results, formulating plan and documentation on the day of the encounter (excluding PFT review).                  Again, thank you for allowing me to participate in the care of your patient.        Sincerely,        Tiffanie Gomez MD    Electronically signed

## 2025-04-17 NOTE — PROGRESS NOTES
"[facility]    Reason for Visit  Stephanie Bhatia is a 74 year old female who is being seen for RECHECK (Return Interstitial Lung )    HPI  Stephanie Bhatia is a 74 year old who is being seen for f/u of scleroderma ILD.  She was initially diagnosed with scleroderma in about 2009 or 2010.  Scleroderma ILD was first diagnosed on chest CT scan in 2/2017.  She also had a few lung nodules that were small, 4 mm and did not require follow-up as she was low risk.  She started tocilizumab in approximately September 2022 for progressive drop in PFT and progression of ILD on chest CT.  She opted for tocilizumab instead of mycophenolate or nintedanib.     Today, Stephanie reports feeling \"fine\" since her last appointment (Dec 2024).  Her dyspnea with exertion \"comes and goes,\" and she feels short of breath after 1-2 flights of stairs, depending on how fast she goes.  She rarely coughd.  She returned from [facility] 3-days ago, where she was walking >1-mile 5-days/week.  She plans to continue walking regularly at home and to start water therapy at [facility].  She has a sister and friend that go as well.    She continues taking Tocilizumab subQ 1x/week.  She took atovaquone 3-days before her trip, throughout, and is taking it for 5-days since returning for malaria prophylaxis.  She also takes omeprazole daily to manage GERD.    She endorses dizziness, which she says she has experienced for years.  She also said she had mild, intermittent diarrhea in [place].    She received a flu vaccine Fall 2024 and an updated COVID booster last month.    ROS was negative for headaches, vision changes, chest pain, heart palpitations, constipation, rashes, and new joint pain/aches.          Current Outpatient Medications   Medication Sig Dispense Refill    atovaquone-proguanil (MALARONE) 250-100 MG tablet Take 1 tablet by mouth daily. Start 2 days before travel and continue 7 days after return. 38 tablet 0    " Biotin 5000 MCG CAPS Take by mouth.      cyanocobalamin (VITAMIN B-12) 500 MCG tablet Take 500 mcg by mouth daily.      fluocinolone acetonide (DERMA SMOOTHE/FS BODY) 0.01 % external oil Apply to the scalp twice a week. Allow the medication to stay on the scalp for a minimum of 4 hours 118 mL 11    ibuprofen (ADVIL/MOTRIN) 200 MG tablet Take 200 mg by mouth every 4 hours as needed for mild pain      ketoconazole (NIZORAL) 2 % external shampoo Apply to the scalp three times a week. Lather and let sit for a minimum of 1 minute and then rinse. 120 mL 11    omeprazole (PRILOSEC) 20 MG DR capsule Take 20 mg by mouth daily.      tocilizumab (ACTEMRA) 162 MG/0.9ML subcutaneous injection Inject 0.9 mLs (162 mg) Subcutaneous every 7 days 3.6 mL 11    vitamin B-Complex Take 1 tablet by mouth daily.      Vitamin D, Cholecalciferol, 25 MCG (1000 UT) CAPS Take by mouth.       No current facility-administered medications for this visit.     No Known Allergies  Past Medical History:   Diagnosis Date    Anxiety     Arthritis 1997    scleroderma    Depressive disorder 1997    Herpes simplex without mention of complication     Interstitial lung disease (H)     scleroderma ILD, dx by chest CT 5/2017    Lung nodules     4 mm LLL and RLL 2017    Migraine     loss of speech and comprehension, has had w/u and is complex migraine, last one about 2/2017    Raynaud phenomenon     Systemic sclerosis with limited cutaneous involvement (H) 12/02/2014    Dx ~2010, Scl-70 and anti-centromere antibody neg, +RF       Past Surgical History:   Procedure Laterality Date    HYSTEROSCOPIC PLACEMENT CONTRACEPTIVE DEVICE         Social History     Socioeconomic History    Marital status: Single     Spouse name:     Number of children: 0    Years of education: Not on file    Highest education level: Master's degree (e.g., MA, MS, [place], MEd, MSW, DIANE)   Occupational History    Occupation:      Comment: [employer] - part time    Tobacco Use    Smoking status: Former     Current packs/day: 0.00     Average packs/day: 1 pack/day for 20.0 years (20.0 ttl pk-yrs)     Types: Cigarettes     Start date: 1966     Quit date: 1986     Years since quittin.4    Smokeless tobacco: Never   Vaping Use    Vaping status: Never Used   Substance and Sexual Activity    Alcohol use: Not Currently     Comment: none since     Drug use: No    Sexual activity: Not Currently     Partners: Male   Other Topics Concern    Parent/sibling w/ CABG, MI or angioplasty before 65F 55M? No   Social History Narrative    .  No children. Sister lives in [place].  Part time at [employer].   in the past.     Possible asbestos exposure from basement pipes in childhood home.  Lived in  [facility] [id number].  Denies exposure to pet birds, hot tubs.  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     Social Drivers of Health     Financial Resource Strain: Low Risk  (2025)    Financial Resource Strain     Within the past 12 months, have you or your family members you live with been unable to get utilities (heat, electricity) when it was really needed?: No   Food Insecurity: Low Risk  (2025)    Food Insecurity     Within the past 12 months, did you worry that your food would run out before you got money to buy more?: No     Within the past 12 months, did the food you bought just not last and you didn t have money to get more?: No   Transportation Needs: Low Risk  (2025)    Transportation Needs     Within the past 12 months, has lack of transportation kept you from medical appointments, getting your medicines, non-medical meetings or appointments, work, or from getting things that you need?: No   Physical Activity: Inactive (2025)    Exercise Vital Sign     Days of Exercise per Week: 0 days     Minutes of Exercise per Session: 0 min   Stress: No Stress Concern Present (2025)    Intelligent Apps (mytaxi)  "of Occupational Health - Occupational Stress Questionnaire     Feeling of Stress : Only a little   Social Connections: Unknown (1/20/2025)    Social Connection and Isolation Panel [NHANES]     Frequency of Communication with Friends and Family: Not on file     Frequency of Social Gatherings with Friends and Family: Once a week     Attends [employer] Services: Not on file     Active Member of Clubs or Organizations: Not on file     Attends Club or Organization Meetings: Not on file     Marital Status: Not on file   Interpersonal Safety: Low Risk  (1/21/2025)    Interpersonal Safety     Do you feel physically and emotionally safe where you currently live?: Yes     Within the past 12 months, have you been hit, slapped, kicked or otherwise physically hurt by someone?: No     Within the past 12 months, have you been humiliated or emotionally abused in other ways by your partner or ex-partner?: No   Housing Stability: Low Risk  (1/20/2025)    Housing Stability     Do you have housing? : Yes     Are you worried about losing your housing?: No       Family History   Problem Relation Age of Onset    C.A.D. Father         a efw small heart attacks    Neurologic Disorder Father         dementia 70s    Coronary Artery Disease Father     Neurologic Disorder Mother         dementia    Depression Mother     Cerebrovascular Disease Maternal Grandmother         ,    Other Cancer Paternal Grandmother     Rheumatoid Arthritis Sister     Hypertension Sister     Dementia Paternal Cousin     Dementia Paternal Uncle         60s    Depression Sister     Breast Cancer No family hx of     Cancer - colorectal No family hx of     Diabetes No family hx of     Cancer No family hx of         no skin cancer    Interstitial Lung Disease No family hx of     Melanoma No family hx of     Skin Cancer No family hx of     Glaucoma No family hx of     Macular Degeneration No family hx of            Vitals: BP 96/61   Pulse 82   Ht 1.66 m (5' 5.35\")   Wt " 58 kg (127 lb 12.8 oz)   SpO2 98%   BMI 21.04 kg/m      Exam:   GENERAL APPEARANCE: Well developed, well nourished, alert, and in no apparent distress.  RESP: Good air flow throughout.  +Bibasilar inspiratory crackles.  +1 inspiratory squeak in LLL.  No rhonchi.  No wheezes.  CV: Normal S1, S2, regular rhythm, normal rate.  No murmur.  No LE edema.   MS: Extremities normal.  No clubbing.  No cyanosis.  No digital ulcers.  SKIN: No rash on limited exam.  NEURO: Mentation intact, speech normal.  PSYCH: mentation appears normal and affect normal/bright.      Results:  Recent Results (from the past week)   General PFT Lab (Please always keep checked)    Collection Time: 04/17/25  7:02 AM   Result Value Ref Range    FVC-Pred 2.47 L    FVC-Pre 2.80 L    FVC-%Pred-Pre 113 %    FEV1-Pre 2.37 L    FEV1-%Pred-Pre 123 %    FEV1FVC-Pred 78 %    FEV1FVC-Pre 85 %    FEFMax-Pred 5.18 L/sec    FEFMax-Pre 6.90 L/sec    FEFMax-%Pred-Pre 133 %    FEF2575-Pred 1.58 L/sec    FEF2575-Pre 2.87 L/sec    FVF3867-%Pred-Pre 181 %    ExpTime-Pre 5.32 sec    FIFMax-Pre 3.51 L/sec    VC-Pred 3.06 L    VC-Pre 2.65 L    VC-%Pred-Pre 86 %    IC-Pred 1.85 L    IC-Pre 1.81 L    IC-%Pred-Pre 97 %    ERV-Pred 0.93 L    ERV-Pre 0.84 L    ERV-%Pred-Pre 90 %    FEV1FEV6-Pred 78 %    FEV1FEV6-Pre 85 %    FRCPleth-Pred 2.95 L    FRCPleth-Pre 2.98 L    FRCPleth-%Pred-Pre 100 %    RVPleth-Pred 2.19 L    RVPleth-Pre 2.14 L    RVPleth-%Pred-Pre 97 %    TLCPleth-Pred 5.02 L    TLCPleth-Pre 4.79 L    TLCPleth-%Pred-Pre 95 %    DLCOunc-Pred 18.76 ml/min/mmHg    DLCOunc-Pre 18.80 ml/min/mmHg    DLCOunc-%Pred-Pre 100 %    VA-Pre 4.11 L    VA-%Pred-Pre 90 %    FEV1SVC-Pred 63 %    FEV1SVC-Pre 89 %       We reviewed the pulmonary function test that was performed today, which shows normal spirometry, lung volumes and diffusion.  Lung function has been stable since starting tocilizumab.    Labs were ordered but not drawn for this visit today.  We will review new labs  as soon as they are taken.    We reviewed results with the patient.        Assessment and plan:  Stephanie Bhatia is a 74 year old who is being seen for f/u of scleroderma ILD.    1.  Scleroderma ILD.  She has mild dyspnea on exertion that is stable.  PFT has been stable since starting tocilizumab approximately 3 years ago.  We will continue the current dose of 162 mg subcu weekly.  We see no reason to change to or add on mycophenolate or nintedanib at this time.  We discussed ongoing trials of other antifibrotic medications that have shown benefit.  She does not need additional medications at this time, but these might be options in the future if her scleroderma ILD worsens.  Given her response to tocilizumab, we are hopeful that her ILD will remain stable.  She will return for follow-up in 4-months.  2.  High risk medication immunosuppression monitoring.  Labs were not taken for her visit today.  They will be taken within 1-week, and we will review them when they are available.  We will recheck labs at her next visit to monitor tocilizumab.  We will also recheck a cholesterol level.  3.  GERD.  She is on daily omeprazole.  4.  ILD-PRO registry.  She is enrolled.  5. Health care maintenance.  She should get the COVID vaccine every 6 months.  Santos Chino, Medical Student  [employer]    I saw and evaluated the patient with Santos Chino MD/PhD student, confirming key aspects of the history and exam. I personally reviewed the recent PFT and other labs. The above note reflects our detailed discussion of the findings, assessment and plan.  I reviewed today's PFT: normal and stable.  I reviewed today's labs: normal.   The longitudinal plan of care for the diagnosis(es)/condition(s) as documented were addressed during this visit. Due to the added complexity in care, I will continue to support Stephanie in the subsequent management and with ongoing continuity of care.  I spent 38 minutes  reviewing chart, talking with and examining patient, reviewing test results, formulating plan and documentation on the day of the encounter (excluding PFT review).

## 2025-04-22 ENCOUNTER — OFFICE VISIT (OUTPATIENT)
Dept: FAMILY MEDICINE | Facility: CLINIC | Age: 75
End: 2025-04-22
Payer: COMMERCIAL

## 2025-04-22 VITALS
BODY MASS INDEX: 21.49 KG/M2 | OXYGEN SATURATION: 100 % | WEIGHT: 129 LBS | TEMPERATURE: 97.2 F | DIASTOLIC BLOOD PRESSURE: 66 MMHG | HEIGHT: 65 IN | SYSTOLIC BLOOD PRESSURE: 102 MMHG | HEART RATE: 69 BPM | RESPIRATION RATE: 16 BRPM

## 2025-04-22 DIAGNOSIS — R53.83 OTHER FATIGUE: ICD-10-CM

## 2025-04-22 DIAGNOSIS — K14.0 GLOSSITIS: Primary | ICD-10-CM

## 2025-04-22 PROCEDURE — 3074F SYST BP LT 130 MM HG: CPT | Performed by: INTERNAL MEDICINE

## 2025-04-22 PROCEDURE — 99213 OFFICE O/P EST LOW 20 MIN: CPT | Performed by: INTERNAL MEDICINE

## 2025-04-22 PROCEDURE — 3078F DIAST BP <80 MM HG: CPT | Performed by: INTERNAL MEDICINE

## 2025-04-22 ASSESSMENT — PATIENT HEALTH QUESTIONNAIRE - PHQ9
SUM OF ALL RESPONSES TO PHQ QUESTIONS 1-9: 8
SUM OF ALL RESPONSES TO PHQ QUESTIONS 1-9: 8
10. IF YOU CHECKED OFF ANY PROBLEMS, HOW DIFFICULT HAVE THESE PROBLEMS MADE IT FOR YOU TO DO YOUR WORK, TAKE CARE OF THINGS AT HOME, OR GET ALONG WITH OTHER PEOPLE: SOMEWHAT DIFFICULT

## 2025-04-22 NOTE — PROGRESS NOTES
"  Assessment & Plan     (K14.0) Glossitis  (primary encounter diagnosis)  Comment: Improving, has aphthous ulcer- wonder if stress related; if worsening, let me know.    (R53.83) Other fatigue  Comment: Lots of travel recently, feeling run down.  Gradually improving.     (M34.81) Systemic sclerosis with lung involvement (H)  (J84.9) ILD (interstitial lung disease) (H)  (M05.741,  M05.742) Rheumatoid arthritis involving both hands with positive rheumatoid factor (H)  Comment: Managed by Mercedes Gomez and Velia- Dr. Gomez prescribing tocilizumab- tolerating well and has had stable lung function.      Leeann Brown is a 74 year old, presenting for the following health issues:  Consult        4/22/2025    11:02 AM   Additional Questions   Roomed by minh   Accompanied by self     History of Present Illness       Reason for visit:  Doctor s request   She is taking medications regularly.      Pulm- Dr. Gomez- scleroderma ILD (SSDx 2009, ILD 2017), tociluzumab, saw 4/17/25    Tongue hurting with ulcer on left underside 4 days ago.  Getting labs done next Friday.  Has had a little bit of diarrhea- but it's getting better.     Also pulling out living will- planning to redo this.        Objective    /66   Pulse 69   Temp 97.2  F (36.2  C) (Temporal)   Resp 16   Ht 1.655 m (5' 5.16\")   Wt 58.5 kg (129 lb)   SpO2 100%   BMI 21.36 kg/m    Body mass index is 21.36 kg/m .  Physical Exam   GENERAL: alert and no distress  NECK: no adenopathy, no asymmetry, masses, or scars  RESP: lungs clear to auscultation - no rales, rhonchi or wheezes  MS: no gross musculoskeletal defects noted, no edema  PSYCH: mentation appears normal, affect normal/bright            Signed Electronically by: Radha Ventura, DO    "

## 2025-04-28 ENCOUNTER — LAB (OUTPATIENT)
Dept: LAB | Facility: CLINIC | Age: 75
End: 2025-04-28
Payer: COMMERCIAL

## 2025-04-28 DIAGNOSIS — J84.9 ILD (INTERSTITIAL LUNG DISEASE) (H): ICD-10-CM

## 2025-04-28 LAB
ALBUMIN SERPL BCG-MCNC: 4 G/DL (ref 3.5–5.2)
ALP SERPL-CCNC: 45 U/L (ref 40–150)
ALT SERPL W P-5'-P-CCNC: 24 U/L (ref 0–50)
AST SERPL W P-5'-P-CCNC: 32 U/L (ref 0–45)
BASOPHILS # BLD AUTO: 0.1 10E3/UL (ref 0–0.2)
BASOPHILS NFR BLD AUTO: 1 %
BILIRUB DIRECT SERPL-MCNC: 0.28 MG/DL (ref 0–0.3)
BILIRUB SERPL-MCNC: 1 MG/DL
CHOLEST SERPL-MCNC: 240 MG/DL
EOSINOPHIL # BLD AUTO: 0.6 10E3/UL (ref 0–0.7)
EOSINOPHIL NFR BLD AUTO: 11 %
ERYTHROCYTE [DISTWIDTH] IN BLOOD BY AUTOMATED COUNT: 12.4 % (ref 10–15)
FASTING STATUS PATIENT QL REPORTED: YES
HCT VFR BLD AUTO: 40.4 % (ref 35–47)
HDLC SERPL-MCNC: 44 MG/DL
HGB BLD-MCNC: 13.4 G/DL (ref 11.7–15.7)
IMM GRANULOCYTES # BLD: 0 10E3/UL
IMM GRANULOCYTES NFR BLD: 0 %
LDLC SERPL CALC-MCNC: 165 MG/DL
LYMPHOCYTES # BLD AUTO: 2.3 10E3/UL (ref 0.8–5.3)
LYMPHOCYTES NFR BLD AUTO: 42 %
MCH RBC QN AUTO: 31.4 PG (ref 26.5–33)
MCHC RBC AUTO-ENTMCNC: 33.2 G/DL (ref 31.5–36.5)
MCV RBC AUTO: 95 FL (ref 78–100)
MONOCYTES # BLD AUTO: 0.7 10E3/UL (ref 0–1.3)
MONOCYTES NFR BLD AUTO: 14 %
NEUTROPHILS # BLD AUTO: 1.8 10E3/UL (ref 1.6–8.3)
NEUTROPHILS NFR BLD AUTO: 32 %
NONHDLC SERPL-MCNC: 196 MG/DL
NRBC # BLD AUTO: 0 10E3/UL
NRBC BLD AUTO-RTO: 0 /100
PLATELET # BLD AUTO: 211 10E3/UL (ref 150–450)
PROT SERPL-MCNC: 6.4 G/DL (ref 6.4–8.3)
RBC # BLD AUTO: 4.27 10E6/UL (ref 3.8–5.2)
TRIGL SERPL-MCNC: 155 MG/DL
WBC # BLD AUTO: 5.4 10E3/UL (ref 4–11)

## 2025-04-28 PROCEDURE — 85025 COMPLETE CBC W/AUTO DIFF WBC: CPT | Performed by: PATHOLOGY

## 2025-04-28 PROCEDURE — 36415 COLL VENOUS BLD VENIPUNCTURE: CPT | Performed by: PATHOLOGY

## 2025-04-28 PROCEDURE — 82247 BILIRUBIN TOTAL: CPT | Performed by: INTERNAL MEDICINE

## 2025-04-28 PROCEDURE — 99000 SPECIMEN HANDLING OFFICE-LAB: CPT | Performed by: PATHOLOGY

## 2025-04-28 PROCEDURE — 80061 LIPID PANEL: CPT | Performed by: INTERNAL MEDICINE

## 2025-04-29 ENCOUNTER — MYC MEDICAL ADVICE (OUTPATIENT)
Dept: FAMILY MEDICINE | Facility: CLINIC | Age: 75
End: 2025-04-29
Payer: COMMERCIAL

## 2025-04-30 NOTE — TELEPHONE ENCOUNTER
Writer replied to patient via [employer].  DAMARIS Barreto BSN, PHN, AMB-BC (she/her)  [facility] RN

## 2025-05-19 NOTE — PROGRESS NOTES
[facility] Dermatology Note  Encounter Date: May 20, 2025  Office Visit     Dermatology Problem List:  # Chronic telogen effluvium   - Current tx: Dermasmoothe 2x/week, Ketoconazole 3x/week  # Tinea pedis, previously treated with ketoconazole cream  # AKs   - s/p cryotherapy   # Angular cheilitis  # Benign nevi   # Cherry hemangiomas  # CREST  # ILD; Actemra    # PMHx: Systemic sclerosis with ILD, on tocilizumab  ____________________________________________    Assessment & Plan:    # Aks, L nasal bridge, R upper cutaneous lip, upper chest   - Cryotherapy performed today. See procedure note below.     # Benign lesions - SKs, cherry angiomas, lentigenes.  - No treatment required    # Multiple benign nevi   - Monitor for ABCDEs of melanoma   - Continue sun protection - recommend SPF 30 or higher with frequent application   - Return sooner if noticing changing or symptomatic lesions     Procedures Performed:   Cryotherapy procedure note: After verbal consent and discussion of risks and benefits including but no limited to dyspigmentation/scar, blister, and pain, 3 Aks was(were) treated with 1-2mm freeze border for 2 cycles with liquid nitrogen. Post cryotherapy instructions were provided.     Follow-up: 1 year(s) in-person, or earlier for new or changing lesions    Staff and Scribe:     Scribe Disclosure:   I, May Mckee, am serving as a scribe; to document services personally performed by Sloane Abarca MD -based on data collection and the provider's statements to me.     Provider Disclosure:   The documentation recorded by the scribe accurately reflects the services I personally performed and the decisions made by me.    Sloane Abarca MD    Department of Dermatology  [facility]: Phone: [phone], Fax: [phone]  5/26/2025     ____________________________________________    CC: Derm Problem  (1.  FBSC/2.  Spot on nose )    HPI:  Ms. Stephanie Bhatia is a(n) 74 year old female who presents today [place] [facility]    Has a spot of concern on her nose today, this will bleed if she scrubs too hard.  Denies fhx or personal hx of skin cancer.   Has previously had cryotherapy performed on Aks.     Patient is otherwise feeling well, without additional skin concerns.    Labs Reviewed:  N/A    Physical Exam:  Vitals: There were no vitals taken for this visit.  SKIN: Total skin excluding the undergarment areas was performed. The exam included the head/face, neck, both arms, chest, back, abdomen, both legs, digits and/or nails.   - There are erythematous macules with overyling adherent scale on the L nasal bridge, R upper cutaneous lip, upper chest.   - There are dome shaped bright red papules on the trunk and extremities .   - Multiple regular brown pigmented macules and papules are identified on the trunk and extremities.  - Scattered brown macules on sun exposed areas.  - Waxy stuck on papules and plaques on trunk and extremities.   - No other lesions of concern on areas examined.     Medications:  Current Outpatient Medications   Medication Sig Dispense Refill    azithromycin (ZITHROMAX) 500 MG tablet Take 1 tablet (500 mg) by mouth daily. 3 tablet 0    Biotin 5000 MCG CAPS Take by mouth.      cyanocobalamin (VITAMIN B-12) 500 MCG tablet Take 500 mcg by mouth daily.      fluocinolone acetonide (DERMA SMOOTHE/FS BODY) 0.01 % external oil Apply to the scalp twice a week. Allow the medication to stay on the scalp for a minimum of 4 hours 118 mL 11    ibuprofen (ADVIL/MOTRIN) 200 MG tablet Take 200 mg by mouth every 4 hours as needed for mild pain      ketoconazole (NIZORAL) 2 % external shampoo Apply to the scalp three times a week. Lather and let sit for a minimum of 1 minute and then rinse. 120 mL 11    omeprazole (PRILOSEC) 20 MG DR capsule Take 20 mg by mouth daily.      tocilizumab (ACTEMRA) 162  MG/0.9ML subcutaneous injection Inject 0.9 mLs (162 mg) subcutaneously every 7 days. 3.6 mL 11    vitamin B-Complex Take 1 tablet by mouth daily.      Vitamin D, Cholecalciferol, 25 MCG (1000 UT) CAPS Take by mouth.       No current facility-administered medications for this visit.      Past Medical History:   Patient Active Problem List   Diagnosis    Raynaud phenomenon    Anxiety and depression    Lumbar radiculopathy    Encounter for monitoring tocilizumab therapy    Cluster headache syndrome    AK (actinic keratosis)    Osteoporosis    ILD (interstitial lung disease) (H)    Rheumatoid arthritis involving both hands with positive rheumatoid factor (H)    Senile osteoporosis    Systemic sclerosis with lung involvement (H)    Gastroesophageal reflux disease, unspecified whether esophagitis present    Major depressive disorder, recurrent episode, moderate (H)    Alcohol use disorder, moderate, in sustained remission (H)     Past Medical History:   Diagnosis Date    Anxiety     Arthritis 1997    scleroderma    Depressive disorder 1997    Herpes simplex without mention of complication     Interstitial lung disease (H)     scleroderma ILD, dx by chest CT 5/2017    Lung nodules     4 mm LLL and RLL 2017    Migraine     loss of speech and comprehension, has had w/u and is complex migraine, last one about 2/2017    Raynaud phenomenon     Systemic sclerosis with limited cutaneous involvement (H) 12/02/2014    Dx ~2010, Scl-70 and anti-centromere antibody neg, +RF        CC Rangel Garcia MD  [address] [place] [address] [address]  [place] [address] on close of this encounter.

## 2025-05-20 ENCOUNTER — OFFICE VISIT (OUTPATIENT)
Dept: DERMATOLOGY | Facility: CLINIC | Age: 75
End: 2025-05-20
Payer: COMMERCIAL

## 2025-05-20 DIAGNOSIS — L98.9 SKIN LESION: ICD-10-CM

## 2025-05-20 DIAGNOSIS — D22.9 MULTIPLE BENIGN NEVI: ICD-10-CM

## 2025-05-20 DIAGNOSIS — Z12.83 SKIN CANCER SCREENING: Primary | ICD-10-CM

## 2025-05-20 DIAGNOSIS — L82.1 SEBORRHEIC KERATOSES: ICD-10-CM

## 2025-05-20 DIAGNOSIS — L57.0 ACTINIC KERATOSES: ICD-10-CM

## 2025-05-20 DIAGNOSIS — D18.01 CHERRY ANGIOMA: ICD-10-CM

## 2025-05-20 DIAGNOSIS — L81.4 SOLAR LENTIGO: ICD-10-CM

## 2025-05-20 PROCEDURE — 99213 OFFICE O/P EST LOW 20 MIN: CPT | Mod: 25 | Performed by: DERMATOLOGY

## 2025-05-20 PROCEDURE — 17003 DESTRUCT PREMALG LES 2-14: CPT | Performed by: DERMATOLOGY

## 2025-05-20 PROCEDURE — 17000 DESTRUCT PREMALG LESION: CPT | Performed by: DERMATOLOGY

## 2025-05-20 NOTE — PATIENT INSTRUCTIONS
Proper skin care from [facility]:    -Eliminate harsh soaps as they strip the natural oils from the skin, often resulting in dry itchy skin ( i.e. Dial, Zest, [place] Spring)  -Use mild soaps such as Cetaphil or Dove Sensitive Skin in the shower. You do not need to use soap on arms, legs, and trunk every time you shower unless visibly soiled.   -Avoid hot or cold showers.  -After showering, lightly dry off and apply moisturizing within 2-3 minutes. This will help trap moisture in the skin.   -Aggressive use of a moisturizer at least 1-2 times a day to the entire body (including -Vanicream, Cetaphil, Aquaphor or Cerave) and moisturize hands after every washing.  -We recommend using moisturizers that come in a tub that needs to be scooped out, not a pump. This has more of an oil base. It will hold moisture in your skin much better than a water base moisturizer. The above recommended are non-pore clogging.      Wear a sunscreen with at least SPF 30 on your face, ears, neck and V of the chest daily. Wear sunscreen on other areas of the body if those areas are exposed to the sun throughout the day. Sunscreens can contain physical and/or chemical blockers. Physical blockers are less likely to clog pores, these include zinc oxide and titanium dioxide. Reapply every two hour and after swimming.     Sunscreen examples: https://www.ewg.org/sunscreen/    UV radiation  UVA radiation remains constant throughout the day and throughout the year. It is a longer wavelength than UVB and therefore penetrates deeper into the skin leading to immediate and delayed tanning, photoaging, and skin cancer. 70-80% of UVA and UVB radiation occurs between the hours of 10am-2pm.  UVB radiation  UVB radiation causes the most harmful effects and is more significant during the summer months. However, snow and ice can reflect UVB radiation leading to skin damage during the winter months as well. UVB radiation is responsible for tanning,  burning, inflammation, delayed erythema (pinkness), pigmentation (brown spots), and skin cancer.     I recommend self monthly full body exams and yearly full body exams with a dermatology provider. If you develop a new or changing lesion please follow up for examination. Most skin cancers are pink and scaly or pink and pearly. However, we do see blue/brown/black skin cancers.  Consider the ABCDEs of melanoma when giving yourself your monthly full body exam ( don't forget the groin, buttocks, feet, toes, etc). A-asymmetry, B-borders, C-color, D-diameter, E-elevation or evolving. If you see any of these changes please follow up in clinic. If you cannot see your back I recommend purchasing a hand held mirror to use with a larger wall mirror.       Checking for Skin Cancer  You can find cancer early by checking your skin each month. There are 3 kinds of skin cancer. They are melanoma, basal cell carcinoma, and squamous cell carcinoma. Doing monthly skin checks is the best way to find new marks or skin changes. Follow the instructions below for checking your skin.   The ABCDEs of checking moles for melanoma   Check your moles or growths for signs of melanoma using ABCDE:   Asymmetry: the sides of the mole or growth don t match  Border: the edges are ragged, notched, or blurred  Color: the color within the mole or growth varies  Diameter: the mole or growth is larger than 6 mm (size of a pencil eraser)  Evolving: the size, shape, or color of the mole or growth is changing (evolving is not shown in the images below)    Checking for other types of skin cancer  Basal cell carcinoma or squamous cell carcinoma have symptoms such as:     A spot or mole that looks different from all other marks on your skin  Changes in how an area feels, such as itching, tenderness, or pain  Changes in the skin's surface, such as oozing, bleeding, or scaliness  A sore that does not heal  New swelling or redness beyond the border of a  mole    Who s at risk?  Anyone can get skin cancer. But you are at greater risk if you have:   Fair skin, light-colored hair, or light-colored eyes  Many moles or abnormal moles on your skin  A history of sunburns from sunlight or tanning beds  A family history of skin cancer  A history of exposure to radiation or chemicals  A weakened immune system  If you have had skin cancer in the past, you are at risk for recurring skin cancer.   How to check your skin  Do your monthly skin checkups in front of a full-length mirror. Check all parts of your body, including your:   Head (ears, face, neck, and scalp)  Torso (front, back, and sides)  Arms (tops, undersides, upper, and lower armpits)  Hands (palms, backs, and fingers, including under the nails)  Buttocks and genitals  Legs (front, back, and sides)  Feet (tops, soles, toes, including under the nails, and between toes)  If you have a lot of moles, take digital photos of them each month. Make sure to take photos both up close and from a distance. These can help you see if any moles change over time.   Most skin changes are not cancer. But if you see any changes in your skin, call your doctor right away. Only he or she can diagnose a problem. If you have skin cancer, seeing your doctor can be the first step toward getting the treatment that could save your life.   Anafore last reviewed this educational content on 4/1/2019 2000-2020 [employer]. [address] [place] [address], [place] [address]. All rights reserved. This information is not intended as a substitute for professional medical care. Always follow your healthcare professional's instructions.       When should I call my doctor?  If you are worsening or not improving, please, contact us or seek urgent care as noted below.     Who should I call with questions (adults)?    [facility] [phone]  For urgent needs outside of business hours call the [facility] at  [phone] and ask for the dermatology resident on call to be paged  If this is a medical emergency and you are unable to reach an ER, Call 911      If you need a prescription refill, please contact your pharmacy. Refills are approved or denied by our Physicians during normal business hours, Monday through Friday.  Per office policy, refills will not be granted if you have not been seen within the past year (or sooner depending on the condition).    Cryotherapy    What is it?  Use of a very cold liquid, such as liquid nitrogen, to freeze and destroy abnormal skin cells that need to be removed    What should I expect?  Tenderness and redness  A small blister that might grow and fill with dark purple blood. There may be crusting.  More than one treatment may be needed if the lesions do not go away.    How do I care for the treated area?  Gently wash the area with your hands when bathing.  Use a thin layer of Vaseline to help with healing. You may use a Band-Aid.   The area should heal within 7-10 days and may leave behind a pink or lighter color.   Do not use an antibiotic or Neosporin ointment.   You may take acetaminophen (Tylenol) for pain.     Call your doctor if you have:  Severe pain  Signs of infection (warmth, redness, cloudy yellow drainage, and or a bad smell)  Questions or concerns    Who should I call with questions?      [facility]: [phone]      [facility]: [phone]      For urgent needs outside of business hours call the [facility] at [phone] and ask for the dermatology resident on call

## 2025-05-20 NOTE — LETTER
5/20/2025      Stephanie Bhatia  [address]  [facility] [address]      Dear Colleague,    Thank you for referring your patient, Stephanie Bhatia, to the [facility]. Please see a copy of my visit note below.    [facility] Dermatology Note  Encounter Date: May 20, 2025  Office Visit     Dermatology Problem List:  # Chronic telogen effluvium   - Current tx: Dermasmoothe 2x/week, Ketoconazole 3x/week  # Tinea pedis, previously treated with ketoconazole cream  # AKs   - s/p cryotherapy   # Angular cheilitis  # Benign nevi   # Cherry hemangiomas  # CREST  # ILD; Actemra    # PMHx: Systemic sclerosis with ILD, on tocilizumab  ____________________________________________    Assessment & Plan:    # Aks, L nasal bridge, R upper cutaneous lip, upper chest   - Cryotherapy performed today. See procedure note below.     # Benign lesions - SKs, cherry angiomas, lentigenes.  - No treatment required    # Multiple benign nevi   - Monitor for ABCDEs of melanoma   - Continue sun protection - recommend SPF 30 or higher with frequent application   - Return sooner if noticing changing or symptomatic lesions     Procedures Performed:   Cryotherapy procedure note: After verbal consent and discussion of risks and benefits including but no limited to dyspigmentation/scar, blister, and pain, 3 Aks was(were) treated with 1-2mm freeze border for 2 cycles with liquid nitrogen. Post cryotherapy instructions were provided.     Follow-up: 1 year(s) in-person, or earlier for new or changing lesions    Staff and Scribe:     Scribe Disclosure:   I, May Mckee, am serving as a scribe; to document services personally performed by Sloane Abarca MD -based on data collection and the provider's statements to me.     Provider Disclosure:   The documentation recorded by the scribe accurately reflects the services I personally performed and the decisions made by me.    Sloane Abarca MD  Assistant  Professor  Department of Dermatology  [facility]: Phone: [phone], Fax: [phone]  5/26/2025     ____________________________________________    CC: Derm Problem (1.  FBSC/2.  Spot on nose )    HPI:  Ms. Stephanie Bhatia is a(n) 74 year old female who presents today as a return patient for FBS.    Has a spot of concern on her nose today, this will bleed if she scrubs too hard.  Denies fhx or personal hx of skin cancer.   Has previously had cryotherapy performed on Aks.     Patient is otherwise feeling well, without additional skin concerns.    Labs Reviewed:  N/A    Physical Exam:  Vitals: There were no vitals taken for this visit.  SKIN: Total skin excluding the undergarment areas was performed. The exam included the head/face, neck, both arms, chest, back, abdomen, both legs, digits and/or nails.   - There are erythematous macules with overyling adherent scale on the L nasal bridge, R upper cutaneous lip, upper chest.   - There are dome shaped bright red papules on the trunk and extremities .   - Multiple regular brown pigmented macules and papules are identified on the trunk and extremities.  - Scattered brown macules on sun exposed areas.  - Waxy stuck on papules and plaques on trunk and extremities.   - No other lesions of concern on areas examined.     Medications:  Current Outpatient Medications   Medication Sig Dispense Refill     azithromycin (ZITHROMAX) 500 MG tablet Take 1 tablet (500 mg) by mouth daily. 3 tablet 0     Biotin 5000 MCG CAPS Take by mouth.       cyanocobalamin (VITAMIN B-12) 500 MCG tablet Take 500 mcg by mouth daily.       fluocinolone acetonide (DERMA SMOOTHE/FS BODY) 0.01 % external oil Apply to the scalp twice a week. Allow the medication to stay on the scalp for a minimum of 4 hours 118 mL 11     ibuprofen (ADVIL/MOTRIN) 200 MG tablet Take 200 mg by mouth every 4 hours as needed for mild pain        ketoconazole (NIZORAL) 2 % external shampoo Apply to the scalp three times a week. Lather and let sit for a minimum of 1 minute and then rinse. 120 mL 11     omeprazole (PRILOSEC) 20 MG DR capsule Take 20 mg by mouth daily.       tocilizumab (ACTEMRA) 162 MG/0.9ML subcutaneous injection Inject 0.9 mLs (162 mg) subcutaneously every 7 days. 3.6 mL 11     vitamin B-Complex Take 1 tablet by mouth daily.       Vitamin D, Cholecalciferol, 25 MCG (1000 UT) CAPS Take by mouth.       No current facility-administered medications for this visit.      Past Medical History:   Patient Active Problem List   Diagnosis     Raynaud phenomenon     Anxiety and depression     Lumbar radiculopathy     Encounter for monitoring tocilizumab therapy     Cluster headache syndrome     AK (actinic keratosis)     Osteoporosis     ILD (interstitial lung disease) (H)     Rheumatoid arthritis involving both hands with positive rheumatoid factor (H)     Senile osteoporosis     Systemic sclerosis with lung involvement (H)     Gastroesophageal reflux disease, unspecified whether esophagitis present     Major depressive disorder, recurrent episode, moderate (H)     Alcohol use disorder, moderate, in sustained remission (H)     Past Medical History:   Diagnosis Date     Anxiety      Arthritis 1997    scleroderma     Depressive disorder 1997     Herpes simplex without mention of complication      Interstitial lung disease (H)     scleroderma ILD, dx by chest CT 5/2017     Lung nodules     4 mm LLL and RLL 2017     Migraine     loss of speech and comprehension, has had w/u and is complex migraine, last one about 2/2017     Raynaud phenomenon      Systemic sclerosis with limited cutaneous involvement (H) 12/02/2014    Dx ~2010, Scl-70 and anti-centromere antibody neg, +RF        CC Rangel Garcia MD  [address] [place] [phone] on close of this encounter.      Again, thank you for allowing me to participate in the care of your  patient.        Sincerely,        Sloane Abarca MD    Electronically signed

## 2025-06-13 ENCOUNTER — OFFICE VISIT (OUTPATIENT)
Dept: DERMATOLOGY | Facility: CLINIC | Age: 75
End: 2025-06-13
Attending: DERMATOLOGY
Payer: COMMERCIAL

## 2025-06-13 DIAGNOSIS — L65.0 TELOGEN EFFLUVIUM: ICD-10-CM

## 2025-06-13 DIAGNOSIS — L65.9 LOSS OF HAIR: ICD-10-CM

## 2025-06-13 DIAGNOSIS — L30.9 DERMATITIS: Primary | ICD-10-CM

## 2025-06-13 PROCEDURE — 99214 OFFICE O/P EST MOD 30 MIN: CPT | Performed by: DERMATOLOGY

## 2025-06-13 PROCEDURE — 1126F AMNT PAIN NOTED NONE PRSNT: CPT | Performed by: DERMATOLOGY

## 2025-06-13 ASSESSMENT — PAIN SCALES - GENERAL: PAINLEVEL_OUTOF10: NO PAIN (0)

## 2025-06-13 NOTE — LETTER
6/13/2025       RE: Stephanie Bhatia  [address]  [place] [address]       Dear Colleague,    Thank you for referring your patient, Stephanie Bhatia, to the [facility] [place] [facility]. Please see a copy of my visit note below.    [facility] Dermatology Note  Encounter Date: Jun 13, 2025  Office Visit     Dermatology Problem List:  # Chronic telogen effluvium with scalp dermatitis  - Current tx: Dermasmoothe 2x/week, Ketoconazole 3x/week  - Labs: reviewed  - Hair metrix: 1/20/25    # Tinea pedis  - previously treated with ketoconazole cream    # AKs   # Angular cheilitis  # Benign nevi   # Cherry hemangiomas  # CREST  # ILD; Actemra    ____________________________________________    Assessment & Plan:    # Chronic Telogen Effluvium with scalp dermatitis  She seems to be improving with the current medical regimen. Her scalp is notably less erythematous and scaling than prior exams and her hair regrowth is evident, with 50% less thinning on the new regrowth when compared to previous exams.  She notes some shedding still which we will watch. Given recent increase in itching, will refer her to allergy for testing to rule out allergic component.     - Labs: CBC, CMP, Vit D, TSH, Iron studies (ferritin, iron), androgen studies (free and total testosterone, DHEAS) reviewed   - ketoconazole 2% shampoo in the shower 3x/week. If irritating, may alternate with Head and Shoulders.   -  fluocinolone (derma-smooth) oil on the scalp twice weekly.   - RTC in 6 months for further evaluation    # History of sensitivities to products  Had an episode of lip swelling last year presumably in response to a lip moisturizer. Has never had allergies in the past and no personal or FHX of atopy. Never been allergy tested in the past.     - Referral placed to allergy for further testing    Procedures Performed:   None    Follow-up: 6  month(s) in-person, or earlier for new or changing lesions    Staff and Medical Student:     Cammy Palomares, MS4    Staffed by Dr. Weems    Staff Physician:  I was present with the medical student who participated in the service and in the documentation of the note. I have verified the history and personally performed the physical exam and medical decision making. I agree with the assessment and plan of care as documented in the note.           Tenisha Weems MD  Professor   Department of Dermatology  [facility]: Phone: [phone], Fax:[phone]  [facility]: Phone: [phone], Fax: [phone]      ____________________________________________    CC: Hair Loss (Hair loss follow up; Stephanie reports that she is losing more hair lately and itchy scalp.)    HPI:  Ms. Stephanie Bhatia is a(n) 74 year old female with a PMH of scleroderma and ILD on actemra who presents today as a return patient for follow up on scalp hair loss.     She does not feel she has had much subjective hair regrowth. She reports that she is seeing more hair in the shower drain recently and having increased itching worse in the evening when she is not at work. Not sure if it is allergies. No history of atopy. Had an episode of lip swelling last year in response to a new lip moisturizer (unsure which one). Has never been allergy tested.     Currently using Dermasmoothe 2x/week, Ketoconazole 3x/week, Aveda Envody line shampoo, conditioner, tonic. Worst area is still vertex scalp. No loss of body hair, eyebrows or eyelashes. Washes hair up to 5 times a week.    No other concerns today and in general state of health.     Labs:  CMP, CBC w Diff, TSH, Vit D, Ferritin, Iron, DHEA-S, Testosterone total and free reviewed.    Physical Exam:  SKIN: Focused examination of scalp, hands and face was performed.  - Mild notable perifollicular  erythema and mild scale  - Lower half of the hair is 50% less dense than scalp growth areas  - Daniel: 1.2   - First layer 1-2, second 4-5, third 8-9cm  - Temple thinning (L > R)  - Negative hair pull tests  - No other lesions of concern on areas examined.     Medications:  Current Outpatient Medications   Medication Sig Dispense Refill    azithromycin (ZITHROMAX) 500 MG tablet Take 1 tablet (500 mg) by mouth daily. 3 tablet 0    Biotin 5000 MCG CAPS Take by mouth.      cyanocobalamin (VITAMIN B-12) 500 MCG tablet Take 500 mcg by mouth daily.      fluocinolone acetonide (DERMA SMOOTHE/FS BODY) 0.01 % external oil Apply to the scalp twice a week. Allow the medication to stay on the scalp for a minimum of 4 hours 118 mL 11    ibuprofen (ADVIL/MOTRIN) 200 MG tablet Take 200 mg by mouth every 4 hours as needed for mild pain      ketoconazole (NIZORAL) 2 % external shampoo Apply to the scalp three times a week. Lather and let sit for a minimum of 1 minute and then rinse. 120 mL 11    omeprazole (PRILOSEC) 20 MG DR capsule Take 20 mg by mouth daily.      tocilizumab (ACTEMRA) 162 MG/0.9ML subcutaneous injection Inject 0.9 mLs (162 mg) subcutaneously every 7 days. 3.6 mL 11    vitamin B-Complex Take 1 tablet by mouth daily.      Vitamin D, Cholecalciferol, 25 MCG (1000 UT) CAPS Take by mouth.       No current facility-administered medications for this visit.      Past Medical History:   Patient Active Problem List   Diagnosis    Raynaud phenomenon    Anxiety and depression    Lumbar radiculopathy    Encounter for monitoring tocilizumab therapy    Cluster headache syndrome    AK (actinic keratosis)    Osteoporosis    ILD (interstitial lung disease) (H)    Rheumatoid arthritis involving both hands with positive rheumatoid factor (H)    Senile osteoporosis    Systemic sclerosis with lung involvement (H)    Gastroesophageal reflux disease, unspecified whether esophagitis present    Major depressive disorder, recurrent episode,  moderate (H)    Alcohol use disorder, moderate, in sustained remission (H)     Past Medical History:   Diagnosis Date    Anxiety     Arthritis 1997    scleroderma    Depressive disorder 1997    Herpes simplex without mention of complication     Interstitial lung disease (H)     scleroderma ILD, dx by chest CT 5/2017    Lung nodules     4 mm LLL and RLL 2017    Migraine     loss of speech and comprehension, has had w/u and is complex migraine, last one about 2/2017    Raynaud phenomenon     Systemic sclerosis with limited cutaneous involvement (H) 12/02/2014    Dx ~2010, Scl-70 and anti-centromere antibody neg, +RF         Again, thank you for allowing me to participate in the care of your patient.      Sincerely,    Tenisha Weems MD

## 2025-06-13 NOTE — NURSING NOTE
Dermatology Rooming Note    Stephanie Bhatia's goals for this visit include:   Chief Complaint   Patient presents with    Hair Loss     Hair loss follow up; Stephanie reports that she is losing more hair lately and itchy scalp.     LIZZETH Pillai

## 2025-06-13 NOTE — PROGRESS NOTES
[facility] Dermatology Note  Encounter Date: Jun 13, 2025  Office Visit     Dermatology Problem List:  # Chronic telogen effluvium with scalp dermatitis  - Current tx: Dermasmoothe 2x/week, Ketoconazole 3x/week  - Labs: reviewed  - Hair metrix: 1/20/25    # Tinea pedis  - previously treated with ketoconazole cream    # AKs   # Angular cheilitis  # Benign nevi   # Cherry hemangiomas  # CREST  # ILD; Actemra    ____________________________________________    Assessment & Plan:    # Chronic Telogen Effluvium with scalp dermatitis  She seems to be improving with the current medical regimen. Her scalp is notably less erythematous and scaling than prior exams and her hair regrowth is evident, with 50% less thinning on the new regrowth when compared to previous exams.  She notes some shedding still which we will watch. Given recent increase in itching, will refer her to allergy for testing to rule out allergic component.     - Labs: CBC, CMP, Vit D, TSH, Iron studies (ferritin, iron), androgen studies (free and total testosterone, DHEAS) reviewed   - ketoconazole 2% shampoo in the shower 3x/week. If irritating, may alternate with Head and Shoulders.   -  fluocinolone (derma-smooth) oil on the scalp twice weekly.   - RTC in 6 months for further evaluation    # History of sensitivities to products  Had an episode of lip swelling last year presumably in response to a lip moisturizer. Has never had allergies in the past and no personal or FHX of atopy. Never been allergy tested in the past.     - Referral placed to allergy for further testing    Procedures Performed:   None    Follow-up: 6 month(s) in-person, or earlier for new or changing lesions    Staff and Medical Student:     Cammy Palomares, MS4    Staffed by Dr. Weems    Staff Physician:  I was present with the medical student who participated in the service and in the documentation of the note. I have verified the history and personally performed  the physical exam and medical decision making. I agree with the assessment and plan of care as documented in the note.           Tenisha Weems MD  Professor   Department of Dermatology  [facility]: Phone: [phone], Fax:[phone]  [facility]: Phone: [phone], Fax: [phone]      ____________________________________________    CC: Hair Loss (Hair loss follow up; Stephanie reports that she is losing more hair lately and itchy scalp.)    HPI:  Ms. Stephanie Bhatia is a(n) 74 year old female with a PMH of scleroderma and ILD on actemra who presents today as a return patient for follow up on scalp hair loss.     She does not feel she has had much subjective hair regrowth. She reports that she is seeing more hair in the shower drain recently and having increased itching worse in the evening when she is not at work. Not sure if it is allergies. No history of atopy. Had an episode of lip swelling last year in response to a new lip moisturizer (unsure which one). Has never been allergy tested.     Currently using Dermasmoothe 2x/week, Ketoconazole 3x/week, [employer]a Envody line shampoo, conditioner, tonic. Worst area is still vertex scalp. No loss of body hair, eyebrows or eyelashes. Washes hair up to 5 times a week.    No other concerns today and in general state of health.     Labs:  CMP, CBC w Diff, TSH, Vit D, Ferritin, Iron, DHEA-S, Testosterone total and free reviewed.    Physical Exam:  SKIN: Focused examination of scalp, hands and face was performed.  - Mild notable perifollicular erythema and mild scale  - Lower half of the hair is 50% less dense than scalp growth areas  - Daniel: 1.2   - First layer 1-2, second 4-5, third 8-9cm  - Temple thinning (L > R)  - Negative hair pull tests  - No other lesions of concern on areas examined.     Medications:  Current Outpatient Medications   Medication Sig  Dispense Refill    azithromycin (ZITHROMAX) 500 MG tablet Take 1 tablet (500 mg) by mouth daily. 3 tablet 0    Biotin 5000 MCG CAPS Take by mouth.      cyanocobalamin (VITAMIN B-12) 500 MCG tablet Take 500 mcg by mouth daily.      fluocinolone acetonide (DERMA SMOOTHE/FS BODY) 0.01 % external oil Apply to the scalp twice a week. Allow the medication to stay on the scalp for a minimum of 4 hours 118 mL 11    ibuprofen (ADVIL/MOTRIN) 200 MG tablet Take 200 mg by mouth every 4 hours as needed for mild pain      ketoconazole (NIZORAL) 2 % external shampoo Apply to the scalp three times a week. Lather and let sit for a minimum of 1 minute and then rinse. 120 mL 11    omeprazole (PRILOSEC) 20 MG DR capsule Take 20 mg by mouth daily.      tocilizumab (ACTEMRA) 162 MG/0.9ML subcutaneous injection Inject 0.9 mLs (162 mg) subcutaneously every 7 days. 3.6 mL 11    vitamin B-Complex Take 1 tablet by mouth daily.      Vitamin D, Cholecalciferol, 25 MCG (1000 UT) CAPS Take by mouth.       No current facility-administered medications for this visit.      Past Medical History:   Patient Active Problem List   Diagnosis    Raynaud phenomenon    Anxiety and depression    Lumbar radiculopathy    Encounter for monitoring tocilizumab therapy    Cluster headache syndrome    AK (actinic keratosis)    Osteoporosis    ILD (interstitial lung disease) (H)    Rheumatoid arthritis involving both hands with positive rheumatoid factor (H)    Senile osteoporosis    Systemic sclerosis with lung involvement (H)    Gastroesophageal reflux disease, unspecified whether esophagitis present    Major depressive disorder, recurrent episode, moderate (H)    Alcohol use disorder, moderate, in sustained remission (H)     Past Medical History:   Diagnosis Date    Anxiety     Arthritis 1997    scleroderma    Depressive disorder 1997    Herpes simplex without mention of complication     Interstitial lung disease (H)     scleroderma ILD, dx by chest CT 5/2017     Lung nodules     4 mm LLL and RLL 2017    Migraine     loss of speech and comprehension, has had w/u and is complex migraine, last one about 2/2017    Raynaud phenomenon     Systemic sclerosis with limited cutaneous involvement (H) 12/02/2014    Dx ~2010, Scl-70 and anti-centromere antibody neg, +RF

## 2025-08-20 ENCOUNTER — TELEPHONE (OUTPATIENT)
Dept: PULMONOLOGY | Facility: CLINIC | Age: 75
End: 2025-08-20
Payer: COMMERCIAL

## (undated) RX ORDER — BUPIVACAINE HYDROCHLORIDE 5 MG/ML
INJECTION, SOLUTION EPIDURAL; INTRACAUDAL
Status: DISPENSED
Start: 2019-11-13

## (undated) RX ORDER — METHYLPREDNISOLONE ACETATE 80 MG/ML
INJECTION, SUSPENSION INTRA-ARTICULAR; INTRALESIONAL; INTRAMUSCULAR; SOFT TISSUE
Status: DISPENSED
Start: 2019-11-13

## (undated) RX ORDER — LIDOCAINE HYDROCHLORIDE 10 MG/ML
INJECTION, SOLUTION EPIDURAL; INFILTRATION; INTRACAUDAL; PERINEURAL
Status: DISPENSED
Start: 2019-11-13